# Patient Record
Sex: MALE | Race: WHITE | NOT HISPANIC OR LATINO | URBAN - METROPOLITAN AREA
[De-identification: names, ages, dates, MRNs, and addresses within clinical notes are randomized per-mention and may not be internally consistent; named-entity substitution may affect disease eponyms.]

---

## 2017-03-01 ENCOUNTER — INPATIENT (INPATIENT)
Facility: HOSPITAL | Age: 77
LOS: 2 days | Discharge: NURSING, REHAB OR LONG-TERM CARE | End: 2017-03-04
Attending: HOSPITALIST | Admitting: HOSPITALIST
Payer: MEDICARE

## 2017-03-01 VITALS
OXYGEN SATURATION: 95 % | RESPIRATION RATE: 16 BRPM | TEMPERATURE: 98 F | HEART RATE: 67 BPM | SYSTOLIC BLOOD PRESSURE: 151 MMHG | DIASTOLIC BLOOD PRESSURE: 83 MMHG

## 2017-03-01 DIAGNOSIS — Z29.9 ENCOUNTER FOR PROPHYLACTIC MEASURES, UNSPECIFIED: ICD-10-CM

## 2017-03-01 DIAGNOSIS — I63.9 CEREBRAL INFARCTION, UNSPECIFIED: ICD-10-CM

## 2017-03-01 DIAGNOSIS — I25.10 ATHEROSCLEROTIC HEART DISEASE OF NATIVE CORONARY ARTERY WITHOUT ANGINA PECTORIS: ICD-10-CM

## 2017-03-01 DIAGNOSIS — I15.9 SECONDARY HYPERTENSION, UNSPECIFIED: ICD-10-CM

## 2017-03-01 DIAGNOSIS — R26.81 UNSTEADINESS ON FEET: ICD-10-CM

## 2017-03-01 LAB
ALBUMIN SERPL ELPH-MCNC: 3.7 G/DL — SIGNIFICANT CHANGE UP (ref 3.3–5)
ALP SERPL-CCNC: 88 U/L — SIGNIFICANT CHANGE UP (ref 40–120)
ALT FLD-CCNC: 15 U/L — SIGNIFICANT CHANGE UP (ref 4–41)
APTT BLD: 27.8 SEC — SIGNIFICANT CHANGE UP (ref 27.5–37.4)
AST SERPL-CCNC: 19 U/L — SIGNIFICANT CHANGE UP (ref 4–40)
BASOPHILS # BLD AUTO: 0.03 K/UL — SIGNIFICANT CHANGE UP (ref 0–0.2)
BASOPHILS NFR BLD AUTO: 0.5 % — SIGNIFICANT CHANGE UP (ref 0–2)
BILIRUB SERPL-MCNC: 0.7 MG/DL — SIGNIFICANT CHANGE UP (ref 0.2–1.2)
BUN SERPL-MCNC: 18 MG/DL — SIGNIFICANT CHANGE UP (ref 7–23)
CALCIUM SERPL-MCNC: 9.1 MG/DL — SIGNIFICANT CHANGE UP (ref 8.4–10.5)
CHLORIDE SERPL-SCNC: 105 MMOL/L — SIGNIFICANT CHANGE UP (ref 98–107)
CO2 SERPL-SCNC: 23 MMOL/L — SIGNIFICANT CHANGE UP (ref 22–31)
CREAT SERPL-MCNC: 1.23 MG/DL — SIGNIFICANT CHANGE UP (ref 0.5–1.3)
EOSINOPHIL # BLD AUTO: 0.13 K/UL — SIGNIFICANT CHANGE UP (ref 0–0.5)
EOSINOPHIL NFR BLD AUTO: 2.1 % — SIGNIFICANT CHANGE UP (ref 0–6)
GLUCOSE SERPL-MCNC: 91 MG/DL — SIGNIFICANT CHANGE UP (ref 70–99)
HCT VFR BLD CALC: 40.5 % — SIGNIFICANT CHANGE UP (ref 39–50)
HGB BLD-MCNC: 13.2 G/DL — SIGNIFICANT CHANGE UP (ref 13–17)
IMM GRANULOCYTES NFR BLD AUTO: 0.2 % — SIGNIFICANT CHANGE UP (ref 0–1.5)
INR BLD: 1.06 — SIGNIFICANT CHANGE UP (ref 0.87–1.18)
LYMPHOCYTES # BLD AUTO: 1.68 K/UL — SIGNIFICANT CHANGE UP (ref 1–3.3)
LYMPHOCYTES # BLD AUTO: 27.4 % — SIGNIFICANT CHANGE UP (ref 13–44)
MCHC RBC-ENTMCNC: 30.8 PG — SIGNIFICANT CHANGE UP (ref 27–34)
MCHC RBC-ENTMCNC: 32.6 % — SIGNIFICANT CHANGE UP (ref 32–36)
MCV RBC AUTO: 94.6 FL — SIGNIFICANT CHANGE UP (ref 80–100)
MONOCYTES # BLD AUTO: 0.58 K/UL — SIGNIFICANT CHANGE UP (ref 0–0.9)
MONOCYTES NFR BLD AUTO: 9.4 % — SIGNIFICANT CHANGE UP (ref 2–14)
NEUTROPHILS # BLD AUTO: 3.71 K/UL — SIGNIFICANT CHANGE UP (ref 1.8–7.4)
NEUTROPHILS NFR BLD AUTO: 60.4 % — SIGNIFICANT CHANGE UP (ref 43–77)
PLATELET # BLD AUTO: 119 K/UL — LOW (ref 150–400)
PMV BLD: 11.3 FL — SIGNIFICANT CHANGE UP (ref 7–13)
POTASSIUM SERPL-MCNC: 4.4 MMOL/L — SIGNIFICANT CHANGE UP (ref 3.5–5.3)
POTASSIUM SERPL-SCNC: 4.4 MMOL/L — SIGNIFICANT CHANGE UP (ref 3.5–5.3)
PROT SERPL-MCNC: 7.4 G/DL — SIGNIFICANT CHANGE UP (ref 6–8.3)
PROTHROM AB SERPL-ACNC: 12.1 SEC — SIGNIFICANT CHANGE UP (ref 10–13.1)
RBC # BLD: 4.28 M/UL — SIGNIFICANT CHANGE UP (ref 4.2–5.8)
RBC # FLD: 16.4 % — HIGH (ref 10.3–14.5)
SODIUM SERPL-SCNC: 143 MMOL/L — SIGNIFICANT CHANGE UP (ref 135–145)
WBC # BLD: 6.14 K/UL — SIGNIFICANT CHANGE UP (ref 3.8–10.5)
WBC # FLD AUTO: 6.14 K/UL — SIGNIFICANT CHANGE UP (ref 3.8–10.5)

## 2017-03-01 PROCEDURE — 70450 CT HEAD/BRAIN W/O DYE: CPT | Mod: 26,59

## 2017-03-01 PROCEDURE — 99223 1ST HOSP IP/OBS HIGH 75: CPT | Mod: GC

## 2017-03-01 PROCEDURE — 70498 CT ANGIOGRAPHY NECK: CPT | Mod: 26,52

## 2017-03-01 PROCEDURE — 70496 CT ANGIOGRAPHY HEAD: CPT | Mod: 26,59

## 2017-03-01 PROCEDURE — 71020: CPT | Mod: 26

## 2017-03-01 RX ORDER — SIMVASTATIN 20 MG/1
20 TABLET, FILM COATED ORAL AT BEDTIME
Qty: 0 | Refills: 0 | Status: DISCONTINUED | OUTPATIENT
Start: 2017-03-01 | End: 2017-03-01

## 2017-03-01 RX ORDER — BUPROPION HYDROCHLORIDE 150 MG/1
150 TABLET, EXTENDED RELEASE ORAL
Qty: 0 | Refills: 0 | Status: DISCONTINUED | OUTPATIENT
Start: 2017-03-01 | End: 2017-03-04

## 2017-03-01 RX ORDER — HEPARIN SODIUM 5000 [USP'U]/ML
5000 INJECTION INTRAVENOUS; SUBCUTANEOUS EVERY 12 HOURS
Qty: 0 | Refills: 0 | Status: DISCONTINUED | OUTPATIENT
Start: 2017-03-01 | End: 2017-03-04

## 2017-03-01 RX ORDER — CLOPIDOGREL BISULFATE 75 MG/1
75 TABLET, FILM COATED ORAL DAILY
Qty: 0 | Refills: 0 | Status: DISCONTINUED | OUTPATIENT
Start: 2017-03-01 | End: 2017-03-04

## 2017-03-01 RX ORDER — ASPIRIN/CALCIUM CARB/MAGNESIUM 324 MG
81 TABLET ORAL DAILY
Qty: 0 | Refills: 0 | Status: DISCONTINUED | OUTPATIENT
Start: 2017-03-01 | End: 2017-03-04

## 2017-03-01 RX ORDER — NICOTINE POLACRILEX 2 MG
1 GUM BUCCAL DAILY
Qty: 0 | Refills: 0 | Status: DISCONTINUED | OUTPATIENT
Start: 2017-03-01 | End: 2017-03-04

## 2017-03-01 RX ORDER — SIMVASTATIN 20 MG/1
40 TABLET, FILM COATED ORAL AT BEDTIME
Qty: 0 | Refills: 0 | Status: DISCONTINUED | OUTPATIENT
Start: 2017-03-01 | End: 2017-03-04

## 2017-03-01 RX ADMIN — SIMVASTATIN 40 MILLIGRAM(S): 20 TABLET, FILM COATED ORAL at 22:44

## 2017-03-01 NOTE — H&P ADULT. - NEGATIVE OPHTHALMOLOGIC SYMPTOMS
no lacrimation L/no blurred vision L/no lacrimation R/no blurred vision R/no discharge R/no discharge L/no photophobia

## 2017-03-01 NOTE — H&P ADULT. - ASSESSMENT
76M admitted for unsteady gait due to b/l LE weakness r/o Stroke     Neuro consult ion the chart  NIHSS= 1

## 2017-03-01 NOTE — ED ADULT TRIAGE NOTE - CHIEF COMPLAINT QUOTE
Pt states that he has problems with his feet, that they are unsteady and he had multiple recent falls, last one yesterday. Denies injury from falls of head trauma. Pt on Plavix. Denies chest pain/palpitations/dizziness/SOB. Ambulatory in triage with cane Pt states that he has "problems" with his feet, that they are unsteady and he had multiple recent falls, last one yesterday. Denies injury from falls of head trauma. Pt on Plavix. Denies chest pain/palpitations/dizziness/SOB. Ambulatory in triage with cane. Denies foot pain.

## 2017-03-01 NOTE — ED PROVIDER NOTE - ATTENDING CONTRIBUTION TO CARE
73 yo male hx of stent also psychiatric hx gives hx of several episodes of isolated dizziness, not related to change of position, each episode happened while walking; denies focal numbness or weakness. Exam slow small steppage, mild increase tone of arms without gross cogwheeling no romberg and nl cerebellar testing. Imp: r/o movement disorder. Plan, given hx of Plavix, concerned about falling risk; CT of head, neuro consult

## 2017-03-01 NOTE — H&P ADULT. - PROBLEM SELECTOR PLAN 1
CM  F/U A1C, FLP, TSH, CBC, BMP in am   F/U TTE Bubble study,   F/U CTA H & N   F/U PT, OT, PMR  No need for permissive HTN  Okay to eat  Fall, Aspiration and seizure precautions

## 2017-03-01 NOTE — ED PROVIDER NOTE - PROGRESS NOTE DETAILS
Pt stable. Labs, ecg, imaging, neuro c/s appreciated. Pt has ppm, unable to get mri. Will admit to med on tele for further workup and management. Pt's pmd does not come here as per pt. Dr. Marie and MAR aware.  Vijay Fuller MD PGY-3

## 2017-03-01 NOTE — H&P ADULT. - NEGATIVE NEUROLOGICAL SYMPTOMS
no tremors/no generalized seizures/no syncope/no paresthesias/no vertigo/no focal seizures/no loss of sensation

## 2017-03-01 NOTE — ED PROVIDER NOTE - OBJECTIVE STATEMENT
77 y/o M with PMH of CAD (s/p stents), HTN p/w unsteadiness x 1 week. Pt reports multiple falls in the last  week, feels unsteady on his feet. Pt is on Plavix. Pt denies fevers, chills, nausea, vomiting, cough, dysuria, hematuria, numbness, weakness, vision changes, head trauma, LOC, chest pain, palpitations. Pt lives alone at home.

## 2017-03-01 NOTE — ED PROVIDER NOTE - MEDICAL DECISION MAKING DETAILS
unsteadiness, likely neurological, possibly early parkinson's, labs, ct, cxr, ecg, neuro c/s, reassess

## 2017-03-01 NOTE — H&P ADULT. - ATTENDING COMMENTS
-pt seen and examined by me  -75 yo m with 2-3 falls over last week that he attributes to leg giving out; denies periepisodic  cp, sob, lightheadedness, dizziness, palpitations. Denies recent changes to meds or dosages, denies decreases po intake, denies nausea, vomiting, diarrhea. Currently manifesting 5/5 strength throughout  -cont tele; CTA of head and neck ordered as pt with PPM  -would ensure PPM current with interrogation  -d/w tele pa -pt seen and examined by me  -75 yo m with 2-3 falls over last week that he attributes to leg giving out; denies periepisodic  cp, sob, lightheadedness, dizziness, palpitations. Denies recent changes to meds or dosages, denies decreases po intake, denies nausea, vomiting, diarrhea. Currently manifesting 5/5 strength throughout  -cont tele; CTA of head and neck ordered as pt with PPM  -would ensure PPM current with interrogation; pt unsure when last interrogated or by whom; brother may know; would try contact brother Johnson at [phone]  -d/w tele pa

## 2017-03-01 NOTE — H&P ADULT. - NEGATIVE ENMT SYMPTOMS
no abnormal taste sensation/no sinus symptoms/no recurrent cold sores/no nose bleeds/no nasal discharge/no vertigo

## 2017-03-02 DIAGNOSIS — Z95.0 PRESENCE OF CARDIAC PACEMAKER: ICD-10-CM

## 2017-03-02 LAB
BUN SERPL-MCNC: 14 MG/DL — SIGNIFICANT CHANGE UP (ref 7–23)
CALCIUM SERPL-MCNC: 9.3 MG/DL — SIGNIFICANT CHANGE UP (ref 8.4–10.5)
CHLORIDE SERPL-SCNC: 101 MMOL/L — SIGNIFICANT CHANGE UP (ref 98–107)
CHOLEST SERPL-MCNC: 115 MG/DL — LOW (ref 120–199)
CO2 SERPL-SCNC: 22 MMOL/L — SIGNIFICANT CHANGE UP (ref 22–31)
CREAT SERPL-MCNC: 1.1 MG/DL — SIGNIFICANT CHANGE UP (ref 0.5–1.3)
GLUCOSE SERPL-MCNC: 101 MG/DL — HIGH (ref 70–99)
HBA1C BLD-MCNC: 5.5 % — SIGNIFICANT CHANGE UP (ref 4–5.6)
HCT VFR BLD CALC: 42.2 % — SIGNIFICANT CHANGE UP (ref 39–50)
HDLC SERPL-MCNC: 43 MG/DL — SIGNIFICANT CHANGE UP (ref 35–55)
HGB BLD-MCNC: 13.8 G/DL — SIGNIFICANT CHANGE UP (ref 13–17)
LIPID PNL WITH DIRECT LDL SERPL: 57 MG/DL — SIGNIFICANT CHANGE UP
MCHC RBC-ENTMCNC: 30.5 PG — SIGNIFICANT CHANGE UP (ref 27–34)
MCHC RBC-ENTMCNC: 32.7 % — SIGNIFICANT CHANGE UP (ref 32–36)
MCV RBC AUTO: 93.4 FL — SIGNIFICANT CHANGE UP (ref 80–100)
PLATELET # BLD AUTO: 124 K/UL — LOW (ref 150–400)
PMV BLD: 11.3 FL — SIGNIFICANT CHANGE UP (ref 7–13)
POTASSIUM SERPL-MCNC: 4.4 MMOL/L — SIGNIFICANT CHANGE UP (ref 3.5–5.3)
POTASSIUM SERPL-SCNC: 4.4 MMOL/L — SIGNIFICANT CHANGE UP (ref 3.5–5.3)
RBC # BLD: 4.52 M/UL — SIGNIFICANT CHANGE UP (ref 4.2–5.8)
RBC # FLD: 16.3 % — HIGH (ref 10.3–14.5)
SODIUM SERPL-SCNC: 139 MMOL/L — SIGNIFICANT CHANGE UP (ref 135–145)
TRIGL SERPL-MCNC: 79 MG/DL — SIGNIFICANT CHANGE UP (ref 10–149)
TSH SERPL-MCNC: 2.89 UIU/ML — SIGNIFICANT CHANGE UP (ref 0.27–4.2)
WBC # BLD: 7.57 K/UL — SIGNIFICANT CHANGE UP (ref 3.8–10.5)
WBC # FLD AUTO: 7.57 K/UL — SIGNIFICANT CHANGE UP (ref 3.8–10.5)

## 2017-03-02 PROCEDURE — 72125 CT NECK SPINE W/O DYE: CPT | Mod: 26

## 2017-03-02 PROCEDURE — 99233 SBSQ HOSP IP/OBS HIGH 50: CPT

## 2017-03-02 PROCEDURE — 72128 CT CHEST SPINE W/O DYE: CPT | Mod: 26

## 2017-03-02 PROCEDURE — 99222 1ST HOSP IP/OBS MODERATE 55: CPT | Mod: GC

## 2017-03-02 PROCEDURE — 72131 CT LUMBAR SPINE W/O DYE: CPT | Mod: 26

## 2017-03-02 RX ORDER — AMLODIPINE BESYLATE 2.5 MG/1
5 TABLET ORAL DAILY
Qty: 0 | Refills: 0 | Status: DISCONTINUED | OUTPATIENT
Start: 2017-03-02 | End: 2017-03-03

## 2017-03-02 RX ORDER — INFLUENZA VIRUS VACCINE 15; 15; 15; 15 UG/.5ML; UG/.5ML; UG/.5ML; UG/.5ML
0.5 SUSPENSION INTRAMUSCULAR ONCE
Qty: 0 | Refills: 0 | Status: COMPLETED | OUTPATIENT
Start: 2017-03-02 | End: 2017-03-02

## 2017-03-02 RX ADMIN — HEPARIN SODIUM 5000 UNIT(S): 5000 INJECTION INTRAVENOUS; SUBCUTANEOUS at 19:32

## 2017-03-02 RX ADMIN — BUPROPION HYDROCHLORIDE 150 MILLIGRAM(S): 150 TABLET, EXTENDED RELEASE ORAL at 19:32

## 2017-03-02 RX ADMIN — Medication 81 MILLIGRAM(S): at 12:37

## 2017-03-02 RX ADMIN — HEPARIN SODIUM 5000 UNIT(S): 5000 INJECTION INTRAVENOUS; SUBCUTANEOUS at 05:22

## 2017-03-02 RX ADMIN — CLOPIDOGREL BISULFATE 75 MILLIGRAM(S): 75 TABLET, FILM COATED ORAL at 12:37

## 2017-03-02 RX ADMIN — BUPROPION HYDROCHLORIDE 150 MILLIGRAM(S): 150 TABLET, EXTENDED RELEASE ORAL at 05:22

## 2017-03-02 RX ADMIN — Medication 1 PATCH: at 12:37

## 2017-03-02 RX ADMIN — SIMVASTATIN 40 MILLIGRAM(S): 20 TABLET, FILM COATED ORAL at 22:33

## 2017-03-02 NOTE — OCCUPATIONAL THERAPY INITIAL EVALUATION ADULT - PERTINENT HX OF CURRENT PROBLEM, REHAB EVAL
76M that ambulates with a cane and has a history of HTN, CAD, St Denis PPM, daily cigarette smoker has experienced biateral LE weakness for the past week, with an atraumatic fall a few days ago. The pt says, he is able to ambulate. But feels unsteady on his feet due to the bilateral LE weakness.  Positive diaphoresis. CT Head (-).

## 2017-03-02 NOTE — PHYSICAL THERAPY INITIAL EVALUATION ADULT - GAIT DEVIATIONS NOTED, PT EVAL
increased right lateral lean ; decreased balance during turns./decreased step length/decreased ginger

## 2017-03-02 NOTE — OCCUPATIONAL THERAPY INITIAL EVALUATION ADULT - PLANNED THERAPY INTERVENTIONS, OT EVAL
transfer training/motor coordination training/balance training/ADL retraining/bed mobility training/fine motor coordination training/neuromuscular re-education/strengthening/ROM

## 2017-03-02 NOTE — PHYSICAL THERAPY INITIAL EVALUATION ADULT - ACTIVE RANGE OF MOTION EXAMINATION, REHAB EVAL
except left shoulder flexion 0-145 degrees/bilateral upper extremity Active ROM was WFL (within functional limits)

## 2017-03-02 NOTE — OCCUPATIONAL THERAPY INITIAL EVALUATION ADULT - DIAGNOSIS, OT EVAL
Left eyelid droop, Decreased standing balance, decreased functional mobility Left eyelid droop, decreased coordination, decreased standing balance, decreased functional mobility, decreased ADL independence.

## 2017-03-02 NOTE — PHYSICAL THERAPY INITIAL EVALUATION ADULT - PERTINENT HX OF CURRENT PROBLEM, REHAB EVAL
76M that ambulates with a cane and has a history of HTN, CAD, St Denis PPM, daily cigarette smoker has experienced b/l LE weakness for the past week, with an atraumatic fall a few days ago. The pt says, he is able to ambulate. But feels unsteady on his feet due to the b/l LE weakness.  CT head negative for acute findings.

## 2017-03-02 NOTE — OCCUPATIONAL THERAPY INITIAL EVALUATION ADULT - MD ORDER
Occupational Therapy to evaluate and treat. Occupational Therapy to evaluate and treat.  OOB with assistance.

## 2017-03-02 NOTE — PHYSICAL THERAPY INITIAL EVALUATION ADULT - GENERAL OBSERVATIONS, REHAB EVAL
Pt found supine in bed in NAD; +telemetry monitor; left eyelid droop noted; decreased bilateral visual tracking.

## 2017-03-02 NOTE — PHYSICAL THERAPY INITIAL EVALUATION ADULT - PLANNED THERAPY INTERVENTIONS, PT EVAL
balance training/Pt left sitting in chair in NAD; call bell in reach; NORA Bhardwaj made aware./gait training/bed mobility training/strengthening/transfer training

## 2017-03-02 NOTE — OCCUPATIONAL THERAPY INITIAL EVALUATION ADULT - LIVES WITH, PROFILE
alone Pt lives in apartment with elevator access. Pt reports there are 4 steps to enter building./alone

## 2017-03-03 ENCOUNTER — TRANSCRIPTION ENCOUNTER (OUTPATIENT)
Age: 77
End: 2017-03-03

## 2017-03-03 LAB
BUN SERPL-MCNC: 19 MG/DL — SIGNIFICANT CHANGE UP (ref 7–23)
CALCIUM SERPL-MCNC: 8.9 MG/DL — SIGNIFICANT CHANGE UP (ref 8.4–10.5)
CHLORIDE SERPL-SCNC: 106 MMOL/L — SIGNIFICANT CHANGE UP (ref 98–107)
CO2 SERPL-SCNC: 22 MMOL/L — SIGNIFICANT CHANGE UP (ref 22–31)
CREAT SERPL-MCNC: 1.01 MG/DL — SIGNIFICANT CHANGE UP (ref 0.5–1.3)
GLUCOSE SERPL-MCNC: 103 MG/DL — HIGH (ref 70–99)
HCT VFR BLD CALC: 41.8 % — SIGNIFICANT CHANGE UP (ref 39–50)
HGB BLD-MCNC: 13.8 G/DL — SIGNIFICANT CHANGE UP (ref 13–17)
HIV1 AG SER QL: SIGNIFICANT CHANGE UP
HIV1+2 AB SPEC QL: SIGNIFICANT CHANGE UP
MCHC RBC-ENTMCNC: 30.8 PG — SIGNIFICANT CHANGE UP (ref 27–34)
MCHC RBC-ENTMCNC: 33 % — SIGNIFICANT CHANGE UP (ref 32–36)
MCV RBC AUTO: 93.3 FL — SIGNIFICANT CHANGE UP (ref 80–100)
PLATELET # BLD AUTO: 114 K/UL — LOW (ref 150–400)
PMV BLD: 11.1 FL — SIGNIFICANT CHANGE UP (ref 7–13)
POTASSIUM SERPL-MCNC: 4.1 MMOL/L — SIGNIFICANT CHANGE UP (ref 3.5–5.3)
POTASSIUM SERPL-SCNC: 4.1 MMOL/L — SIGNIFICANT CHANGE UP (ref 3.5–5.3)
RBC # BLD: 4.48 M/UL — SIGNIFICANT CHANGE UP (ref 4.2–5.8)
RBC # FLD: 16.2 % — HIGH (ref 10.3–14.5)
RPR SER-TITR: SIGNIFICANT CHANGE UP
RPR SERPL-ACNC: REACTIVE — SIGNIFICANT CHANGE UP
SODIUM SERPL-SCNC: 141 MMOL/L — SIGNIFICANT CHANGE UP (ref 135–145)
T PALLIDUM AB TITR SER: POSITIVE — SIGNIFICANT CHANGE UP
WBC # BLD: 5.4 K/UL — SIGNIFICANT CHANGE UP (ref 3.8–10.5)
WBC # FLD AUTO: 5.4 K/UL — SIGNIFICANT CHANGE UP (ref 3.8–10.5)

## 2017-03-03 PROCEDURE — 99232 SBSQ HOSP IP/OBS MODERATE 35: CPT

## 2017-03-03 RX ORDER — SIMVASTATIN 20 MG/1
1 TABLET, FILM COATED ORAL
Qty: 0 | Refills: 0 | COMMUNITY
Start: 2017-03-03

## 2017-03-03 RX ORDER — CLOPIDOGREL BISULFATE 75 MG/1
1 TABLET, FILM COATED ORAL
Qty: 0 | Refills: 0 | COMMUNITY

## 2017-03-03 RX ORDER — SIMVASTATIN 20 MG/1
1 TABLET, FILM COATED ORAL
Qty: 0 | Refills: 0 | COMMUNITY

## 2017-03-03 RX ORDER — AMLODIPINE BESYLATE 2.5 MG/1
1 TABLET ORAL
Qty: 0 | Refills: 0 | COMMUNITY
Start: 2017-03-03

## 2017-03-03 RX ORDER — NICOTINE POLACRILEX 2 MG
1 GUM BUCCAL
Qty: 0 | Refills: 0 | COMMUNITY
Start: 2017-03-03

## 2017-03-03 RX ORDER — AMLODIPINE BESYLATE 2.5 MG/1
10 TABLET ORAL DAILY
Qty: 0 | Refills: 0 | Status: DISCONTINUED | OUTPATIENT
Start: 2017-03-03 | End: 2017-03-04

## 2017-03-03 RX ORDER — CLOPIDOGREL BISULFATE 75 MG/1
1 TABLET, FILM COATED ORAL
Qty: 0 | Refills: 0 | COMMUNITY
Start: 2017-03-03

## 2017-03-03 RX ORDER — ASPIRIN/CALCIUM CARB/MAGNESIUM 324 MG
1 TABLET ORAL
Qty: 0 | Refills: 0 | COMMUNITY
Start: 2017-03-03

## 2017-03-03 RX ADMIN — Medication 81 MILLIGRAM(S): at 12:33

## 2017-03-03 RX ADMIN — HEPARIN SODIUM 5000 UNIT(S): 5000 INJECTION INTRAVENOUS; SUBCUTANEOUS at 18:14

## 2017-03-03 RX ADMIN — BUPROPION HYDROCHLORIDE 150 MILLIGRAM(S): 150 TABLET, EXTENDED RELEASE ORAL at 18:14

## 2017-03-03 RX ADMIN — SIMVASTATIN 40 MILLIGRAM(S): 20 TABLET, FILM COATED ORAL at 21:18

## 2017-03-03 RX ADMIN — AMLODIPINE BESYLATE 5 MILLIGRAM(S): 2.5 TABLET ORAL at 05:37

## 2017-03-03 RX ADMIN — HEPARIN SODIUM 5000 UNIT(S): 5000 INJECTION INTRAVENOUS; SUBCUTANEOUS at 05:37

## 2017-03-03 RX ADMIN — BUPROPION HYDROCHLORIDE 150 MILLIGRAM(S): 150 TABLET, EXTENDED RELEASE ORAL at 05:37

## 2017-03-03 RX ADMIN — Medication 1 PATCH: at 12:30

## 2017-03-03 RX ADMIN — AMLODIPINE BESYLATE 10 MILLIGRAM(S): 2.5 TABLET ORAL at 12:33

## 2017-03-03 RX ADMIN — Medication 1 PATCH: at 12:33

## 2017-03-03 RX ADMIN — CLOPIDOGREL BISULFATE 75 MILLIGRAM(S): 75 TABLET, FILM COATED ORAL at 12:33

## 2017-03-03 NOTE — DISCHARGE NOTE ADULT - ADDITIONAL INSTRUCTIONS
Follow up with your primary care doctor. Follow up with your primary care doctor. within 1 week. Call for an appointment

## 2017-03-03 NOTE — DISCHARGE NOTE ADULT - PLAN OF CARE
Continue medications as prescribed. Rehab. Follow up with your primary care doctor. Monitor blood pressure. Continue medications as prescribed. Low sodium diet.

## 2017-03-03 NOTE — DISCHARGE NOTE ADULT - CARE PROVIDERS DIRECT ADDRESSES
,[email or web address],DirectAddress_Unknown [email or web address],DirectAddress_Unknown,DirectAddress_Unknown

## 2017-03-03 NOTE — DISCHARGE NOTE ADULT - CONDITIONS AT DISCHARGE
Alert and oriented X4. No distress noted. No complaint of pain. Pt will be transferred to [facility] via [facility] accompanied by 1 attendee. IV removed. Discharge paperwork placed in pt envelope and given to [facility] Attendee.

## 2017-03-03 NOTE — DISCHARGE NOTE ADULT - CARE PLAN
Principal Discharge DX:	Unsteady gait  Goal:	Continue medications as prescribed. Rehab.  Instructions for follow-up, activity and diet:	Follow up with your primary care doctor.  Secondary Diagnosis:	HTN (hypertension)  Goal:	Monitor blood pressure. Continue medications as prescribed.  Instructions for follow-up, activity and diet:	Low sodium diet.  Secondary Diagnosis:	Pacemaker  Goal:	Follow up with your primary care doctor.

## 2017-03-03 NOTE — DISCHARGE NOTE ADULT - PATIENT PORTAL LINK FT
“You can access the [facility] Patient Portal, offered by , by registering with the following website: [email or web address]”

## 2017-03-03 NOTE — DISCHARGE NOTE ADULT - MEDICATION SUMMARY - MEDICATIONS TO TAKE
I will START or STAY ON the medications listed below when I get home from the hospital:    aspirin 81 mg oral delayed release tablet  -- 1 tab(s) by mouth once a day  -- Indication: For Prophylactic measure    simvastatin 40 mg oral tablet  -- 1 tab(s) by mouth once a day (at bedtime)  -- Indication: For Hyperlipidemia    clopidogrel 75 mg oral tablet  -- 1 tab(s) by mouth once a day  -- Indication: For CAD (coronary artery disease)    amLODIPine 10 mg oral tablet  -- 1 tab(s) by mouth once a day  -- Indication: For Hypertension    nicotine 21 mg/24 hr transdermal film, extended release  -- 1 patch by transdermal patch once a day  -- Indication: For Smoking Cessation    buPROPion 300 mg/24 hours (XL) oral tablet, extended release  -- 1 tab(s) by mouth every 24 hours  -- Pharmacy records show:  2/3- Bupropion 150mg  2/11- Bupropion 100mg   2/22- Bupropion 300mg XL  -- Indication: For Smoking cessation

## 2017-03-03 NOTE — DISCHARGE NOTE ADULT - HOSPITAL COURSE
77 y/o Male, that ambulates with a cane and has a history of HTN, CAD, St Denis PPM, daily cigarette smoker, has experienced b/l LE weakness for the past week, with an atraumatic fall a few days ago. The pt says, he is able to ambulate. But feels unsteady on his feet due to the b/l LE weakness.  Positive diaphoresis. He denies dizziness, HA, blurred vision, tinnitus , SOB, chest pain, palpitations, nausea, vomit, chills.  He asked his brother to take him to the Ed due to the B/.L LE not improving. Pt was admitted to telemetry for r/o cva.    On admission, EKG done showed A-paced @ 60, RBBB, LVH, Tinv V6, AVL. CT Head done showed no gross evidence for calvarial fracture, acute intracranial hemorrhage, or acute infarct. If the patient remains symptomatic, consider short interval follow-up study. CXR done showed left sided dual-lead pacemaker. The lungs are free of any focal abnormalities. The heart is not enlarged and there are no pleural effusions. House Neuro c/s done and showed a NIHSS of 1 and rec CTA Head/Neck since patient has a PPM. CTA Head/Neck done showed atherosclerotic calcification of the cavernous and supraclinoid  internal carotid arteries bilaterally. No significant stenosis or occlusion of the carotid arteries, vertebral arteries, or cerebral arteries. CT Chest/Thoracic/C-Spine done showed a demineralization of the bones are seen. Degenerative changes as described above. Compression fracture seen involving the T7 vertebral body as described above. House ID c/s done.  Pt comfortable at this time. No evidence of an acute CVA. 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 Holding off on PCN for now. Pt is medically cleared for discharge to Rehab @ [facility]. Pt will need outpatient follow up. 75 y/o Male, that ambulates with a cane and has a history of HTN, CAD, St Denis PPM, daily cigarette smoker, has experienced b/l LE weakness for the past week, with an atraumatic fall a few days ago. The pt says, he is able to ambulate. But feels unsteady on his feet due to the b/l LE weakness.  Positive diaphoresis. He denies dizziness, HA, blurred vision, tinnitus , SOB, chest pain, palpitations, nausea, vomit, chills.  He asked his brother to take him to the Ed due to the B/.L LE not improving. Pt was admitted to telemetry for r/o cva.    On admission, EKG done showed A-paced @ 60, RBBB, LVH, Tinv V6, AVL. CT Head done showed no gross evidence for calvarial fracture, acute intracranial hemorrhage, or acute infarct. If the patient remains symptomatic, consider short interval follow-up study. CXR done showed left sided dual-lead pacemaker. The lungs are free of any focal abnormalities. The heart is not enlarged and there are no pleural effusions. [place] Neuro c/s done and showed a NIHSS of 1 and rec CTA Head/Neck since patient has a PPM. CTA Head/Neck done showed atherosclerotic calcification of the cavernous and supraclinoid  internal carotid arteries bilaterally. No significant stenosis or occlusion of the carotid arteries, vertebral arteries, or cerebral arteries. CT Chest/Thoracic/C-Spine done showed a demineralization of the bones are seen. Degenerative changes as described above. Compression fracture seen involving the T7 vertebral body as described above. House ID c/s done as patient had history of late latent syphilis and to rule out neurosyphilis as the cause of his ataxia: unlkely to be neurosyphillis and RPR titers have been stable for over a 1 year.. Holding off on PCN for now. Pt is medically cleared for discharge to Rehab @ [facility]. Pt will need outpatient follow up with ID (Dr. Rich), neuro (Dr. Moise) and Cardio (Dr. Martinez)

## 2017-03-03 NOTE — DISCHARGE NOTE ADULT - MEDICATION SUMMARY - MEDICATIONS TO CHANGE
I will SWITCH the dose or number of times a day I take the medications listed below when I get home from the hospital:    simvastatin 20 mg oral tablet  -- 1 tab(s) by mouth once a day (at bedtime)

## 2017-03-03 NOTE — DISCHARGE NOTE ADULT - CARE PROVIDER_API CALL
Lakhwinder Martinez), Cardiovascular Disease; Internal Medicine  [address] [place] [address]  Phone: [phone]  Fax: [phone] Lakhwinder Martinez), Cardiovascular Disease; Internal Medicine  [address] [place] [address]  Phone: [phone]  Fax: [phone]    Mariaa Rich (MD), Infectious Disease; Internal Medicine  [address] [place] [address]  Phone: [phone]  Fax: [phone]    Juan M Moise (DO), Neurology  [address] [place] [address]  Phone: [phone]  Fax: [phone]

## 2017-03-04 VITALS
SYSTOLIC BLOOD PRESSURE: 166 MMHG | TEMPERATURE: 98 F | OXYGEN SATURATION: 98 % | HEART RATE: 69 BPM | DIASTOLIC BLOOD PRESSURE: 70 MMHG | RESPIRATION RATE: 17 BRPM

## 2017-03-04 LAB
BUN SERPL-MCNC: 20 MG/DL — SIGNIFICANT CHANGE UP (ref 7–23)
CALCIUM SERPL-MCNC: 8.8 MG/DL — SIGNIFICANT CHANGE UP (ref 8.4–10.5)
CHLORIDE SERPL-SCNC: 105 MMOL/L — SIGNIFICANT CHANGE UP (ref 98–107)
CO2 SERPL-SCNC: 21 MMOL/L — LOW (ref 22–31)
CREAT SERPL-MCNC: 1.08 MG/DL — SIGNIFICANT CHANGE UP (ref 0.5–1.3)
GLUCOSE SERPL-MCNC: 106 MG/DL — HIGH (ref 70–99)
HCT VFR BLD CALC: 41.7 % — SIGNIFICANT CHANGE UP (ref 39–50)
HGB BLD-MCNC: 13.7 G/DL — SIGNIFICANT CHANGE UP (ref 13–17)
MCHC RBC-ENTMCNC: 30.9 PG — SIGNIFICANT CHANGE UP (ref 27–34)
MCHC RBC-ENTMCNC: 32.9 % — SIGNIFICANT CHANGE UP (ref 32–36)
MCV RBC AUTO: 94.1 FL — SIGNIFICANT CHANGE UP (ref 80–100)
PLATELET # BLD AUTO: 116 K/UL — LOW (ref 150–400)
PMV BLD: 11.6 FL — SIGNIFICANT CHANGE UP (ref 7–13)
POTASSIUM SERPL-MCNC: 4.3 MMOL/L — SIGNIFICANT CHANGE UP (ref 3.5–5.3)
POTASSIUM SERPL-SCNC: 4.3 MMOL/L — SIGNIFICANT CHANGE UP (ref 3.5–5.3)
RBC # BLD: 4.43 M/UL — SIGNIFICANT CHANGE UP (ref 4.2–5.8)
RBC # FLD: 16.1 % — HIGH (ref 10.3–14.5)
SODIUM SERPL-SCNC: 140 MMOL/L — SIGNIFICANT CHANGE UP (ref 135–145)
WBC # BLD: 5.6 K/UL — SIGNIFICANT CHANGE UP (ref 3.8–10.5)
WBC # FLD AUTO: 5.6 K/UL — SIGNIFICANT CHANGE UP (ref 3.8–10.5)

## 2017-03-04 PROCEDURE — 99239 HOSP IP/OBS DSCHRG MGMT >30: CPT

## 2017-03-04 RX ADMIN — AMLODIPINE BESYLATE 10 MILLIGRAM(S): 2.5 TABLET ORAL at 06:02

## 2017-03-04 RX ADMIN — HEPARIN SODIUM 5000 UNIT(S): 5000 INJECTION INTRAVENOUS; SUBCUTANEOUS at 06:02

## 2017-03-04 RX ADMIN — BUPROPION HYDROCHLORIDE 150 MILLIGRAM(S): 150 TABLET, EXTENDED RELEASE ORAL at 06:02

## 2017-03-04 NOTE — PROVIDER CONTACT NOTE (OTHER) - SITUATION
Patient refused vital signs assessment during the night. Continues to refuse vital signs, cardiac monitor placement, and blood draw.
Patient BP/Orthostatic results, drop from sitting to standing readings

## 2017-03-04 NOTE — PROVIDER CONTACT NOTE (OTHER) - BACKGROUND
Patient had negative orthos on previous evaluations
Alert and oriented x4, admitted to telemetry for chest pain

## 2017-03-09 LAB — ACHR BIND AB SER-SCNC: 0 — SIGNIFICANT CHANGE UP

## 2017-05-31 ENCOUNTER — INPATIENT (INPATIENT)
Facility: HOSPITAL | Age: 77
LOS: 1 days | Discharge: HOME | End: 2017-06-02
Attending: INTERNAL MEDICINE | Admitting: INTERNAL MEDICINE
Payer: MEDICARE

## 2017-05-31 VITALS
TEMPERATURE: 98 F | SYSTOLIC BLOOD PRESSURE: 170 MMHG | HEART RATE: 58 BPM | DIASTOLIC BLOOD PRESSURE: 79 MMHG | OXYGEN SATURATION: 98 % | RESPIRATION RATE: 20 BRPM
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
## 2017-05-31 DIAGNOSIS — F01.50 VASCULAR DEMENTIA WITHOUT BEHAVIORAL DISTURBANCE: ICD-10-CM

## 2017-05-31 DIAGNOSIS — L98.9 DISORDER OF THE SKIN AND SUBCUTANEOUS TISSUE, UNSPECIFIED: ICD-10-CM

## 2017-05-31 DIAGNOSIS — Z90.49 ACQUIRED ABSENCE OF OTHER SPECIFIED PARTS OF DIGESTIVE TRACT: Chronic | ICD-10-CM

## 2017-05-31 DIAGNOSIS — Z98.890 OTHER SPECIFIED POSTPROCEDURAL STATES: Chronic | ICD-10-CM

## 2017-05-31 DIAGNOSIS — Z29.9 ENCOUNTER FOR PROPHYLACTIC MEASURES, UNSPECIFIED: ICD-10-CM

## 2017-05-31 DIAGNOSIS — I25.10 ATHEROSCLEROTIC HEART DISEASE OF NATIVE CORONARY ARTERY WITHOUT ANGINA PECTORIS: ICD-10-CM

## 2017-05-31 DIAGNOSIS — I10 ESSENTIAL (PRIMARY) HYPERTENSION: ICD-10-CM

## 2017-05-31 LAB
ALBUMIN SERPL ELPH-MCNC: 3.5 G/DL — SIGNIFICANT CHANGE UP (ref 3.3–5)
ALP SERPL-CCNC: 81 U/L — SIGNIFICANT CHANGE UP (ref 40–120)
ALT FLD-CCNC: 14 U/L — SIGNIFICANT CHANGE UP (ref 4–41)
AST SERPL-CCNC: 13 U/L — SIGNIFICANT CHANGE UP (ref 4–40)
BASOPHILS # BLD AUTO: 0.03 K/UL — SIGNIFICANT CHANGE UP (ref 0–0.2)
BASOPHILS NFR BLD AUTO: 0.4 % — SIGNIFICANT CHANGE UP (ref 0–2)
BILIRUB SERPL-MCNC: 0.3 MG/DL — SIGNIFICANT CHANGE UP (ref 0.2–1.2)
BUN SERPL-MCNC: 13 MG/DL — SIGNIFICANT CHANGE UP (ref 7–23)
CALCIUM SERPL-MCNC: 8.7 MG/DL — SIGNIFICANT CHANGE UP (ref 8.4–10.5)
CHLORIDE SERPL-SCNC: 102 MMOL/L — SIGNIFICANT CHANGE UP (ref 98–107)
CK MB BLD-MCNC: 2.23 NG/ML — SIGNIFICANT CHANGE UP (ref 1–6.6)
CK MB BLD-MCNC: SIGNIFICANT CHANGE UP (ref 0–2.5)
CK SERPL-CCNC: 57 U/L — SIGNIFICANT CHANGE UP (ref 30–200)
CO2 SERPL-SCNC: 24 MMOL/L — SIGNIFICANT CHANGE UP (ref 22–31)
CREAT SERPL-MCNC: 0.98 MG/DL — SIGNIFICANT CHANGE UP (ref 0.5–1.3)
EOSINOPHIL # BLD AUTO: 0.28 K/UL — SIGNIFICANT CHANGE UP (ref 0–0.5)
EOSINOPHIL NFR BLD AUTO: 4 % — SIGNIFICANT CHANGE UP (ref 0–6)
GLUCOSE SERPL-MCNC: 88 MG/DL — SIGNIFICANT CHANGE UP (ref 70–99)
HCT VFR BLD CALC: 40 % — SIGNIFICANT CHANGE UP (ref 39–50)
HGB BLD-MCNC: 12.9 G/DL — LOW (ref 13–17)
IMM GRANULOCYTES NFR BLD AUTO: 0.1 % — SIGNIFICANT CHANGE UP (ref 0–1.5)
LYMPHOCYTES # BLD AUTO: 1.6 K/UL — SIGNIFICANT CHANGE UP (ref 1–3.3)
LYMPHOCYTES # BLD AUTO: 22.9 % — SIGNIFICANT CHANGE UP (ref 13–44)
MCHC RBC-ENTMCNC: 30.9 PG — SIGNIFICANT CHANGE UP (ref 27–34)
MCHC RBC-ENTMCNC: 32.3 % — SIGNIFICANT CHANGE UP (ref 32–36)
MCV RBC AUTO: 95.7 FL — SIGNIFICANT CHANGE UP (ref 80–100)
MONOCYTES # BLD AUTO: 0.79 K/UL — SIGNIFICANT CHANGE UP (ref 0–0.9)
MONOCYTES NFR BLD AUTO: 11.3 % — SIGNIFICANT CHANGE UP (ref 2–14)
NEUTROPHILS # BLD AUTO: 4.27 K/UL — SIGNIFICANT CHANGE UP (ref 1.8–7.4)
NEUTROPHILS NFR BLD AUTO: 61.3 % — SIGNIFICANT CHANGE UP (ref 43–77)
PLATELET # BLD AUTO: 130 K/UL — LOW (ref 150–400)
PMV BLD: 10.9 FL — SIGNIFICANT CHANGE UP (ref 7–13)
POTASSIUM SERPL-MCNC: 4.3 MMOL/L — SIGNIFICANT CHANGE UP (ref 3.5–5.3)
POTASSIUM SERPL-SCNC: 4.3 MMOL/L — SIGNIFICANT CHANGE UP (ref 3.5–5.3)
PROT SERPL-MCNC: 7 G/DL — SIGNIFICANT CHANGE UP (ref 6–8.3)
RBC # BLD: 4.18 M/UL — LOW (ref 4.2–5.8)
RBC # FLD: 15 % — HIGH (ref 10.3–14.5)
SODIUM SERPL-SCNC: 138 MMOL/L — SIGNIFICANT CHANGE UP (ref 135–145)
TROPONIN T SERPL-MCNC: < 0.06 NG/ML — SIGNIFICANT CHANGE UP (ref 0–0.06)
WBC # BLD: 6.98 K/UL — SIGNIFICANT CHANGE UP (ref 3.8–10.5)
WBC # FLD AUTO: 6.98 K/UL — SIGNIFICANT CHANGE UP (ref 3.8–10.5)

## 2017-05-31 PROCEDURE — 71020: CPT | Mod: 26

## 2017-05-31 PROCEDURE — 99223 1ST HOSP IP/OBS HIGH 75: CPT

## 2017-05-31 PROCEDURE — 70450 CT HEAD/BRAIN W/O DYE: CPT | Mod: 26

## 2017-05-31 RX ORDER — AMLODIPINE BESYLATE 2.5 MG/1
10 TABLET ORAL DAILY
Qty: 0 | Refills: 0 | Status: DISCONTINUED | OUTPATIENT
Start: 2017-05-31 | End: 2017-06-02

## 2017-05-31 RX ORDER — CLOPIDOGREL BISULFATE 75 MG/1
75 TABLET, FILM COATED ORAL DAILY
Qty: 0 | Refills: 0 | Status: DISCONTINUED | OUTPATIENT
Start: 2017-05-31 | End: 2017-06-02

## 2017-05-31 RX ORDER — HEPARIN SODIUM 5000 [USP'U]/ML
5000 INJECTION INTRAVENOUS; SUBCUTANEOUS EVERY 8 HOURS
Qty: 0 | Refills: 0 | Status: DISCONTINUED | OUTPATIENT
Start: 2017-05-31 | End: 2017-06-02

## 2017-05-31 RX ORDER — ASPIRIN/CALCIUM CARB/MAGNESIUM 324 MG
81 TABLET ORAL DAILY
Qty: 0 | Refills: 0 | Status: DISCONTINUED | OUTPATIENT
Start: 2017-05-31 | End: 2017-06-02

## 2017-05-31 RX ORDER — SIMVASTATIN 20 MG/1
40 TABLET, FILM COATED ORAL AT BEDTIME
Qty: 0 | Refills: 0 | Status: DISCONTINUED | OUTPATIENT
Start: 2017-05-31 | End: 2017-06-01

## 2017-05-31 RX ORDER — BUPROPION HYDROCHLORIDE 150 MG/1
300 TABLET, EXTENDED RELEASE ORAL DAILY
Qty: 0 | Refills: 0 | Status: DISCONTINUED | OUTPATIENT
Start: 2017-05-31 | End: 2017-06-02

## 2017-05-31 NOTE — H&P ADULT - NSHPREVIEWOFSYSTEMS_GEN_ALL_CORE
REVIEW OF SYSTEMS:    CONSTITUTIONAL: No weakness, fevers or chills  EYES/ENT: No visual changes;  No dysphagia  NECK: No pain or stiffness  RESPIRATORY: No cough, wheezing, hemoptysis; No shortness of breath  CARDIOVASCULAR: No chest pain or palpitations; No lower extremity edema  GASTROINTESTINAL: No abdominal or epigastric pain. No nausea, vomiting, or hematemesis; No diarrhea or constipation. No melena or hematochezia.  BACK: No back pain  GENITOURINARY: No dysuria, frequency or hematuria  NEUROLOGICAL: +Forgetfullness, No numbness or weakness, No Falls  SKIN: No itching, burning, rashes, or lesions   All other review of systems is negative unless indicated above.

## 2017-05-31 NOTE — H&P ADULT - HISTORY OF PRESENT ILLNESS
This is a 76M with history of HTN, CAD s/p PCI x1, and Vascular Dementia who presents to the hospital from his PCP's office due to elevated BP and worsening dementia. The patient said that he was recently discharged from a rehab facility to home and has been doing well. Said that he has had episodes where he forgets to take his medications but is otherwise well, denies any other complaints. As per the patient's brother, Johnson Young, the patient has been having worsening memory problems, especially with short term memory. Said that the patient forgets things he did the previous day, forgets what meals he has eaten and has forgotten to take his medications several times. Said that the patient's memory issued has worsened over the past 6 months. No other complaints.     In the ED, his vitals were T 98, P 50, /79, R 20, O2 sat 98% RA. His lab work did not show any significant abnormalities and his CT Head showed no acute findings. He was admitted to medicine.    Of note, patient is unsure on what medications he is taking and a note from his PCP states that he only filled out 1 medication, bupropion XL 300mg.

## 2017-05-31 NOTE — ED PROVIDER NOTE - PMH
CAD (coronary artery disease)    HTN (hypertension) CAD (coronary artery disease)    Dementia    HTN (hypertension)

## 2017-05-31 NOTE — ED ADULT NURSE NOTE - CHIEF COMPLAINT QUOTE
From Dr. Cee Christie office for F/U today sent for Adm. to Dr. David Martinez for HTN, pt denies CP/Dizziness/SOB on Plavix stated not taking his BP meds x few months.   pt cooperative AOX3,       Hx. Dementia, HTN, depression

## 2017-05-31 NOTE — H&P ADULT - NSHPSOCIALHISTORY_GEN_ALL_CORE
Social History:    Marital Status:  (   )    ( X ) Single    (   )    (  )   Occupation: Retired  Lives with: ( X ) alone  (  ) children   (  ) spouse   (  ) parents  (  ) other    Substance Use (street drugs): ( X ) never used  (  ) other:  Tobacco Usage:  Current smoker, 1ppd x 60+ years  Alcohol Usage: Denies

## 2017-05-31 NOTE — H&P ADULT - NSHPPHYSICALEXAM_GEN_ALL_CORE
Vital Signs Last 24 Hrs  T(C): 36.4, Max: 36.7 (05-31 @ 16:24)  T(F): 97.5, Max: 98.1 (05-31 @ 19:29)  HR: 59 (58 - 65)  BP: 159/91 (159/84 - 190/85)  BP(mean): --  RR: 17 (17 - 20)  SpO2: 98% (98% - 100%)    GENERAL: No acute distress, well-developed  HEAD:  Atraumatic, Normocephalic  ENT: EOMI, PERRLA, conjunctiva and sclera clear, Neck supple, No JVD, moist mucosa  CHEST/LUNG: Clear to auscultation bilaterally; No wheeze, equal breath sounds bilaterally   BACK: No spinal tenderness  HEART: Regular rate and rhythm; No murmurs, rubs, or gallops  ABDOMEN: Soft, Nontender, Nondistended; Bowel sounds present  EXTREMITIES:  No clubbing, cyanosis, or edema  PSYCH: Nl behavior, nl affect  NEUROLOGY: AAOx3, non-focal, cranial nerves intact, impaired short term recall (able to only recall 1/3 objects), good registration, attention and calculation  SKIN: Normal color, No rashes or lesions; Patient with stitches behind L ear, clear incision site

## 2017-05-31 NOTE — H&P ADULT - PROBLEM SELECTOR PLAN 1
- Patient with poorly controlled HTN in setting of medication non-compliance  - Unclear on medications he is on at home for BP control, was on norvasc during his last discharge, will restart on norvasc 10mg daily and observe his BP  - Will need to clarify home meds in AM

## 2017-05-31 NOTE — ED PROVIDER NOTE - MEDICAL DECISION MAKING DETAILS
pt with uncontrolled HTN, mildly hypertensive in ED, mildly increased confusion today. Will check basics, EKG, cxr, ua, admit.

## 2017-05-31 NOTE — H&P ADULT - ASSESSMENT
This is a 76M with history of HTN, CAD, and Dementia who presents to the hospital with uncontrolled HTN and worsening dementia.

## 2017-05-31 NOTE — ED ADULT NURSE NOTE - OBJECTIVE STATEMENT
Pt recd A+Ox2-3. Pt sent from PMD for HTN and increased dementia and multiple falls. Pt denies any CP, SOB, dizziness, N/V/D, or any other pain or discomfort at this time. Labs drawn and sent. Will continue to monitor.

## 2017-05-31 NOTE — H&P ADULT - PROBLEM SELECTOR PLAN 4
- Patient unsure why he had a skin biopsy, said that it occurred about 6-8 weeks ago  - Would recommend obtaining more information from patient's brother in AM as patient does not remember why he had the biopsy or the name of his dermatologist who preformed the biopsy.

## 2017-05-31 NOTE — ED PROVIDER NOTE - MUSCULOSKELETAL, MLM
Spine appears normal, range of motion is not limited, no muscle or joint tenderness. no midline spinal ttp

## 2017-05-31 NOTE — H&P ADULT - NSHPLABSRESULTS_GEN_ALL_CORE
Labs ( 31 May 2017 18:00 ):               12.9   6.98  )-----------( 130                   40.0     138  |  102  |  13  ----------------------------<  88  4.3   |  24  |  0.98    Ca    8.7      31 May 2017 18:00    TPro  7.0  /  Alb  3.5  /  TBili  0.3  /  DBili  x   /  AST  13  /  ALT  14  /  AlkPhos  81  05-31    CARDIAC MARKERS ( 31 May 2017 18:00 )  x     / < 0.06 ng/mL / 57 u/L / 2.23 ng/mL / x        LIVER FUNCTIONS - ( 31 May 2017 18:00 )  Alb: 3.5 g/dL / Pro: 7.0 g/dL / ALK PHOS: 81 u/L / ALT: 14 u/L / AST: 13 u/L / GGT: x

## 2017-05-31 NOTE — ED ADULT TRIAGE NOTE - CHIEF COMPLAINT QUOTE
From Dr. Cee Christie office for F/U today sent for Adm. to Dr. David Martinez for HTN, pt denies CP/Dizziness/SOB on Plavix stated not taking his BP meds x few months.   pt cooperative AOX3, From Dr. Cee Christie office for F/U today sent for Adm. to Dr. David Martinez for HTN, pt denies CP/Dizziness/SOB on Plavix stated not taking his BP meds x few months.   pt cooperative AOX3,       Hx. Dementia, HTN, depression

## 2017-05-31 NOTE — ED PROVIDER NOTE - OBJECTIVE STATEMENT
77 y/o m pmh HTN, CAD, St Denis PPM, presents from PMD for uncontrolled HTN and worsening confusion. Pt was recently discharged from sub acute rehab for frequent falls, per pmd Dr. Christie, pt not med compliant, BP in office today was 220/110, pt was also more confused/disoriented than usual. In ED patient had no complaints, denies chest pain, abdominal pain, urinary symptoms.

## 2017-05-31 NOTE — H&P ADULT - PROBLEM SELECTOR PLAN 2
- patient with worsening dementia  - Would continue with bupropion XL  - Consider neuro eval in AM for his worsening dementia  - Has a positive RPR titer from his last hospitalization but according to the discharge summary his titer was stable from last year and therefore is unlikely to have neurosyphilis

## 2017-05-31 NOTE — ED PROVIDER NOTE - ATTENDING CONTRIBUTION TO CARE
attg; 77 y/o m with h/o dementia, HTN, plavix, depression, presents for admission from PCP to Dr Martinez. Pt lives home alone. More confused, HTN with noncompliance, and frequent falls per note. Pt cannot give specifics of the falls. Pt is aaox 3 and denies pain. Normal exam. Plan for labs, EKG, CT, xr, ua and admission.   I have personally performed a face to face bedside history and physical examination of this patient. I have discussed the history, examination, review of systems, assessment and plan of management with the resident. I have reviewed the electronic medical record and amended it to reflect my history, review of systems, physical exam, assessment and plan.

## 2017-06-01 LAB
BUN SERPL-MCNC: 15 MG/DL — SIGNIFICANT CHANGE UP (ref 7–23)
CALCIUM SERPL-MCNC: 8.9 MG/DL — SIGNIFICANT CHANGE UP (ref 8.4–10.5)
CHLORIDE SERPL-SCNC: 105 MMOL/L — SIGNIFICANT CHANGE UP (ref 98–107)
CO2 SERPL-SCNC: 23 MMOL/L — SIGNIFICANT CHANGE UP (ref 22–31)
CREAT SERPL-MCNC: 0.97 MG/DL — SIGNIFICANT CHANGE UP (ref 0.5–1.3)
GLUCOSE SERPL-MCNC: 101 MG/DL — HIGH (ref 70–99)
HCT VFR BLD CALC: 40.2 % — SIGNIFICANT CHANGE UP (ref 39–50)
HGB BLD-MCNC: 13.2 G/DL — SIGNIFICANT CHANGE UP (ref 13–17)
MAGNESIUM SERPL-MCNC: 2.1 MG/DL — SIGNIFICANT CHANGE UP (ref 1.6–2.6)
MCHC RBC-ENTMCNC: 31.4 PG — SIGNIFICANT CHANGE UP (ref 27–34)
MCHC RBC-ENTMCNC: 32.8 % — SIGNIFICANT CHANGE UP (ref 32–36)
MCV RBC AUTO: 95.5 FL — SIGNIFICANT CHANGE UP (ref 80–100)
PLATELET # BLD AUTO: 128 K/UL — LOW (ref 150–400)
PMV BLD: 11.5 FL — SIGNIFICANT CHANGE UP (ref 7–13)
POTASSIUM SERPL-MCNC: 4.1 MMOL/L — SIGNIFICANT CHANGE UP (ref 3.5–5.3)
POTASSIUM SERPL-SCNC: 4.1 MMOL/L — SIGNIFICANT CHANGE UP (ref 3.5–5.3)
RBC # BLD: 4.21 M/UL — SIGNIFICANT CHANGE UP (ref 4.2–5.8)
RBC # FLD: 15.3 % — HIGH (ref 10.3–14.5)
SODIUM SERPL-SCNC: 142 MMOL/L — SIGNIFICANT CHANGE UP (ref 135–145)
WBC # BLD: 6.13 K/UL — SIGNIFICANT CHANGE UP (ref 3.8–10.5)
WBC # FLD AUTO: 6.13 K/UL — SIGNIFICANT CHANGE UP (ref 3.8–10.5)

## 2017-06-01 RX ORDER — ATORVASTATIN CALCIUM 80 MG/1
1 TABLET, FILM COATED ORAL
Qty: 30 | Refills: 0 | OUTPATIENT
Start: 2017-06-01 | End: 2017-07-01

## 2017-06-01 RX ORDER — ATORVASTATIN CALCIUM 80 MG/1
40 TABLET, FILM COATED ORAL AT BEDTIME
Qty: 0 | Refills: 0 | Status: DISCONTINUED | OUTPATIENT
Start: 2017-06-01 | End: 2017-06-02

## 2017-06-01 RX ORDER — CARVEDILOL PHOSPHATE 80 MG/1
6.25 CAPSULE, EXTENDED RELEASE ORAL EVERY 12 HOURS
Qty: 0 | Refills: 0 | Status: DISCONTINUED | OUTPATIENT
Start: 2017-06-01 | End: 2017-06-02

## 2017-06-01 RX ORDER — AMLODIPINE BESYLATE 2.5 MG/1
1 TABLET ORAL
Qty: 30 | Refills: 0 | OUTPATIENT
Start: 2017-06-01 | End: 2017-07-01

## 2017-06-01 RX ORDER — CARVEDILOL PHOSPHATE 80 MG/1
1 CAPSULE, EXTENDED RELEASE ORAL
Qty: 60 | Refills: 0 | OUTPATIENT
Start: 2017-06-01 | End: 2017-07-01

## 2017-06-01 RX ORDER — ASPIRIN/CALCIUM CARB/MAGNESIUM 324 MG
1 TABLET ORAL
Qty: 30 | Refills: 0 | OUTPATIENT
Start: 2017-06-01 | End: 2017-07-01

## 2017-06-01 RX ORDER — CLOPIDOGREL BISULFATE 75 MG/1
1 TABLET, FILM COATED ORAL
Qty: 30 | Refills: 0 | OUTPATIENT
Start: 2017-06-01 | End: 2017-07-01

## 2017-06-01 RX ORDER — NICOTINE POLACRILEX 2 MG
1 GUM BUCCAL DAILY
Qty: 0 | Refills: 0 | Status: DISCONTINUED | OUTPATIENT
Start: 2017-06-01 | End: 2017-06-02

## 2017-06-01 RX ORDER — NICOTINE POLACRILEX 2 MG
1 GUM BUCCAL
Qty: 30 | Refills: 0 | OUTPATIENT
Start: 2017-06-01 | End: 2017-07-01

## 2017-06-01 RX ADMIN — HEPARIN SODIUM 5000 UNIT(S): 5000 INJECTION INTRAVENOUS; SUBCUTANEOUS at 05:19

## 2017-06-01 RX ADMIN — AMLODIPINE BESYLATE 10 MILLIGRAM(S): 2.5 TABLET ORAL at 05:19

## 2017-06-01 RX ADMIN — HEPARIN SODIUM 5000 UNIT(S): 5000 INJECTION INTRAVENOUS; SUBCUTANEOUS at 13:05

## 2017-06-01 RX ADMIN — CLOPIDOGREL BISULFATE 75 MILLIGRAM(S): 75 TABLET, FILM COATED ORAL at 11:55

## 2017-06-01 RX ADMIN — BUPROPION HYDROCHLORIDE 300 MILLIGRAM(S): 150 TABLET, EXTENDED RELEASE ORAL at 11:55

## 2017-06-01 RX ADMIN — ATORVASTATIN CALCIUM 40 MILLIGRAM(S): 80 TABLET, FILM COATED ORAL at 22:00

## 2017-06-01 RX ADMIN — Medication 1 PATCH: at 11:55

## 2017-06-01 RX ADMIN — HEPARIN SODIUM 5000 UNIT(S): 5000 INJECTION INTRAVENOUS; SUBCUTANEOUS at 22:00

## 2017-06-01 RX ADMIN — Medication 81 MILLIGRAM(S): at 11:55

## 2017-06-01 RX ADMIN — CARVEDILOL PHOSPHATE 6.25 MILLIGRAM(S): 80 CAPSULE, EXTENDED RELEASE ORAL at 17:07

## 2017-06-01 NOTE — DISCHARGE NOTE ADULT - HOSPITAL COURSE
This is a 76M with history of HTN, CAD s/p PCI x1, and Vascular Dementia who presents to the hospital from his PCP's office due to elevated BP and worsening dementia. The patient said that he was recently discharged from a rehab facility to home and has been doing well. Said that he has had episodes where he forgets to take his medications but is otherwise well, denies any other complaints. As per the patient's brother, Johnson Young, the patient has been having worsening memory problems, especially with short term memory that has worsened over the past 6 months. No other complaints. In the ED, his vitals were T 98, P 50, /79, R 20, O2 sat 98% RA. His lab work did not show any significant abnormalities and his CT Head showed no acute findings. He was admitted to medicine.    Hospital Course    Essential hypertension - Patient with poorly controlled HTN in setting of medication non-compliance, restarted on Norvasc 10mg daily, added Coreg by Dr Martinez. CXR -Clear lungs    Vascular dementia without behavioral disturbance - patient with worsening dementia, continue with Bupropion XL. Following Psych as outpatient. Has a positive RPR titer from his last hospitalization but according to the discharge summary his titer was stable from last year and therefore is unlikely to have neurosyphilis. CT Head -No CT evidence of acute intracranial hemorrhage, brain edema, mass effect or acute territorial infarct. At present, patient's mental status returned to normal.    Coronary artery disease - started Lipitor, added Coreg. Active Smoker- Nicotine patch 21 mcg daily. Skin lesion- Patient unsure why he had a skin biopsy, said that it occurred about 6-8 weeks ago. Out pt Derm [phone]. Need for prophylactic measure- HSQ for DVT ppx.

## 2017-06-01 NOTE — PROGRESS NOTE ADULT - ASSESSMENT
A/P  HTN Urgency  Mild Dementia  CAD hx PCI stent LCx 2012  s.p PPM  smoker, BPH    echo from 5/2015 EF 60% DD  NST negative 5/2015      Rec:   asa, clopidogrel, amlodipine as ordered  add carvedilol 6.25mg BID for BP  discontinue simvastatin; add atorvastatin 40mg qhs    discharge planning in AM if stable    HAIR Martinez MD, FACC  [phone]

## 2017-06-01 NOTE — DISCHARGE NOTE ADULT - CARE PLAN
Principal Discharge DX:	Essential hypertension  Goal:	Optimal blood pressure control  Instructions for follow-up, activity and diet:	Continue taking medications Coreg, Norvasc as prescribed. Monitor blood pressure at home prior to taking medications. Follow low sodium diet. Follow up with Dr Martinez PCP / Card as outpatient for further management and treatment.  Secondary Diagnosis:	Coronary artery disease involving native coronary artery of native heart without angina pectoris  Instructions for follow-up, activity and diet:	Continue taking ASA, Plavix, Lipitor as prescribed daily. Follow up with Dr Martinez PCP / Card as outpatient for further management and treatment.  Secondary Diagnosis:	Vascular dementia without behavioral disturbance  Instructions for follow-up, activity and diet:	Follow up with your Psych as outpatient. Continue taking Wellbutrin as prescribed.  Secondary Diagnosis:	Smoker  Instructions for follow-up, activity and diet:	Smoking cessations reinforced with patient. Pt agreed to stop smoking and to use Nicotine patch.

## 2017-06-01 NOTE — DISCHARGE NOTE ADULT - SECONDARY DIAGNOSIS.
Coronary artery disease involving native coronary artery of native heart without angina pectoris Vascular dementia without behavioral disturbance Smoker

## 2017-06-01 NOTE — DISCHARGE NOTE ADULT - PATIENT PORTAL LINK FT
“You can access the Follow[facility] Patient Portal, offered by [facility], by registering with the following website: [email or web address]”

## 2017-06-01 NOTE — DISCHARGE NOTE ADULT - PLAN OF CARE
Optimal blood pressure control Continue taking medications Coreg, Norvasc as prescribed. Monitor blood pressure at home prior to taking medications. Follow low sodium diet. Follow up with Dr Martinez PCP / Card as outpatient for further management and treatment. Continue taking ASA, Plavix, Lipitor as prescribed daily. Follow up with Dr Martinez PCP / Card as outpatient for further management and treatment. Follow up with your Psych as outpatient. Continue taking Wellbutrin as prescribed. Smoking cessations reinforced with patient. Pt agreed to stop smoking and to use Nicotine patch.

## 2017-06-01 NOTE — DISCHARGE NOTE ADULT - MEDICATION SUMMARY - MEDICATIONS TO TAKE
I will START or STAY ON the medications listed below when I get home from the hospital:    aspirin 81 mg oral delayed release tablet  -- 1 tab(s) by mouth once a day  -- Indication: For Coronary artery disease involving native coronary artery of native heart without angina pectoris    atorvastatin 40 mg oral tablet  -- 1 tab(s) by mouth once a day (at bedtime)  -- Indication: For Coronary artery disease involving native coronary artery of native heart without angina pectoris    clopidogrel 75 mg oral tablet  -- 1 tab(s) by mouth once a day  -- Indication: For Coronary artery disease involving native coronary artery of native heart without angina pectoris    carvedilol 6.25 mg oral tablet  -- 1 tab(s) by mouth every 12 hours  -- Indication: For Essential hypertension    amLODIPine 10 mg oral tablet  -- 1 tab(s) by mouth once a day  -- Indication: For Essential hypertension    nicotine 21 mg/24 hr transdermal film, extended release  -- 1 patch by transdermal patch once a day  -- Indication: For Smoker    buPROPion 300 mg/24 hours (XL) oral tablet, extended release  -- 1 tab(s) by mouth every 24 hours  -- Pharmacy records show:  2/3- Bupropion 150mg  2/11- Bupropion 100mg   2/22- Bupropion 300mg XL  -- Indication: For Vascular dementia without behavioral disturbance

## 2017-06-01 NOTE — DISCHARGE NOTE ADULT - CARE PROVIDER_API CALL
Lakhwinder Martinez), Cardiovascular Disease; Internal Medicine  [address] [place] [address]  Phone: [phone]  Fax: [phone]

## 2017-06-01 NOTE — PROGRESS NOTE ADULT - SUBJECTIVE AND OBJECTIVE BOX
Cardiology Attending  Pt awake and alert  No cp SOB    v/s 163/88 59  99% RA; afebrile  Neck: no JVP  Lungs: clear; no rales or wheezing  CVS: s1s2 NRRR no murmur  Abd: soft NT obese  Ext: no edema  Skin: no rashes  CNS: aao x 3 non focal    Hgb 13  cr 0.9  Trop < 0.06    CXR clear    A/P  HTN Urgency  Mild Dementia  CAD hx PCI stent LCx 2012  s.p PPM  smoker, BPH    echo from 5/2015 EF 60% DD  NST negative 5/2015      Rec:   asa, clopidogrel, amlodipine as ordered  add carvedilol 6.25mg BID for BP  discontinue simvastatin; add atorvastatin 40mg qhs    discharge planning in AM if stable    HAIR Martinez MD, FACC  [phone]

## 2017-06-02 VITALS
HEART RATE: 84 BPM | OXYGEN SATURATION: 98 % | DIASTOLIC BLOOD PRESSURE: 91 MMHG | TEMPERATURE: 98 F | RESPIRATION RATE: 18 BRPM | SYSTOLIC BLOOD PRESSURE: 150 MMHG

## 2017-06-02 RX ADMIN — BUPROPION HYDROCHLORIDE 300 MILLIGRAM(S): 150 TABLET, EXTENDED RELEASE ORAL at 13:11

## 2017-06-02 RX ADMIN — CLOPIDOGREL BISULFATE 75 MILLIGRAM(S): 75 TABLET, FILM COATED ORAL at 13:11

## 2017-06-02 RX ADMIN — Medication 1 PATCH: at 13:00

## 2017-06-02 RX ADMIN — HEPARIN SODIUM 5000 UNIT(S): 5000 INJECTION INTRAVENOUS; SUBCUTANEOUS at 05:50

## 2017-06-02 RX ADMIN — CARVEDILOL PHOSPHATE 6.25 MILLIGRAM(S): 80 CAPSULE, EXTENDED RELEASE ORAL at 05:50

## 2017-06-02 RX ADMIN — HEPARIN SODIUM 5000 UNIT(S): 5000 INJECTION INTRAVENOUS; SUBCUTANEOUS at 13:12

## 2017-06-02 RX ADMIN — AMLODIPINE BESYLATE 10 MILLIGRAM(S): 2.5 TABLET ORAL at 05:50

## 2017-06-02 RX ADMIN — Medication 81 MILLIGRAM(S): at 13:11

## 2017-06-30 ENCOUNTER — INPATIENT (INPATIENT)
Facility: HOSPITAL | Age: 77
LOS: 1 days | Discharge: HOME | DRG: 312 | End: 2017-07-02
Attending: INTERNAL MEDICINE | Admitting: INTERNAL MEDICINE
Payer: MEDICARE

## 2017-06-30 VITALS
SYSTOLIC BLOOD PRESSURE: 169 MMHG | HEART RATE: 56 BPM | WEIGHT: 235.89 LBS | RESPIRATION RATE: 17 BRPM | HEIGHT: 71 IN | TEMPERATURE: 98 F | OXYGEN SATURATION: 95 % | DIASTOLIC BLOOD PRESSURE: 79 MMHG

## 2017-06-30 DIAGNOSIS — Z90.49 ACQUIRED ABSENCE OF OTHER SPECIFIED PARTS OF DIGESTIVE TRACT: Chronic | ICD-10-CM

## 2017-06-30 DIAGNOSIS — Z98.890 OTHER SPECIFIED POSTPROCEDURAL STATES: Chronic | ICD-10-CM

## 2017-06-30 LAB
ALBUMIN SERPL ELPH-MCNC: 3.4 G/DL — LOW (ref 3.5–5)
ALP SERPL-CCNC: 82 U/L — SIGNIFICANT CHANGE UP (ref 40–120)
ALT FLD-CCNC: 20 U/L DA — SIGNIFICANT CHANGE UP (ref 10–60)
ANION GAP SERPL CALC-SCNC: 5 MMOL/L — SIGNIFICANT CHANGE UP (ref 5–17)
APTT BLD: 26.4 SEC — LOW (ref 27.5–37.4)
AST SERPL-CCNC: 15 U/L — SIGNIFICANT CHANGE UP (ref 10–40)
BASOPHILS # BLD AUTO: 0.1 K/UL — SIGNIFICANT CHANGE UP (ref 0–0.2)
BASOPHILS NFR BLD AUTO: 1 % — SIGNIFICANT CHANGE UP (ref 0–2)
BILIRUB SERPL-MCNC: 0.5 MG/DL — SIGNIFICANT CHANGE UP (ref 0.2–1.2)
BUN SERPL-MCNC: 20 MG/DL — HIGH (ref 7–18)
CALCIUM SERPL-MCNC: 8.5 MG/DL — SIGNIFICANT CHANGE UP (ref 8.4–10.5)
CHLORIDE SERPL-SCNC: 106 MMOL/L — SIGNIFICANT CHANGE UP (ref 96–108)
CK MB CFR SERPL CALC: <1 NG/ML — SIGNIFICANT CHANGE UP (ref 0–3.6)
CO2 SERPL-SCNC: 25 MMOL/L — SIGNIFICANT CHANGE UP (ref 22–31)
CREAT SERPL-MCNC: 1.14 MG/DL — SIGNIFICANT CHANGE UP (ref 0.5–1.3)
EOSINOPHIL # BLD AUTO: 0.1 K/UL — SIGNIFICANT CHANGE UP (ref 0–0.5)
EOSINOPHIL NFR BLD AUTO: 1.8 % — SIGNIFICANT CHANGE UP (ref 0–6)
GLUCOSE SERPL-MCNC: 98 MG/DL — SIGNIFICANT CHANGE UP (ref 70–99)
HCT VFR BLD CALC: 37.7 % — LOW (ref 39–50)
HGB BLD-MCNC: 12.4 G/DL — LOW (ref 13–17)
INR BLD: 1.05 RATIO — SIGNIFICANT CHANGE UP (ref 0.88–1.16)
LYMPHOCYTES # BLD AUTO: 1.7 K/UL — SIGNIFICANT CHANGE UP (ref 1–3.3)
LYMPHOCYTES # BLD AUTO: 23.4 % — SIGNIFICANT CHANGE UP (ref 13–44)
MAGNESIUM SERPL-MCNC: 2.3 MG/DL — SIGNIFICANT CHANGE UP (ref 1.6–2.6)
MCHC RBC-ENTMCNC: 32.2 PG — SIGNIFICANT CHANGE UP (ref 27–34)
MCHC RBC-ENTMCNC: 32.8 GM/DL — SIGNIFICANT CHANGE UP (ref 32–36)
MCV RBC AUTO: 98.1 FL — SIGNIFICANT CHANGE UP (ref 80–100)
MONOCYTES # BLD AUTO: 0.8 K/UL — SIGNIFICANT CHANGE UP (ref 0–0.9)
MONOCYTES NFR BLD AUTO: 10.2 % — SIGNIFICANT CHANGE UP (ref 2–14)
NEUTROPHILS # BLD AUTO: 4.8 K/UL — SIGNIFICANT CHANGE UP (ref 1.8–7.4)
NEUTROPHILS NFR BLD AUTO: 63.7 % — SIGNIFICANT CHANGE UP (ref 43–77)
PLATELET # BLD AUTO: 120 K/UL — LOW (ref 150–400)
POTASSIUM SERPL-MCNC: 4.1 MMOL/L — SIGNIFICANT CHANGE UP (ref 3.5–5.3)
POTASSIUM SERPL-SCNC: 4.1 MMOL/L — SIGNIFICANT CHANGE UP (ref 3.5–5.3)
PROT SERPL-MCNC: 7 G/DL — SIGNIFICANT CHANGE UP (ref 6–8.3)
PROTHROM AB SERPL-ACNC: 11.5 SEC — SIGNIFICANT CHANGE UP (ref 9.8–12.7)
RBC # BLD: 3.84 M/UL — LOW (ref 4.2–5.8)
RBC # FLD: 13.4 % — SIGNIFICANT CHANGE UP (ref 10.3–14.5)
SODIUM SERPL-SCNC: 136 MMOL/L — SIGNIFICANT CHANGE UP (ref 135–145)
TROPONIN I SERPL-MCNC: <0.015 NG/ML — SIGNIFICANT CHANGE UP (ref 0–0.04)
WBC # BLD: 7.5 K/UL — SIGNIFICANT CHANGE UP (ref 3.8–10.5)
WBC # FLD AUTO: 7.5 K/UL — SIGNIFICANT CHANGE UP (ref 3.8–10.5)

## 2017-06-30 PROCEDURE — 71010: CPT | Mod: 26

## 2017-06-30 PROCEDURE — 70450 CT HEAD/BRAIN W/O DYE: CPT | Mod: 26

## 2017-06-30 PROCEDURE — 72125 CT NECK SPINE W/O DYE: CPT | Mod: 26

## 2017-06-30 PROCEDURE — 73080 X-RAY EXAM OF ELBOW: CPT | Mod: 26,LT

## 2017-06-30 RX ORDER — TETANUS TOXOID, REDUCED DIPHTHERIA TOXOID AND ACELLULAR PERTUSSIS VACCINE, ADSORBED 5; 2.5; 8; 8; 2.5 [IU]/.5ML; [IU]/.5ML; UG/.5ML; UG/.5ML; UG/.5ML
0.5 SUSPENSION INTRAMUSCULAR ONCE
Qty: 0 | Refills: 0 | Status: COMPLETED | OUTPATIENT
Start: 2017-06-30 | End: 2017-06-30

## 2017-06-30 RX ADMIN — TETANUS TOXOID, REDUCED DIPHTHERIA TOXOID AND ACELLULAR PERTUSSIS VACCINE, ADSORBED 0.5 MILLILITER(S): 5; 2.5; 8; 8; 2.5 SUSPENSION INTRAMUSCULAR at 18:22

## 2017-06-30 NOTE — ED ADULT NURSE REASSESSMENT NOTE - NS ED NURSE REASSESS COMMENT FT1
2303 - pt is ambulatory with assistance, denies any chest pain and denies any light headedness pt placed on telebox J  for continous monitoring.

## 2017-06-30 NOTE — ED PROVIDER NOTE - MEDICAL DECISION MAKING DETAILS
patient pw mechanical fall. head ct reassuring. elbow reassuring. safe for dc. patient pw mechanical fall. head ct reassuring. elbow reassuring. safe for dc with local wound care. patient pw mechanical fall. head ct reassuring. elbow reassuring. safe for dc with local wound care.  upon preparing to dc, patient tried to ambulate out of the er, became diaphoretic and had a syncopal episode. will admit for obs. patient pw mechanical fall. head ct reassuring. elbow reassuring. safe for dc with local wound care.  upon preparing to dc, patient tried to ambulate out of the er, became diaphoretic and had a syncopal episode. will admit for tele obs.

## 2017-06-30 NOTE — ED ADULT NURSE REASSESSMENT NOTE - NS ED NURSE REASSESS COMMENT FT1
1911- no henrietta in LOC feeling better 1845- no henrietta in LOC feeling better, offered food to eat before he goes home

## 2017-06-30 NOTE — ED PROVIDER NOTE - PHYSICAL EXAMINATION
Gen: Alert, NAD  Head: NC, AT   Eyes: PERRL, EOMI, normal lids/conjunctiva  ENT: normal hearing, patent oropharynx without erythema/exudate, uvula midline  Neck: supple, no tenderness, Trachea midline  Pulm: Bilateral BS, normal resp effort, no wheeze/stridor/retractions  CV: RRR, no M/R/G, 2+ radial and dp pulses bl, no edema  Abd: soft, NT/ND, +BS, no hepatosplenomegaly  Mskel: extremities x4 with normal ROM and no joint effusions. the left elbow, however, has a large hematoma just distal to it. no ctl spine ttp.   Skin: abrasion left elbow without laceration  Neuro: AAOx3, no sensory/motor deficits, CN 2-12 intact

## 2017-06-30 NOTE — ED ADULT NURSE NOTE - OBJECTIVE STATEMENT
pt came in via ambulance with c/o fall , pt fell backwards and states that  he sustained cuts on his forearm left but denies any LOC

## 2017-06-30 NOTE — ED PROVIDER NOTE - OBJECTIVE STATEMENT
Pertinent PMH/PSH/FHx/SHx and Review of Systems contained within:  76m hx of cad on plavix and bl le paresthesias with gait instability pw mechanical fall down 1 stair just pta. unknown loc, unclear if hit head. denies ha, vision change, nausea, or any other pain. abrasion of the left elbow but full rom  Fh and Sh not otherwise contributory  ROS otherwise negative

## 2017-06-30 NOTE — ED ADULT NURSE REASSESSMENT NOTE - NS ED NURSE REASSESS COMMENT FT1
1900 - pt left the building prior to signing discharge paper. and pt found  outside  by Ms Heller pale and diaphoretic.

## 2017-07-01 DIAGNOSIS — Z95.0 PRESENCE OF CARDIAC PACEMAKER: ICD-10-CM

## 2017-07-01 DIAGNOSIS — R55 SYNCOPE AND COLLAPSE: ICD-10-CM
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
## 2017-07-01 DIAGNOSIS — I25.10 ATHEROSCLEROTIC HEART DISEASE OF NATIVE CORONARY ARTERY WITHOUT ANGINA PECTORIS: ICD-10-CM

## 2017-07-01 DIAGNOSIS — Z29.9 ENCOUNTER FOR PROPHYLACTIC MEASURES, UNSPECIFIED: ICD-10-CM

## 2017-07-01 DIAGNOSIS — Z90.49 ACQUIRED ABSENCE OF OTHER SPECIFIED PARTS OF DIGESTIVE TRACT: Chronic | ICD-10-CM
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
## 2017-07-01 DIAGNOSIS — R20.2 PARESTHESIA OF SKIN: ICD-10-CM

## 2017-07-01 DIAGNOSIS — Z87.438 PERSONAL HISTORY OF OTHER DISEASES OF MALE GENITAL ORGANS: Chronic | ICD-10-CM

## 2017-07-01 DIAGNOSIS — I10 ESSENTIAL (PRIMARY) HYPERTENSION: ICD-10-CM

## 2017-07-01 LAB
24R-OH-CALCIDIOL SERPL-MCNC: 26.6 NG/ML — LOW (ref 30–100)
ANION GAP SERPL CALC-SCNC: 8 MMOL/L — SIGNIFICANT CHANGE UP (ref 5–17)
APPEARANCE UR: CLEAR — SIGNIFICANT CHANGE UP
BILIRUB UR-MCNC: NEGATIVE — SIGNIFICANT CHANGE UP
BUN SERPL-MCNC: 18 MG/DL — SIGNIFICANT CHANGE UP (ref 7–18)
CALCIUM SERPL-MCNC: 8.2 MG/DL — LOW (ref 8.4–10.5)
CHLORIDE SERPL-SCNC: 109 MMOL/L — HIGH (ref 96–108)
CHOLEST SERPL-MCNC: 84 MG/DL — SIGNIFICANT CHANGE UP (ref 10–199)
CK MB BLD-MCNC: 0.8 % — SIGNIFICANT CHANGE UP (ref 0–3.5)
CK MB BLD-MCNC: 0.8 % — SIGNIFICANT CHANGE UP (ref 0–3.5)
CK MB CFR SERPL CALC: 1.1 NG/ML — SIGNIFICANT CHANGE UP (ref 0–3.6)
CK MB CFR SERPL CALC: 1.5 NG/ML — SIGNIFICANT CHANGE UP (ref 0–3.6)
CK SERPL-CCNC: 141 U/L — SIGNIFICANT CHANGE UP (ref 35–232)
CK SERPL-CCNC: 191 U/L — SIGNIFICANT CHANGE UP (ref 35–232)
CO2 SERPL-SCNC: 23 MMOL/L — SIGNIFICANT CHANGE UP (ref 22–31)
COLOR SPEC: YELLOW — SIGNIFICANT CHANGE UP
CREAT SERPL-MCNC: 1 MG/DL — SIGNIFICANT CHANGE UP (ref 0.5–1.3)
DIFF PNL FLD: NEGATIVE — SIGNIFICANT CHANGE UP
GLUCOSE SERPL-MCNC: 79 MG/DL — SIGNIFICANT CHANGE UP (ref 70–99)
GLUCOSE UR QL: NEGATIVE — SIGNIFICANT CHANGE UP
HBA1C BLD-MCNC: 5.9 % — HIGH (ref 4–5.6)
HCT VFR BLD CALC: 37.6 % — LOW (ref 39–50)
HDLC SERPL-MCNC: 41 MG/DL — SIGNIFICANT CHANGE UP (ref 40–125)
HGB BLD-MCNC: 12.9 G/DL — LOW (ref 13–17)
KETONES UR-MCNC: ABNORMAL
LEUKOCYTE ESTERASE UR-ACNC: NEGATIVE — SIGNIFICANT CHANGE UP
LIPID PNL WITH DIRECT LDL SERPL: 28 MG/DL — SIGNIFICANT CHANGE UP
MAGNESIUM SERPL-MCNC: 2.3 MG/DL — SIGNIFICANT CHANGE UP (ref 1.6–2.6)
MCHC RBC-ENTMCNC: 32.9 PG — SIGNIFICANT CHANGE UP (ref 27–34)
MCHC RBC-ENTMCNC: 34.1 GM/DL — SIGNIFICANT CHANGE UP (ref 32–36)
MCV RBC AUTO: 96.4 FL — SIGNIFICANT CHANGE UP (ref 80–100)
NITRITE UR-MCNC: NEGATIVE — SIGNIFICANT CHANGE UP
PH UR: 5 — SIGNIFICANT CHANGE UP (ref 5–8)
PHOSPHATE SERPL-MCNC: 2.6 MG/DL — SIGNIFICANT CHANGE UP (ref 2.5–4.5)
PLATELET # BLD AUTO: 128 K/UL — LOW (ref 150–400)
POTASSIUM SERPL-MCNC: 4 MMOL/L — SIGNIFICANT CHANGE UP (ref 3.5–5.3)
POTASSIUM SERPL-SCNC: 4 MMOL/L — SIGNIFICANT CHANGE UP (ref 3.5–5.3)
PROT UR-MCNC: 15
RBC # BLD: 3.91 M/UL — LOW (ref 4.2–5.8)
RBC # FLD: 13.7 % — SIGNIFICANT CHANGE UP (ref 10.3–14.5)
SODIUM SERPL-SCNC: 140 MMOL/L — SIGNIFICANT CHANGE UP (ref 135–145)
SP GR SPEC: 1.02 — SIGNIFICANT CHANGE UP (ref 1.01–1.02)
TOTAL CHOLESTEROL/HDL RATIO MEASUREMENT: 2 RATIO — LOW (ref 3.4–9.6)
TRIGL SERPL-MCNC: 76 MG/DL — SIGNIFICANT CHANGE UP (ref 10–149)
TROPONIN I SERPL-MCNC: <0.015 NG/ML — SIGNIFICANT CHANGE UP (ref 0–0.04)
TROPONIN I SERPL-MCNC: <0.015 NG/ML — SIGNIFICANT CHANGE UP (ref 0–0.04)
TSH SERPL-MCNC: 1.81 UU/ML — SIGNIFICANT CHANGE UP (ref 0.34–4.82)
UROBILINOGEN FLD QL: 1
VIT B12 SERPL-MCNC: 416 PG/ML — SIGNIFICANT CHANGE UP (ref 243–894)
WBC # BLD: 8.3 K/UL — SIGNIFICANT CHANGE UP (ref 3.8–10.5)
WBC # FLD AUTO: 8.3 K/UL — SIGNIFICANT CHANGE UP (ref 3.8–10.5)

## 2017-07-01 PROCEDURE — 99285 EMERGENCY DEPT VISIT HI MDM: CPT

## 2017-07-01 RX ORDER — ASPIRIN/CALCIUM CARB/MAGNESIUM 324 MG
81 TABLET ORAL DAILY
Qty: 0 | Refills: 0 | Status: DISCONTINUED | OUTPATIENT
Start: 2017-07-01 | End: 2017-07-02

## 2017-07-01 RX ORDER — BUPROPION HYDROCHLORIDE 150 MG/1
300 TABLET, EXTENDED RELEASE ORAL DAILY
Qty: 0 | Refills: 0 | Status: DISCONTINUED | OUTPATIENT
Start: 2017-07-01 | End: 2017-07-02

## 2017-07-01 RX ORDER — SODIUM CHLORIDE 9 MG/ML
1000 INJECTION INTRAMUSCULAR; INTRAVENOUS; SUBCUTANEOUS
Qty: 0 | Refills: 0 | Status: DISCONTINUED | OUTPATIENT
Start: 2017-07-01 | End: 2017-07-01

## 2017-07-01 RX ORDER — AMLODIPINE BESYLATE 2.5 MG/1
10 TABLET ORAL DAILY
Qty: 0 | Refills: 0 | Status: DISCONTINUED | OUTPATIENT
Start: 2017-07-01 | End: 2017-07-02

## 2017-07-01 RX ORDER — CLOPIDOGREL BISULFATE 75 MG/1
75 TABLET, FILM COATED ORAL DAILY
Qty: 0 | Refills: 0 | Status: DISCONTINUED | OUTPATIENT
Start: 2017-07-01 | End: 2017-07-02

## 2017-07-01 RX ORDER — CLOPIDOGREL BISULFATE 75 MG/1
75 TABLET, FILM COATED ORAL DAILY
Qty: 0 | Refills: 0 | Status: DISCONTINUED | OUTPATIENT
Start: 2017-07-01 | End: 2017-07-01

## 2017-07-01 RX ORDER — SODIUM CHLORIDE 9 MG/ML
1000 INJECTION, SOLUTION INTRAVENOUS
Qty: 0 | Refills: 0 | Status: DISCONTINUED | OUTPATIENT
Start: 2017-07-01 | End: 2017-07-02

## 2017-07-01 RX ORDER — GABAPENTIN 400 MG/1
100 CAPSULE ORAL
Qty: 0 | Refills: 0 | Status: DISCONTINUED | OUTPATIENT
Start: 2017-07-01 | End: 2017-07-02

## 2017-07-01 RX ORDER — HEPARIN SODIUM 5000 [USP'U]/ML
5000 INJECTION INTRAVENOUS; SUBCUTANEOUS EVERY 8 HOURS
Qty: 0 | Refills: 0 | Status: DISCONTINUED | OUTPATIENT
Start: 2017-07-01 | End: 2017-07-02

## 2017-07-01 RX ORDER — ATORVASTATIN CALCIUM 80 MG/1
20 TABLET, FILM COATED ORAL AT BEDTIME
Qty: 0 | Refills: 0 | Status: DISCONTINUED | OUTPATIENT
Start: 2017-07-01 | End: 2017-07-02

## 2017-07-01 RX ORDER — NICOTINE POLACRILEX 2 MG
1 GUM BUCCAL DAILY
Qty: 0 | Refills: 0 | Status: DISCONTINUED | OUTPATIENT
Start: 2017-07-01 | End: 2017-07-02

## 2017-07-01 RX ADMIN — Medication 1 PATCH: at 22:53

## 2017-07-01 RX ADMIN — GABAPENTIN 100 MILLIGRAM(S): 400 CAPSULE ORAL at 06:44

## 2017-07-01 RX ADMIN — HEPARIN SODIUM 5000 UNIT(S): 5000 INJECTION INTRAVENOUS; SUBCUTANEOUS at 14:29

## 2017-07-01 RX ADMIN — ATORVASTATIN CALCIUM 20 MILLIGRAM(S): 80 TABLET, FILM COATED ORAL at 21:46

## 2017-07-01 RX ADMIN — BUPROPION HYDROCHLORIDE 300 MILLIGRAM(S): 150 TABLET, EXTENDED RELEASE ORAL at 12:49

## 2017-07-01 RX ADMIN — CLOPIDOGREL BISULFATE 75 MILLIGRAM(S): 75 TABLET, FILM COATED ORAL at 12:12

## 2017-07-01 RX ADMIN — GABAPENTIN 100 MILLIGRAM(S): 400 CAPSULE ORAL at 17:41

## 2017-07-01 RX ADMIN — HEPARIN SODIUM 5000 UNIT(S): 5000 INJECTION INTRAVENOUS; SUBCUTANEOUS at 21:47

## 2017-07-01 RX ADMIN — SODIUM CHLORIDE 50 MILLILITER(S): 9 INJECTION, SOLUTION INTRAVENOUS at 06:47

## 2017-07-01 RX ADMIN — HEPARIN SODIUM 5000 UNIT(S): 5000 INJECTION INTRAVENOUS; SUBCUTANEOUS at 06:44

## 2017-07-01 RX ADMIN — AMLODIPINE BESYLATE 10 MILLIGRAM(S): 2.5 TABLET ORAL at 12:49

## 2017-07-01 RX ADMIN — Medication 81 MILLIGRAM(S): at 12:12

## 2017-07-01 RX ADMIN — SODIUM CHLORIDE 75 MILLILITER(S): 9 INJECTION INTRAMUSCULAR; INTRAVENOUS; SUBCUTANEOUS at 03:11

## 2017-07-01 NOTE — H&P ADULT - RS GEN PE MLT RESP DETAILS PC
good air movement/clear to auscultation bilaterally/respirations non-labored/airway patent/breath sounds equal

## 2017-07-01 NOTE — H&P ADULT - MUSCULOSKELETAL
detailed exam no calf tenderness/ROM intact/no joint warmth/no joint erythema/no joint swelling/normal strength

## 2017-07-01 NOTE — PROGRESS NOTE ADULT - PROBLEM SELECTOR PLAN 1
s/p fall preceded by dizziness and lightheadedness;   monitor on telemetry to rule out arrythmia.  patient had a recent pacemaker interrogation- normal function  Seen by ED attending ; recommendations noted

## 2017-07-01 NOTE — PROGRESS NOTE ADULT - ASSESSMENT
HPI:  76m from home lives alone ambulates with the help of cane, PMH of CAD s/p stent not on any medications, s/p PPM, HTN, bilateral LE paresthesias with gait instability, chronic active smoker, BIBA after patient had a fall while he was walking on the street. Per patient he was in usual state of health and was walking up the street, he felt tired and there was stoop on the way, he felt as if his legs gave way and lost balance tried to avoid fall by holding some support, but ultimately ended up with a fall backwards, hitting his left elbow. patient denies any LOC, headache, nausea or vomiting. He denies any chest pain, palpitations, weakness of extremities, dizziness, visual disturbances, tremors, involuntary loss of  urine or feces. A passer by noticed and called 911 who brought him here. patient reports that he had similar episodes of fall X3 in the last 6 months relates to poor balance and paresthesias of both feet. patient follows up with cardiologist, echo done 2 years ago, PPM was interrogated 3 weeks ago.     In ED, patient VS were stable on admission, CT head no acute pathology, CT c spine degenerative spondylosis and spondylolisthesis. patient had X ray of left forearm no acute fracture. He was dischaged from ED, when he was found to be diaphoretic, and weak unable to ambulate again and was almost about to pass out again. so he was brought back to ED and ekg was done which showed paced rythm, LAD, Rt BBB, TWI in v1, v2. cardiac enzymes X1 negative.    ADDENDUM: UPDATED OUTPATIENT MEDRICS AFTER CONFIRMING WITH PHARMACY (01 Jul 2017 01:15)

## 2017-07-01 NOTE — CONSULT NOTE ADULT - SUBJECTIVE AND OBJECTIVE BOX
Cardiology Attending    History: He is an extremely pleasant 76-year-old male with a past medical history of symptomatic sick sinus syndrome who originally had a [employer] dual-chamber pacemaker implanted at [facility] in October 2013. Device interrogation in the office 2 weeks ago demonstrated excellent right atrial and right ventricular lead function with over 8 years remaining of pacemaker longevity. He A paces 80% of the time but never requires ventricular pacing. He denies high risk features such as palpitations or angina. He is now admitted with a fall rule out syncope. EKG/Tele show AP-VS, no arrhythmias    PMHX: As above. Hypertension, symptomatic sick sinus syndrome, hyperlipidemia, anxiety, and CAD  PSHX: [employer] dual chamber pacemaker in October 2013, appendectomy  Home Medications: Imdur 30 mg daily, Benicar 20 mg daily, Zocor 20 mg daily, Plavix 75 daily, Klonopin 0.5 mg daily.  Allergies: He has no known drug allergies.  Social HX: No tobacco, alcohol, or drugs.  Review of systems: No nausea. No vomiting. No diarrhea. No fever. No abdominal pain. No rashes. No weakness. No angina.  + fall r/o syncope    PHYSICAL EXAM:  T(C): 37.1 (07-01-17 @ 12:05), Max: 37.1 (07-01-17 @ 12:05)  HR: 60 (07-01-17 @ 09:06) (56 - 60)  BP: 165/73 (07-01-17 @ 09:06) (132/77 - 169/79)  RR: 14 (07-01-17 @ 12:05) (12 - 20)  SpO2: 95% (07-01-17 @ 12:05) (95% - 100%)  Wt(kg): --    no JVD  RRR, no murmurs  CTAB  soft nt/nd  no c/c/e    TELEMETRY: AP-VS  ECG: atrial pacing, RBBB    Echo: office normal  NST: n/a  Cath: n/a  	  	  LABS:	 	                          12.9   8.3   )-----------( 128      ( 01 Jul 2017 05:14 )             37.6     07-01    140  |  109<H>  |  18  ----------------------------<  79  4.0   |  23  |  1.00    Ca    8.2<L>      01 Jul 2017 05:14  Phos  2.6     07-01  Mg     2.3     07-01    TPro  7.0  /  Alb  3.4<L>  /  TBili  0.5  /  DBili  x   /  AST  15  /  ALT  20  /  AlkPhos  82  06-30    proBNP:   Lipid Profile:   HgA1c: Hemoglobin A1C, Whole Blood: 5.9 % (07-01 @ 09:23)    TSH: Thyroid Stimulating Hormone, Serum: 1.81 uU/mL (07-01 @ 05:14)      ASSESSMENT/PLAN: He is an extremely pleasant 76-year-old male with a past medical history of symptomatic sick sinus syndrome who originally had a [employer] dual-chamber pacemaker implanted at [facility] in October 2013. Device interrogation in the office 2 weeks ago demonstrated excellent right atrial and right ventricular lead function with over 8 years remaining of pacemaker longevity. He A paces 80% of the time but never requires ventricular pacing. He denies high risk features such as palpitations or angina. He is now admitted with a fall rule out syncope. EKG/Tele show AP-VS, no arrhythmias    -call [employer] for PPM interrogation. If interrogation normal then d/c home today  -f/u with me and cardiologist Dr. Lakhwinder Martinez as scheduled    Misti Amaya M.D., [facility]  [phone]

## 2017-07-01 NOTE — H&P ADULT - PROBLEM SELECTOR PLAN 4
patient c/o paresthesia of both feet, likely due to neuropathy vs age related autonomic dysfunction.  will start on gabapentin 100mg BID.

## 2017-07-01 NOTE — H&P ADULT - PROBLEM SELECTOR PLAN 2
patient almost synopsized after initial evaluation in ED.  EKG-paced rythm, LAD, Rt BBB, TWI in v1, v2. cardiac enzymes X1 negative.  will trend cardiac enzymes   admit to telemetry. patient almost synopsized after initial evaluation in ED.  admit to telemetry.  EKG-paced rythm, LAD, Rt BBB, TWI in v1, v2. cardiac enzymes X1 negative.  will trend cardiac enzymes   fall precautions  f/u echo, TSH, lipid profile, a1c levels.  PT evaluation. patient almost synopsized after initial evaluation in ED.  admit to telemetry.  EKG-paced rythm, LAD, Rt BBB, TWI in v1, v2. cardiac enzymes X1 negative.  will trend cardiac enzymes   fall precautions  f/u echo, TSH, lipid profile, a1c levels.  PT evaluation.  cardiology- DR Amin

## 2017-07-01 NOTE — ED ADULT NURSE REASSESSMENT NOTE - NS ED NURSE REASSESS COMMENT FT1
Patient alert and oriented x3, breathing spontaneously on room air. Patient remains being nursed on tele box, reported no complaints of pain or discomfort. Ambulated to the bathroom with assist. Vitals charted. IV infusion continues at prescribed rate. Patient is being admitted, awaiting bed availability.

## 2017-07-01 NOTE — H&P ADULT - GAIT/BALANCE
able to walk without difficulty, but was swaying side ways. cerebellar signs negative, heel knee test and finger nose coordination intact.

## 2017-07-01 NOTE — H&P ADULT - HISTORY OF PRESENT ILLNESS
76m from home lives alone ambulates with the help of cane, PMH of CAD s/p stent not on any medications, HTN, bilateral LE paresthesias with gait instability BIBA after patient had a fall while he was walking on the street. Per patient he was in usual state of health and was walking up the street, he felt tired and there was stoop on the way, he felt as if his legs gave way and lost balance tried to avoid fall by holding some support, but ultimately ended up with a fall backwards, hitting his left elbow. patient denies any LOC, headache, nausea or vomiting. He denies any chest pain, palpitations, weakness of extremities, dizziness, visual disturbances, tremors, involuntary loss of  urine or feces. A passer by noticed and called 911 who brought him here. patient reports that he had similar episodes of fall X3 in the last 6 months relates to poor balance and paresthesias of both feet.     In ED, patient VS were stable on admission, CT head no acute pathology, CT c spine degenerative spondylosis and spondylolisthesis. patient had X ray of left forearm no acute fracture. He was dischaged from ED, when he was found to be diaphoretic, and weak unable to ambulate again and was almost about to pass out again. so he was brought back to ED and ekg was done which showed paced rythm, LAD, Rt BBB, TWI in v1, v2. cardiac enzymes X1 negative. 76m from home lives alone ambulates with the help of cane, PMH of CAD s/p stent not on any medications, s/p PPM, HTN, bilateral LE paresthesias with gait instability, chronic active smoker, BIBA after patient had a fall while he was walking on the street. Per patient he was in usual state of health and was walking up the street, he felt tired and there was stoop on the way, he felt as if his legs gave way and lost balance tried to avoid fall by holding some support, but ultimately ended up with a fall backwards, hitting his left elbow. patient denies any LOC, headache, nausea or vomiting. He denies any chest pain, palpitations, weakness of extremities, dizziness, visual disturbances, tremors, involuntary loss of  urine or feces. A passer by noticed and called 911 who brought him here. patient reports that he had similar episodes of fall X3 in the last 6 months relates to poor balance and paresthesias of both feet. patient follows up with cardiologist, echo done 2 years ago, PPM was interrogated 3 weeks ago.     In ED, patient VS were stable on admission, CT head no acute pathology, CT c spine degenerative spondylosis and spondylolisthesis. patient had X ray of left forearm no acute fracture. He was dischaged from ED, when he was found to be diaphoretic, and weak unable to ambulate again and was almost about to pass out again. so he was brought back to ED and ekg was done which showed paced rythm, LAD, Rt BBB, TWI in v1, v2. cardiac enzymes X1 negative. 76m from home lives alone ambulates with the help of cane, PMH of CAD s/p stent not on any medications, s/p PPM, HTN, bilateral LE paresthesias with gait instability, chronic active smoker, BIBA after patient had a fall while he was walking on the street. Per patient he was in usual state of health and was walking up the street, he felt tired and there was stoop on the way, he felt as if his legs gave way and lost balance tried to avoid fall by holding some support, but ultimately ended up with a fall backwards, hitting his left elbow. patient denies any LOC, headache, nausea or vomiting. He denies any chest pain, palpitations, weakness of extremities, dizziness, visual disturbances, tremors, involuntary loss of  urine or feces. A passer by noticed and called 911 who brought him here. patient reports that he had similar episodes of fall X3 in the last 6 months relates to poor balance and paresthesias of both feet. patient follows up with cardiologist, echo done 2 years ago, PPM was interrogated 3 weeks ago.     In ED, patient VS were stable on admission, CT head no acute pathology, CT c spine degenerative spondylosis and spondylolisthesis. patient had X ray of left forearm no acute fracture. He was dischaged from ED, when he was found to be diaphoretic, and weak unable to ambulate again and was almost about to pass out again. so he was brought back to ED and ekg was done which showed paced rythm, LAD, Rt BBB, TWI in v1, v2. cardiac enzymes X1 negative.    ADDENDUM: UPDATED OUTPATIENT MEDRICS AFTER CONFIRMING WITH PHARMACY

## 2017-07-01 NOTE — H&P ADULT - PROBLEM SELECTOR PLAN 1
patient had hx of recurrent falls likely due to age related neuropathy vs orthostatic hypotension vs arrythmia.   CT head no acute pathology, CT c spine degenerative spondylosis and spondylolisthesis.  X ray of left forearm no fracture noted.  orthostatics positive , will start with IVF NS at 75 cc/hr  PT evaluation   Fall precautions

## 2017-07-01 NOTE — PROGRESS NOTE ADULT - SUBJECTIVE AND OBJECTIVE BOX
Patient is a 76y old  Male who presents with a chief complaint of fall (2017 01:15)  ATTENDING NOTE    INTERVAL HPI/OVERNIGHT EVENTS: no new complaints    MEDICATIONS  (STANDING):  atorvastatin 20 milliGRAM(s) Oral at bedtime  aspirin enteric coated 81 milliGRAM(s) Oral daily  gabapentin 100 milliGRAM(s) Oral two times a day  heparin  Injectable 5000 Unit(s) SubCutaneous every 8 hours  sodium chloride 0.45%. 1000 milliLiter(s) (50 mL/Hr) IV Continuous <Continuous>  clopidogrel Tablet 75 milliGRAM(s) Oral daily  amLODIPine   Tablet 10 milliGRAM(s) Oral daily  buPROPion XL . 300 milliGRAM(s) Oral daily    MEDICATIONS  (PRN):      __________________________________________________  REVIEW OF SYSTEMS:    CONSTITUTIONAL: No fever,   EYES: no acute visual disturbances  NECK: No pain or stiffness  RESPIRATORY: No cough; No shortness of breath  CARDIOVASCULAR: No chest pain, no palpitations  GASTROINTESTINAL: No pain. No nausea or vomiting; No diarrhea   NEUROLOGICAL: No headache or numbness, no tremors  MUSCULOSKELETAL: No joint pain, no muscle pain  GENITOURINARY: no dysuria, no frequency, no hesitancy  PSYCHIATRY: no depression , no anxiety  ALL OTHER  ROS negative        Vital Signs Last 24 Hrs  T(C): 36.8 (2017 09:06), Max: 36.9 (2017 15:11)  T(F): 98.3 (2017 09:06), Max: 98.5 (2017 15:11)  HR: 60 (2017 09:06) (56 - 60)  BP: 165/73 (2017 09:06) (132/77 - 169/79)  BP(mean): --  RR: 16 (2017 09:06) (12 - 20)  SpO2: 98% (2017 09:06) (95% - 100%)    ________________________________________________  PHYSICAL EXAM:  GENERAL: NAD  HEENT: Normocephalic;  conjunctivae and sclerae clear; moist mucous membranes;   NECK : supple  CHEST/LUNG: Clear to auscultation bilaterally with good air entry   HEART: S1 S2  regular; no murmurs, gallops or rubs  ABDOMEN: Soft, Nontender, Nondistended; Bowel sounds present  EXTREMITIES: no cyanosis; no edema; no calf tenderness  SKIN: warm and dry; no rash  NERVOUS SYSTEM:  Awake and alert; Oriented  to place, person and time ; no new deficits    _________________________________________________  LABS:                        12.9   8.3   )-----------( 128      ( 2017 05:14 )             37.6     07-01    140  |  109<H>  |  18  ----------------------------<  79  4.0   |  23  |  1.00    Ca    8.2<L>      2017 05:14  Phos  2.6     07-  Mg     2.3     07-01    TPro  7.0  /  Alb  3.4<L>  /  TBili  0.5  /  DBili  x   /  AST  15  /  ALT  20  /  AlkPhos  82  06-30    PT/INR - ( 2017 16:12 )   PT: 11.5 sec;   INR: 1.05 ratio         PTT - ( 2017 16:12 )  PTT:26.4 sec  Urinalysis Basic - ( 2017 03:32 )    Color: Yellow / Appearance: Clear / S.020 / pH: x  Gluc: x / Ketone: Trace  / Bili: Negative / Urobili: 1   Blood: x / Protein: 15 / Nitrite: Negative   Leuk Esterase: Negative / RBC: 0-2 /HPF / WBC 0-2 /HPF   Sq Epi: x / Non Sq Epi: Few / Bacteria: Few /HPF      CAPILLARY BLOOD GLUCOSE  137 (2017 19:11)            RADIOLOGY & ADDITIONAL TESTS:    Imaging Personally Reviewed:  YES/NO    Consultant(s) Notes Reviewed:   YES/ No    Care Discussed with Consultants :     Plan of care was discussed with patient and /or primary care giver; all questions and concerns were addressed and care was aligned with patient's wishes. ATTENDING NOTE- COVERAGE FOR DR MONTELONGO    Patient is a 76y old  Male who presents with a chief complaint of fall (2017 01:15)      INTERVAL HPI/ OVERNIGHT EVENTS: no new complaints    MEDICATIONS  (STANDING):  atorvastatin 20 milliGRAM(s) Oral at bedtime  aspirin enteric coated 81 milliGRAM(s) Oral daily  gabapentin 100 milliGRAM(s) Oral two times a day  heparin  Injectable 5000 Unit(s) SubCutaneous every 8 hours  sodium chloride 0.45%. 1000 milliLiter(s) (50 mL/Hr) IV Continuous <Continuous>  clopidogrel Tablet 75 milliGRAM(s) Oral daily  amLODIPine   Tablet 10 milliGRAM(s) Oral daily  buPROPion XL . 300 milliGRAM(s) Oral daily    MEDICATIONS  (PRN):      __________________________________________________  REVIEW OF SYSTEMS:    CONSTITUTIONAL: No fever,   EYES: no acute visual disturbances  NECK: No pain or stiffness  RESPIRATORY: No cough; No shortness of breath  CARDIOVASCULAR: No chest pain, no palpitations  GASTROINTESTINAL: No pain. No nausea or vomiting; No diarrhea   NEUROLOGICAL: No headache or numbness, no tremors  MUSCULOSKELETAL: No joint pain, no muscle pain  GENITOURINARY: no dysuria, no frequency, no hesitancy  PSYCHIATRY: no depression , no anxiety  ALL OTHER  ROS negative        Vital Signs Last 24 Hrs  T(C): 36.8 (2017 09:06), Max: 36.9 (2017 15:11)  T(F): 98.3 (2017 09:06), Max: 98.5 (2017 15:11)  HR: 60 (2017 09:06) (56 - 60)  BP: 165/73 (2017 09:06) (132/77 - 169/79)  BP(mean): --  RR: 16 (2017 09:06) (12 - 20)  SpO2: 98% (2017 09:06) (95% - 100%)    ________________________________________________  PHYSICAL EXAM:  GENERAL: NAD  HEENT: Normocephalic;  conjunctivae and sclerae clear; moist mucous membranes;   NECK : supple  CHEST/LUNG: Clear to auscultation bilaterally with good air entry   HEART: S1 S2  regular; no murmurs, gallops or rubs  ABDOMEN: Soft, Nontender, Nondistended; Bowel sounds present  EXTREMITIES: no cyanosis; no edema; no calf tenderness  SKIN: warm and dry; no rash  NERVOUS SYSTEM:  Awake and alert; Oriented  to place, person and time ; no new deficits    _________________________________________________  LABS:                        12.9   8.3   )-----------( 128      ( 2017 05:14 )             37.6     07-01    140  |  109<H>  |  18  ----------------------------<  79  4.0   |  23  |  1.00    Ca    8.2<L>      2017 05:14  Phos  2.6     07-01  Mg     2.3     07-01    TPro  7.0  /  Alb  3.4<L>  /  TBili  0.5  /  DBili  x   /  AST  15  /  ALT  20  /  AlkPhos  82  06-30    PT/INR - ( 2017 16:12 )   PT: 11.5 sec;   INR: 1.05 ratio         PTT - ( 2017 16:12 )  PTT:26.4 sec  Urinalysis Basic - ( 2017 03:32 )    Color: Yellow / Appearance: Clear / S.020 / pH: x  Gluc: x / Ketone: Trace  / Bili: Negative / Urobili: 1   Blood: x / Protein: 15 / Nitrite: Negative   Leuk Esterase: Negative / RBC: 0-2 /HPF / WBC 0-2 /HPF   Sq Epi: x / Non Sq Epi: Few / Bacteria: Few /HPF      CAPILLARY BLOOD GLUCOSE  137 (2017 19:11)            RADIOLOGY & ADDITIONAL TESTS:    Imaging Personally Reviewed:  YES- CXR- no infiltrate and no effusion    Consultant(s) Notes Reviewed:   YES- EP attending        Plan of care was discussed with patient and /or primary care giver; all questions and concerns were addressed and care was aligned with patient's wishes.

## 2017-07-01 NOTE — H&P ADULT - PROBLEM SELECTOR PLAN 5
patient takes anti hypertensive at home, but do not remember the name, will hold antihypertensives for now given orthostatic hypotension.  primary team to call pharmacy [facility] at [phone] regarding list of medications. patient takes anti hypertensive at home, but do not remember the name, will hold antihypertensives for now given orthostatic hypotension except for home dose of norvasc.

## 2017-07-01 NOTE — H&P ADULT - ASSESSMENT
76m from home lives alone ambulates with the help of cane, PMH of CAD s/p stent not on any medications, HTN, bilateral LE paresthesias with gait instability BIBA after patient had a fall while he was walking on the street.    In ED, patient VS were stable on admission, CT head no acute pathology, CT c spine degenerative spondylosis and spondylolisthesis. patient had X ray of left forearm no acute fracture. He was dischaged from ED, when he was found to be diaphoretic, and weak unable to ambulate again and was almost about to pass out again. so he was brought back to ED and ekg was done which showed paced rythm, LAD, Rt BBB, TWI in v1, v2. cardiac enzymes X1 negative.

## 2017-07-01 NOTE — ED ADULT NURSE REASSESSMENT NOTE - NS ED NURSE REASSESS COMMENT FT1
Report received from LASHELL Washington RN. Patient resting in bed. AA&Ox3. Breathing on room air. IVHL intact on right hand. 0.5NS ongoing. Denies pain or discomfort. Transferred to ED holding. In no distress.

## 2017-07-01 NOTE — H&P ADULT - PROBLEM SELECTOR PLAN 3
pateint has Hx of CAD s/p stent but denies any use of aspirin or statin.  will start on aspirin and statin for now   hold beta blocker due to orthostatic hypotension.  f/u echo  s/p PPM (patient do not know the reason) was interrogated 3 weeks ago.  primary team to call pharmacy [facility] at [phone] pateint has Hx of CAD s/p stent but denies any use of aspirin or statin.  will start on aspirin and statin for now   hold beta blocker due to orthostatic hypotension.  f/u echo  s/p PPM (patient do not know the reason) was interrogated 3 weeks ago.([facility])  primary team to call pharmacy [facility] at [phone] pateint has Hx of CAD s/p stent but denies any use of aspirin or statin.  will start on aspirin and statin for now along with plavix confirmed with pharmacy patient recently refilled prescriptions.  hold beta blocker due to orthostatic hypotension.  f/u echo  s/p PPM (patient do not know the reason) was interrogated 3 weeks ago.([facility])  rep informed of interrogation.

## 2017-07-02 ENCOUNTER — TRANSCRIPTION ENCOUNTER (OUTPATIENT)
Age: 77
End: 2017-07-02

## 2017-07-02 VITALS
RESPIRATION RATE: 18 BRPM | OXYGEN SATURATION: 98 % | DIASTOLIC BLOOD PRESSURE: 89 MMHG | TEMPERATURE: 98 F | SYSTOLIC BLOOD PRESSURE: 131 MMHG | HEART RATE: 58 BPM

## 2017-07-02 LAB
ANION GAP SERPL CALC-SCNC: 11 MMOL/L — SIGNIFICANT CHANGE UP (ref 5–17)
BUN SERPL-MCNC: 12 MG/DL — SIGNIFICANT CHANGE UP (ref 7–18)
CALCIUM SERPL-MCNC: 8.5 MG/DL — SIGNIFICANT CHANGE UP (ref 8.4–10.5)
CHLORIDE SERPL-SCNC: 109 MMOL/L — HIGH (ref 96–108)
CO2 SERPL-SCNC: 23 MMOL/L — SIGNIFICANT CHANGE UP (ref 22–31)
CREAT SERPL-MCNC: 0.93 MG/DL — SIGNIFICANT CHANGE UP (ref 0.5–1.3)
GLUCOSE SERPL-MCNC: 87 MG/DL — SIGNIFICANT CHANGE UP (ref 70–99)
HCT VFR BLD CALC: 36.6 % — LOW (ref 39–50)
HGB BLD-MCNC: 12.4 G/DL — LOW (ref 13–17)
MCHC RBC-ENTMCNC: 32.9 PG — SIGNIFICANT CHANGE UP (ref 27–34)
MCHC RBC-ENTMCNC: 33.8 GM/DL — SIGNIFICANT CHANGE UP (ref 32–36)
MCV RBC AUTO: 97.1 FL — SIGNIFICANT CHANGE UP (ref 80–100)
PLATELET # BLD AUTO: 123 K/UL — LOW (ref 150–400)
POTASSIUM SERPL-MCNC: 4 MMOL/L — SIGNIFICANT CHANGE UP (ref 3.5–5.3)
POTASSIUM SERPL-SCNC: 4 MMOL/L — SIGNIFICANT CHANGE UP (ref 3.5–5.3)
RBC # BLD: 3.77 M/UL — LOW (ref 4.2–5.8)
RBC # FLD: 13.5 % — SIGNIFICANT CHANGE UP (ref 10.3–14.5)
SODIUM SERPL-SCNC: 143 MMOL/L — SIGNIFICANT CHANGE UP (ref 135–145)
WBC # BLD: 7.4 K/UL — SIGNIFICANT CHANGE UP (ref 3.8–10.5)
WBC # FLD AUTO: 7.4 K/UL — SIGNIFICANT CHANGE UP (ref 3.8–10.5)

## 2017-07-02 PROCEDURE — 70450 CT HEAD/BRAIN W/O DYE: CPT

## 2017-07-02 PROCEDURE — 85730 THROMBOPLASTIN TIME PARTIAL: CPT

## 2017-07-02 PROCEDURE — 82553 CREATINE MB FRACTION: CPT

## 2017-07-02 PROCEDURE — 84100 ASSAY OF PHOSPHORUS: CPT

## 2017-07-02 PROCEDURE — 83036 HEMOGLOBIN GLYCOSYLATED A1C: CPT

## 2017-07-02 PROCEDURE — 80053 COMPREHEN METABOLIC PANEL: CPT

## 2017-07-02 PROCEDURE — 90715 TDAP VACCINE 7 YRS/> IM: CPT

## 2017-07-02 PROCEDURE — 85610 PROTHROMBIN TIME: CPT

## 2017-07-02 PROCEDURE — 82550 ASSAY OF CK (CPK): CPT

## 2017-07-02 PROCEDURE — 82607 VITAMIN B-12: CPT

## 2017-07-02 PROCEDURE — 82306 VITAMIN D 25 HYDROXY: CPT

## 2017-07-02 PROCEDURE — 84443 ASSAY THYROID STIM HORMONE: CPT

## 2017-07-02 PROCEDURE — 80061 LIPID PANEL: CPT

## 2017-07-02 PROCEDURE — 97161 PT EVAL LOW COMPLEX 20 MIN: CPT

## 2017-07-02 PROCEDURE — 80048 BASIC METABOLIC PNL TOTAL CA: CPT

## 2017-07-02 PROCEDURE — 36415 COLL VENOUS BLD VENIPUNCTURE: CPT

## 2017-07-02 PROCEDURE — 93306 TTE W/DOPPLER COMPLETE: CPT

## 2017-07-02 PROCEDURE — 81001 URINALYSIS AUTO W/SCOPE: CPT

## 2017-07-02 PROCEDURE — 72125 CT NECK SPINE W/O DYE: CPT

## 2017-07-02 PROCEDURE — 93005 ELECTROCARDIOGRAM TRACING: CPT

## 2017-07-02 PROCEDURE — 99285 EMERGENCY DEPT VISIT HI MDM: CPT | Mod: 25

## 2017-07-02 PROCEDURE — 73080 X-RAY EXAM OF ELBOW: CPT

## 2017-07-02 PROCEDURE — 71045 X-RAY EXAM CHEST 1 VIEW: CPT

## 2017-07-02 PROCEDURE — 85027 COMPLETE CBC AUTOMATED: CPT

## 2017-07-02 PROCEDURE — 83735 ASSAY OF MAGNESIUM: CPT

## 2017-07-02 PROCEDURE — 84484 ASSAY OF TROPONIN QUANT: CPT

## 2017-07-02 RX ADMIN — HEPARIN SODIUM 5000 UNIT(S): 5000 INJECTION INTRAVENOUS; SUBCUTANEOUS at 05:48

## 2017-07-02 RX ADMIN — BUPROPION HYDROCHLORIDE 300 MILLIGRAM(S): 150 TABLET, EXTENDED RELEASE ORAL at 11:08

## 2017-07-02 RX ADMIN — CLOPIDOGREL BISULFATE 75 MILLIGRAM(S): 75 TABLET, FILM COATED ORAL at 11:08

## 2017-07-02 RX ADMIN — AMLODIPINE BESYLATE 10 MILLIGRAM(S): 2.5 TABLET ORAL at 05:48

## 2017-07-02 RX ADMIN — Medication 1 PATCH: at 11:08

## 2017-07-02 RX ADMIN — GABAPENTIN 100 MILLIGRAM(S): 400 CAPSULE ORAL at 05:48

## 2017-07-02 RX ADMIN — Medication 81 MILLIGRAM(S): at 11:08

## 2017-07-02 NOTE — DISCHARGE NOTE ADULT - HOSPITAL COURSE
HPI:  76m from home lives alone ambulates with the help of cane, PMH of CAD s/p stent not on any medications, s/p PPM, HTN, bilateral LE paresthesias with gait instability, chronic active smoker, BIBA after patient had a fall while he was walking on the street. Per patient he was in usual state of health and was walking up the street, he felt tired and there was stoop on the way, he felt as if his legs gave way and lost balance tried to avoid fall by holding some support, but ultimately ended up with a fall backwards, hitting his left elbow. patient denies any LOC, headache, nausea or vomiting. He denies any chest pain, palpitations, weakness of extremities, dizziness, visual disturbances, tremors, involuntary loss of  urine or feces. A passer by noticed and called 911 who brought him here. patient reports that he had similar episodes of fall X3 in the last 6 months relates to poor balance and paresthesias of both feet. patient follows up with cardiologist, echo done 2 years ago, PPM was interrogated 3 weeks ago.     In ED, patient VS were stable on admission, CT head no acute pathology, CT c spine degenerative spondylosis and spondylolisthesis. patient had X ray of left forearm no acute fracture. He was dischaged from ED, when he was found to be diaphoretic, and weak unable to ambulate again and was almost about to pass out again. so he was brought back to ED and ekg was done which showed paced rythm, LAD, Rt BBB, TWI in v1, v2. cardiac enzymes X1 negative.    ADDENDUM: UPDATED OUTPATIENT MEDRICS AFTER CONFIRMING WITH PHARMACY (01 Jul 2017 01:15)      COURSE:    Patient remained clinically stable.  He was monitored on telemetry without any acute events. He was noted with a-paced rhythm without signs of arrhythmias.  Recent pacemaker interrogation prior to hospitalization no need to interrogate in house. He will follow up with PMD, primary cardiologist and the EP cardiologist upon discharge for continued care and monitoring.  The hospital course of this patient was uncomplicated and the patient was discharged in stable medical condition.

## 2017-07-02 NOTE — DISCHARGE NOTE ADULT - PATIENT PORTAL LINK FT
“You can access the Follow[employer] Patient Portal, offered by [facility], by registering with the following website: [email or web address]”

## 2017-07-02 NOTE — PROGRESS NOTE ADULT - SUBJECTIVE AND OBJECTIVE BOX
Subjective: pt seen and examined , ROS negative.    atorvastatin 20 milliGRAM(s) Oral at bedtime  aspirin enteric coated 81 milliGRAM(s) Oral daily  gabapentin 100 milliGRAM(s) Oral two times a day  heparin  Injectable 5000 Unit(s) SubCutaneous every 8 hours  sodium chloride 0.45%. 1000 milliLiter(s) IV Continuous <Continuous>  clopidogrel Tablet 75 milliGRAM(s) Oral daily  amLODIPine   Tablet 10 milliGRAM(s) Oral daily  buPROPion XL . 300 milliGRAM(s) Oral daily  nicotine -   7 mG/24Hr(s) Patch 1 patch Transdermal daily                            12.9   8.3   )-----------( 128      ( 2017 05:14 )             37.6       Hemoglobin: 12.9 g/dL ( @ 05:14)  Hemoglobin: 12.4 g/dL ( @ 16:12)          140  |  109<H>  |  18  ----------------------------<  79  4.0   |  23  |  1.00    Ca    8.2<L>      2017 05:14  Phos  2.6       Mg     2.3         TPro  7.0  /  Alb  3.4<L>  /  TBili  0.5  /  DBili  x   /  AST  15  /  ALT  20  /  AlkPhos  82      Creatinine Trend: 1.00<--, 1.14<--    COAGS:     CARDIAC MARKERS ( 2017 10:57 )  <0.015 ng/mL / x     / 191 U/L / x     / 1.5 ng/mL  CARDIAC MARKERS ( 2017 05:14 )  <0.015 ng/mL / x     / 141 U/L / x     / 1.1 ng/mL  CARDIAC MARKERS ( 2017 23:04 )  <0.015 ng/mL / x     / x     / x     / <1.0 ng/mL    T(C): 36.3 (17 @ 05:16), Max: 37.1 (17 @ 12:05)  HR: 60 (17 @ 05:16) (58 - 63)  BP: 162/65 (17 @ 05:16) (138/84 - 165/73)  RR: 18 (17 @ 05:16) (14 - 18)  SpO2: 99% (17 @ 05:16) (95% - 99%)  Wt(kg): --    I&O's Summary    2017 07:01  -  2017 06:59  --------------------------------------------------------  IN: 300 mL / OUT: 0 mL / NET: 300 mL        Daily     Daily Weight in k.4 (2017 05:16)	    Appearance: Normal	  HEENT:   Normal oral mucosa, PERRL, EOMI	  Lymphatic: No lymphadenopathy , no edema  Cardiovascular: Normal S1 S2, No JVD, No murmurs , Peripheral pulses palpable 2+ bilaterally  Respiratory: Lungs clear to auscultation, normal effort 	  Gastrointestinal:  Soft, Non-tender, + BS	  Skin: No rashes, No ecchymoses, No cyanosis, warm to touch  Musculoskeletal: Normal range of motion, normal strength  Psychiatry:  Mood & affect appropriate    TELEMETRY: 	  paced   ECG:  	  RADIOLOGY:   DIAGNOSTIC TESTING:  [ ] Echocardiogram: nl LV/RV fx  [ ]  Catheterization:  [ ] Stress Test:    OTHER: 	        ASSESSMENT/PLAN: 	76y Male hx of Sick sinus syndrome, s/p ppm, now fall and near syncope    Asa, statin , plavix   GI / DVT prophylaxis.   keep K>4, mag >2.0   cont bp control with norvasc,   smoking cessation  no s/s of chf   PPM evaluation pending .  d/c planning   D/W Dr Amaya

## 2017-07-02 NOTE — DISCHARGE NOTE ADULT - MEDICATION SUMMARY - MEDICATIONS TO TAKE
I will START or STAY ON the medications listed below when I get home from the hospital:    aspirin 81 mg oral tablet  -- 1 tab(s) by mouth once a day  -- Indication: For CAD (coronary artery disease)    Lipitor 40 mg oral tablet  -- 1 tab(s) by mouth once a day  -- Indication: For Hyperlipidemia    Plavix 75 mg oral tablet  -- 1 tab(s) by mouth once a day  -- Indication: For CAD (coronary artery disease)    Coreg 6.25 mg oral tablet  -- 1 tab(s) by mouth 2 times a day  -- Indication: For CHF    amLODIPine 10 mg oral tablet  -- 1 tab(s) by mouth once a day  -- Indication: For HTN (hypertension)    buPROPion 24 hour extended release  -- 300 milligram(s) by mouth once a day  -- Indication: For Mood

## 2017-07-02 NOTE — DISCHARGE NOTE ADULT - NSTOBACCOHOTLINE_GEN_A_CS
[facility] Smokers Quitline ([id number]) [facility] Smokers Quitline ([email or web address])

## 2017-07-02 NOTE — PHYSICAL THERAPY INITIAL EVALUATION ADULT - GENERAL OBSERVATIONS, REHAB EVAL
Consult received, chart reviewed. Patient received standing in room, NAD. Patient agreed to EVALUATION from Physical Therapist.

## 2017-07-02 NOTE — PHYSICAL THERAPY INITIAL EVALUATION ADULT - CRITERIA FOR SKILLED THERAPEUTIC INTERVENTIONS
Pt. presents without gross impairment and independent with functional mobility. Skilled, therapeutic PT intervention not indicated at this time. Pt. will be d/c from PT program at this time.

## 2017-07-02 NOTE — PROGRESS NOTE ADULT - PROBLEM SELECTOR PLAN 2
stable; continue maintenance cardiac meds stable; no acute cardiac symptoms  continue maintenance cardiac meds

## 2017-07-02 NOTE — DISCHARGE NOTE ADULT - CARE PLAN
Principal Discharge DX:	Near syncope  Goal:	Avoid falls  Instructions for follow-up, activity and diet:	Follow up with your PMD and primary cardiologist within one week  Secondary Diagnosis:	CAD (coronary artery disease)  Instructions for follow-up, activity and diet:	Continue your prescribed medications  Secondary Diagnosis:	Pacemaker  Secondary Diagnosis:	HTN (hypertension)  Instructions for follow-up, activity and diet:	continue your medications as prescribed

## 2017-07-02 NOTE — PROGRESS NOTE ADULT - SUBJECTIVE AND OBJECTIVE BOX
MEDICAL ATTENDING NOTE- COVERAGE FOR DR MONTELONGO    Patient is a 76y old  Male who presents with a chief complaint of fall       INTERVAL HPI/ OVERNIGHT EVENTS: no new complaints    MEDICATIONS  (STANDING):  atorvastatin 20 milliGRAM(s) Oral at bedtime  aspirin enteric coated 81 milliGRAM(s) Oral daily  gabapentin 100 milliGRAM(s) Oral two times a day  heparin  Injectable 5000 Unit(s) SubCutaneous every 8 hours  sodium chloride 0.45%. 1000 milliLiter(s) (50 mL/Hr) IV Continuous <Continuous>  clopidogrel Tablet 75 milliGRAM(s) Oral daily  amLODIPine   Tablet 10 milliGRAM(s) Oral daily  buPROPion XL . 300 milliGRAM(s) Oral daily  nicotine -   7 mG/24Hr(s) Patch 1 patch Transdermal daily    MEDICATIONS  (PRN):      __________________________________________________  REVIEW OF SYSTEMS:    CONSTITUTIONAL: No fever,   EYES: no acute visual disturbances  NECK: No pain or stiffness  RESPIRATORY: No cough; No shortness of breath  CARDIOVASCULAR: No chest pain, no palpitations  GASTROINTESTINAL: No pain. No nausea or vomiting; No diarrhea   NEUROLOGICAL: No headache or numbness, no tremors  MUSCULOSKELETAL: No joint pain, no muscle pain  GENITOURINARY: no dysuria, no frequency, no hesitancy  PSYCHIATRY: no depression , no anxiety  ALL OTHER  ROS negative        Vital Signs Last 24 Hrs  T(C): 36.5 (02 Jul 2017 11:51), Max: 36.9 (01 Jul 2017 21:59)  T(F): 97.7 (02 Jul 2017 11:51), Max: 98.5 (01 Jul 2017 21:59)  HR: 58 (02 Jul 2017 11:51) (58 - 63)  BP: 135/55 (02 Jul 2017 11:51) (135/55 - 162/65)  BP(mean): --  RR: 16 (02 Jul 2017 11:51) (14 - 18)  SpO2: 96% (02 Jul 2017 11:51) (95% - 99%)    ________________________________________________  PHYSICAL EXAM:  GENERAL: NAD  HEENT: Normocephalic;  conjunctivae and sclerae clear; moist mucous membranes;   NECK : supple  CHEST/LUNG: Clear to auscultation bilaterally with good air entry   HEART: S1 S2  regular; no murmurs, gallops or rubs  ABDOMEN: Soft, Nontender, Nondistended; Bowel sounds present  EXTREMITIES: no cyanosis; no edema; no calf tenderness  SKIN: warm and dry; no rash  NERVOUS SYSTEM:  Awake and alert; Oriented  to place, person and time ; no new deficits    _________________________________________________  LABS:                        12.4   7.4   )-----------( 123      ( 02 Jul 2017 06:24 )             36.6     07-02    143  |  109<H>  |  12  ----------------------------<  87  4.0   |  23  |  0.93    Ca    8.5      02 Jul 2017 06:24  Phos  2.6     07-01  Mg     2.3     07-01    TPro  7.0  /  Alb  3.4<L>  /  TBili  0.5  /  DBili  x   /  AST  15  /  ALT  20  /  AlkPhos  82  06-30    PT/INR - ( 30 Jun 2017 16:12 )   PT: 11.5 sec;   INR: 1.05 ratio         PTT - ( 30 Jun 2017 16:12 )  PTT:26.4 sec  Urinalysis Basic - ( 01 Jul 2017 03:32 )                Plan of care was discussed with patient; all questions and concerns were addressed and care was aligned with patient's wishes. MEDICAL ATTENDING NOTE- COVERAGE FOR DR MONTELONGO    Patient is a 76y old  Male who presents with a chief complaint of s/p fall       INTERVAL HPI/ OVERNIGHT EVENTS: no new complaints    MEDICATIONS  (STANDING):  atorvastatin 20 milliGRAM(s) Oral at bedtime  aspirin enteric coated 81 milliGRAM(s) Oral daily  gabapentin 100 milliGRAM(s) Oral two times a day  heparin  Injectable 5000 Unit(s) SubCutaneous every 8 hours  sodium chloride 0.45%. 1000 milliLiter(s) (50 mL/Hr) IV Continuous <Continuous>  clopidogrel Tablet 75 milliGRAM(s) Oral daily  amLODIPine   Tablet 10 milliGRAM(s) Oral daily  buPROPion XL . 300 milliGRAM(s) Oral daily  nicotine -   7 mG/24Hr(s) Patch 1 patch Transdermal daily    MEDICATIONS  (PRN):      __________________________________________________  REVIEW OF SYSTEMS:    CONSTITUTIONAL: No fever,   EYES: no acute visual disturbances  NECK: No pain or stiffness  RESPIRATORY: No cough; No shortness of breath  CARDIOVASCULAR: No chest pain, no palpitations  GASTROINTESTINAL: No pain. No nausea or vomiting; No diarrhea   NEUROLOGICAL: No headache or numbness, no tremors  MUSCULOSKELETAL: No joint pain, no muscle pain  GENITOURINARY: no dysuria, no frequency, no hesitancy  PSYCHIATRY: no depression , no anxiety  ALL OTHER  ROS negative        Vital Signs Last 24 Hrs  T(C): 36.5 (02 Jul 2017 11:51), Max: 36.9 (01 Jul 2017 21:59)  T(F): 97.7 (02 Jul 2017 11:51), Max: 98.5 (01 Jul 2017 21:59)  HR: 58 (02 Jul 2017 11:51) (58 - 63)  BP: 135/55 (02 Jul 2017 11:51) (135/55 - 162/65)  BP(mean): --  RR: 16 (02 Jul 2017 11:51) (14 - 18)  SpO2: 96% (02 Jul 2017 11:51) (95% - 99%)    ________________________________________________  PHYSICAL EXAM:  GENERAL: NAD  HEENT: Normocephalic;  conjunctivae and sclerae clear; moist mucous membranes;   NECK : supple  CHEST/LUNG: Clear to auscultation bilaterally with good air entry   HEART: S1 S2  regular; no murmurs, gallops or rubs  ABDOMEN: Soft, Nontender, Nondistended; Bowel sounds present  EXTREMITIES: no cyanosis; no edema; no calf tenderness  SKIN: warm and dry; no rash  NERVOUS SYSTEM:  Awake and alert; Oriented  to place, person and time ; no new deficits    _________________________________________________  LABS:                        12.4   7.4   )-----------( 123      ( 02 Jul 2017 06:24 )             36.6     07-02    143  |  109<H>  |  12  ----------------------------<  87  4.0   |  23  |  0.93    Ca    8.5      02 Jul 2017 06:24  Phos  2.6     07-01  Mg     2.3     07-01    TPro  7.0  /  Alb  3.4<L>  /  TBili  0.5  /  DBili  x   /  AST  15  /  ALT  20  /  AlkPhos  82  06-30    PT/INR - ( 30 Jun 2017 16:12 )   PT: 11.5 sec;   INR: 1.05 ratio         PTT - ( 30 Jun 2017 16:12 )  PTT:26.4 sec  Urinalysis Basic - ( 01 Jul 2017 03:32 )                Plan of care was discussed with patient; all questions and concerns were addressed and care was aligned with patient's wishes.

## 2017-07-02 NOTE — DISCHARGE NOTE ADULT - PLAN OF CARE
Avoid falls Follow up with your PMD and primary cardiologist within one week Continue your prescribed medications continue your medications as prescribed

## 2017-07-02 NOTE — DISCHARGE NOTE ADULT - NSTOBACCOSMKCESSPRO_GEN_A_CS
Referred to [facility] for Tobacco Control... Referred to [facility] for Tobacco Control...

## 2017-07-03 PROBLEM — F03.90 UNSPECIFIED DEMENTIA, UNSPECIFIED SEVERITY, WITHOUT BEHAVIORAL DISTURBANCE, PSYCHOTIC DISTURBANCE, MOOD DISTURBANCE, AND ANXIETY: Chronic | Status: ACTIVE | Noted: 2017-05-31

## 2017-07-10 DIAGNOSIS — I10 ESSENTIAL (PRIMARY) HYPERTENSION: ICD-10-CM

## 2017-07-10 DIAGNOSIS — G62.9 POLYNEUROPATHY, UNSPECIFIED: ICD-10-CM
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
## 2017-07-10 DIAGNOSIS — Z79.02 LONG TERM (CURRENT) USE OF ANTITHROMBOTICS/ANTIPLATELETS: ICD-10-CM
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
## 2017-07-10 DIAGNOSIS — Z98.890 OTHER SPECIFIED POSTPROCEDURAL STATES: ICD-10-CM

## 2017-07-10 DIAGNOSIS — R55 SYNCOPE AND COLLAPSE: ICD-10-CM

## 2017-07-10 DIAGNOSIS — M43.10 SPONDYLOLISTHESIS, SITE UNSPECIFIED: ICD-10-CM

## 2017-07-10 DIAGNOSIS — R26.89 OTHER ABNORMALITIES OF GAIT AND MOBILITY: ICD-10-CM

## 2017-07-10 DIAGNOSIS — R20.9 UNSPECIFIED DISTURBANCES OF SKIN SENSATION: ICD-10-CM
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
## 2017-07-10 DIAGNOSIS — M47.9 SPONDYLOSIS, UNSPECIFIED: ICD-10-CM

## 2017-07-10 DIAGNOSIS — Z95.0 PRESENCE OF CARDIAC PACEMAKER: ICD-10-CM

## 2017-07-10 DIAGNOSIS — Z90.49 ACQUIRED ABSENCE OF OTHER SPECIFIED PARTS OF DIGESTIVE TRACT: ICD-10-CM

## 2017-07-10 DIAGNOSIS — F17.210 NICOTINE DEPENDENCE, CIGARETTES, UNCOMPLICATED: ICD-10-CM
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
## 2017-07-10 DIAGNOSIS — I25.10 ATHEROSCLEROTIC HEART DISEASE OF NATIVE CORONARY ARTERY WITHOUT ANGINA PECTORIS: ICD-10-CM

## 2017-07-11 ENCOUNTER — INPATIENT (INPATIENT)
Facility: HOSPITAL | Age: 77
LOS: 3 days | Discharge: NURSING, REHAB OR LONG-TERM CARE | DRG: 312 | End: 2017-07-15
Attending: INTERNAL MEDICINE | Admitting: INTERNAL MEDICINE
Payer: MEDICARE

## 2017-07-11 VITALS — WEIGHT: 190.04 LBS

## 2017-07-11 DIAGNOSIS — Z87.438 PERSONAL HISTORY OF OTHER DISEASES OF MALE GENITAL ORGANS: Chronic | ICD-10-CM
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
## 2017-07-11 DIAGNOSIS — Z98.890 OTHER SPECIFIED POSTPROCEDURAL STATES: Chronic | ICD-10-CM

## 2017-07-11 DIAGNOSIS — Z90.49 ACQUIRED ABSENCE OF OTHER SPECIFIED PARTS OF DIGESTIVE TRACT: Chronic | ICD-10-CM

## 2017-07-11 LAB
ALBUMIN SERPL ELPH-MCNC: 3.4 G/DL — LOW (ref 3.5–5)
ALP SERPL-CCNC: 96 U/L — SIGNIFICANT CHANGE UP (ref 40–120)
ALT FLD-CCNC: 25 U/L DA — SIGNIFICANT CHANGE UP (ref 10–60)
ANION GAP SERPL CALC-SCNC: 9 MMOL/L — SIGNIFICANT CHANGE UP (ref 5–17)
APTT BLD: 24.9 SEC — LOW (ref 27.5–37.4)
AST SERPL-CCNC: 18 U/L — SIGNIFICANT CHANGE UP (ref 10–40)
BASOPHILS # BLD AUTO: 0.1 K/UL — SIGNIFICANT CHANGE UP (ref 0–0.2)
BASOPHILS NFR BLD AUTO: 0.9 % — SIGNIFICANT CHANGE UP (ref 0–2)
BILIRUB SERPL-MCNC: 0.6 MG/DL — SIGNIFICANT CHANGE UP (ref 0.2–1.2)
BUN SERPL-MCNC: 19 MG/DL — HIGH (ref 7–18)
CALCIUM SERPL-MCNC: 8.7 MG/DL — SIGNIFICANT CHANGE UP (ref 8.4–10.5)
CHLORIDE SERPL-SCNC: 110 MMOL/L — HIGH (ref 96–108)
CO2 SERPL-SCNC: 23 MMOL/L — SIGNIFICANT CHANGE UP (ref 22–31)
CREAT SERPL-MCNC: 1.05 MG/DL — SIGNIFICANT CHANGE UP (ref 0.5–1.3)
EOSINOPHIL # BLD AUTO: 0.2 K/UL — SIGNIFICANT CHANGE UP (ref 0–0.5)
EOSINOPHIL NFR BLD AUTO: 2.3 % — SIGNIFICANT CHANGE UP (ref 0–6)
ETHANOL SERPL-MCNC: 4 MG/DL — SIGNIFICANT CHANGE UP (ref 0–10)
GLUCOSE SERPL-MCNC: 104 MG/DL — HIGH (ref 70–99)
HCT VFR BLD CALC: 38.7 % — LOW (ref 39–50)
HGB BLD-MCNC: 13 G/DL — SIGNIFICANT CHANGE UP (ref 13–17)
INR BLD: 1.06 RATIO — SIGNIFICANT CHANGE UP (ref 0.88–1.16)
LIDOCAIN IGE QN: 76 U/L — SIGNIFICANT CHANGE UP (ref 73–393)
LYMPHOCYTES # BLD AUTO: 1.8 K/UL — SIGNIFICANT CHANGE UP (ref 1–3.3)
LYMPHOCYTES # BLD AUTO: 20 % — SIGNIFICANT CHANGE UP (ref 13–44)
MCHC RBC-ENTMCNC: 33 PG — SIGNIFICANT CHANGE UP (ref 27–34)
MCHC RBC-ENTMCNC: 33.7 GM/DL — SIGNIFICANT CHANGE UP (ref 32–36)
MCV RBC AUTO: 97.9 FL — SIGNIFICANT CHANGE UP (ref 80–100)
MONOCYTES # BLD AUTO: 0.6 K/UL — SIGNIFICANT CHANGE UP (ref 0–0.9)
MONOCYTES NFR BLD AUTO: 7 % — SIGNIFICANT CHANGE UP (ref 2–14)
NEUTROPHILS # BLD AUTO: 6.4 K/UL — SIGNIFICANT CHANGE UP (ref 1.8–7.4)
NEUTROPHILS NFR BLD AUTO: 69.8 % — SIGNIFICANT CHANGE UP (ref 43–77)
NT-PROBNP SERPL-SCNC: 819 PG/ML — HIGH (ref 0–450)
PLATELET # BLD AUTO: 130 K/UL — LOW (ref 150–400)
POTASSIUM SERPL-MCNC: 4.1 MMOL/L — SIGNIFICANT CHANGE UP (ref 3.5–5.3)
POTASSIUM SERPL-SCNC: 4.1 MMOL/L — SIGNIFICANT CHANGE UP (ref 3.5–5.3)
PROT SERPL-MCNC: 7.4 G/DL — SIGNIFICANT CHANGE UP (ref 6–8.3)
PROTHROM AB SERPL-ACNC: 11.6 SEC — SIGNIFICANT CHANGE UP (ref 9.8–12.7)
RBC # BLD: 3.95 M/UL — LOW (ref 4.2–5.8)
RBC # FLD: 14.1 % — SIGNIFICANT CHANGE UP (ref 10.3–14.5)
SODIUM SERPL-SCNC: 142 MMOL/L — SIGNIFICANT CHANGE UP (ref 135–145)
TROPONIN I SERPL-MCNC: <0.015 NG/ML — SIGNIFICANT CHANGE UP (ref 0–0.04)
WBC # BLD: 9.1 K/UL — SIGNIFICANT CHANGE UP (ref 3.8–10.5)
WBC # FLD AUTO: 9.1 K/UL — SIGNIFICANT CHANGE UP (ref 3.8–10.5)

## 2017-07-11 PROCEDURE — 72125 CT NECK SPINE W/O DYE: CPT | Mod: 26

## 2017-07-11 PROCEDURE — 70450 CT HEAD/BRAIN W/O DYE: CPT | Mod: 26

## 2017-07-11 PROCEDURE — 99285 EMERGENCY DEPT VISIT HI MDM: CPT | Mod: 25

## 2017-07-11 PROCEDURE — 73060 X-RAY EXAM OF HUMERUS: CPT | Mod: 26,RT

## 2017-07-11 PROCEDURE — 73030 X-RAY EXAM OF SHOULDER: CPT | Mod: 26,RT

## 2017-07-11 PROCEDURE — 71020: CPT | Mod: 26

## 2017-07-11 RX ORDER — ACETAMINOPHEN 500 MG
650 TABLET ORAL ONCE
Qty: 0 | Refills: 0 | Status: COMPLETED | OUTPATIENT
Start: 2017-07-11 | End: 2017-07-11

## 2017-07-11 RX ORDER — SODIUM CHLORIDE 9 MG/ML
3 INJECTION INTRAMUSCULAR; INTRAVENOUS; SUBCUTANEOUS ONCE
Qty: 0 | Refills: 0 | Status: COMPLETED | OUTPATIENT
Start: 2017-07-11 | End: 2017-07-11

## 2017-07-11 RX ADMIN — SODIUM CHLORIDE 3 MILLILITER(S): 9 INJECTION INTRAMUSCULAR; INTRAVENOUS; SUBCUTANEOUS at 20:40

## 2017-07-11 RX ADMIN — Medication 650 MILLIGRAM(S): at 20:40

## 2017-07-11 NOTE — ED PROVIDER NOTE - OBJECTIVE STATEMENT
75 y/o M pt (ambulatory with cane) with PMHx of HTN (on Benicar), Dementia, Pacemaker, and CAD and no significant PSHx presents to ED c/o R shoulder pain s/p mechanical trip and fall with head trauma today (1 hour PTA in ED; 18:00pm). Pt reports he was walking to go get food when he tripped and fell; pt states he has been having difficulty walking recently, which may have lead to his fall.  Pt is uncertain of LOC; pt is able to recall the fall but does not remember head trauma. Pt denies neck pain, numbness, tingling, or any other complaints. Pt notes living alone with no health aides and only occasional assistance from family. Pt takes Klonopin, Plavix, Imdur, and Benicar daily. NKDA.

## 2017-07-11 NOTE — ED PROVIDER NOTE - MEDICAL DECISION MAKING DETAILS
77 y/o M pt presents with likely mechanical fall, however unclear functional status. Will check labs, X-Rays, CT scan, and reassess.

## 2017-07-11 NOTE — ED ADULT TRIAGE NOTE - CHIEF COMPLAINT QUOTE
syncope without complaints.  Arrived awake and alert with facial symmetry and equal strength of all extremities

## 2017-07-12 DIAGNOSIS — I25.10 ATHEROSCLEROTIC HEART DISEASE OF NATIVE CORONARY ARTERY WITHOUT ANGINA PECTORIS: ICD-10-CM

## 2017-07-12 DIAGNOSIS — I10 ESSENTIAL (PRIMARY) HYPERTENSION: ICD-10-CM
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
## 2017-07-12 DIAGNOSIS — F03.90 UNSPECIFIED DEMENTIA, UNSPECIFIED SEVERITY, WITHOUT BEHAVIORAL DISTURBANCE, PSYCHOTIC DISTURBANCE, MOOD DISTURBANCE, AND ANXIETY: ICD-10-CM

## 2017-07-12 DIAGNOSIS — Z29.9 ENCOUNTER FOR PROPHYLACTIC MEASURES, UNSPECIFIED: ICD-10-CM

## 2017-07-12 LAB
24R-OH-CALCIDIOL SERPL-MCNC: 23.8 NG/ML — LOW (ref 30–100)
ALBUMIN SERPL ELPH-MCNC: 3.6 G/DL — SIGNIFICANT CHANGE UP (ref 3.5–5)
ALP SERPL-CCNC: 94 U/L — SIGNIFICANT CHANGE UP (ref 40–120)
ALT FLD-CCNC: 24 U/L DA — SIGNIFICANT CHANGE UP (ref 10–60)
ANION GAP SERPL CALC-SCNC: 6 MMOL/L — SIGNIFICANT CHANGE UP (ref 5–17)
AST SERPL-CCNC: 17 U/L — SIGNIFICANT CHANGE UP (ref 10–40)
BASOPHILS # BLD AUTO: 0.1 K/UL — SIGNIFICANT CHANGE UP (ref 0–0.2)
BASOPHILS NFR BLD AUTO: 0.7 % — SIGNIFICANT CHANGE UP (ref 0–2)
BILIRUB SERPL-MCNC: 0.6 MG/DL — SIGNIFICANT CHANGE UP (ref 0.2–1.2)
BUN SERPL-MCNC: 15 MG/DL — SIGNIFICANT CHANGE UP (ref 7–18)
CALCIUM SERPL-MCNC: 8.7 MG/DL — SIGNIFICANT CHANGE UP (ref 8.4–10.5)
CHLORIDE SERPL-SCNC: 109 MMOL/L — HIGH (ref 96–108)
CHOLEST SERPL-MCNC: 101 MG/DL — SIGNIFICANT CHANGE UP (ref 10–199)
CK SERPL-CCNC: 80 U/L — SIGNIFICANT CHANGE UP (ref 35–232)
CO2 SERPL-SCNC: 24 MMOL/L — SIGNIFICANT CHANGE UP (ref 22–31)
CREAT SERPL-MCNC: 0.96 MG/DL — SIGNIFICANT CHANGE UP (ref 0.5–1.3)
EOSINOPHIL # BLD AUTO: 0 K/UL — SIGNIFICANT CHANGE UP (ref 0–0.5)
EOSINOPHIL NFR BLD AUTO: 0.5 % — SIGNIFICANT CHANGE UP (ref 0–6)
GLUCOSE SERPL-MCNC: 115 MG/DL — HIGH (ref 70–99)
HBA1C BLD-MCNC: 5.8 % — HIGH (ref 4–5.6)
HCT VFR BLD CALC: 39.4 % — SIGNIFICANT CHANGE UP (ref 39–50)
HDLC SERPL-MCNC: 45 MG/DL — SIGNIFICANT CHANGE UP (ref 40–125)
HGB BLD-MCNC: 13.3 G/DL — SIGNIFICANT CHANGE UP (ref 13–17)
LIPID PNL WITH DIRECT LDL SERPL: 41 MG/DL — SIGNIFICANT CHANGE UP
LYMPHOCYTES # BLD AUTO: 1.4 K/UL — SIGNIFICANT CHANGE UP (ref 1–3.3)
LYMPHOCYTES # BLD AUTO: 17.3 % — SIGNIFICANT CHANGE UP (ref 13–44)
MAGNESIUM SERPL-MCNC: 2.3 MG/DL — SIGNIFICANT CHANGE UP (ref 1.6–2.6)
MCHC RBC-ENTMCNC: 33 PG — SIGNIFICANT CHANGE UP (ref 27–34)
MCHC RBC-ENTMCNC: 33.7 GM/DL — SIGNIFICANT CHANGE UP (ref 32–36)
MCV RBC AUTO: 97.8 FL — SIGNIFICANT CHANGE UP (ref 80–100)
MONOCYTES # BLD AUTO: 0.6 K/UL — SIGNIFICANT CHANGE UP (ref 0–0.9)
MONOCYTES NFR BLD AUTO: 7.5 % — SIGNIFICANT CHANGE UP (ref 2–14)
NEUTROPHILS # BLD AUTO: 5.8 K/UL — SIGNIFICANT CHANGE UP (ref 1.8–7.4)
NEUTROPHILS NFR BLD AUTO: 74 % — SIGNIFICANT CHANGE UP (ref 43–77)
PHOSPHATE SERPL-MCNC: 1.9 MG/DL — LOW (ref 2.5–4.5)
PLATELET # BLD AUTO: 106 K/UL — LOW (ref 150–400)
POTASSIUM SERPL-MCNC: 3.9 MMOL/L — SIGNIFICANT CHANGE UP (ref 3.5–5.3)
POTASSIUM SERPL-SCNC: 3.9 MMOL/L — SIGNIFICANT CHANGE UP (ref 3.5–5.3)
PROT SERPL-MCNC: 7.7 G/DL — SIGNIFICANT CHANGE UP (ref 6–8.3)
RBC # BLD: 4.03 M/UL — LOW (ref 4.2–5.8)
RBC # FLD: 14.6 % — HIGH (ref 10.3–14.5)
SODIUM SERPL-SCNC: 139 MMOL/L — SIGNIFICANT CHANGE UP (ref 135–145)
TOTAL CHOLESTEROL/HDL RATIO MEASUREMENT: 2.2 RATIO — LOW (ref 3.4–9.6)
TRIGL SERPL-MCNC: 77 MG/DL — SIGNIFICANT CHANGE UP (ref 10–149)
TSH SERPL-MCNC: 1.14 UU/ML — SIGNIFICANT CHANGE UP (ref 0.34–4.82)
VIT B12 SERPL-MCNC: 419 PG/ML — SIGNIFICANT CHANGE UP (ref 243–894)
WBC # BLD: 7.8 K/UL — SIGNIFICANT CHANGE UP (ref 3.8–10.5)
WBC # FLD AUTO: 7.8 K/UL — SIGNIFICANT CHANGE UP (ref 3.8–10.5)

## 2017-07-12 PROCEDURE — 72110 X-RAY EXAM L-2 SPINE 4/>VWS: CPT | Mod: 26

## 2017-07-12 PROCEDURE — 74000: CPT | Mod: 26

## 2017-07-12 RX ORDER — MORPHINE SULFATE 50 MG/1
2 CAPSULE, EXTENDED RELEASE ORAL ONCE
Qty: 0 | Refills: 0 | Status: DISCONTINUED | OUTPATIENT
Start: 2017-07-12 | End: 2017-07-12

## 2017-07-12 RX ORDER — BUPROPION HYDROCHLORIDE 150 MG/1
300 TABLET, EXTENDED RELEASE ORAL DAILY
Qty: 0 | Refills: 0 | Status: DISCONTINUED | OUTPATIENT
Start: 2017-07-12 | End: 2017-07-13

## 2017-07-12 RX ORDER — AMLODIPINE BESYLATE 2.5 MG/1
10 TABLET ORAL DAILY
Qty: 0 | Refills: 0 | Status: DISCONTINUED | OUTPATIENT
Start: 2017-07-12 | End: 2017-07-15

## 2017-07-12 RX ORDER — NICOTINE POLACRILEX 2 MG
1 GUM BUCCAL DAILY
Qty: 0 | Refills: 0 | Status: DISCONTINUED | OUTPATIENT
Start: 2017-07-12 | End: 2017-07-15

## 2017-07-12 RX ORDER — CARVEDILOL PHOSPHATE 80 MG/1
6.25 CAPSULE, EXTENDED RELEASE ORAL EVERY 12 HOURS
Qty: 0 | Refills: 0 | Status: DISCONTINUED | OUTPATIENT
Start: 2017-07-12 | End: 2017-07-15

## 2017-07-12 RX ORDER — POTASSIUM PHOSPHATE, MONOBASIC POTASSIUM PHOSPHATE, DIBASIC 236; 224 MG/ML; MG/ML
15 INJECTION, SOLUTION INTRAVENOUS ONCE
Qty: 0 | Refills: 0 | Status: DISCONTINUED | OUTPATIENT
Start: 2017-07-12 | End: 2017-07-12

## 2017-07-12 RX ORDER — AMLODIPINE BESYLATE 2.5 MG/1
10 TABLET ORAL ONCE
Qty: 0 | Refills: 0 | Status: COMPLETED | OUTPATIENT
Start: 2017-07-12 | End: 2017-07-12

## 2017-07-12 RX ORDER — ACETAMINOPHEN 500 MG
650 TABLET ORAL EVERY 6 HOURS
Qty: 0 | Refills: 0 | Status: DISCONTINUED | OUTPATIENT
Start: 2017-07-12 | End: 2017-07-15

## 2017-07-12 RX ORDER — MORPHINE SULFATE 50 MG/1
2 CAPSULE, EXTENDED RELEASE ORAL EVERY 6 HOURS
Qty: 0 | Refills: 0 | Status: DISCONTINUED | OUTPATIENT
Start: 2017-07-12 | End: 2017-07-15

## 2017-07-12 RX ORDER — METOPROLOL TARTRATE 50 MG
5 TABLET ORAL ONCE
Qty: 0 | Refills: 0 | Status: COMPLETED | OUTPATIENT
Start: 2017-07-12 | End: 2017-07-12

## 2017-07-12 RX ORDER — HYDRALAZINE HCL 50 MG
10 TABLET ORAL ONCE
Qty: 0 | Refills: 0 | Status: COMPLETED | OUTPATIENT
Start: 2017-07-12 | End: 2017-07-12

## 2017-07-12 RX ORDER — POTASSIUM PHOSPHATE, MONOBASIC POTASSIUM PHOSPHATE, DIBASIC 236; 224 MG/ML; MG/ML
15 INJECTION, SOLUTION INTRAVENOUS ONCE
Qty: 0 | Refills: 0 | Status: COMPLETED | OUTPATIENT
Start: 2017-07-12 | End: 2017-07-12

## 2017-07-12 RX ORDER — KETOROLAC TROMETHAMINE 30 MG/ML
15 SYRINGE (ML) INJECTION ONCE
Qty: 0 | Refills: 0 | Status: DISCONTINUED | OUTPATIENT
Start: 2017-07-12 | End: 2017-07-12

## 2017-07-12 RX ORDER — ASPIRIN/CALCIUM CARB/MAGNESIUM 324 MG
81 TABLET ORAL DAILY
Qty: 0 | Refills: 0 | Status: DISCONTINUED | OUTPATIENT
Start: 2017-07-12 | End: 2017-07-15

## 2017-07-12 RX ORDER — ATORVASTATIN CALCIUM 80 MG/1
40 TABLET, FILM COATED ORAL AT BEDTIME
Qty: 0 | Refills: 0 | Status: DISCONTINUED | OUTPATIENT
Start: 2017-07-12 | End: 2017-07-15

## 2017-07-12 RX ORDER — KETOROLAC TROMETHAMINE 30 MG/ML
10 SYRINGE (ML) INJECTION ONCE
Qty: 0 | Refills: 0 | Status: DISCONTINUED | OUTPATIENT
Start: 2017-07-12 | End: 2017-07-12

## 2017-07-12 RX ORDER — CLOPIDOGREL BISULFATE 75 MG/1
75 TABLET, FILM COATED ORAL DAILY
Qty: 0 | Refills: 0 | Status: DISCONTINUED | OUTPATIENT
Start: 2017-07-12 | End: 2017-07-15

## 2017-07-12 RX ORDER — ENOXAPARIN SODIUM 100 MG/ML
40 INJECTION SUBCUTANEOUS DAILY
Qty: 0 | Refills: 0 | Status: DISCONTINUED | OUTPATIENT
Start: 2017-07-12 | End: 2017-07-15

## 2017-07-12 RX ORDER — HYDRALAZINE HCL 50 MG
5 TABLET ORAL ONCE
Qty: 0 | Refills: 0 | Status: COMPLETED | OUTPATIENT
Start: 2017-07-12 | End: 2017-07-12

## 2017-07-12 RX ADMIN — Medication 10 MILLIGRAM(S): at 04:54

## 2017-07-12 RX ADMIN — ATORVASTATIN CALCIUM 40 MILLIGRAM(S): 80 TABLET, FILM COATED ORAL at 21:39

## 2017-07-12 RX ADMIN — Medication 5 MILLIGRAM(S): at 00:33

## 2017-07-12 RX ADMIN — Medication 1 PATCH: at 05:56

## 2017-07-12 RX ADMIN — Medication 5 MILLIGRAM(S): at 05:56

## 2017-07-12 RX ADMIN — Medication 81 MILLIGRAM(S): at 11:25

## 2017-07-12 RX ADMIN — BUPROPION HYDROCHLORIDE 300 MILLIGRAM(S): 150 TABLET, EXTENDED RELEASE ORAL at 11:25

## 2017-07-12 RX ADMIN — MORPHINE SULFATE 2 MILLIGRAM(S): 50 CAPSULE, EXTENDED RELEASE ORAL at 03:34

## 2017-07-12 RX ADMIN — Medication 650 MILLIGRAM(S): at 00:33

## 2017-07-12 RX ADMIN — CLOPIDOGREL BISULFATE 75 MILLIGRAM(S): 75 TABLET, FILM COATED ORAL at 11:25

## 2017-07-12 RX ADMIN — MORPHINE SULFATE 2 MILLIGRAM(S): 50 CAPSULE, EXTENDED RELEASE ORAL at 11:25

## 2017-07-12 RX ADMIN — AMLODIPINE BESYLATE 10 MILLIGRAM(S): 2.5 TABLET ORAL at 16:50

## 2017-07-12 RX ADMIN — MORPHINE SULFATE 2 MILLIGRAM(S): 50 CAPSULE, EXTENDED RELEASE ORAL at 01:12

## 2017-07-12 RX ADMIN — MORPHINE SULFATE 2 MILLIGRAM(S): 50 CAPSULE, EXTENDED RELEASE ORAL at 11:55

## 2017-07-12 RX ADMIN — MORPHINE SULFATE 2 MILLIGRAM(S): 50 CAPSULE, EXTENDED RELEASE ORAL at 04:00

## 2017-07-12 RX ADMIN — ENOXAPARIN SODIUM 40 MILLIGRAM(S): 100 INJECTION SUBCUTANEOUS at 11:25

## 2017-07-12 RX ADMIN — Medication 10 MILLIGRAM(S): at 03:34

## 2017-07-12 RX ADMIN — AMLODIPINE BESYLATE 10 MILLIGRAM(S): 2.5 TABLET ORAL at 01:12

## 2017-07-12 RX ADMIN — CARVEDILOL PHOSPHATE 6.25 MILLIGRAM(S): 80 CAPSULE, EXTENDED RELEASE ORAL at 17:35

## 2017-07-12 RX ADMIN — Medication 650 MILLIGRAM(S): at 00:42

## 2017-07-12 RX ADMIN — POTASSIUM PHOSPHATE, MONOBASIC POTASSIUM PHOSPHATE, DIBASIC 62.5 MILLIMOLE(S): 236; 224 INJECTION, SOLUTION INTRAVENOUS at 09:46

## 2017-07-12 RX ADMIN — Medication 10 MILLIGRAM(S): at 06:02

## 2017-07-12 NOTE — H&P ADULT - ASSESSMENT
75yo/M ambulates with cane living on his own PMH of HTN, paresthesia of B/L LE, dementia, CAD s/p stent placement and pacemaker comes to ED complaining of left shoulder pain following fall. He does not remember any events before or during fall. He complaints of episodes of dizziness frequently, specially following change in posture.  He had similar fall 2 weeks prior and got admitted to [facility].   He denies fever, nausea, vomiting, vision - hearing problem, SOB, CP, headache, abdominal pain, palpitation, involuntary loss of urine, tongue bite.  Patients BP lying down: 193/64, HR: 67                    sittin/71, HR: 67

## 2017-07-12 NOTE — PHYSICAL THERAPY INITIAL EVALUATION ADULT - PERTINENT HX OF CURRENT PROBLEM, REHAB EVAL
Pt. admitted from home due to a fall; Xrays are neg. for fracture to (L) sh. Pt. states had fallen once last week also. Pt. w/ h/o HTN, RLS., Paresthesias on both LE. Pt. lives alone in apart. bldg. w/ elevator; has SAC to use for ambulation.

## 2017-07-12 NOTE — PHYSICAL THERAPY INITIAL EVALUATION ADULT - 30 SECOND CHAIR STAND TEST, REHAB EVAL
According to 30 second chair stand test, patient's score is at 3  which reveals significant functional weakness and puts the patient at risk for falls. Based on the score mentioned above, patient is unsafe to be discharged to home and will greatly benefit from sub-acute rehab placement.

## 2017-07-12 NOTE — H&P ADULT - NSHPLABSRESULTS_GEN_ALL_CORE
13.0   9.1   )-----------( 130      ( 11 Jul 2017 19:23 )             38.7       07-11    142  |  110<H>  |  19<H>  ----------------------------<  104<H>  4.1   |  23  |  1.05    Ca    8.7      11 Jul 2017 19:23    TPro  7.4  /  Alb  3.4<L>  /  TBili  0.6  /  DBili  x   /  AST  18  /  ALT  25  /  AlkPhos  96  07-11          PT/INR - ( 11 Jul 2017 19:23 )   PT: 11.6 sec;   INR: 1.06 ratio         PTT - ( 11 Jul 2017 19:23 )  PTT:24.9 sec    Lactate Trend      CARDIAC MARKERS ( 11 Jul 2017 23:54 )  x     / x     / 80 U/L / x     / x      CARDIAC MARKERS ( 11 Jul 2017 19:23 )  <0.015 ng/mL / x     / x     / x     / x 13.0   9.1   )-----------( 130      ( 11 Jul 2017 19:23 )             38.7       07-11    142  |  110<H>  |  19<H>  ----------------------------<  104<H>  4.1   |  23  |  1.05    Ca    8.7      11 Jul 2017 19:23    TPro  7.4  /  Alb  3.4<L>  /  TBili  0.6  /  DBili  x   /  AST  18  /  ALT  25  /  AlkPhos  96  07-11          PT/INR - ( 11 Jul 2017 19:23 )   PT: 11.6 sec;   INR: 1.06 ratio         PTT - ( 11 Jul 2017 19:23 )  PTT:24.9 sec    Lactate Trend      CARDIAC MARKERS ( 11 Jul 2017 23:54 )  x     / x     / 80 U/L / x     / x      CARDIAC MARKERS ( 11 Jul 2017 19:23 )  <0.015 ng/mL / x     / x     / x     / x    CT Cervical spine and head: No acute fracture or dislocation of the cervical spine.   Cervical spondylosis.

## 2017-07-12 NOTE — H&P ADULT - EXTREMITIES COMMENTS
Bruises over inner aspect of Right arm. (probably from fall 2 weeks back) Bruises over inner aspect of Right arm. (probably from fall 2 weeks back), paresthesia of B/L LE.

## 2017-07-12 NOTE — H&P ADULT - HISTORY OF PRESENT ILLNESS
77yo/M ambulates with cane living on his own(without home aid and with some assistance from brother.) PMH of HTN, dementia, CAD, s/p pacemaker and stent placement comes to ED complaining of left shoulder pain following fall. He does not remember any events before or during fall. He only remembers surrounded by peoples after regaining consciousness someone of whom called 911. He complaints of episodes of dizziness frequently, specially following change in posture. He states that he is being cautious about standing up to prevent fall.   He had similar fall 2 weeks prior and got admitted to [facility].   He denies fever, nausea, vomiting, vision - hearing problem, SOB, CP, headache, abdominal pain. 77yo/M ambulates with cane living on his own(without home aid and with some assistance from brother.) PMH of HTN, dementia, CAD, s/p pacemaker and stent placement comes to ED complaining of left shoulder pain following fall. He does not remember any events before or during fall. He only remembers surrounded by peoples after regaining consciousness someone of whom called 911. He complaints of episodes of dizziness frequently, specially following change in posture. He states that he is being cautious about standing up to prevent fall.   He had similar fall 2 weeks prior and got admitted to [facility].   He denies fever, nausea, vomiting, vision - hearing problem, SOB, CP, headache, abdominal pain.  he takes his all medication regularly with help of pill organizer box. 77yo/M ambulates with cane living on his own(without home aid and with some assistance from brother.) PMH of HTN, paresthesia of B/L LE, dementia, CAD s/p stent placement and pacemaker comes to ED complaining of left shoulder pain following fall. He does not remember any events before or during fall. He only remembers surrounded by peoples after regaining consciousness someone of whom called 911. He complaints of episodes of dizziness frequently, specially following change in posture. He states that he is being cautious about standing up to prevent fall.   He had similar fall 2 weeks prior and got admitted to [facility].   He denies fever, nausea, vomiting, vision - hearing problem, SOB, CP, headache, abdominal pain, palpitation, involuntary loss of urine. tongue bite.  he takes his all medication regularly with help of pill organizer box. 77yo/M ambulates with cane living on his own(without home aid and with some assistance from brother.) PMH of HTN, paresthesia of B/L LE, dementia, CAD s/p stent placement and pacemaker comes to ED complaining of left shoulder pain following fall. He does not remember any events before or during fall. He only remembers surrounded by peoples after regaining consciousness someone of whom called 911. He complaints of episodes of dizziness frequently, specially following change in posture. He states that he is being cautious about standing up to prevent fall.   He had similar fall 2 weeks prior and got admitted to [facility]. He denies fever, nausea, vomiting, vision - hearing problem, SOB, CP, headache, abdominal pain, palpitation, involuntary loss of urine. tongue bite.he takes his all medication regularly with help of pill organizer box. 77yo/M ambulates with cane living on his own(without home aid and with some assistance from brother.) PMH of HTN, paresthesia of B/L LE, dementia, CAD s/p stent placement and pacemaker comes to ED complaining of left shoulder pain following fall. He does not remember any events before or during fall. He only remembers surrounded by peoples after regaining consciousness someone of whom called 911. He complaints of episodes of dizziness frequently, specially following change in posture. He states that he is being cautious about standing up to prevent fall.   He had similar fall 2 weeks prior and got admitted to [facility]. He denies fever, nausea, vomiting, vision - hearing problem, SOB, CP, headache, abdominal pain, palpitation, involuntary loss of urine. tongue bite.he takes his all medication regularly with help of pill organizer box. patient had good meal before walk.

## 2017-07-13 DIAGNOSIS — Z95.5 PRESENCE OF CORONARY ANGIOPLASTY IMPLANT AND GRAFT: ICD-10-CM

## 2017-07-13 DIAGNOSIS — Z91.81 HISTORY OF FALLING: ICD-10-CM

## 2017-07-13 DIAGNOSIS — R55 SYNCOPE AND COLLAPSE: ICD-10-CM

## 2017-07-13 LAB
ANION GAP SERPL CALC-SCNC: 10 MMOL/L — SIGNIFICANT CHANGE UP (ref 5–17)
BUN SERPL-MCNC: 11 MG/DL — SIGNIFICANT CHANGE UP (ref 7–18)
CALCIUM SERPL-MCNC: 8.8 MG/DL — SIGNIFICANT CHANGE UP (ref 8.4–10.5)
CHLORIDE SERPL-SCNC: 105 MMOL/L — SIGNIFICANT CHANGE UP (ref 96–108)
CK MB BLD-MCNC: 1.4 % — SIGNIFICANT CHANGE UP (ref 0–3.5)
CK MB CFR SERPL CALC: 2 NG/ML — SIGNIFICANT CHANGE UP (ref 0–3.6)
CK SERPL-CCNC: 147 U/L — SIGNIFICANT CHANGE UP (ref 35–232)
CO2 SERPL-SCNC: 22 MMOL/L — SIGNIFICANT CHANGE UP (ref 22–31)
CREAT SERPL-MCNC: 0.83 MG/DL — SIGNIFICANT CHANGE UP (ref 0.5–1.3)
GLUCOSE SERPL-MCNC: 109 MG/DL — HIGH (ref 70–99)
HCT VFR BLD CALC: 40.5 % — SIGNIFICANT CHANGE UP (ref 39–50)
HGB BLD-MCNC: 13.7 G/DL — SIGNIFICANT CHANGE UP (ref 13–17)
MAGNESIUM SERPL-MCNC: 2.2 MG/DL — SIGNIFICANT CHANGE UP (ref 1.6–2.6)
MCHC RBC-ENTMCNC: 33 PG — SIGNIFICANT CHANGE UP (ref 27–34)
MCHC RBC-ENTMCNC: 33.8 GM/DL — SIGNIFICANT CHANGE UP (ref 32–36)
MCV RBC AUTO: 97.5 FL — SIGNIFICANT CHANGE UP (ref 80–100)
PHOSPHATE SERPL-MCNC: 2.2 MG/DL — LOW (ref 2.5–4.5)
PLATELET # BLD AUTO: 107 K/UL — LOW (ref 150–400)
POTASSIUM SERPL-MCNC: 4.3 MMOL/L — SIGNIFICANT CHANGE UP (ref 3.5–5.3)
POTASSIUM SERPL-SCNC: 4.3 MMOL/L — SIGNIFICANT CHANGE UP (ref 3.5–5.3)
RBC # BLD: 4.16 M/UL — LOW (ref 4.2–5.8)
RBC # FLD: 14.4 % — SIGNIFICANT CHANGE UP (ref 10.3–14.5)
SODIUM SERPL-SCNC: 137 MMOL/L — SIGNIFICANT CHANGE UP (ref 135–145)
TROPONIN I SERPL-MCNC: <0.015 NG/ML — SIGNIFICANT CHANGE UP (ref 0–0.04)
WBC # BLD: 8.1 K/UL — SIGNIFICANT CHANGE UP (ref 3.8–10.5)
WBC # FLD AUTO: 8.1 K/UL — SIGNIFICANT CHANGE UP (ref 3.8–10.5)

## 2017-07-13 RX ADMIN — Medication 81 MILLIGRAM(S): at 12:42

## 2017-07-13 RX ADMIN — Medication 1 PATCH: at 12:42

## 2017-07-13 RX ADMIN — AMLODIPINE BESYLATE 10 MILLIGRAM(S): 2.5 TABLET ORAL at 06:03

## 2017-07-13 RX ADMIN — BUPROPION HYDROCHLORIDE 300 MILLIGRAM(S): 150 TABLET, EXTENDED RELEASE ORAL at 12:42

## 2017-07-13 RX ADMIN — CARVEDILOL PHOSPHATE 6.25 MILLIGRAM(S): 80 CAPSULE, EXTENDED RELEASE ORAL at 17:46

## 2017-07-13 RX ADMIN — Medication 1 PATCH: at 06:04

## 2017-07-13 RX ADMIN — ATORVASTATIN CALCIUM 40 MILLIGRAM(S): 80 TABLET, FILM COATED ORAL at 21:36

## 2017-07-13 RX ADMIN — CARVEDILOL PHOSPHATE 6.25 MILLIGRAM(S): 80 CAPSULE, EXTENDED RELEASE ORAL at 06:03

## 2017-07-13 RX ADMIN — ENOXAPARIN SODIUM 40 MILLIGRAM(S): 100 INJECTION SUBCUTANEOUS at 12:42

## 2017-07-13 RX ADMIN — CLOPIDOGREL BISULFATE 75 MILLIGRAM(S): 75 TABLET, FILM COATED ORAL at 12:42

## 2017-07-13 NOTE — PROGRESS NOTE ADULT - ASSESSMENT
75yo/M ambulates with cane living on his own PMH of HTN, dementia, CAD s/p stent placement, and pacemaker presents after syncopal episode and fall. Pt does not remember any events before, or during fall. Pt also reports multiple prior falls over the past months.  Pt's orthostatic vital signs were positive. CT head and neck negative for acute fracture, or stroke.  Pt was saturating well on room air.  Pt was placed on the telemetry floor to monitor for arrythmia.

## 2017-07-13 NOTE — PROGRESS NOTE ADULT - SUBJECTIVE AND OBJECTIVE BOX
Patient is a 76y old  Male who presents with a chief complaint of Pain in left shoulder following fall. (2017 02:32)    PATIENT IS SEEN AND EXAMINED IN MEDICAL FLOOR.      ALLERGIES:  No Known Allergies  Daily Weight in k (2017 05:16)    VITALS:    Vital Signs Last 24 Hrs  T(C): 36.7 (2017 20:03), Max: 37 (2017 08:09)  T(F): 98.1 (2017 20:03), Max: 98.6 (2017 08:09)  HR: 63 (2017 20:03) (59 - 64)  BP: 175/71 (2017 20:03) (138/74 - 179/71)  BP(mean): --  RR: 18 (2017 20:03) (15 - 18)  SpO2: 97% (2017 20:03) (96% - 98%)    LABS:  CBC Full  -  ( 2017 07:43 )  WBC Count : 8.1 K/uL  Hemoglobin : 13.7 g/dL  Hematocrit : 40.5 %  Platelet Count - Automated : 107 K/uL  Mean Cell Volume : 97.5 fl  Mean Cell Hemoglobin : 33.0 pg  Mean Cell Hemoglobin Concentration : 33.8 gm/dL  Auto Neutrophil # : x  Auto Lymphocyte # : x  Auto Monocyte # : x  Auto Eosinophil # : x  Auto Basophil # : x  Auto Neutrophil % : x  Auto Lymphocyte % : x  Auto Monocyte % : x  Auto Eosinophil % : x  Auto Basophil % : x      07-13    137  |  105  |  11  ----------------------------<  109<H>  4.3   |  22  |  0.83    Ca    8.8      2017 07:43  Phos  2.2     07-13  Mg     2.2     07-13    TPro  7.7  /  Alb  3.6  /  TBili  0.6  /  DBili  x   /  AST  17  /  ALT  24  /  AlkPhos  94  07-12        CARDIAC MARKERS ( 2017 07:43 )  <0.015 ng/mL / x     / 147 U/L / x     / 2.0 ng/mL  CARDIAC MARKERS ( 2017 23:54 )  x     / x     / 80 U/L / x     / x          LIVER FUNCTIONS - ( 2017 06:33 )  Alb: 3.6 g/dL / Pro: 7.7 g/dL / ALK PHOS: 94 U/L / ALT: 24 U/L DA / AST: 17 U/L / GGT: x             MEDICATIONS:    MEDICATIONS  (STANDING):  nicotine - 21 mG/24Hr(s) Patch 1 patch Transdermal daily  enoxaparin Injectable 40 milliGRAM(s) SubCutaneous daily  aspirin enteric coated 81 milliGRAM(s) Oral daily  atorvastatin 40 milliGRAM(s) Oral at bedtime  clopidogrel Tablet 75 milliGRAM(s) Oral daily  carvedilol 6.25 milliGRAM(s) Oral every 12 hours  amLODIPine   Tablet 10 milliGRAM(s) Oral daily      MEDICATIONS  (PRN):  acetaminophen   Tablet. 650 milliGRAM(s) Oral every 6 hours PRN Mild Pain (1 - 3)  morphine  - Injectable 2 milliGRAM(s) IV Push every 6 hours PRN Moderate Pain (4 - 6)      REVIEW OF SYSTEMS:                           ALL ROS DONE [  X  ]    CONSTITUTIONAL:  LETHARGIC [   ], FEVER [   ], UNRESPONSIVE [   ]  CVS:  CP  [   ], SOB, [   ], PALPITATIONS [   ], DIZZYNESS [   ]  RS: COUGH [   ], SPUTUM [   ]  GI: ABDOMINAL PAIN [   ], NAUSEA [   ], VOMITINGS [   ], DIARRHEA [   ], CONSTIPATION [   ]  :  DYSURIA [   ], NOCTURIA [   ], INCREASED FREQUENCY [   ], DRIBLING [   ],  SKELETAL: PAINFUL JOINTS [   ], SWOLLEN JOINTS [   ], NECK ACHE [   ], LOW BACK ACHE [   ],  SKIN : ULCERS [   ], RASH [   ], ITCHING [   ]  CNS: HEAD ACHE [   ], DOUBLE VISION [   ], BLURRED VISION [   ], AMS / CONFUSION [   ], SEIZURES [   ], SEIZURES [   ], WEAKNESS [   ],TINGLING / NUMBNESS [   ],    PHYSICAL EXAMINATION:  GENERAL APPEARANCE: NO DISTRESS  HEENT: no  PALLOR, no  JVD,   no  NODES, NECK SUPPLE  CVS: S1 +, S2 +,   RS: AEEB, no  RALES, B/L   RONCHI  +  ABD: SOFT, NT, NO, BS +  EXT:  PE +  SKIN: WARM,   SKELETAL:  ROM ACCEPTABLE  CNS:  AAO X 2   ,   NO DEFICITS    RADIOLOGY :      ASSESSMENT :     Fall  HTN (hypertension)  Pacemaker  CAD (coronary artery disease)  Dementia  HTN (hypertension)  CAD (coronary artery disease)  H/O hydrocele  History of appendectomy  S/P skin biopsy  History of appendectomy  No significant past surgical history      PLAN:  77yo/M ambulates with cane living on his own(without home aid and with some assistance from brother.) PMH of HTN, paresthesia of B/L LE, dementia, CAD s/p stent placement and pacemaker comes to ED complaining of left shoulder pain following fall. He does not remember any events before or during fall. He only remembers surrounded by peoples after regaining consciousness someone of whom called 911. He complaints of episodes of dizziness frequently, specially following change in posture. He states that he is being cautious about standing up to prevent fall.     - SYNCOPE, CHRONIC VERTIGO , DIZZINESS WITH RECURRENT FALLS - DISCONTINUED WELLBUTRIN. ACS AND ARRYTHMIA IS BEING RULED OUT  - SMOKER'S BRONCHITIS - ADD DUONEB ( SMOKING CESSATION D/W PATIENT )  - T 11, T12, L4 COMPRESSION FRACTURES ( OLD ), LS SPINAL SPONDYLOSIS, SUSPECT PERIPHERAL NEUROPATHY  - DC PLAN TO [facility] IN AM  - GI AND DVT PROPHYLAXIS  - DR. MONTELONGO Patient is a 76y old  Male who presents with a chief complaint of Pain in left shoulder following fall. (2017 02:32)    PATIENT IS SEEN AND EXAMINED IN MEDICAL FLOOR.      ALLERGIES:  No Known Allergies  Daily Weight in k (2017 05:16)    VITALS:    Vital Signs Last 24 Hrs  T(C): 36.7 (2017 20:03), Max: 37 (2017 08:09)  T(F): 98.1 (2017 20:03), Max: 98.6 (2017 08:09)  HR: 63 (2017 20:03) (59 - 64)  BP: 175/71 (2017 20:03) (138/74 - 179/71)  BP(mean): --  RR: 18 (2017 20:03) (15 - 18)  SpO2: 97% (2017 20:03) (96% - 98%)    LABS:  CBC Full  -  ( 2017 07:43 )  WBC Count : 8.1 K/uL  Hemoglobin : 13.7 g/dL  Hematocrit : 40.5 %  Platelet Count - Automated : 107 K/uL  Mean Cell Volume : 97.5 fl  Mean Cell Hemoglobin : 33.0 pg  Mean Cell Hemoglobin Concentration : 33.8 gm/dL  Auto Neutrophil # : x  Auto Lymphocyte # : x  Auto Monocyte # : x  Auto Eosinophil # : x  Auto Basophil # : x  Auto Neutrophil % : x  Auto Lymphocyte % : x  Auto Monocyte % : x  Auto Eosinophil % : x  Auto Basophil % : x      07-13    137  |  105  |  11  ----------------------------<  109<H>  4.3   |  22  |  0.83    Ca    8.8      2017 07:43  Phos  2.2     07-13  Mg     2.2     07-13    TPro  7.7  /  Alb  3.6  /  TBili  0.6  /  DBili  x   /  AST  17  /  ALT  24  /  AlkPhos  94  07-12        CARDIAC MARKERS ( 2017 07:43 )  <0.015 ng/mL / x     / 147 U/L / x     / 2.0 ng/mL  CARDIAC MARKERS ( 2017 23:54 )  x     / x     / 80 U/L / x     / x          LIVER FUNCTIONS - ( 2017 06:33 )  Alb: 3.6 g/dL / Pro: 7.7 g/dL / ALK PHOS: 94 U/L / ALT: 24 U/L DA / AST: 17 U/L / GGT: x             MEDICATIONS:    MEDICATIONS  (STANDING):  nicotine - 21 mG/24Hr(s) Patch 1 patch Transdermal daily  enoxaparin Injectable 40 milliGRAM(s) SubCutaneous daily  aspirin enteric coated 81 milliGRAM(s) Oral daily  atorvastatin 40 milliGRAM(s) Oral at bedtime  clopidogrel Tablet 75 milliGRAM(s) Oral daily  carvedilol 6.25 milliGRAM(s) Oral every 12 hours  amLODIPine   Tablet 10 milliGRAM(s) Oral daily      MEDICATIONS  (PRN):  acetaminophen   Tablet. 650 milliGRAM(s) Oral every 6 hours PRN Mild Pain (1 - 3)  morphine  - Injectable 2 milliGRAM(s) IV Push every 6 hours PRN Moderate Pain (4 - 6)      REVIEW OF SYSTEMS:                           ALL ROS DONE [  X  ]    CONSTITUTIONAL:  LETHARGIC [   ], FEVER [   ], UNRESPONSIVE [   ]  CVS:  CP  [   ], SOB, [   ], PALPITATIONS [   ], DIZZYNESS [   ]  RS: COUGH [   ], SPUTUM [   ]  GI: ABDOMINAL PAIN [   ], NAUSEA [   ], VOMITINGS [   ], DIARRHEA [   ], CONSTIPATION [   ]  :  DYSURIA [   ], NOCTURIA [   ], INCREASED FREQUENCY [   ], DRIBLING [   ],  SKELETAL: PAINFUL JOINTS [   ], SWOLLEN JOINTS [   ], NECK ACHE [   ], LOW BACK ACHE [   ],  SKIN : ULCERS [   ], RASH [   ], ITCHING [   ]  CNS: HEAD ACHE [   ], DOUBLE VISION [   ], BLURRED VISION [   ], AMS / CONFUSION [   ], SEIZURES [   ], SEIZURES [   ], WEAKNESS [   ],TINGLING / NUMBNESS [   ],    PHYSICAL EXAMINATION:  GENERAL APPEARANCE: NO DISTRESS  HEENT: no  PALLOR, no  JVD,   no  NODES, NECK SUPPLE  CVS: S1 +, S2 +,   RS: AEEB, no  RALES, B/L   RONCHI  +  ABD: SOFT, NT, NO, BS +  EXT:  PE +  SKIN: WARM,   SKELETAL:  ROM ACCEPTABLE  CNS:  AAO X 2   ,   NO DEFICITS    RADIOLOGY :      ASSESSMENT :     Fall  HTN (hypertension)  Pacemaker  CAD (coronary artery disease)  Dementia  HTN (hypertension)  CAD (coronary artery disease)  H/O hydrocele  History of appendectomy  S/P skin biopsy  History of appendectomy  No significant past surgical history      PLAN:  77yo/M ambulates with cane living on his own(without home aid and with some assistance from brother.) PMH of HTN, paresthesia of B/L LE, dementia, CAD s/p stent placement and pacemaker comes to ED complaining of left shoulder pain following fall. He does not remember any events before or during fall. He only remembers surrounded by peoples after regaining consciousness someone of whom called 911. He complaints of episodes of dizziness frequently, specially following change in posture. He states that he is being cautious about standing up to prevent fall.     - SYNCOPE, CHRONIC VERTIGO , DIZZINESS WITH RECURRENT FALLS - DISCONTINUED WELLBUTRIN. ACS AND ARRYTHMIA IS BEING RULED OUT  - SMOKER'S BRONCHITIS - ADD DUONEB ( SMOKING CESSATION D/W PATIENT )  - T 11, T12, L4 COMPRESSION FRACTURES ( OLD ), LS SPINAL SPONDYLOSIS, SUSPECT PERIPHERAL NEUROPATHY  - fecal impaction - add lactulose 30 ml bid and mom 30 ml q daily   - DC PLAN TO [facility] IN AM  - GI AND DVT PROPHYLAXIS  - DR. MONTELONGO

## 2017-07-13 NOTE — PROGRESS NOTE ADULT - SUBJECTIVE AND OBJECTIVE BOX
PGY 1 Note discussed with supervising resident and primary attending    Patient is a 76y old  Male who presents after syncopal episode and fall. (12 Jul 2017 02:32)  Overnight no acute events reported.  Today pt presents in no acute distress.  Found resting in bed comfortably.  Pt states that he feels better today and has no complaints.  Pt reports active appetite, normal BM, ans is urinating appropriately.       INTERVAL HPI/OVERNIGHT EVENTS: no new complaints    MEDICATIONS  (STANDING):  nicotine - 21 mG/24Hr(s) Patch 1 patch Transdermal daily  enoxaparin Injectable 40 milliGRAM(s) SubCutaneous daily  aspirin enteric coated 81 milliGRAM(s) Oral daily  atorvastatin 40 milliGRAM(s) Oral at bedtime  clopidogrel Tablet 75 milliGRAM(s) Oral daily  buPROPion XL . 300 milliGRAM(s) Oral daily  carvedilol 6.25 milliGRAM(s) Oral every 12 hours  amLODIPine   Tablet 10 milliGRAM(s) Oral daily    MEDICATIONS  (PRN):  acetaminophen   Tablet. 650 milliGRAM(s) Oral every 6 hours PRN Mild Pain (1 - 3)  morphine  - Injectable 2 milliGRAM(s) IV Push every 6 hours PRN Moderate Pain (4 - 6)        __________________________________________________  REVIEW OF SYSTEMS:    CONSTITUTIONAL: No fever,   EYES: no acute visual disturbances  NECK: No pain or stiffness  RESPIRATORY: No cough; No shortness of breath  CARDIOVASCULAR: No chest pain, no palpitations  GASTROINTESTINAL: No pain. No nausea or vomiting; No diarrhea   NEUROLOGICAL: No headache or numbness, no tremors  MUSCULOSKELETAL: No joint pain, no muscle pain  GENITOURINARY: no dysuria, no frequency, no hesitancy  PSYCHIATRY: no depression , no anxiety  ALL OTHER  ROS negative      Vital Signs Last 24 Hrs  T(C): 36.8 (13 Jul 2017 15:52), Max: 37 (13 Jul 2017 08:09)  T(F): 98.2 (13 Jul 2017 15:52), Max: 98.6 (13 Jul 2017 08:09)  HR: 63 (13 Jul 2017 15:52) (59 - 64)  BP: 163/81 (13 Jul 2017 15:52) (138/74 - 179/71)  BP(mean): --  RR: 18 (13 Jul 2017 15:52) (15 - 18)  SpO2: 98% (13 Jul 2017 15:52) (96% - 98%)    ________________________________________________  PHYSICAL EXAM:  GENERAL: NAD  HEENT:Normocephalic;  conjunctivae and sclerae clear; moist mucous membranes;   NECK : supple  CHEST/LUNG: Clear to auscuitation bilaterally with good air entry   HEART: S1 S2  regular; no murmurs, gallops or rubs  ABDOMEN: Soft, Nontender, Nondistended; Bowel sounds present  EXTREMITIES: no cyanosis; no edema; no calf tenderness  NERVOUS SYSTEM:  Awake and alert; Oriented  to place, person and time ; no new deficits    _________________________________________________  LABS:                                   13.7   8.1   )-----------( 107      ( 13 Jul 2017 07:43 )             40.5       07-13    137  |  105  |  11  ----------------------------<  109<H>  4.3   |  22  |  0.83    Ca    8.8      13 Jul 2017 07:43  Phos  2.2     07-13  Mg     2.2     07-13    TPro  7.7  /  Alb  3.6  /  TBili  0.6  /  DBili  x   /  AST  17  /  ALT  24  /  AlkPhos  94  07-12      PT/INR - ( 11 Jul 2017 19:23 )   PT: 11.6 sec;   INR: 1.06 ratio         PTT - ( 11 Jul 2017 19:23 )  PTT:24.9 sec    CAPILLARY BLOOD GLUCOSE        RADIOLOGY & ADDITIONAL TESTS:    CT Cervical Spine No Cont (07.11.17 @ 20:11)   IMPRESSION: No acute fracture or dislocation of the cervical spine.   Cervical spondylosis.    CT Head No Cont (07.11.17 @ 20:10) >    IMPRESSION: No intracranial hemorrhage or depressed calvarial fracture.   Chronic infarcts as above.        Imaging Personally Reviewed:  YES    Consultant(s) Notes Reviewed:   YES    Care Discussed with Consultants :     Plan of care was discussed with patient and /or primary care giver; all questions and concerns were addressed and care was aligned with patient's wishes.

## 2017-07-14 ENCOUNTER — TRANSCRIPTION ENCOUNTER (OUTPATIENT)
Age: 77
End: 2017-07-14

## 2017-07-14 LAB
ANION GAP SERPL CALC-SCNC: 10 MMOL/L — SIGNIFICANT CHANGE UP (ref 5–17)
BUN SERPL-MCNC: 16 MG/DL — SIGNIFICANT CHANGE UP (ref 7–18)
CALCIUM SERPL-MCNC: 9 MG/DL — SIGNIFICANT CHANGE UP (ref 8.4–10.5)
CHLORIDE SERPL-SCNC: 105 MMOL/L — SIGNIFICANT CHANGE UP (ref 96–108)
CO2 SERPL-SCNC: 23 MMOL/L — SIGNIFICANT CHANGE UP (ref 22–31)
CREAT SERPL-MCNC: 0.99 MG/DL — SIGNIFICANT CHANGE UP (ref 0.5–1.3)
GLUCOSE SERPL-MCNC: 120 MG/DL — HIGH (ref 70–99)
HCT VFR BLD CALC: 41.7 % — SIGNIFICANT CHANGE UP (ref 39–50)
HGB BLD-MCNC: 14 G/DL — SIGNIFICANT CHANGE UP (ref 13–17)
MAGNESIUM SERPL-MCNC: 2.2 MG/DL — SIGNIFICANT CHANGE UP (ref 1.6–2.6)
MCHC RBC-ENTMCNC: 32.7 PG — SIGNIFICANT CHANGE UP (ref 27–34)
MCHC RBC-ENTMCNC: 33.6 GM/DL — SIGNIFICANT CHANGE UP (ref 32–36)
MCV RBC AUTO: 97.3 FL — SIGNIFICANT CHANGE UP (ref 80–100)
PHOSPHATE SERPL-MCNC: 2.6 MG/DL — SIGNIFICANT CHANGE UP (ref 2.5–4.5)
PLATELET # BLD AUTO: 148 K/UL — LOW (ref 150–400)
POTASSIUM SERPL-MCNC: 4 MMOL/L — SIGNIFICANT CHANGE UP (ref 3.5–5.3)
POTASSIUM SERPL-SCNC: 4 MMOL/L — SIGNIFICANT CHANGE UP (ref 3.5–5.3)
RBC # BLD: 4.28 M/UL — SIGNIFICANT CHANGE UP (ref 4.2–5.8)
RBC # FLD: 13.8 % — SIGNIFICANT CHANGE UP (ref 10.3–14.5)
SODIUM SERPL-SCNC: 138 MMOL/L — SIGNIFICANT CHANGE UP (ref 135–145)
WBC # BLD: 7.1 K/UL — SIGNIFICANT CHANGE UP (ref 3.8–10.5)
WBC # FLD AUTO: 7.1 K/UL — SIGNIFICANT CHANGE UP (ref 3.8–10.5)

## 2017-07-14 PROCEDURE — 70450 CT HEAD/BRAIN W/O DYE: CPT | Mod: 26

## 2017-07-14 RX ORDER — SODIUM CHLORIDE 9 MG/ML
1000 INJECTION INTRAMUSCULAR; INTRAVENOUS; SUBCUTANEOUS
Qty: 0 | Refills: 0 | Status: DISCONTINUED | OUTPATIENT
Start: 2017-07-14 | End: 2017-07-15

## 2017-07-14 RX ORDER — CARVEDILOL PHOSPHATE 80 MG/1
1 CAPSULE, EXTENDED RELEASE ORAL
Qty: 0 | Refills: 0 | COMMUNITY

## 2017-07-14 RX ORDER — BUPROPION HYDROCHLORIDE 150 MG/1
300 TABLET, EXTENDED RELEASE ORAL
Qty: 0 | Refills: 0 | COMMUNITY

## 2017-07-14 RX ORDER — CLOPIDOGREL BISULFATE 75 MG/1
1 TABLET, FILM COATED ORAL
Qty: 0 | Refills: 0 | COMMUNITY

## 2017-07-14 RX ORDER — BUPROPION HYDROCHLORIDE 150 MG/1
1 TABLET, EXTENDED RELEASE ORAL
Qty: 0 | Refills: 0 | COMMUNITY

## 2017-07-14 RX ADMIN — CARVEDILOL PHOSPHATE 6.25 MILLIGRAM(S): 80 CAPSULE, EXTENDED RELEASE ORAL at 18:24

## 2017-07-14 RX ADMIN — CARVEDILOL PHOSPHATE 6.25 MILLIGRAM(S): 80 CAPSULE, EXTENDED RELEASE ORAL at 05:15

## 2017-07-14 RX ADMIN — ATORVASTATIN CALCIUM 40 MILLIGRAM(S): 80 TABLET, FILM COATED ORAL at 22:35

## 2017-07-14 RX ADMIN — CLOPIDOGREL BISULFATE 75 MILLIGRAM(S): 75 TABLET, FILM COATED ORAL at 11:36

## 2017-07-14 RX ADMIN — AMLODIPINE BESYLATE 10 MILLIGRAM(S): 2.5 TABLET ORAL at 05:15

## 2017-07-14 RX ADMIN — SODIUM CHLORIDE 80 MILLILITER(S): 9 INJECTION INTRAMUSCULAR; INTRAVENOUS; SUBCUTANEOUS at 11:19

## 2017-07-14 RX ADMIN — Medication 1 PATCH: at 11:36

## 2017-07-14 RX ADMIN — Medication 1 PATCH: at 12:00

## 2017-07-14 RX ADMIN — ENOXAPARIN SODIUM 40 MILLIGRAM(S): 100 INJECTION SUBCUTANEOUS at 11:36

## 2017-07-14 RX ADMIN — Medication 81 MILLIGRAM(S): at 11:36

## 2017-07-14 NOTE — DISCHARGE NOTE ADULT - NSTOBACCOHOTLINE_GEN_A_CS
[facility] Smokers Quitline ([id number]) [facility] Smokers Quitline ([phone])

## 2017-07-14 NOTE — PROGRESS NOTE ADULT - PROBLEM SELECTOR PROBLEM 3
Coronary artery disease involving native heart, angina presence unspecified, unspecified vessel or lesion type
Coronary artery disease involving native heart, angina presence unspecified, unspecified vessel or lesion type

## 2017-07-14 NOTE — DISCHARGE NOTE ADULT - PLAN OF CARE
Please follow up with PCP and stay hydrated. Please hold bupoprion for now due to hypotension, and follow up with PCP.  Please follow up with Psychiatrist. Please continue current cardiac re Please schedule close follow up with cardiologist and PCP. Please hold bupoprion for now due to hypotension, and follow up with PCP.  Please follow up with Psychiatrist.  Please sit up and stand up slowly.  Please stay hydrated. Please continue current cardiac regimen

## 2017-07-14 NOTE — PROGRESS NOTE ADULT - SUBJECTIVE AND OBJECTIVE BOX
PGY 1 Note discussed with supervising resident and primary attending    Patient is a 76y old  Male who presents after syncopal episode and fall. (12 Jul 2017 02:32)  Overnight no acute events reported.  Today pt presents in no acute distress.  Found resting in bed comfortably.  Pt states that he feels better today and has no complaints.  Pt reports active appetite, normal BM, ans is urinating appropriately.       INTERVAL HPI/OVERNIGHT EVENTS: no new complaints    MEDICATIONS  (STANDING):  nicotine - 21 mG/24Hr(s) Patch 1 patch Transdermal daily  enoxaparin Injectable 40 milliGRAM(s) SubCutaneous daily  aspirin enteric coated 81 milliGRAM(s) Oral daily  atorvastatin 40 milliGRAM(s) Oral at bedtime  clopidogrel Tablet 75 milliGRAM(s) Oral daily  carvedilol 6.25 milliGRAM(s) Oral every 12 hours  amLODIPine   Tablet 10 milliGRAM(s) Oral daily  sodium chloride 0.9%. 1000 milliLiter(s) (80 mL/Hr) IV Continuous <Continuous>    MEDICATIONS  (PRN):  acetaminophen   Tablet. 650 milliGRAM(s) Oral every 6 hours PRN Mild Pain (1 - 3)  morphine  - Injectable 2 milliGRAM(s) IV Push every 6 hours PRN Moderate Pain (4 - 6)          __________________________________________________  REVIEW OF SYSTEMS:    CONSTITUTIONAL: No fever,   EYES: no acute visual disturbances  NECK: No pain or stiffness  RESPIRATORY: No cough; No shortness of breath  CARDIOVASCULAR: No chest pain, no palpitations  GASTROINTESTINAL: No pain. No nausea or vomiting; No diarrhea   NEUROLOGICAL: No headache or numbness, no tremors  MUSCULOSKELETAL: No joint pain, no muscle pain  GENITOURINARY: no dysuria, no frequency, no hesitancy  PSYCHIATRY: no depression , no anxiety  ALL OTHER  ROS negative      Vital Signs Last 24 Hrs  T(C): 36.7 (14 Jul 2017 12:08), Max: 36.8 (14 Jul 2017 00:20)  T(F): 98 (14 Jul 2017 12:08), Max: 98.2 (14 Jul 2017 00:20)  HR: 61 (14 Jul 2017 12:08) (58 - 64)  BP: 132/70 (14 Jul 2017 12:08) (132/70 - 175/71)  BP(mean): --  RR: 18 (14 Jul 2017 12:08) (18 - 18)  SpO2: 98% (14 Jul 2017 12:08) (97% - 99%)    ________________________________________________  PHYSICAL EXAM:  GENERAL: NAD  HEENT:Normocephalic;  conjunctivae and sclerae clear; moist mucous membranes;   NECK : supple  CHEST/LUNG: Clear to auscuitation bilaterally with good air entry   HEART: S1 S2  regular; no murmurs, gallops or rubs  ABDOMEN: Soft, Nontender, Nondistended; Bowel sounds present  EXTREMITIES: no cyanosis; no edema; no calf tenderness  NERVOUS SYSTEM:  Awake and alert; Oriented  to place, person and time ; no new deficits    _________________________________________________  LABS:                                   14.0   7.1   )-----------( 148      ( 14 Jul 2017 07:46 )             41.7       07-14    138  |  105  |  16  ----------------------------<  120<H>  4.0   |  23  |  0.99    Ca    9.0      14 Jul 2017 07:46  Phos  2.6     07-14  Mg     2.2     07-14          PT/INR - ( 11 Jul 2017 19:23 )   PT: 11.6 sec;   INR: 1.06 ratio         PTT - ( 11 Jul 2017 19:23 )  PTT:24.9 sec    CAPILLARY BLOOD GLUCOSE        RADIOLOGY & ADDITIONAL TESTS:    CT Cervical Spine No Cont (07.11.17 @ 20:11)   IMPRESSION: No acute fracture or dislocation of the cervical spine.   Cervical spondylosis.    CT Head No Cont (07.11.17 @ 20:10) >    IMPRESSION: No intracranial hemorrhage or depressed calvarial fracture.   Chronic infarcts as above.        Imaging Personally Reviewed:  YES    Consultant(s) Notes Reviewed:   YES    Care Discussed with Consultants :     Plan of care was discussed with patient and /or primary care giver; all questions and concerns were addressed and care was aligned with patient's wishes. PGY 1 Note discussed with supervising resident and primary attending    Patient is a 76y old Male who presents after syncopal episode and fall. (12 Jul 2017 02:32)  Overnight no acute events reported.  Today pt presents in no acute distress.  Found resting in bed comfortably.  Pt states that he feels better today and has no complaints.  Pt reports active appetite, normal BM, ans is urinating appropriately.  Pt is pending discharge to rehab facility.   recently spoke to niece stating that pt's healthcare proxy is returning from [place] today, and will approve rehab facility prior to discharge.      INTERVAL HPI/OVERNIGHT EVENTS: no new complaints    MEDICATIONS  (STANDING):  nicotine - 21 mG/24Hr(s) Patch 1 patch Transdermal daily  enoxaparin Injectable 40 milliGRAM(s) SubCutaneous daily  aspirin enteric coated 81 milliGRAM(s) Oral daily  atorvastatin 40 milliGRAM(s) Oral at bedtime  clopidogrel Tablet 75 milliGRAM(s) Oral daily  carvedilol 6.25 milliGRAM(s) Oral every 12 hours  amLODIPine   Tablet 10 milliGRAM(s) Oral daily  sodium chloride 0.9%. 1000 milliLiter(s) (80 mL/Hr) IV Continuous <Continuous>    MEDICATIONS  (PRN):  acetaminophen   Tablet. 650 milliGRAM(s) Oral every 6 hours PRN Mild Pain (1 - 3)  morphine  - Injectable 2 milliGRAM(s) IV Push every 6 hours PRN Moderate Pain (4 - 6)          __________________________________________________  REVIEW OF SYSTEMS:    CONSTITUTIONAL: No fever,   EYES: no acute visual disturbances  NECK: No pain or stiffness  RESPIRATORY: No cough; No shortness of breath  CARDIOVASCULAR: No chest pain, no palpitations  GASTROINTESTINAL: No pain. No nausea or vomiting; No diarrhea   NEUROLOGICAL: No headache or numbness, no tremors  MUSCULOSKELETAL: No joint pain, no muscle pain  GENITOURINARY: no dysuria, no frequency, no hesitancy  PSYCHIATRY: no depression , no anxiety  ALL OTHER  ROS negative      Vital Signs Last 24 Hrs  T(C): 36.6 (15 Jul 2017 11:23), Max: 37.3 (14 Jul 2017 20:02)  T(F): 97.9 (15 Jul 2017 11:23), Max: 99.2 (14 Jul 2017 20:02)  HR: 59 (15 Jul 2017 11:23) (58 - 67)  BP: 147/62 (15 Jul 2017 11:23) (137/72 - 168/64)  BP(mean): --  RR: 16 (15 Jul 2017 11:23) (16 - 18)  SpO2: 94% (15 Jul 2017 11:23) (94% - 99%)    ________________________________________________  PHYSICAL EXAM:  GENERAL: NAD  HEENT:Normocephalic;  conjunctivae and sclerae clear; moist mucous membranes;   NECK : supple  CHEST/LUNG: Clear to auscuitation bilaterally with good air entry   HEART: S1 S2  regular; no murmurs, gallops or rubs  ABDOMEN: Soft, Nontender, Nondistended; Bowel sounds present  EXTREMITIES: no cyanosis; no edema; no calf tenderness  NERVOUS SYSTEM:  Awake and alert; Oriented  to place, person and time ; no new deficits    _________________________________________________  LABS:                                              14.0   7.1   )-----------( 148      ( 14 Jul 2017 07:46 )             41.7     07-15    137  |  105  |  17  ----------------------------<  98  4.0   |  23  |  0.93    Ca    8.5      15 Jul 2017 06:44  Phos  2.6     07-14  Mg     2.2     07-14        RADIOLOGY & ADDITIONAL TESTS:    CT Cervical Spine No Cont (07.11.17 @ 20:11)   IMPRESSION: No acute fracture or dislocation of the cervical spine.   Cervical spondylosis.    CT Head No Cont (07.11.17 @ 20:10) >    IMPRESSION: No intracranial hemorrhage or depressed calvarial fracture.   Chronic infarcts as above.        Imaging Personally Reviewed:  YES    Consultant(s) Notes Reviewed:   YES    Care Discussed with Consultants :     Plan of care was discussed with patient and /or primary care giver; all questions and concerns were addressed and care was aligned with patient's wishes.

## 2017-07-14 NOTE — DISCHARGE NOTE ADULT - CARE PLAN
Principal Discharge DX:	Orthostatic hypotension  Goal:	Please follow up with PCP and stay hydrated.  Instructions for follow-up, activity and diet:	Please hold bupoprion for now due to hypotension, and follow up with PCP.  Please follow up with Psychiatrist.  Secondary Diagnosis:	Coronary artery disease involving native heart without angina pectoris, unspecified vessel or lesion type  Goal:	Please continue current cardiac re Principal Discharge DX:	Orthostatic hypotension  Goal:	Please follow up with PCP and stay hydrated.  Instructions for follow-up, activity and diet:	Please hold bupoprion for now due to hypotension, and follow up with PCP.  Please follow up with Psychiatrist.  Please sit up and stand up slowly.  Please stay hydrated.  Secondary Diagnosis:	Coronary artery disease involving native heart without angina pectoris, unspecified vessel or lesion type  Goal:	Please continue current cardiac regimen  Instructions for follow-up, activity and diet:	Please schedule close follow up with cardiologist and PCP.

## 2017-07-14 NOTE — PROGRESS NOTE ADULT - SUBJECTIVE AND OBJECTIVE BOX
Patient is a 76y old  Male who presents with a chief complaint of Pain in left shoulder following fall. (14 Jul 2017 12:13)    PATIENT IS SEEN AND EXAMINED IN MEDICAL FLOOR. NO SIGNIFICANT EVENTS OVERNIGHT OBSERVED.     ALLERGIES:  No Known Allergies      VITALS:    Vital Signs Last 24 Hrs  T(C): 36.7 (14 Jul 2017 12:08), Max: 36.8 (13 Jul 2017 15:52)  T(F): 98 (14 Jul 2017 12:08), Max: 98.2 (13 Jul 2017 15:52)  HR: 61 (14 Jul 2017 12:08) (58 - 64)  BP: 132/70 (14 Jul 2017 12:08) (132/70 - 175/71)  BP(mean): --  RR: 18 (14 Jul 2017 12:08) (18 - 18)  SpO2: 98% (14 Jul 2017 12:08) (97% - 99%)    LABS:  CBC Full  -  ( 14 Jul 2017 07:46 )  WBC Count : 7.1 K/uL  Hemoglobin : 14.0 g/dL  Hematocrit : 41.7 %  Platelet Count - Automated : 148 K/uL  Mean Cell Volume : 97.3 fl  Mean Cell Hemoglobin : 32.7 pg  Mean Cell Hemoglobin Concentration : 33.6 gm/dL  Auto Neutrophil # : x  Auto Lymphocyte # : x  Auto Monocyte # : x  Auto Eosinophil # : x  Auto Basophil # : x  Auto Neutrophil % : x  Auto Lymphocyte % : x  Auto Monocyte % : x  Auto Eosinophil % : x  Auto Basophil % : x      07-14    138  |  105  |  16  ----------------------------<  120<H>  4.0   |  23  |  0.99    Ca    9.0      14 Jul 2017 07:46  Phos  2.6     07-14  Mg     2.2     07-14        CARDIAC MARKERS ( 13 Jul 2017 07:43 )  <0.015 ng/mL / x     / 147 U/L / x     / 2.0 ng/mL    MEDICATIONS:    MEDICATIONS  (STANDING):  nicotine - 21 mG/24Hr(s) Patch 1 patch Transdermal daily  enoxaparin Injectable 40 milliGRAM(s) SubCutaneous daily  aspirin enteric coated 81 milliGRAM(s) Oral daily  atorvastatin 40 milliGRAM(s) Oral at bedtime  clopidogrel Tablet 75 milliGRAM(s) Oral daily  carvedilol 6.25 milliGRAM(s) Oral every 12 hours  amLODIPine   Tablet 10 milliGRAM(s) Oral daily  sodium chloride 0.9%. 1000 milliLiter(s) (80 mL/Hr) IV Continuous <Continuous>      MEDICATIONS  (PRN):  acetaminophen   Tablet. 650 milliGRAM(s) Oral every 6 hours PRN Mild Pain (1 - 3)  morphine  - Injectable 2 milliGRAM(s) IV Push every 6 hours PRN Moderate Pain (4 - 6)      REVIEW OF SYSTEMS:                           ALL ROS DONE [  X  ]    CONSTITUTIONAL:  LETHARGIC [   ], FEVER [   ], UNRESPONSIVE [   ]  CVS:  CP  [   ], SOB, [   ], PALPITATIONS [   ], DIZZYNESS [   ]  RS: COUGH [   ], SPUTUM [   ]  GI: ABDOMINAL PAIN [   ], NAUSEA [   ], VOMITINGS [   ], DIARRHEA [   ], CONSTIPATION [   ]  :  DYSURIA [   ], NOCTURIA [   ], INCREASED FREQUENCY [   ], DRIBLING [   ],  SKELETAL: PAINFUL JOINTS [   ], SWOLLEN JOINTS [   ], NECK ACHE [   ], LOW BACK ACHE [   ],  SKIN : ULCERS [   ], RASH [   ], ITCHING [   ]  CNS: HEAD ACHE [   ], DOUBLE VISION [   ], BLURRED VISION [   ], AMS / CONFUSION [   ], SEIZURES [   ], SEIZURES [   ], WEAKNESS [   ],TINGLING / NUMBNESS [   ],      PHYSICAL EXAMINATION:  GENERAL APPEARANCE: NO DISTRESS  HEENT: no  PALLOR, no  JVD,   no  NODES, NECK SUPPLE  CVS: S1 +, S2 +,   RS: AEEB, no  RALES, B/L   RONCHI  +  ABD: SOFT, NT, NO, BS +  EXT:  PE +  SKIN: WARM,   SKELETAL:  ROM ACCEPTABLE  CNS:  AAO X 2   ,   NO DEFICITS    RADIOLOGY :      ASSESSMENT :     Fall  HTN (hypertension)  Pacemaker  CAD (coronary artery disease)  Dementia  HTN (hypertension)  CAD (coronary artery disease)  H/O hydrocele  History of appendectomy  S/P skin biopsy  History of appendectomy  No significant past surgical history      PLAN:  75yo/M ambulates with cane living on his own(without home aid and with some assistance from brother.) PMH of HTN, paresthesia of B/L LE, dementia, CAD s/p stent placement and pacemaker comes to ED complaining of left shoulder pain following fall. He does not remember any events before or during fall. He only remembers surrounded by peoples after regaining consciousness someone of whom called 911. He complaints of episodes of dizziness frequently, specially following change in posture. He states that he is being cautious about standing up to prevent fall.       - DC TELE AND DC PATIENT TO [facility]  - SYNCOPE, CHRONIC VERTIGO , DIZZINESS WITH RECURRENT FALLS - DISCONTINUED WELLBUTRIN. ACS AND ARRYTHMIA IS BEING RULED OUT  - SMOKER'S BRONCHITIS - ADD DUONEB ( SMOKING CESSATION D/W PATIENT )  - T 11, T12, L4 COMPRESSION FRACTURES ( OLD ), LS SPINAL SPONDYLOSIS, SUSPECT PERIPHERAL NEUROPATHY  - fecal impaction - add lactulose 30 ml bid and mom 30 ml q daily   - DC PLAN TO [facility] IN AM  - GI AND DVT PROPHYLAXIS  - DR. MONTELONGO

## 2017-07-14 NOTE — DISCHARGE NOTE ADULT - PATIENT PORTAL LINK FT
“You can access the [facility] Patient Portal, offered by [facility], by registering with the following website: [email or web address]”

## 2017-07-14 NOTE — PROGRESS NOTE ADULT - PROBLEM SELECTOR PLAN 5
improve VTE score of 2. will start on chemical anti coagulation
improve VTE score of 2. will start on chemical anti coagulation

## 2017-07-14 NOTE — PROGRESS NOTE ADULT - ASSESSMENT
77yo/M ambulates with cane living on his own PMH of HTN, dementia, CAD s/p stent placement, and pacemaker presents after syncopal episode and fall. Pt does not remember any events before, or during fall. Pt also reports multiple prior falls over the past months.  Pt's orthostatic vital signs were positive. CT head and neck negative for acute fracture, or stroke.  Pt was saturating well on room air.  Pt was placed on the telemetry floor to monitor for arrythmia. Pt pending discharge.  Attempting to get in touch with family for transfer to rehab facility. 77yo/M ambulates with cane living on his own PMH of HTN, dementia, CAD s/p stent placement, and pacemaker presents after syncopal episode and fall. Pt does not remember any events before, or during fall. Pt also reports multiple prior falls over the past months.  Pt's orthostatic vital signs were positive. CT head and neck negative for acute fracture, or stroke.  Pt was saturating well on room air.  Pt was placed on the telemetry floor to monitor for arrythmia. Pt pending discharge.  Pt's healthcare proxy returned from [place] today, and will come to hospital later today to approve rehab facility.

## 2017-07-14 NOTE — DISCHARGE NOTE ADULT - MEDICATION SUMMARY - MEDICATIONS TO STOP TAKING
I will STOP taking the medications listed below when I get home from the hospital:    buPROPion 300 mg/24 hours (XL) oral tablet, extended release  -- 1 tab(s) by mouth every 24 hours  -- Pharmacy records show:  2/3- Bupropion 150mg  2/11- Bupropion 100mg   2/22- Bupropion 300mg XL

## 2017-07-14 NOTE — PROGRESS NOTE ADULT - PROBLEM SELECTOR PLAN 1
mechanical fall vs syncopal episode  admitted with similar episode  2 months prior  -CT head and neck neg- results above   -Pacemaker interrogated yesterday by st, [facility], and found to be functioning properly  -orthostatic vital signs positive- will repeat in AM  -will attain repeat CT head in AM per neuro  -Dr. Pena on board for neuro  -fall precautions.
mechanical fall vs syncopal episode  admitted with similar episode  2 months prior  -CT head and neck neg- results above   -Pacemaker interrogated yesterday by st, [facility], and found to be functioning properly  -orthostatic vital signs positive  -CT head negative  -Dr. Pena on board for neuro  -fall precautions.  -pt ready for D/C

## 2017-07-14 NOTE — DISCHARGE NOTE ADULT - HOSPITAL COURSE
75yo/M living on his own( with some assistance from brother.) with PMH of HTN, paresthesia of B/L LE, dementia, CAD s/p stent placement, and pacemaker who presents after syncopal episode and fall. Pt was admitted 2 weeks prior for a fall without syncope.  Pt does not remember events of fall. On arrival orthostatic vital signs were positive for orthostatic hypotension.  Heat CT was significant for stable chronic small vessel disease.  Chest x-ray was also negative. Troponin negative x 1.  Echo showed EF >60% and EKG significant for RBBB.  Pt was admitted to the telemetry floor for possible arrythmia, but no events occurred  Pt labs remained within normal limits during stay.  Pt did have some hypertensive episodes controlled with his blood pressure medication regimen.  Pt currently feels well, and will be discharged to subacute rehab today.  Pt advised to have close follow up with PCP, and Cardiologist.

## 2017-07-14 NOTE — DISCHARGE NOTE ADULT - MEDICATION SUMMARY - MEDICATIONS TO TAKE
I will START or STAY ON the medications listed below when I get home from the hospital:    aspirin 81 mg oral tablet  -- 1 tab(s) by mouth once a day  -- Indication: For Coronary artery disease involving native heart, angina presence unspecified, unspecified vessel or lesion type    Lipitor 40 mg oral tablet  -- 1 tab(s) by mouth once a day  -- Indication: For hld    clopidogrel 75 mg oral tablet  -- 1 tab(s) by mouth once a day  -- Indication: For Coronary artery disease involving native heart, angina presence unspecified, unspecified vessel or lesion type    carvedilol 6.25 mg oral tablet  -- 1 tab(s) by mouth every 12 hours  -- Indication: For Essential hypertension    amLODIPine 10 mg oral tablet  -- 1 tab(s) by mouth once a day  -- Indication: For Essential hypertension    nicotine 21 mg/24 hr transdermal film, extended release  -- 1 patch by transdermal patch once a day  -- Indication: For smoking

## 2017-07-14 NOTE — PROGRESS NOTE ADULT - PROBLEM SELECTOR PLAN 3
s/p stent.  taking isosorbide mononitrate, olmesartan, simvastatin, clopidogrel regularly
s/p stent.  taking isosorbide mononitrate, olmesartan, simvastatin, clopidogrel regularly

## 2017-07-15 VITALS
DIASTOLIC BLOOD PRESSURE: 65 MMHG | SYSTOLIC BLOOD PRESSURE: 164 MMHG | OXYGEN SATURATION: 100 % | RESPIRATION RATE: 16 BRPM | TEMPERATURE: 98 F | HEART RATE: 63 BPM

## 2017-07-15 LAB
ANION GAP SERPL CALC-SCNC: 9 MMOL/L — SIGNIFICANT CHANGE UP (ref 5–17)
BUN SERPL-MCNC: 17 MG/DL — SIGNIFICANT CHANGE UP (ref 7–18)
CALCIUM SERPL-MCNC: 8.5 MG/DL — SIGNIFICANT CHANGE UP (ref 8.4–10.5)
CHLORIDE SERPL-SCNC: 105 MMOL/L — SIGNIFICANT CHANGE UP (ref 96–108)
CO2 SERPL-SCNC: 23 MMOL/L — SIGNIFICANT CHANGE UP (ref 22–31)
CREAT SERPL-MCNC: 0.93 MG/DL — SIGNIFICANT CHANGE UP (ref 0.5–1.3)
GLUCOSE SERPL-MCNC: 98 MG/DL — SIGNIFICANT CHANGE UP (ref 70–99)
POTASSIUM SERPL-MCNC: 4 MMOL/L — SIGNIFICANT CHANGE UP (ref 3.5–5.3)
POTASSIUM SERPL-SCNC: 4 MMOL/L — SIGNIFICANT CHANGE UP (ref 3.5–5.3)
SODIUM SERPL-SCNC: 137 MMOL/L — SIGNIFICANT CHANGE UP (ref 135–145)

## 2017-07-15 PROCEDURE — 84484 ASSAY OF TROPONIN QUANT: CPT

## 2017-07-15 PROCEDURE — 73030 X-RAY EXAM OF SHOULDER: CPT

## 2017-07-15 PROCEDURE — 84100 ASSAY OF PHOSPHORUS: CPT

## 2017-07-15 PROCEDURE — 80061 LIPID PANEL: CPT

## 2017-07-15 PROCEDURE — 82607 VITAMIN B-12: CPT

## 2017-07-15 PROCEDURE — 74018 RADEX ABDOMEN 1 VIEW: CPT

## 2017-07-15 PROCEDURE — 72125 CT NECK SPINE W/O DYE: CPT

## 2017-07-15 PROCEDURE — 97162 PT EVAL MOD COMPLEX 30 MIN: CPT

## 2017-07-15 PROCEDURE — 72110 X-RAY EXAM L-2 SPINE 4/>VWS: CPT

## 2017-07-15 PROCEDURE — 73060 X-RAY EXAM OF HUMERUS: CPT

## 2017-07-15 PROCEDURE — 85027 COMPLETE CBC AUTOMATED: CPT

## 2017-07-15 PROCEDURE — 82553 CREATINE MB FRACTION: CPT

## 2017-07-15 PROCEDURE — 83880 ASSAY OF NATRIURETIC PEPTIDE: CPT

## 2017-07-15 PROCEDURE — 82306 VITAMIN D 25 HYDROXY: CPT

## 2017-07-15 PROCEDURE — 83690 ASSAY OF LIPASE: CPT

## 2017-07-15 PROCEDURE — 82550 ASSAY OF CK (CPK): CPT

## 2017-07-15 PROCEDURE — 84443 ASSAY THYROID STIM HORMONE: CPT

## 2017-07-15 PROCEDURE — 80053 COMPREHEN METABOLIC PANEL: CPT

## 2017-07-15 PROCEDURE — 85730 THROMBOPLASTIN TIME PARTIAL: CPT

## 2017-07-15 PROCEDURE — 83735 ASSAY OF MAGNESIUM: CPT

## 2017-07-15 PROCEDURE — 36415 COLL VENOUS BLD VENIPUNCTURE: CPT

## 2017-07-15 PROCEDURE — 71046 X-RAY EXAM CHEST 2 VIEWS: CPT

## 2017-07-15 PROCEDURE — 80307 DRUG TEST PRSMV CHEM ANLYZR: CPT

## 2017-07-15 PROCEDURE — 83036 HEMOGLOBIN GLYCOSYLATED A1C: CPT

## 2017-07-15 PROCEDURE — 99285 EMERGENCY DEPT VISIT HI MDM: CPT | Mod: 25

## 2017-07-15 PROCEDURE — 80048 BASIC METABOLIC PNL TOTAL CA: CPT

## 2017-07-15 PROCEDURE — 85610 PROTHROMBIN TIME: CPT

## 2017-07-15 PROCEDURE — 70450 CT HEAD/BRAIN W/O DYE: CPT

## 2017-07-15 PROCEDURE — 93005 ELECTROCARDIOGRAM TRACING: CPT

## 2017-07-15 RX ADMIN — Medication 1 PATCH: at 11:15

## 2017-07-15 RX ADMIN — AMLODIPINE BESYLATE 10 MILLIGRAM(S): 2.5 TABLET ORAL at 05:26

## 2017-07-15 RX ADMIN — CARVEDILOL PHOSPHATE 6.25 MILLIGRAM(S): 80 CAPSULE, EXTENDED RELEASE ORAL at 17:30

## 2017-07-15 RX ADMIN — Medication 1 PATCH: at 11:14

## 2017-07-15 RX ADMIN — Medication 81 MILLIGRAM(S): at 11:14

## 2017-07-15 RX ADMIN — CARVEDILOL PHOSPHATE 6.25 MILLIGRAM(S): 80 CAPSULE, EXTENDED RELEASE ORAL at 05:26

## 2017-07-15 RX ADMIN — ENOXAPARIN SODIUM 40 MILLIGRAM(S): 100 INJECTION SUBCUTANEOUS at 11:14

## 2017-07-15 RX ADMIN — CLOPIDOGREL BISULFATE 75 MILLIGRAM(S): 75 TABLET, FILM COATED ORAL at 11:14

## 2017-07-15 NOTE — CHART NOTE - NSCHARTNOTEFT_GEN_A_CORE
Spoke with Johnson Young (Health care proxy) at bedside today received his approval to discharge pt to [facility] for Rehab.

## 2017-07-18 DIAGNOSIS — K56.41 FECAL IMPACTION: ICD-10-CM

## 2017-07-18 DIAGNOSIS — I25.10 ATHEROSCLEROTIC HEART DISEASE OF NATIVE CORONARY ARTERY WITHOUT ANGINA PECTORIS: ICD-10-CM

## 2017-07-18 DIAGNOSIS — M25.512 PAIN IN LEFT SHOULDER: ICD-10-CM

## 2017-07-18 DIAGNOSIS — I95.1 ORTHOSTATIC HYPOTENSION: ICD-10-CM

## 2017-07-18 DIAGNOSIS — F03.90 UNSPECIFIED DEMENTIA, UNSPECIFIED SEVERITY, WITHOUT BEHAVIORAL DISTURBANCE, PSYCHOTIC DISTURBANCE, MOOD DISTURBANCE, AND ANXIETY: ICD-10-CM

## 2017-07-18 DIAGNOSIS — F17.210 NICOTINE DEPENDENCE, CIGARETTES, UNCOMPLICATED: ICD-10-CM

## 2017-07-18 DIAGNOSIS — R55 SYNCOPE AND COLLAPSE: ICD-10-CM

## 2017-07-18 DIAGNOSIS — Z71.6 TOBACCO ABUSE COUNSELING: ICD-10-CM

## 2017-07-18 DIAGNOSIS — E78.5 HYPERLIPIDEMIA, UNSPECIFIED: ICD-10-CM

## 2017-07-18 DIAGNOSIS — G62.9 POLYNEUROPATHY, UNSPECIFIED: ICD-10-CM

## 2017-07-18 DIAGNOSIS — I10 ESSENTIAL (PRIMARY) HYPERTENSION: ICD-10-CM

## 2017-07-18 DIAGNOSIS — Z95.5 PRESENCE OF CORONARY ANGIOPLASTY IMPLANT AND GRAFT: ICD-10-CM

## 2017-07-18 DIAGNOSIS — M47.897 OTHER SPONDYLOSIS, LUMBOSACRAL REGION: ICD-10-CM

## 2017-07-18 DIAGNOSIS — J41.0 SIMPLE CHRONIC BRONCHITIS: ICD-10-CM

## 2017-07-18 DIAGNOSIS — Z23 ENCOUNTER FOR IMMUNIZATION: ICD-10-CM

## 2017-10-07 ENCOUNTER — INPATIENT (INPATIENT)
Facility: HOSPITAL | Age: 77
LOS: 3 days | Discharge: HOME HEALTH | End: 2017-10-11
Attending: INTERNAL MEDICINE | Admitting: INTERNAL MEDICINE
Payer: MEDICARE

## 2017-10-07 VITALS
RESPIRATION RATE: 20 BRPM | OXYGEN SATURATION: 99 % | HEART RATE: 60 BPM | SYSTOLIC BLOOD PRESSURE: 149 MMHG | DIASTOLIC BLOOD PRESSURE: 66 MMHG

## 2017-10-07 DIAGNOSIS — Z87.438 PERSONAL HISTORY OF OTHER DISEASES OF MALE GENITAL ORGANS: Chronic | ICD-10-CM

## 2017-10-07 DIAGNOSIS — Z90.49 ACQUIRED ABSENCE OF OTHER SPECIFIED PARTS OF DIGESTIVE TRACT: Chronic | ICD-10-CM

## 2017-10-07 DIAGNOSIS — Z98.890 OTHER SPECIFIED POSTPROCEDURAL STATES: Chronic | ICD-10-CM

## 2017-10-07 LAB
ALBUMIN SERPL ELPH-MCNC: 3.8 G/DL — SIGNIFICANT CHANGE UP (ref 3.3–5)
ALP SERPL-CCNC: 102 U/L — SIGNIFICANT CHANGE UP (ref 40–120)
ALT FLD-CCNC: 18 U/L — SIGNIFICANT CHANGE UP (ref 4–41)
APPEARANCE UR: CLEAR — SIGNIFICANT CHANGE UP
APTT BLD: 26.6 SEC — LOW (ref 27.5–37.4)
AST SERPL-CCNC: 22 U/L — SIGNIFICANT CHANGE UP (ref 4–40)
BASE EXCESS BLDV CALC-SCNC: -0.2 MMOL/L — SIGNIFICANT CHANGE UP
BASOPHILS # BLD AUTO: 0.04 K/UL — SIGNIFICANT CHANGE UP (ref 0–0.2)
BASOPHILS NFR BLD AUTO: 0.5 % — SIGNIFICANT CHANGE UP (ref 0–2)
BILIRUB SERPL-MCNC: 0.3 MG/DL — SIGNIFICANT CHANGE UP (ref 0.2–1.2)
BILIRUB UR-MCNC: NEGATIVE — SIGNIFICANT CHANGE UP
BLOOD GAS VENOUS - CREATININE: 0.85 MG/DL — SIGNIFICANT CHANGE UP (ref 0.5–1.3)
BLOOD UR QL VISUAL: NEGATIVE — SIGNIFICANT CHANGE UP
BUN SERPL-MCNC: 13 MG/DL — SIGNIFICANT CHANGE UP (ref 7–23)
CALCIUM SERPL-MCNC: 8.5 MG/DL — SIGNIFICANT CHANGE UP (ref 8.4–10.5)
CHLORIDE BLDV-SCNC: 104 MMOL/L — SIGNIFICANT CHANGE UP (ref 96–108)
CHLORIDE SERPL-SCNC: 100 MMOL/L — SIGNIFICANT CHANGE UP (ref 98–107)
CK MB BLD-MCNC: 4.4 NG/ML — SIGNIFICANT CHANGE UP (ref 1–6.6)
CK SERPL-CCNC: 147 U/L — SIGNIFICANT CHANGE UP (ref 30–200)
CO2 SERPL-SCNC: 22 MMOL/L — SIGNIFICANT CHANGE UP (ref 22–31)
COLOR SPEC: SIGNIFICANT CHANGE UP
CREAT SERPL-MCNC: 0.95 MG/DL — SIGNIFICANT CHANGE UP (ref 0.5–1.3)
EOSINOPHIL # BLD AUTO: 0.17 K/UL — SIGNIFICANT CHANGE UP (ref 0–0.5)
EOSINOPHIL NFR BLD AUTO: 2 % — SIGNIFICANT CHANGE UP (ref 0–6)
GAS PNL BLDV: 133 MMOL/L — LOW (ref 136–146)
GLUCOSE BLDV-MCNC: 108 — HIGH (ref 70–99)
GLUCOSE SERPL-MCNC: 103 MG/DL — HIGH (ref 70–99)
GLUCOSE UR-MCNC: NEGATIVE — SIGNIFICANT CHANGE UP
HCO3 BLDV-SCNC: 24 MMOL/L — SIGNIFICANT CHANGE UP (ref 20–27)
HCT VFR BLD CALC: 37 % — LOW (ref 39–50)
HCT VFR BLDV CALC: 39.7 % — SIGNIFICANT CHANGE UP (ref 39–51)
HGB BLD-MCNC: 12.5 G/DL — LOW (ref 13–17)
HGB BLDV-MCNC: 12.9 G/DL — LOW (ref 13–17)
IMM GRANULOCYTES # BLD AUTO: 0.03 # — SIGNIFICANT CHANGE UP
IMM GRANULOCYTES NFR BLD AUTO: 0.4 % — SIGNIFICANT CHANGE UP (ref 0–1.5)
INR BLD: 1.06 — SIGNIFICANT CHANGE UP (ref 0.88–1.17)
KETONES UR-MCNC: NEGATIVE — SIGNIFICANT CHANGE UP
LACTATE BLDV-MCNC: 1.2 MMOL/L — SIGNIFICANT CHANGE UP (ref 0.5–2)
LEUKOCYTE ESTERASE UR-ACNC: NEGATIVE — SIGNIFICANT CHANGE UP
LYMPHOCYTES # BLD AUTO: 1.43 K/UL — SIGNIFICANT CHANGE UP (ref 1–3.3)
LYMPHOCYTES # BLD AUTO: 17.2 % — SIGNIFICANT CHANGE UP (ref 13–44)
MCHC RBC-ENTMCNC: 31.5 PG — SIGNIFICANT CHANGE UP (ref 27–34)
MCHC RBC-ENTMCNC: 33.8 % — SIGNIFICANT CHANGE UP (ref 32–36)
MCV RBC AUTO: 93.2 FL — SIGNIFICANT CHANGE UP (ref 80–100)
MONOCYTES # BLD AUTO: 0.89 K/UL — SIGNIFICANT CHANGE UP (ref 0–0.9)
MONOCYTES NFR BLD AUTO: 10.7 % — SIGNIFICANT CHANGE UP (ref 2–14)
MUCOUS THREADS # UR AUTO: SIGNIFICANT CHANGE UP
NEUTROPHILS # BLD AUTO: 5.75 K/UL — SIGNIFICANT CHANGE UP (ref 1.8–7.4)
NEUTROPHILS NFR BLD AUTO: 69.2 % — SIGNIFICANT CHANGE UP (ref 43–77)
NITRITE UR-MCNC: NEGATIVE — SIGNIFICANT CHANGE UP
NRBC # FLD: 0 — SIGNIFICANT CHANGE UP
PCO2 BLDV: 41 MMHG — SIGNIFICANT CHANGE UP (ref 41–51)
PH BLDV: 7.39 PH — SIGNIFICANT CHANGE UP (ref 7.32–7.43)
PH UR: 6.5 — SIGNIFICANT CHANGE UP (ref 4.6–8)
PLATELET # BLD AUTO: 123 K/UL — LOW (ref 150–400)
PMV BLD: 10.5 FL — SIGNIFICANT CHANGE UP (ref 7–13)
PO2 BLDV: 42 MMHG — HIGH (ref 35–40)
POTASSIUM BLDV-SCNC: 3.6 MMOL/L — SIGNIFICANT CHANGE UP (ref 3.4–4.5)
POTASSIUM SERPL-MCNC: 3.9 MMOL/L — SIGNIFICANT CHANGE UP (ref 3.5–5.3)
POTASSIUM SERPL-SCNC: 3.9 MMOL/L — SIGNIFICANT CHANGE UP (ref 3.5–5.3)
PROT SERPL-MCNC: 6.8 G/DL — SIGNIFICANT CHANGE UP (ref 6–8.3)
PROT UR-MCNC: NEGATIVE — SIGNIFICANT CHANGE UP
PROTHROM AB SERPL-ACNC: 11.9 SEC — SIGNIFICANT CHANGE UP (ref 9.8–13.1)
RBC # BLD: 3.97 M/UL — LOW (ref 4.2–5.8)
RBC # FLD: 13.9 % — SIGNIFICANT CHANGE UP (ref 10.3–14.5)
RBC CASTS # UR COMP ASSIST: SIGNIFICANT CHANGE UP (ref 0–?)
SAO2 % BLDV: 79 % — SIGNIFICANT CHANGE UP (ref 60–85)
SODIUM SERPL-SCNC: 136 MMOL/L — SIGNIFICANT CHANGE UP (ref 135–145)
SP GR SPEC: 1.01 — SIGNIFICANT CHANGE UP (ref 1–1.03)
TROPONIN T SERPL-MCNC: < 0.06 NG/ML — SIGNIFICANT CHANGE UP (ref 0–0.06)
TSH SERPL-MCNC: 2.65 UIU/ML — SIGNIFICANT CHANGE UP (ref 0.27–4.2)
UROBILINOGEN FLD QL: NORMAL E.U. — SIGNIFICANT CHANGE UP (ref 0.1–0.2)
WBC # BLD: 8.31 K/UL — SIGNIFICANT CHANGE UP (ref 3.8–10.5)
WBC # FLD AUTO: 8.31 K/UL — SIGNIFICANT CHANGE UP (ref 3.8–10.5)
WBC UR QL: SIGNIFICANT CHANGE UP (ref 0–?)

## 2017-10-07 PROCEDURE — 71010: CPT | Mod: 26

## 2017-10-07 PROCEDURE — 70450 CT HEAD/BRAIN W/O DYE: CPT | Mod: 26

## 2017-10-07 RX ORDER — SODIUM CHLORIDE 9 MG/ML
1000 INJECTION INTRAMUSCULAR; INTRAVENOUS; SUBCUTANEOUS ONCE
Qty: 0 | Refills: 0 | Status: COMPLETED | OUTPATIENT
Start: 2017-10-07 | End: 2017-10-07

## 2017-10-07 RX ADMIN — SODIUM CHLORIDE 1000 MILLILITER(S): 9 INJECTION INTRAMUSCULAR; INTRAVENOUS; SUBCUTANEOUS at 23:28

## 2017-10-07 NOTE — ED PROVIDER NOTE - OBJECTIVE STATEMENT
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  Pt was discharged from [facility] to family's home 2 weeks ago.  Per niece, patient has been more fatigued and eating less this past week.  Today patient was standing in room, felt dizzy and found himself on the floor.  Pt denies any pain.  Unclear if he hit head, unclear LOC.  Pt denies feeling chest pain, shortness of breath, nausea/vomiting/diarrhea, fever/chills, dysuria, head/neck/back pain.  family reports cough.    - Radha Lamb,

## 2017-10-07 NOTE — ED ADULT NURSE NOTE - OBJECTIVE STATEMENT
pt received to rm 19 A&Ox3, fall risk, c/o dizziness and fall at home. pt states he got up out of bed and began to feel dizzy. The next thing he knew he woke up with his head in the closet. Pt was found by niece who is an EMT and called 911. family states pt was sweating when she  found him. Pt has hx of pacemaker and cva 6 months ago with some residual balance issues and cognitive deficits. Pt denies head trauma, cp, sob, nvd, fever/chills. Awaiting further eval

## 2017-10-07 NOTE — ED PROVIDER NOTE - MEDICAL DECISION MAKING DETAILS
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  Plan: IV hydration, labs, Cardiac enzymes, CT head, U/A.

## 2017-10-07 NOTE — ED PROVIDER NOTE - ATTENDING CONTRIBUTION TO CARE
MILAN Attending Note - Dr. 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  PE: pt is alert and oriented to person and place, perrl, ent normal, membranes are dry, neck supple. no lymphadenopathy or thyroid enlargement, No JVD.  Chest clear to P&A, Heart- reg rhythm without murmur, rubs or gallops, radial pulses equal bilaterally.  Abd is soft, non-tender, Bowel sounds are active. no mass or organomegaly. : No CVA tenderness. Neuro:  Pt weak and tired Perrl    Distal neurosensory is intact. Motor function is 4/5 strength bilaterally.  No focal deficits. Extremities:  No edema.  Skin: warm and dry with poor skin turgor.  Impression: Altered mental status and weakness with dehydration   Plan: IV hydration, labs, Cardiac enzymes, CT head, U/A.

## 2017-10-07 NOTE — ED ADULT TRIAGE NOTE - CHIEF COMPLAINT QUOTE
From home with weakness, s/p fall by his bed no obvious hematoma, pt stated, became weak and slow went down denies cp/SOB + dizziness.

## 2017-10-08 DIAGNOSIS — Z95.0 PRESENCE OF CARDIAC PACEMAKER: ICD-10-CM

## 2017-10-08 DIAGNOSIS — R55 SYNCOPE AND COLLAPSE: ICD-10-CM

## 2017-10-08 DIAGNOSIS — Z29.9 ENCOUNTER FOR PROPHYLACTIC MEASURES, UNSPECIFIED: ICD-10-CM

## 2017-10-08 DIAGNOSIS — F03.90 UNSPECIFIED DEMENTIA, UNSPECIFIED SEVERITY, WITHOUT BEHAVIORAL DISTURBANCE, PSYCHOTIC DISTURBANCE, MOOD DISTURBANCE, AND ANXIETY: ICD-10-CM

## 2017-10-08 DIAGNOSIS — F17.200 NICOTINE DEPENDENCE, UNSPECIFIED, UNCOMPLICATED: ICD-10-CM

## 2017-10-08 DIAGNOSIS — I25.10 ATHEROSCLEROTIC HEART DISEASE OF NATIVE CORONARY ARTERY WITHOUT ANGINA PECTORIS: ICD-10-CM

## 2017-10-08 DIAGNOSIS — I10 ESSENTIAL (PRIMARY) HYPERTENSION: ICD-10-CM

## 2017-10-08 LAB
BUN SERPL-MCNC: 10 MG/DL — SIGNIFICANT CHANGE UP (ref 7–23)
CALCIUM SERPL-MCNC: 8.5 MG/DL — SIGNIFICANT CHANGE UP (ref 8.4–10.5)
CHLORIDE SERPL-SCNC: 104 MMOL/L — SIGNIFICANT CHANGE UP (ref 98–107)
CHOLEST SERPL-MCNC: 113 MG/DL — LOW (ref 120–199)
CK SERPL-CCNC: 174 U/L — SIGNIFICANT CHANGE UP (ref 30–200)
CO2 SERPL-SCNC: 21 MMOL/L — LOW (ref 22–31)
CREAT SERPL-MCNC: 0.77 MG/DL — SIGNIFICANT CHANGE UP (ref 0.5–1.3)
GLUCOSE SERPL-MCNC: 79 MG/DL — SIGNIFICANT CHANGE UP (ref 70–99)
HBA1C BLD-MCNC: 6.2 % — HIGH (ref 4–5.6)
HCT VFR BLD CALC: 36.8 % — LOW (ref 39–50)
HDLC SERPL-MCNC: 37 MG/DL — SIGNIFICANT CHANGE UP (ref 35–55)
HGB BLD-MCNC: 12 G/DL — LOW (ref 13–17)
LIPID PNL WITH DIRECT LDL SERPL: 66 MG/DL — SIGNIFICANT CHANGE UP
MAGNESIUM SERPL-MCNC: 2.1 MG/DL — SIGNIFICANT CHANGE UP (ref 1.6–2.6)
MCHC RBC-ENTMCNC: 30.9 PG — SIGNIFICANT CHANGE UP (ref 27–34)
MCHC RBC-ENTMCNC: 32.6 % — SIGNIFICANT CHANGE UP (ref 32–36)
MCV RBC AUTO: 94.8 FL — SIGNIFICANT CHANGE UP (ref 80–100)
NRBC # FLD: 0 — SIGNIFICANT CHANGE UP
PHOSPHATE SERPL-MCNC: 2.4 MG/DL — LOW (ref 2.5–4.5)
PLATELET # BLD AUTO: 123 K/UL — LOW (ref 150–400)
PMV BLD: 11.2 FL — SIGNIFICANT CHANGE UP (ref 7–13)
POTASSIUM SERPL-MCNC: 4.3 MMOL/L — SIGNIFICANT CHANGE UP (ref 3.5–5.3)
POTASSIUM SERPL-SCNC: 4.3 MMOL/L — SIGNIFICANT CHANGE UP (ref 3.5–5.3)
RBC # BLD: 3.88 M/UL — LOW (ref 4.2–5.8)
RBC # FLD: 14 % — SIGNIFICANT CHANGE UP (ref 10.3–14.5)
SODIUM SERPL-SCNC: 140 MMOL/L — SIGNIFICANT CHANGE UP (ref 135–145)
TRIGL SERPL-MCNC: 66 MG/DL — SIGNIFICANT CHANGE UP (ref 10–149)
TROPONIN T SERPL-MCNC: < 0.06 NG/ML — SIGNIFICANT CHANGE UP (ref 0–0.06)
TSH SERPL-MCNC: 2.02 UIU/ML — SIGNIFICANT CHANGE UP (ref 0.27–4.2)
WBC # BLD: 5.83 K/UL — SIGNIFICANT CHANGE UP (ref 3.8–10.5)
WBC # FLD AUTO: 5.83 K/UL — SIGNIFICANT CHANGE UP (ref 3.8–10.5)

## 2017-10-08 RX ORDER — AMLODIPINE BESYLATE 2.5 MG/1
10 TABLET ORAL DAILY
Qty: 0 | Refills: 0 | Status: DISCONTINUED | OUTPATIENT
Start: 2017-10-08 | End: 2017-10-11

## 2017-10-08 RX ORDER — NICOTINE POLACRILEX 2 MG
4 GUM BUCCAL
Qty: 0 | Refills: 0 | Status: DISCONTINUED | OUTPATIENT
Start: 2017-10-08 | End: 2017-10-11

## 2017-10-08 RX ORDER — INFLUENZA VIRUS VACCINE 15; 15; 15; 15 UG/.5ML; UG/.5ML; UG/.5ML; UG/.5ML
0.5 SUSPENSION INTRAMUSCULAR ONCE
Qty: 0 | Refills: 0 | Status: DISCONTINUED | OUTPATIENT
Start: 2017-10-08 | End: 2017-10-11

## 2017-10-08 RX ORDER — SODIUM CHLORIDE 9 MG/ML
500 INJECTION INTRAMUSCULAR; INTRAVENOUS; SUBCUTANEOUS ONCE
Qty: 0 | Refills: 0 | Status: COMPLETED | OUTPATIENT
Start: 2017-10-08 | End: 2017-10-08

## 2017-10-08 RX ORDER — ATORVASTATIN CALCIUM 80 MG/1
40 TABLET, FILM COATED ORAL AT BEDTIME
Qty: 0 | Refills: 0 | Status: DISCONTINUED | OUTPATIENT
Start: 2017-10-08 | End: 2017-10-11

## 2017-10-08 RX ORDER — SODIUM CHLORIDE 9 MG/ML
1000 INJECTION INTRAMUSCULAR; INTRAVENOUS; SUBCUTANEOUS
Qty: 0 | Refills: 0 | Status: DISCONTINUED | OUTPATIENT
Start: 2017-10-08 | End: 2017-10-11

## 2017-10-08 RX ORDER — CARVEDILOL PHOSPHATE 80 MG/1
6.25 CAPSULE, EXTENDED RELEASE ORAL EVERY 12 HOURS
Qty: 0 | Refills: 0 | Status: DISCONTINUED | OUTPATIENT
Start: 2017-10-08 | End: 2017-10-11

## 2017-10-08 RX ORDER — ASPIRIN/CALCIUM CARB/MAGNESIUM 324 MG
81 TABLET ORAL DAILY
Qty: 0 | Refills: 0 | Status: DISCONTINUED | OUTPATIENT
Start: 2017-10-08 | End: 2017-10-11

## 2017-10-08 RX ORDER — CLOPIDOGREL BISULFATE 75 MG/1
75 TABLET, FILM COATED ORAL DAILY
Qty: 0 | Refills: 0 | Status: DISCONTINUED | OUTPATIENT
Start: 2017-10-08 | End: 2017-10-11

## 2017-10-08 RX ADMIN — SODIUM CHLORIDE 1000 MILLILITER(S): 9 INJECTION INTRAMUSCULAR; INTRAVENOUS; SUBCUTANEOUS at 12:59

## 2017-10-08 RX ADMIN — SODIUM CHLORIDE 75 MILLILITER(S): 9 INJECTION INTRAMUSCULAR; INTRAVENOUS; SUBCUTANEOUS at 21:19

## 2017-10-08 RX ADMIN — CLOPIDOGREL BISULFATE 75 MILLIGRAM(S): 75 TABLET, FILM COATED ORAL at 11:02

## 2017-10-08 RX ADMIN — AMLODIPINE BESYLATE 10 MILLIGRAM(S): 2.5 TABLET ORAL at 04:58

## 2017-10-08 RX ADMIN — ATORVASTATIN CALCIUM 40 MILLIGRAM(S): 80 TABLET, FILM COATED ORAL at 21:19

## 2017-10-08 RX ADMIN — Medication 81 MILLIGRAM(S): at 11:02

## 2017-10-08 RX ADMIN — Medication 4 MILLIGRAM(S): at 11:02

## 2017-10-08 RX ADMIN — SODIUM CHLORIDE 75 MILLILITER(S): 9 INJECTION INTRAMUSCULAR; INTRAVENOUS; SUBCUTANEOUS at 14:00

## 2017-10-08 RX ADMIN — CARVEDILOL PHOSPHATE 6.25 MILLIGRAM(S): 80 CAPSULE, EXTENDED RELEASE ORAL at 04:59

## 2017-10-08 RX ADMIN — CARVEDILOL PHOSPHATE 6.25 MILLIGRAM(S): 80 CAPSULE, EXTENDED RELEASE ORAL at 16:53

## 2017-10-08 NOTE — H&P ADULT - RS GEN PE MLT RESP DETAILS PC
no rales/no wheezes/breath sounds equal/clear to auscultation bilaterally/no chest wall tenderness/no intercostal retractions/airway patent/no subcutaneous emphysema/no rhonchi/respirations non-labored/good air movement

## 2017-10-08 NOTE — CONSULT NOTE ADULT - SUBJECTIVE AND OBJECTIVE BOX
HISTORY OF PRESENT ILLNESS: 77M with  a  history of HTN, PPM unsure of when placed  CAD s/p stents on Plavix, CVA 6 with residual memory and balance issues resulting in approximatively 5 falls in 1 year.  The pt was at his brother's house, standing and the next thing he recalls is coming too on the floor laying on the R side of his body. His niece came, called EMS and the pt was taken to the ED.  The pt denies, dizziness, HA, tinnitus, blurry vision, nausea, vomit, bowel/ bladder incontinence, chest pain, palpitations, SOB, chills, diaphoresis. The pt is unsure of LOC.        PAST MEDICAL & SURGICAL HISTORY:  Pacemaker  Dementia  HTN (hypertension)  CAD (coronary artery disease)  H/O hydrocele  S/P skin biopsy: Behind L ear  History of appendectomy      PREVIOUS DIAGNOSTIC TESTING:    [ ] Echocardiogram: 7/1/17      EF 60%      < from: Transthoracic Echocardiogram (07.01.17 @ 08:11) >  1. Mitral annular calcification. Mild mitral regurgitation.    2. Normal trileaflet aortic valve.  3. Normal aortic root.  4. Normal left atrium.  5. Normal left ventricular internal dimensions and wall  thicknesses.  6. Normal Left Ventricular Systolic Function,  (EF = 55 to  60%)  7. Grade I diastolic dysfunction  8. Normal right atrium.  9. Normal right ventricular size and function. A device  lead is visualized in the right heart.  10. RA Pressure is 10 mm Hg.  11. There is mild tricuspid regurgitation.  12. Normal pericardium with no pericardial effusion.    < end of copied text >    [ ]  Catheterization:  2012       < from: Cardiac Cath Lab (06.18.12 @ 11:11) >  Ventricles: No left ventriculogram was performed.  Coronary vessels: The coronary circulation is left dominant.  LM:      LM: Angiography showed minor luminal irregularities with no flow  limiting lesions.  LAD:      Proximal LAD:There was a 95 % stenosis. The lesion was hazy and  associated with a small filling defect consistent with thrombus. There was  MASOOD grade 3 flow through the vessel (brisk flow). This lesion is a likely  culprit for the patient's recent myocardial infarction.  CX:      Circumflex: Angiography showed minor luminal irregularities with  no flow limiting lesions.  OM1: There was no significant restenosis.  RCA:      RCA: Angiography showed mild atherosclerosis with no flow  limiting lesions.    A stent was performed on the 90 % lesion in the proximal LAD. Following  intervention there was a 1 % residual stenosis. According to the ACC/AHA      [ ] Stress Test:  	    MEDICATIONS:  amLODIPine   Tablet 10 milliGRAM(s) Oral daily  aspirin enteric coated 81 milliGRAM(s) Oral daily  carvedilol 6.25 milliGRAM(s) Oral every 12 hours  clopidogrel Tablet 75 milliGRAM(s) Oral daily    atorvastatin 40 milliGRAM(s) Oral at bedtime    influenza   Vaccine 0.5 milliLiter(s) IntraMuscular once  sodium chloride 0.9%. 1000 milliLiter(s) IV Continuous <Continuous>      Allergies    No Known Allergies    Intolerances        FAMILY HISTORY:  No pertinent family history in first degree relatives      SOCIAL HISTORY:    [ ] Non-smoker  [ ] Former Smoker  [x ] Current Smoker  [ ] Alcohol      REVIEW OF SYSTEMS:  [ ]chest pain  [  ]shortness of breath  [  ]palpitations  [ x ]syncope PTA  [ ]near syncope [  ]diplopia  [  ]altered mental status   [  ]fevers  [ ]chills [ ]nausea  [ vomiting  [ ]abdominal pain  [ ]melena  [ ]BRBPR  [  ]epistaxis  [  ]rash  [  ]lower extremity edema          [x ] All others negative; currently with no complaints 	  [ ] Unable to obtain    PHYSICAL EXAM:  T(C): 36.7 (10-08-17 @ 12:50), Max: 37 (10-08-17 @ 04:23)  HR: 60 (10-08-17 @ 12:50) (60 - 66)  BP: 148/80 (10-08-17 @ 12:50) (148/80 - 168/70)  RR: 20 (10-08-17 @ 12:50) (16 - 21)  SpO2: 98% (10-08-17 @ 12:50) (96% - 100%)  Wt(kg): --  I&O's Summary    07 Oct 2017 07:01  -  08 Oct 2017 07:00  --------------------------------------------------------  IN: 0 mL / OUT: 350 mL / NET: -350 mL    08 Oct 2017 07:01  -  08 Oct 2017 16:08  --------------------------------------------------------  IN: 0 mL / OUT: 250 mL / NET: -250 mL        Appearance: No Signs of acute distress	  HEENT:   Normal oral mucosa, PERRL, EOMI	  Lymphatic: No lymphadenopathy  Cardiovascular: Normal S1 S2, No JVD, No murmurs, No edema  Respiratory: Lungs clear to auscultation	  Gastrointestinal:  Soft, Non-tender, + BS	Obese  Extremities:, No clubbing, cyanosis or edema  Vascular: Peripheral pulses palpable 2+ bilaterally    TELEMETRY: 	    ECG:  	  RADIOLOGY:  CXR   Clear Lungs   CT Brain   < from: CT Head No Cont (10.07.17 @ 23:54) >  No acute intracranial hemorrhage or calvarial fracture.     < end of copied text >      OTHER: 	  	  LABS:	 	    CARDIAC MARKERS:      CKMB: 4.40 ng/mL (10-07 @ 21:17)                              12.0   5.83  )-----------( 123      ( 08 Oct 2017 06:12 )             36.8     10-08    140  |  104  |  10  ----------------------------<  79  4.3   |  21<L>  |  0.77    Ca    8.5      08 Oct 2017 06:12  Phos  2.4     10-08  Mg     2.1     10-08    TPro  6.8  /  Alb  3.8  /  TBili  0.3  /  DBili  x   /  AST  22  /  ALT  18  /  AlkPhos  102  10-07    proBNP:   Lipid Profile:   HgA1c: Hemoglobin A1C, Whole Blood: 6.2 % (10-08 @ 06:12)    TSH: Thyroid Stimulating Hormone, Serum: 2.02 uIU/mL (10-08 @ 06:12)  Thyroid Stimulating Hormone, Serum: 2.65 uIU/mL (10-07 @ 21:17)      ASSESSMENT/PLAN: 77M with  a  history of HTN, PPM unsure of when placed  CAD s/p stents on Plavix, CVA 6 with residual memory and balance issues resulting in approximatively 5 falls in 1 year.  The pt was at his brother's house, standing and the next thing he recalls is coming too on the floor laying on the R side of his body.     admitted s/p syncopal episode w/ report of ~ 5 falls in 1 year   CE neg x 2  CT Brain negative  Last orthostatics negative  now dons MALIA Stocking   C/W tele monitoring   Interrogate PPM   further recommendations dependent on above   current smoker on nicotine gum

## 2017-10-08 NOTE — PHYSICAL THERAPY INITIAL EVALUATION ADULT - IMPAIRMENTS CONTRIBUTING TO GAIT DEVIATIONS, PT EVAL
cognition/impaired balance/decreased strength impaired balance/cognition/* IMPULSIVE @ times/decreased strength

## 2017-10-08 NOTE — PHYSICAL THERAPY INITIAL EVALUATION ADULT - PATIENT PROFILE REVIEW, REHAB EVAL
ACTIVITY: Ambulate as Tolerated; spoke with NORA George prior to PT evaluation--> Pt OK for ambulation/yes

## 2017-10-08 NOTE — PHYSICAL THERAPY INITIAL EVALUATION ADULT - ADDITIONAL COMMENTS
Pt reports that he lives off an on between his place and his brothers house. Pt lives alone in an apartment with ~3STE; elevator is inside. When pt lives at brother house with are also a few steps to negotiate to enter; bedroom/bathroom on the first floor. Prior to hospital admission pt ambulated independently using a single axis cane/rolling walker. Pt reports multiple recent falls.    Pt left comfortable in bed, NAD, all lines intact, all precautions maintained, with call bell in reach, bed alarm on, and RN aware of PT session.

## 2017-10-08 NOTE — H&P ADULT - HISTORY OF PRESENT ILLNESS
77M that ambulates with a cane and walker, has a history of HTN, CAD s/p stents on Plavix, CVA 6 with residual memory and balance issues resulting in approximatively 5 falls in 1 year.  The pt was at his brother's house, standing and the next thing he recalls is coming too on the floor laying on the R side of his body. His niece came, called EMS and the pt was taken to the ED.  The pt denies, dizziness, HA, tinnitus, blurry vision, nausea, vomit, bowel/ bladder incontinence, chest pain, palpitations, SOB, chills, diaphoresis. The pt is unsure of LOC.  In the Ed, the pt had a CT head the preliminary is negative, CE negative x 1. The pt currently has no complaints.

## 2017-10-08 NOTE — PHYSICAL THERAPY INITIAL EVALUATION ADULT - PERTINENT HX OF CURRENT PROBLEM, REHAB EVAL
77M that ambulates with a cane and walker, has a history of HTN, CAD s/p stents on Plavix, CVA 6 with residual memory and balance issues resulting in approximatively 5 falls in 1 year.

## 2017-10-08 NOTE — CONSULT NOTE ADULT - SUBJECTIVE AND OBJECTIVE BOX
77 year old  history of HTN, s/p PPM  CAD s/p stents on Plavix, CVA 6 with residual memory and balance issues resulting in approximatively 5 falls in 1 year.  The pt was at his brother's house, standing and the next thing he recalls is coming too on the floor laying on the R side of his body. His niece came, called EMS and the pt was taken to the ED.  The pt denies, dizziness, HA, tinnitus, blurry vision, nausea, vomit, bowel/ bladder incontinence, chest pain, palpitations, SOB, chills, diaphoresis. The pt is unsure of LOC.  In the Ed, the pt had a CT head the preliminary is negative, CE negative x 1.   Patient admitted to telemetry       Review of Systems:  · Negative General Symptoms	no chills; no sweating  · Skin/Breast	negative  · Negative Ophthalmologic Symptoms	no photophobia; no lacrimation L; no lacrimation R; no blurred vision L  · Negative ENMT Symptoms	no ear pain; no tinnitus; no vertigo; no sinus symptoms; no nasal congestion; no nasal discharge; no nasal obstruction; no post-nasal discharge; no recurrent cold sores; no abnormal taste sensation; no dry mouth  · Negative Respiratory and Thorax Symptoms	no wheezing; no dyspnea; no cough; no hemoptysis; no pleuritic chest pain  · Negative Cardiovascular Symptoms	no chest pain; no palpitations; no dyspnea on exertion; no orthopnea  · Negative Gastrointestinal Symptoms	no nausea; no vomiting; no diarrhea; no constipation  · Genitourinary	negative  · Musculoskeletal Comments	Fall  · Neurological Comments	? Syncope ?  · Psychiatric	not applicable  · Hematology/Lymphatics	not applicable  · Endocrine	not applicable  · Allergic/Immunologic	not applicable      Allergies and Intolerances:        Allergies:  	No Known Allergies:     Home Medications:   * No Current Medications as of 2017 12:15 documented in Structured Notes  · 	clopidogrel 75 mg oral tablet: 1 tab(s) orally once a day  · 	carvedilol 6.25 mg oral tablet: 1 tab(s) orally every 12 hours  · 	aspirin 81 mg oral tablet: 1 tab(s) orally once a day  · 	amLODIPine 10 mg oral tablet: 1 tab(s) orally once a day  · 	Lipitor 40 mg oral tablet: 1 tab(s) orally once a day    .    Patient History:    Past Medical History:  CAD (coronary artery disease)    Dementia    HTN (hypertension)    Pacemaker.     Past Surgical History:  H/O hydrocele    History of appendectomy    S/P skin biopsy  Behind L ear.     Family History:  No pertinent family history in first degree relatives.     Social History:  Social History (marital status, living situation, occupation, tobacco use, alcohol and drug use, and sexual history): Single and lives alone.  	Denies ETOH.  Nicotine= Smokes 1 pack od cigarettes/ day     Tobacco Screening:  · Core Measure Site	No  · Has the patient used tobacco in the past 30 days?	Yes  · Tobacco Cessation Education/Counseling	Offered and patient declined  · Tobacco Cessation Medication	Offered and patient accepted    Risk Assessment:    Present on Admission:  Deep Venous Thrombosis	no  Pulmonary Embolus	no  Urinary Catheter	no  Central Venous Catheter/PICC Line	no  Surgical Site Incision	no  Pressure Ulcer(s)	no     Heart Failure:  Does this patient have a history of or has been diagnosed with heart failure? no.       Height/Weight/BSA/BMI:  · Height (FEET)	5 Feet  · Height (INCHES)	11 Inch(s)  · Height (CENTIMETERS)	180.34 Centimeter(s)  · 	 used  · Dosing Weight (KILOGRAMS)	104.8 kg  · Dosing Weight  (POUNDS)	231 Pound(s)  · BSA (m2)	2.24 Meter Squared  · BMI (kG/m2)	32.2     Physical Exam:  · Constitutional	no distress; obese  · Eyes Details	EOMI; conjunctiva clear  · Eyes Comments	wears glasses  · Nose	no discharge  · Mouth	moist  · Neck	detailed exam  · Neck Details	supple  · Breasts Details	no tenderness  · Back	detailed exam  · Back Details	normal shape; strength intact  · Respiratory Details	airway patent; breath sounds equal; good air movement; respirations non-labored; clear to auscultation bilaterally; no chest wall tenderness; no intercostal retractions; no rales; no rhonchi; no subcutaneous emphysema; no wheezes  · Cardiovascular Details	regular rate and rhythm  no rub  · GI Normal	soft; nontender; no masses palpable; bowel sounds normal  · Gastrointestinal Comments	distended due to habitus  · Genitourinary	not examined  · Rectal	not examined  · Extremities	detailed exam  · Extremities Details	no clubbing; no cyanosis; no pedal edema  · Vascular	detailed exam  · Radial Pulse	right normal; left normal  · DP Pulse	right normal; left normal  · PT Pulse	right normal; left normal  · Neurological	detailed exam  · Neurological Details	alert and oriented x 3; responds to verbal commands; sensation intact  · Skin	not examined  · Lymph Nodes	not examined  · Musculoskeletal	detailed exam  · Musculoskeletal Details	normal strength  · Psychiatric	not examined       Labs and Results:  Labs, Radiology, Cardiology, and Other Results: CXR preliminary = clear, L U chest ICD.  	CT Head preliminary = No acute intracranial hemorrhage or calvarial fracture.  	H &H = 12.5/ 37  	BUN/ Creatinie = 13/ 0.95  	Glucose= 103  	CE negative x 1  	UA clean   	EKG     	17 TTE -- EF= 60%.  	1. Mitral annular calcification. Mild mitral regurgitation.  	2. Normal trileaflet aortic valve.  	3. Normal aortic root.  	4. Normal left atrium.  	5. Normal left ventricular internal dimensions and wall  	thicknesses.  	6. Normal Left Ventricular Systolic Function,  (EF = 55 to  	60%)  	7. Grade I diastolic dysfunction  	8. Normal right atrium.  	9. Normal right ventricular size and function. A device  	lead is visualized in the right heart.  	10. RA Pressure is 10 mm Hg.  	11. There is mild tricuspid regurgitation.  12. Normal pericardium with no pericardial effusion.        SUBJECTIVE / OVERNIGHT EVENTS:  clinically stable  patient in good spirit  Denies CP/SOB/Palpitation/HA.  cardiac monitor : NSR     MEDICATIONS  (STANDING):  amLODIPine   Tablet 10 milliGRAM(s) Oral daily  aspirin enteric coated 81 milliGRAM(s) Oral daily  atorvastatin 40 milliGRAM(s) Oral at bedtime  carvedilol 6.25 milliGRAM(s) Oral every 12 hours  clopidogrel Tablet 75 milliGRAM(s) Oral daily  influenza   Vaccine 0.5 milliLiter(s) IntraMuscular once  sodium chloride 0.9% Bolus 500 milliLiter(s) IV Bolus once  sodium chloride 0.9%. 1000 milliLiter(s) (75 mL/Hr) IV Continuous <Continuous>    MEDICATIONS  (PRN):  nicotine  Polacrilex Gum 4 milliGRAM(s) Oral every 3 hours PRN nicotine craving      I&O's Summary    07 Oct 2017 07:  -  08 Oct 2017 07:00  --------------------------------------------------------  IN: 0 mL / OUT: 350 mL / NET: -350 mL    08 Oct 2017 07:  -  08 Oct 2017 12:44  --------------------------------------------------------  IN: 0 mL / OUT: 250 mL / NET: -250 mL      LABS:                        12.0   5.83  )-----------( 123      ( 08 Oct 2017 06:12 )             36.8     10-08    140  |  104  |  10  ----------------------------<  79  4.3   |  21<L>  |  0.77    Ca    8.5      08 Oct 2017 06:12  Phos  2.4     10-08  Mg     2.1     10    TPro  6.8  /  Alb  3.8  /  TBili  0.3  /  DBili  x   /  AST  22  /  ALT  18  /  AlkPhos  102  10-07    PT/INR - ( 07 Oct 2017 21:17 )   PT: 11.9 SEC;   INR: 1.06          PTT - ( 07 Oct 2017 21:17 )  PTT:26.6 SEC  CARDIAC MARKERS ( 08 Oct 2017 06:12 )  x     / < 0.06 ng/mL / 174 u/L / x     / x      CARDIAC MARKERS ( 07 Oct 2017 21:17 )  x     / < 0.06 ng/mL / 147 u/L / 4.40 ng/mL / x          Urinalysis Basic - ( 07 Oct 2017 21:55 )    Color: PLYEL / Appearance: CLEAR / S.008 / pH: 6.5  Gluc: NEGATIVE / Ketone: NEGATIVE  / Bili: NEGATIVE / Urobili: NORMAL E.U.   Blood: NEGATIVE / Protein: NEGATIVE / Nitrite: NEGATIVE   Leuk Esterase: NEGATIVE / RBC: 0-2 / WBC 0-2   Sq Epi: x / Non Sq Epi: x / Bacteria: x

## 2017-10-08 NOTE — H&P ADULT - NSHPLABSRESULTS_GEN_ALL_CORE
CXR preliminary = clear, L U chest ICD.  CT Head preliminary = No acute intracranial hemorrhage or calvarial fracture.  H &H = 12.5/ 37  BUN/ Creatinie = 13/ 0.95  Glucose= 103  CE negative x 1  UA clean   EKG     7/1/17 TTE -- EF= 60%.  1. Mitral annular calcification. Mild mitral regurgitation.  2. Normal trileaflet aortic valve.  3. Normal aortic root.  4. Normal left atrium.  5. Normal left ventricular internal dimensions and wall  thicknesses.  6. Normal Left Ventricular Systolic Function,  (EF = 55 to  60%)  7. Grade I diastolic dysfunction  8. Normal right atrium.  9. Normal right ventricular size and function. A device  lead is visualized in the right heart.  10. RA Pressure is 10 mm Hg.  11. There is mild tricuspid regurgitation.  12. Normal pericardium with no pericardial effusion.

## 2017-10-08 NOTE — CONSULT NOTE ADULT - ATTENDING COMMENTS
EP ATTENDING    Agree with above. Mr. Terence Young is a pleasant 76-year-old male with a past medical history of symptomatic sick sinus syndrome, hypertension, and hyperlipidemia who originally had a [employer] dual-chamber pacemaker implanted in October 2013 at [facility]. His pacemaker interrogations in the past demonstrate underlying sinus bradycardia with normal AV conduction as well as excellent right atrial  and right ventricular lead function with greater than 8 years remaining of pacemaker battery longevity. He is now admitted with a fall r/o syncope. I will arrange PPM interrogation.
patient clinically stable  continue current plan of care  Cardiology on the case  rehab PT  Dementia work up  assistance when needed  Social work evaluation  GI DVT prophylaxis

## 2017-10-08 NOTE — H&P ADULT - NEGATIVE ENMT SYMPTOMS
no vertigo/no sinus symptoms/no recurrent cold sores/no dry mouth/no tinnitus/no nasal congestion/no nasal obstruction/no post-nasal discharge/no abnormal taste sensation/no ear pain/no nasal discharge

## 2017-10-08 NOTE — PHYSICAL THERAPY INITIAL EVALUATION ADULT - DIAGNOSIS, PT EVAL
Pt s/p fall; pt presents with decreased balance, decreased visual tracking, and decreased safety awareness

## 2017-10-08 NOTE — CONSULT NOTE ADULT - ASSESSMENT
· Assessment	  77 year old male admitted for "Syncope"     Problem/Plan - 1:  ·  Problem: Syncope, unspecified syncope type.    continue cardiac monitoring   Check orthostatics and Neuro checks  Pacemaker check   further plan as per Cardiology - main team  Fall precautions  Rehab PT     Problem/Plan - 2:  ·  Problem: S/P Pacemaker.  Plan: Consider PPM interrogation.   Plan as per cardiology     Problem/Plan - 3:  ·  Problem: CAD (coronary artery disease).  Hypertension  Plan: Continue ASA, Plavix, BB, Statin.   continue all previous blood pressure medications  monitor blood pressure and hold parameter       Problem/Plan - 4  ·  Problem: Dementia.  Plan: AA O x3  dementia work up  assistance when needed  rehab PT       Problem/Plan - 5:  Problem: Nicotine dependence. Plan: Nicorette gum PRN.     Problem/Plan - 6:  ·  Problem: Need for prophylactic measure.  Plan: B/L LE Klaus and Venodyne.

## 2017-10-09 LAB
BACTERIA UR CULT: SIGNIFICANT CHANGE UP
BUN SERPL-MCNC: 12 MG/DL — SIGNIFICANT CHANGE UP (ref 7–23)
CALCIUM SERPL-MCNC: 8.4 MG/DL — SIGNIFICANT CHANGE UP (ref 8.4–10.5)
CHLORIDE SERPL-SCNC: 105 MMOL/L — SIGNIFICANT CHANGE UP (ref 98–107)
CO2 SERPL-SCNC: 21 MMOL/L — LOW (ref 22–31)
CREAT SERPL-MCNC: 0.79 MG/DL — SIGNIFICANT CHANGE UP (ref 0.5–1.3)
GLUCOSE SERPL-MCNC: 82 MG/DL — SIGNIFICANT CHANGE UP (ref 70–99)
HCT VFR BLD CALC: 37.8 % — LOW (ref 39–50)
HGB BLD-MCNC: 12.7 G/DL — LOW (ref 13–17)
MCHC RBC-ENTMCNC: 32.2 PG — SIGNIFICANT CHANGE UP (ref 27–34)
MCHC RBC-ENTMCNC: 33.6 % — SIGNIFICANT CHANGE UP (ref 32–36)
MCV RBC AUTO: 95.7 FL — SIGNIFICANT CHANGE UP (ref 80–100)
NRBC # FLD: 0 — SIGNIFICANT CHANGE UP
PLATELET # BLD AUTO: 128 K/UL — LOW (ref 150–400)
PMV BLD: 10.8 FL — SIGNIFICANT CHANGE UP (ref 7–13)
POTASSIUM SERPL-MCNC: 4.2 MMOL/L — SIGNIFICANT CHANGE UP (ref 3.5–5.3)
POTASSIUM SERPL-SCNC: 4.2 MMOL/L — SIGNIFICANT CHANGE UP (ref 3.5–5.3)
RBC # BLD: 3.95 M/UL — LOW (ref 4.2–5.8)
RBC # FLD: 14.2 % — SIGNIFICANT CHANGE UP (ref 10.3–14.5)
SODIUM SERPL-SCNC: 139 MMOL/L — SIGNIFICANT CHANGE UP (ref 135–145)
SPECIMEN SOURCE: SIGNIFICANT CHANGE UP
WBC # BLD: 5.46 K/UL — SIGNIFICANT CHANGE UP (ref 3.8–10.5)
WBC # FLD AUTO: 5.46 K/UL — SIGNIFICANT CHANGE UP (ref 3.8–10.5)

## 2017-10-09 PROCEDURE — 93280 PM DEVICE PROGR EVAL DUAL: CPT | Mod: 26

## 2017-10-09 RX ADMIN — AMLODIPINE BESYLATE 10 MILLIGRAM(S): 2.5 TABLET ORAL at 05:20

## 2017-10-09 RX ADMIN — ATORVASTATIN CALCIUM 40 MILLIGRAM(S): 80 TABLET, FILM COATED ORAL at 22:17

## 2017-10-09 RX ADMIN — SODIUM CHLORIDE 75 MILLILITER(S): 9 INJECTION INTRAMUSCULAR; INTRAVENOUS; SUBCUTANEOUS at 22:18

## 2017-10-09 RX ADMIN — CARVEDILOL PHOSPHATE 6.25 MILLIGRAM(S): 80 CAPSULE, EXTENDED RELEASE ORAL at 05:20

## 2017-10-09 RX ADMIN — CARVEDILOL PHOSPHATE 6.25 MILLIGRAM(S): 80 CAPSULE, EXTENDED RELEASE ORAL at 17:04

## 2017-10-09 RX ADMIN — Medication 81 MILLIGRAM(S): at 11:18

## 2017-10-09 RX ADMIN — CLOPIDOGREL BISULFATE 75 MILLIGRAM(S): 75 TABLET, FILM COATED ORAL at 11:18

## 2017-10-09 NOTE — PROGRESS NOTE ADULT - ASSESSMENT
77 year old male admitted for "Syncope"     Problem/Plan - 1:  ·  Problem: Syncope, unspecified syncope type - near syncope  patient clinically better  stable cardiac monitoring  Check orthostatics and Neuro checks  Pacemaker check as per EPS  further plan as per Cardiology - main team  Fall precautions  Rehab PT started     Problem/Plan - 2:  ·  Problem: S/P Pacemaker.  Plan: Consider PPM interrogation.   patient seen by EPS   Plan as per cardiology     Problem/Plan - 3:  ·  Problem: CAD (coronary artery disease).  Hypertension  Plan: Continue ASA, Plavix Statin.   continue all previous blood pressure medications  monitor blood pressure and hold parameter       Problem/Plan - 4  ·  Problem: Dementia.  Plan: AA O x3  dementia work up  assistance when needed  rehab PT       Problem/Plan - 5:  Problem: Nicotine dependence. Plan: Nicorette gum PRN.  smoking cessation advise     Problem/Plan - 6:  ·  Problem: Need for prophylactic measure.  Plan: B/L ANITA Enrique and Catalina.

## 2017-10-09 NOTE — CHART NOTE - NSCHARTNOTEFT_GEN_A_CORE
ELECTROPHYSIOLOGY  Device Interrogation Performed                                  Date/Time:  10/9/2017 15:10  :                  [employer]          Model:                       Accent DR 2210             Mode:             DDDR                Rate:     60    Atrial Lead:  P wave amplitude:           3.2               mv          Impedence:            410       Ohms      Threshold:          0.75      V@    0.4         ms      Ventricular Lead(s):  RV Lead: R wave amplitude:       7.7                   mv          Impedence:      330             Ohms      Threshold:             1.0   V@          0.4   ms       Battery Status:                     Good    {X}               Underlying Rhythm:    Sinus verna                           Events/Observation: One 8 beats high ventricular rate events (NSVT) on 9/14;  No high atrial rate events     Impression/Plan: s/p fall one week ago; no correlated event reocrded  Normal sensing and pacing via iterative testing.  Excellent threshold capture.  No reprogramming.

## 2017-10-09 NOTE — PROGRESS NOTE ADULT - SUBJECTIVE AND OBJECTIVE BOX
SUBJECTIVE: no CP, SOB, Palpitations, Syncope    MEDICATIONS  (STANDING):  amLODIPine   Tablet 10 milliGRAM(s) Oral daily  aspirin enteric coated 81 milliGRAM(s) Oral daily  atorvastatin 40 milliGRAM(s) Oral at bedtime  carvedilol 6.25 milliGRAM(s) Oral every 12 hours  clopidogrel Tablet 75 milliGRAM(s) Oral daily  influenza   Vaccine 0.5 milliLiter(s) IntraMuscular once  sodium chloride 0.9%. 1000 milliLiter(s) (75 mL/Hr) IV Continuous <Continuous>    LABS:                        12.7   5.46  )-----------( 128      ( 09 Oct 2017 06:10 )             37.8     139  |  105  |  12  ----------------------------<  82  4.2   |  21<L>  |  0.79    Ca    8.4      09 Oct 2017 06:10  Phos  2.4     10-08  Mg     2.1     10-08    TPro  6.8  /  Alb  3.8  /  TBili  0.3  /  DBili  x   /  AST  22  /  ALT  18  /  AlkPhos  102  10-07    CARDIAC MARKERS ( 08 Oct 2017 06:12 )  x     / < 0.06 ng/mL / 174 u/L / x     / x      CARDIAC MARKERS ( 07 Oct 2017 21:17 )  x     / < 0.06 ng/mL / 147 u/L / 4.40 ng/mL / x        PHYSICAL EXAM:  Vital Signs Last 24 Hrs  T(C): 36.3 (09 Oct 2017 05:07), Max: 36.6 (08 Oct 2017 21:12)  T(F): 97.4 (09 Oct 2017 05:07), Max: 97.8 (08 Oct 2017 21:12)  HR: 61 (09 Oct 2017 05:07) (60 - 69)  BP: 160/78 (09 Oct 2017 05:07) (140/79 - 160/78)  RR: 18 (09 Oct 2017 05:07) (18 - 18)  SpO2: 99% (09 Oct 2017 05:07) (97% - 99%)    Lymphatic: No obvious lymphadenopathy, No edema  Cardiovascular: Normal S1S2, No JVD, 1/6 DEVANG, Peripheral pulses palpable 2+ B/L  Respiratory: Lungs clear to auscultation, normal effort  Gastrointestinal: Abdomen soft, ND, NT, +BS  Skin: Warm, dry, intact. No cyanosis, No rash.      DIAGNOSTIC DATA  TELEMETRY: Apaced 60s    < from: Transthoracic Echocardiogram (07.01.17 @ 08:11) >  CONCLUSIONS:  1. Mitral annular calcification. Mild mitral regurgitation.    2. Normal trileaflet aortic valve.  3. Normal aortic root.  4. Normal left atrium.  5. Normal left ventricular internal dimensions and wall  thicknesses.  6. Normal Left Ventricular Systolic Function,  (EF = 55 to  60%)  7. Grade I diastolic dysfunction  8. Normal right atrium.  9. Normal right ventricular size and function. A device  lead is visualized in the right heart.  10. RA Pressure is 10 mm Hg.  11. There is mild tricuspid regurgitation.  12. Normal pericardium with no pericardial effusion.    ------------------------------------------------------------------------  Confirmed on  7/2/2017 - 07:11:51 by Giorgi Gallagher M.D.    < end of copied text >    < from: Cardiac Cath Lab (06.18.12 @ 11:11) >  Ventricles: No left ventriculogram was performed.  Coronary vessels: The coronary circulation is left dominant.  LM:      LM: Angiography showed minor luminal irregularities with no flow  limiting lesions.  LAD:      Proximal LAD:There was a 95 % stenosis. The lesion was hazy and  associated with a small filling defect consistent with thrombus. There was  MASOOD grade 3 flow through the vessel (brisk flow). This lesion is a likely  culprit for the patient's recent myocardial infarction.  CX:      Circumflex: Angiography showed minor luminal irregularities with  no flow limiting lesions.  OM1: There was no significant restenosis.  RCA:      RCA: Angiography showed mild atherosclerosis with no flow  limiting lesions.  Complications: There were no complications.  Recommendations:  Secondary prevention measures.  Patient management should include aggressive medical therapy, smoking  cessation, and weight reduction. The patient should follow a low fat low  calorie diet.  Impressions: Acute NSTEMI; Severe disease of the proximal LAD as described.  Successful PCI of the proximal LAD (ALLYSON x 1).  Prepared and signed by  Lakhwinder Martinez M.D.  Signed 06/18/2012 12:56:12    < end of copied text >        ASSESSMENT AND PLAN:  77M with a PMH of HTN, PPM, CAD s/p NSTEMI and ALLYSON to pLAD in June 2012, CVA,  with residual memory and balance issues resulting in approximatively 5 falls in 1 year.  The pt was at his brother's house, standing and the next thing he recalls is coming too on the floor laying on the R side of his body.     --CE neg x 2  --CT Brain negative  --On admit, orthostatics were positive, so MALIA Stockings placed. Repeat orthostatics have been negative.   --Recent TTE with normal LV function  --Interrogate PPM   --further EP recs pending above    Phyllis Capone PA-C  [facility]  [address], [address]   [place] [address]  office [phone]  pager [phone] SUBJECTIVE: no CP, SOB, Palpitations, Syncope    MEDICATIONS  (STANDING):  amLODIPine   Tablet 10 milliGRAM(s) Oral daily  aspirin enteric coated 81 milliGRAM(s) Oral daily  atorvastatin 40 milliGRAM(s) Oral at bedtime  carvedilol 6.25 milliGRAM(s) Oral every 12 hours  clopidogrel Tablet 75 milliGRAM(s) Oral daily  influenza   Vaccine 0.5 milliLiter(s) IntraMuscular once  sodium chloride 0.9%. 1000 milliLiter(s) (75 mL/Hr) IV Continuous <Continuous>    LABS:                        12.7   5.46  )-----------( 128      ( 09 Oct 2017 06:10 )             37.8     139  |  105  |  12  ----------------------------<  82  4.2   |  21<L>  |  0.79    Ca    8.4      09 Oct 2017 06:10  Phos  2.4     10-08  Mg     2.1     10-08    TPro  6.8  /  Alb  3.8  /  TBili  0.3  /  DBili  x   /  AST  22  /  ALT  18  /  AlkPhos  102  10-07    CARDIAC MARKERS ( 08 Oct 2017 06:12 )  x     / < 0.06 ng/mL / 174 u/L / x     / x      CARDIAC MARKERS ( 07 Oct 2017 21:17 )  x     / < 0.06 ng/mL / 147 u/L / 4.40 ng/mL / x        PHYSICAL EXAM:  Vital Signs Last 24 Hrs  T(C): 36.3 (09 Oct 2017 05:07), Max: 36.6 (08 Oct 2017 21:12)  T(F): 97.4 (09 Oct 2017 05:07), Max: 97.8 (08 Oct 2017 21:12)  HR: 61 (09 Oct 2017 05:07) (60 - 69)  BP: 160/78 (09 Oct 2017 05:07) (140/79 - 160/78)  RR: 18 (09 Oct 2017 05:07) (18 - 18)  SpO2: 99% (09 Oct 2017 05:07) (97% - 99%)    Lymphatic: No obvious lymphadenopathy, No edema  Cardiovascular: Normal S1S2, No JVD, 1/6 DEVANG, Peripheral pulses palpable 2+ B/L  Respiratory: Lungs clear to auscultation, normal effort  Gastrointestinal: Abdomen soft, ND, NT, +BS  Skin: Warm, dry, intact. No cyanosis, No rash.      DIAGNOSTIC DATA  TELEMETRY: Apaced 60s    < from: Transthoracic Echocardiogram (07.01.17 @ 08:11) >  CONCLUSIONS:  1. Mitral annular calcification. Mild mitral regurgitation.    2. Normal trileaflet aortic valve.  3. Normal aortic root.  4. Normal left atrium.  5. Normal left ventricular internal dimensions and wall  thicknesses.  6. Normal Left Ventricular Systolic Function,  (EF = 55 to  60%)  7. Grade I diastolic dysfunction  8. Normal right atrium.  9. Normal right ventricular size and function. A device  lead is visualized in the right heart.  10. RA Pressure is 10 mm Hg.  11. There is mild tricuspid regurgitation.  12. Normal pericardium with no pericardial effusion.    ------------------------------------------------------------------------  Confirmed on  7/2/2017 - 07:11:51 by Giorgi Gallagher M.D.    < end of copied text >    < from: Cardiac Cath Lab (06.18.12 @ 11:11) >  Ventricles: No left ventriculogram was performed.  Coronary vessels: The coronary circulation is left dominant.  LM:      LM: Angiography showed minor luminal irregularities with no flow  limiting lesions.  LAD:      Proximal LAD:There was a 95 % stenosis. The lesion was hazy and  associated with a small filling defect consistent with thrombus. There was  MASOOD grade 3 flow through the vessel (brisk flow). This lesion is a likely  culprit for the patient's recent myocardial infarction.  CX:      Circumflex: Angiography showed minor luminal irregularities with  no flow limiting lesions.  OM1: There was no significant restenosis.  RCA:      RCA: Angiography showed mild atherosclerosis with no flow  limiting lesions.  Complications: There were no complications.  Recommendations:  Secondary prevention measures.  Patient management should include aggressive medical therapy, smoking  cessation, and weight reduction. The patient should follow a low fat low  calorie diet.  Impressions: Acute NSTEMI; Severe disease of the proximal LAD as described.  Successful PCI of the proximal LAD (ALLYSON x 1).  Prepared and signed by  Lakhwinder Martinez M.D.  Signed 06/18/2012 12:56:12    < end of copied text >        ASSESSMENT AND PLAN: 77M with a PMH of HTN, PPM, CAD s/p NSTEMI and ALLYSON to pLAD in June 2012, CVA,  with residual memory and balance issues resulting in approximatively 5 falls in 1 year.  The pt was at his brother's house, standing and the next thing he recalls is coming too on the floor laying on the R side of his body.     --CE neg x 2  --CT Brain negative  --On admit, orthostatics were positive, so MALIA Stockings placed. Repeat orthostatics have been negative.   --Recent TTE with normal LV function  --PPM interrogation unremarkable      Phyllis Capone PA-C  [facility]  [address], [address]   [place] [address]  office [phone]  pager [phone]

## 2017-10-09 NOTE — PROGRESS NOTE ADULT - ATTENDING COMMENTS
EP ATTENDING    Patient seen and examined. Agree with above. Mr. Terence Young is a pleasant 76-year-old male with a past medical history of symptomatic sick sinus syndrome, hypertension, and hyperlipidemia who originally had a [employer] dual-chamber pacemaker implanted in October 2013 at [facility]. His pacemaker interrogation today demonstrates underlying sinus bradycardia with normal AV conduction as well as excellent right atrial and right ventricular lead function with greater than 8 years remaining of pacemaker battery longevity. There were no arrythmias corresponding to his fall/syncope. No further EP workup needed. F/U with me as scheduled at Dr. Martinez's office on Tuesday 12/19 at 1:40 PM for routine PPM interrogations.

## 2017-10-09 NOTE — PROGRESS NOTE ADULT - SUBJECTIVE AND OBJECTIVE BOX
Patient is a 77y old  Male who presents with a chief complaint of Fall X 1 day (08 Oct 2017 03:55)  admitted under Cardiology called for medical/Geriatric consult      SUBJECTIVE / OVERNIGHT EVENTS:  feels better no event over night  Denies CP/SOB/Palpitation/HA.  Seen by EPS - for interrogation  Orthostasis negative    MEDICATIONS  (STANDING):  amLODIPine   Tablet 10 milliGRAM(s) Oral daily  aspirin enteric coated 81 milliGRAM(s) Oral daily  atorvastatin 40 milliGRAM(s) Oral at bedtime  carvedilol 6.25 milliGRAM(s) Oral every 12 hours  clopidogrel Tablet 75 milliGRAM(s) Oral daily  influenza   Vaccine 0.5 milliLiter(s) IntraMuscular once  sodium chloride 0.9%. 1000 milliLiter(s) (75 mL/Hr) IV Continuous <Continuous>    MEDICATIONS  (PRN):  nicotine  Polacrilex Gum 4 milliGRAM(s) Oral every 3 hours PRN nicotine craving      I&O's Summary    08 Oct 2017 07:01  -  09 Oct 2017 07:00  --------------------------------------------------------  IN: 0 mL / OUT: 250 mL / NET: -250 mL        PHYSICAL EXAM:  GENERAL: NAD, well-developed obese in good spirit  HEAD:  Atraumatic, Normocephalic  EYES: EOMI, PERRLA, conjunctiva and sclera clear  NECK: Supple, No JVD large neck  CHEST/LUNG: Clear to auscultation bilaterally; No wheeze left side pacemaker   HEART: Regular rate and rhythm; No murmurs, rubs, or gallops  ABDOMEN: Soft, Nontender, Nondistended; Bowel sounds present globular   EXTREMITIES:  2+ Peripheral Pulses, No clubbing, cyanosis, or edema  PSYCH: AAOx3  NEUROLOGY: non-focal no gross deficits  SKIN: No rashes or lesions    LABS:                        12.7   5.46  )-----------( 128      ( 09 Oct 2017 06:10 )             37.8     10-09    139  |  105  |  12  ----------------------------<  82  4.2   |  21<L>  |  0.79    Ca    8.4      09 Oct 2017 06:10  Phos  2.4     10-  Mg     2.1     10-    TPro  6.8  /  Alb  3.8  /  TBili  0.3  /  DBili  x   /  AST  22  /  ALT  18  /  AlkPhos  102  10-07    PT/INR - ( 07 Oct 2017 21:17 )   PT: 11.9 SEC;   INR: 1.06          PTT - ( 07 Oct 2017 21:17 )  PTT:26.6 SEC  CARDIAC MARKERS ( 08 Oct 2017 06:12 )  x     / < 0.06 ng/mL / 174 u/L / x     / x      CARDIAC MARKERS ( 07 Oct 2017 21:17 )  x     / < 0.06 ng/mL / 147 u/L / 4.40 ng/mL / x          Urinalysis Basic - ( 07 Oct 2017 21:55 )    Color: PLYEL / Appearance: CLEAR / S.008 / pH: 6.5  Gluc: NEGATIVE / Ketone: NEGATIVE  / Bili: NEGATIVE / Urobili: NORMAL E.U.   Blood: NEGATIVE / Protein: NEGATIVE / Nitrite: NEGATIVE   Leuk Esterase: NEGATIVE / RBC: 0-2 / WBC 0-2   Sq Epi: x / Non Sq Epi: x / Bacteria: x      CAPILLARY BLOOD GLUCOSE          RADIOLOGY & ADDITIONAL TESTS:    Imaging Personally Reviewed:    Consultant(s) Notes Reviewed:      Care Discussed with Consultants/Other Providers:

## 2017-10-09 NOTE — PROGRESS NOTE ADULT - ATTENDING COMMENTS
patient clinically stable  continue rehab PT  stable cardiac monitoring  Seen by EPS - possible interrogation  continue rest of medications  assistance when needed  GI DVT prophylaxuis

## 2017-10-10 LAB
BUN SERPL-MCNC: 13 MG/DL — SIGNIFICANT CHANGE UP (ref 7–23)
CALCIUM SERPL-MCNC: 8.2 MG/DL — LOW (ref 8.4–10.5)
CHLORIDE SERPL-SCNC: 104 MMOL/L — SIGNIFICANT CHANGE UP (ref 98–107)
CO2 SERPL-SCNC: 20 MMOL/L — LOW (ref 22–31)
CREAT SERPL-MCNC: 0.83 MG/DL — SIGNIFICANT CHANGE UP (ref 0.5–1.3)
GLUCOSE SERPL-MCNC: 96 MG/DL — SIGNIFICANT CHANGE UP (ref 70–99)
HCT VFR BLD CALC: 38.2 % — LOW (ref 39–50)
HGB BLD-MCNC: 12.5 G/DL — LOW (ref 13–17)
MAGNESIUM SERPL-MCNC: 2 MG/DL — SIGNIFICANT CHANGE UP (ref 1.6–2.6)
MCHC RBC-ENTMCNC: 31.3 PG — SIGNIFICANT CHANGE UP (ref 27–34)
MCHC RBC-ENTMCNC: 32.7 % — SIGNIFICANT CHANGE UP (ref 32–36)
MCV RBC AUTO: 95.5 FL — SIGNIFICANT CHANGE UP (ref 80–100)
NRBC # FLD: 0 — SIGNIFICANT CHANGE UP
PLATELET # BLD AUTO: 130 K/UL — LOW (ref 150–400)
PMV BLD: 10.8 FL — SIGNIFICANT CHANGE UP (ref 7–13)
POTASSIUM SERPL-MCNC: 4.2 MMOL/L — SIGNIFICANT CHANGE UP (ref 3.5–5.3)
POTASSIUM SERPL-SCNC: 4.2 MMOL/L — SIGNIFICANT CHANGE UP (ref 3.5–5.3)
RBC # BLD: 4 M/UL — LOW (ref 4.2–5.8)
RBC # FLD: 13.8 % — SIGNIFICANT CHANGE UP (ref 10.3–14.5)
SODIUM SERPL-SCNC: 138 MMOL/L — SIGNIFICANT CHANGE UP (ref 135–145)
WBC # BLD: 5.46 K/UL — SIGNIFICANT CHANGE UP (ref 3.8–10.5)
WBC # FLD AUTO: 5.46 K/UL — SIGNIFICANT CHANGE UP (ref 3.8–10.5)

## 2017-10-10 RX ADMIN — Medication 81 MILLIGRAM(S): at 11:50

## 2017-10-10 RX ADMIN — CARVEDILOL PHOSPHATE 6.25 MILLIGRAM(S): 80 CAPSULE, EXTENDED RELEASE ORAL at 17:22

## 2017-10-10 RX ADMIN — CLOPIDOGREL BISULFATE 75 MILLIGRAM(S): 75 TABLET, FILM COATED ORAL at 11:50

## 2017-10-10 RX ADMIN — CARVEDILOL PHOSPHATE 6.25 MILLIGRAM(S): 80 CAPSULE, EXTENDED RELEASE ORAL at 05:05

## 2017-10-10 RX ADMIN — ATORVASTATIN CALCIUM 40 MILLIGRAM(S): 80 TABLET, FILM COATED ORAL at 21:29

## 2017-10-10 RX ADMIN — SODIUM CHLORIDE 75 MILLILITER(S): 9 INJECTION INTRAMUSCULAR; INTRAVENOUS; SUBCUTANEOUS at 21:30

## 2017-10-10 RX ADMIN — SODIUM CHLORIDE 75 MILLILITER(S): 9 INJECTION INTRAMUSCULAR; INTRAVENOUS; SUBCUTANEOUS at 05:06

## 2017-10-10 RX ADMIN — AMLODIPINE BESYLATE 10 MILLIGRAM(S): 2.5 TABLET ORAL at 05:04

## 2017-10-10 NOTE — PROGRESS NOTE ADULT - ASSESSMENT
Assessment and Plan:   · Assessment		  Syncope likely due to vasovagal vs orthostatic hypotension  Hx CAD s/p PCI stent, PPM, COPD, HTN, CVA, HLD    Check orthostatics and Neuro checks  Pacemaker check as per EP --appreciated---no arrhythmic events  Fall precautions  Rehab PT started  Continue ASA, Plavix Statin.   continue all previous blood pressure medications  monitor blood pressure and hold parameter  Pt stable from cardiac pt of view for discharge planning and Rehab PT  Medical / Geriatric and EP evaluation are appreciated    HAIR Martinez MD, FACC  [phone]

## 2017-10-10 NOTE — PROGRESS NOTE ADULT - ASSESSMENT
7 year old male admitted for "Syncope"     Problem/Plan - 1:  Problem: Syncope, unspecified syncope type - near syncope  patient clinically better in good spirit  stable cardiac monitoring  Check orthostatics and Neuro checks  Pacemaker check as per EPS  further plan as per Cardiology - main team  Fall precautions  Rehab PT started     Problem/Plan - 2:  Problem: S/P Pacemaker.  s/p  PPM interrogation.   patient seen by EPS ; interrogation okay  no further work up  Plan as per cardiology     Problem/Plan - 3:  ·  Problem: CAD (coronary artery disease).  Hypertension  stable on current medical management   Plan: Continue ASA, Plavix Statin.   continue all previous blood pressure medications  monitor blood pressure and hold parameter       Problem/Plan - 4  ·  Problem: Dementia.  Plan: AA O x3  clinically stable  assistance when needed  rehab PT       Problem/Plan - 5:  Problem: Nicotine dependence. Plan: Nicorette gum PRN.  smoking cessation advise     Problem/Plan - 6:  ·  Problem: Need for prophylactic measure.  Plan: B/L LE Klaus and Venodyne.   out of bed to chair

## 2017-10-10 NOTE — PROGRESS NOTE ADULT - SUBJECTIVE AND OBJECTIVE BOX
Patient is a 77y old  Male who presents with a chief complaint of Fall X 1 day (08 Oct 2017 03:55)  patient admitted to Telemetry called for Medical/Geriatric consult      SUBJECTIVE / OVERNIGHT EVENTS:  Patient in good spirit   Seen by EPS, interrogation okay  no further work up as per recommendation  cardiac monitor stable  Denies CP/SOB/Palpitation/HA.    MEDICATIONS  (STANDING):  amLODIPine   Tablet 10 milliGRAM(s) Oral daily  aspirin enteric coated 81 milliGRAM(s) Oral daily  atorvastatin 40 milliGRAM(s) Oral at bedtime  carvedilol 6.25 milliGRAM(s) Oral every 12 hours  clopidogrel Tablet 75 milliGRAM(s) Oral daily  influenza   Vaccine 0.5 milliLiter(s) IntraMuscular once  sodium chloride 0.9%. 1000 milliLiter(s) (75 mL/Hr) IV Continuous <Continuous>    MEDICATIONS  (PRN):  nicotine  Polacrilex Gum 4 milliGRAM(s) Oral every 3 hours PRN nicotine craving        CAPILLARY BLOOD GLUCOSE        I&O's Summary      PHYSICAL EXAM:  GENERAL: NAD, well-developed obese  HEAD:  Atraumatic, Normocephalic  EYES: EOMI, PERRLA, conjunctiva and sclera clear  NECK: Supple, No JVD  CHEST/LUNG: Clear to auscultation bilaterally; No wheeze left side pacemaker  HEART: Regular rate and rhythm; No murmurs, rubs, or gallops  ABDOMEN: Soft, Nontender, Nondistended; Bowel sounds present  EXTREMITIES:  2+ Peripheral Pulses, No clubbing, cyanosis, or edema  PSYCH: AAOx3  NEUROLOGY: non-focal  SKIN: No rashes or lesions    LABS:                        12.5   5.46  )-----------( 130      ( 10 Oct 2017 06:20 )             38.2     10-10    138  |  104  |  13  ----------------------------<  96  4.2   |  20<L>  |  0.83    Ca    8.2<L>      10 Oct 2017 06:20  Mg     2.0     10-10              CAPILLARY BLOOD GLUCOSE          RADIOLOGY & ADDITIONAL TESTS:    Imaging Personally Reviewed: yes    Consultant(s) Notes Reviewed:  yes    Care Discussed with Consultants/Other Providers: yes

## 2017-10-10 NOTE — PROGRESS NOTE ADULT - ATTENDING COMMENTS
Patient clinically stable  Pacemaker check - ok as per EPS  rehab PT  discharge planning as per Cardiology  GI DVT prophylaxis

## 2017-10-10 NOTE — PROGRESS NOTE ADULT - SUBJECTIVE AND OBJECTIVE BOX
SUBJECTIVE / OVERNIGHT EVENTS:  Pt denies CP/SOB/Palpitation/HA.  PPM interrogation completed    MEDICATIONS  (STANDING):  amLODIPine   Tablet 10 milliGRAM(s) Oral daily  aspirin enteric coated 81 milliGRAM(s) Oral daily  atorvastatin 40 milliGRAM(s) Oral at bedtime  carvedilol 6.25 milliGRAM(s) Oral every 12 hours  clopidogrel Tablet 75 milliGRAM(s) Oral daily  influenza   Vaccine 0.5 milliLiter(s) IntraMuscular once  sodium chloride 0.9%. 1000 milliLiter(s) (75 mL/Hr) IV Continuous <Continuous>    MEDICATIONS  (PRN):  nicotine  Polacrilex Gum 4 milliGRAM(s) Oral every 3 hours PRN nicotine craving        CAPILLARY BLOOD GLUCOSE  I&O's Summary      PHYSICAL EXAM:  GENERAL: NAD, well-developed obese  HEAD:  Atraumatic, Normocephalic  EYES: EOMI, PERRLA, conjunctiva and sclera clear  NECK: Supple, No JVD  CHEST/LUNG: Clear to auscultation bilaterally; No wheeze left side pacemaker  HEART: Regular rate and rhythm; No murmurs, rubs, or gallops  ABDOMEN: Soft, Nontender, Nondistended; Bowel sounds present  EXTREMITIES:  2+ Peripheral Pulses, No clubbing, cyanosis, or edema  PSYCH: AAOx3  NEUROLOGY: non-focal  SKIN: No rashes or lesions    LABS:                        12.5   5.46  )-----------( 130      ( 10 Oct 2017 06:20 )             38.2     10-10    138  |  104  |  13  ----------------------------<  96  4.2   |  20<L>  |  0.83    Ca    8.2<L>      10 Oct 2017 06:20  Mg     2.0     10-10              CAPILLARY BLOOD GLUCOSE          RADIOLOGY & ADDITIONAL TESTS:    Imaging Personally Reviewed: yes    Consultant(s) Notes Reviewed:  yes    Care Discussed with Consultants/Other Providers: yes

## 2017-10-10 NOTE — PROGRESS NOTE ADULT - SUBJECTIVE AND OBJECTIVE BOX
EP ATTENDING    tele: AP-VS, no events    no palpitations, no syncope, no angina    amLODIPine   Tablet 10 milliGRAM(s) Oral daily  aspirin enteric coated 81 milliGRAM(s) Oral daily  atorvastatin 40 milliGRAM(s) Oral at bedtime  carvedilol 6.25 milliGRAM(s) Oral every 12 hours  clopidogrel Tablet 75 milliGRAM(s) Oral daily  influenza   Vaccine 0.5 milliLiter(s) IntraMuscular once  nicotine  Polacrilex Gum 4 milliGRAM(s) Oral every 3 hours PRN  sodium chloride 0.9%. 1000 milliLiter(s) IV Continuous <Continuous>                            12.5   5.46  )-----------( 130      ( 10 Oct 2017 06:20 )             38.2       10-10    138  |  104  |  13  ----------------------------<  96  4.2   |  20<L>  |  0.83    Ca    8.2<L>      10 Oct 2017 06:20  Mg     2.0     10-10        CARDIAC MARKERS ( 08 Oct 2017 06:12 )  x     / < 0.06 ng/mL / 174 u/L / x     / x      CARDIAC MARKERS ( 07 Oct 2017 21:17 )  x     / < 0.06 ng/mL / 147 u/L / 4.40 ng/mL / x            T(C): 36.9 (10-10-17 @ 05:03), Max: 36.9 (10-10-17 @ 05:03)  HR: 61 (10-10-17 @ 05:03) (59 - 61)  BP: 156/85 (10-10-17 @ 05:03) (145/82 - 162/87)  RR: 18 (10-10-17 @ 05:03) (18 - 18)  SpO2: 98% (10-10-17 @ 05:03) (94% - 98%)  Wt(kg): --    no JVD  RRR, no murmurs  CTAB  soft nt/nd  no c/c/e    A/P) Mr. Terence Young is a pleasant 77-year-old male with a past medical history of symptomatic sick sinus syndrome, hypertension, and hyperlipidemia who originally had a [employer] dual-chamber pacemaker implanted in October 2013 at [facility]. His pacemaker interrogation demonstrates underlying sinus bradycardia with normal AV conduction as well as excellent right atrial and right ventricular lead function with greater than 8 years remaining of pacemaker battery longevity. There were no arrythmias corresponding to his fall/syncope.     -No further EP workup needed.   -F/U with me as scheduled at Dr. Martinez's office on Tuesday 12/19 at 1:40 PM for routine PPM interrogations.   -d/c planning as per primary team      Misti Amaya MD, RS  [phone]

## 2017-10-11 ENCOUNTER — TRANSCRIPTION ENCOUNTER (OUTPATIENT)
Age: 77
End: 2017-10-11

## 2017-10-11 VITALS
HEART RATE: 60 BPM | SYSTOLIC BLOOD PRESSURE: 145 MMHG | TEMPERATURE: 99 F | DIASTOLIC BLOOD PRESSURE: 74 MMHG | OXYGEN SATURATION: 97 % | RESPIRATION RATE: 18 BRPM

## 2017-10-11 LAB
BUN SERPL-MCNC: 10 MG/DL — SIGNIFICANT CHANGE UP (ref 7–23)
CALCIUM SERPL-MCNC: 8.6 MG/DL — SIGNIFICANT CHANGE UP (ref 8.4–10.5)
CHLORIDE SERPL-SCNC: 103 MMOL/L — SIGNIFICANT CHANGE UP (ref 98–107)
CO2 SERPL-SCNC: 21 MMOL/L — LOW (ref 22–31)
CREAT SERPL-MCNC: 0.79 MG/DL — SIGNIFICANT CHANGE UP (ref 0.5–1.3)
GLUCOSE SERPL-MCNC: 87 MG/DL — SIGNIFICANT CHANGE UP (ref 70–99)
HCT VFR BLD CALC: 38 % — LOW (ref 39–50)
HGB BLD-MCNC: 12.7 G/DL — LOW (ref 13–17)
MAGNESIUM SERPL-MCNC: 2 MG/DL — SIGNIFICANT CHANGE UP (ref 1.6–2.6)
MCHC RBC-ENTMCNC: 31.5 PG — SIGNIFICANT CHANGE UP (ref 27–34)
MCHC RBC-ENTMCNC: 33.4 % — SIGNIFICANT CHANGE UP (ref 32–36)
MCV RBC AUTO: 94.3 FL — SIGNIFICANT CHANGE UP (ref 80–100)
NRBC # FLD: 0 — SIGNIFICANT CHANGE UP
PHOSPHATE SERPL-MCNC: 2.5 MG/DL — SIGNIFICANT CHANGE UP (ref 2.5–4.5)
PLATELET # BLD AUTO: 123 K/UL — LOW (ref 150–400)
PMV BLD: 10.7 FL — SIGNIFICANT CHANGE UP (ref 7–13)
POTASSIUM SERPL-MCNC: 4 MMOL/L — SIGNIFICANT CHANGE UP (ref 3.5–5.3)
POTASSIUM SERPL-SCNC: 4 MMOL/L — SIGNIFICANT CHANGE UP (ref 3.5–5.3)
RBC # BLD: 4.03 M/UL — LOW (ref 4.2–5.8)
RBC # FLD: 13.9 % — SIGNIFICANT CHANGE UP (ref 10.3–14.5)
SODIUM SERPL-SCNC: 137 MMOL/L — SIGNIFICANT CHANGE UP (ref 135–145)
WBC # BLD: 5.26 K/UL — SIGNIFICANT CHANGE UP (ref 3.8–10.5)
WBC # FLD AUTO: 5.26 K/UL — SIGNIFICANT CHANGE UP (ref 3.8–10.5)

## 2017-10-11 RX ORDER — AMLODIPINE BESYLATE 2.5 MG/1
1 TABLET ORAL
Qty: 0 | Refills: 0 | COMMUNITY

## 2017-10-11 RX ORDER — CARVEDILOL PHOSPHATE 80 MG/1
1 CAPSULE, EXTENDED RELEASE ORAL
Qty: 0 | Refills: 0 | COMMUNITY
Start: 2017-10-11

## 2017-10-11 RX ORDER — NICOTINE POLACRILEX 2 MG
21 GUM BUCCAL
Qty: 0 | Refills: 0 | COMMUNITY
Start: 2017-10-11

## 2017-10-11 RX ORDER — AMLODIPINE BESYLATE 2.5 MG/1
1 TABLET ORAL
Qty: 0 | Refills: 0 | DISCHARGE
Start: 2017-10-11

## 2017-10-11 RX ORDER — ASPIRIN/CALCIUM CARB/MAGNESIUM 324 MG
1 TABLET ORAL
Qty: 0 | Refills: 0 | COMMUNITY
Start: 2017-10-11

## 2017-10-11 RX ORDER — ASPIRIN/CALCIUM CARB/MAGNESIUM 324 MG
1 TABLET ORAL
Qty: 0 | Refills: 0 | COMMUNITY

## 2017-10-11 RX ORDER — CLOPIDOGREL BISULFATE 75 MG/1
1 TABLET, FILM COATED ORAL
Qty: 0 | Refills: 0 | COMMUNITY
Start: 2017-10-11

## 2017-10-11 RX ADMIN — CARVEDILOL PHOSPHATE 6.25 MILLIGRAM(S): 80 CAPSULE, EXTENDED RELEASE ORAL at 05:40

## 2017-10-11 RX ADMIN — Medication 81 MILLIGRAM(S): at 11:41

## 2017-10-11 RX ADMIN — AMLODIPINE BESYLATE 10 MILLIGRAM(S): 2.5 TABLET ORAL at 05:40

## 2017-10-11 RX ADMIN — SODIUM CHLORIDE 75 MILLILITER(S): 9 INJECTION INTRAMUSCULAR; INTRAVENOUS; SUBCUTANEOUS at 05:40

## 2017-10-11 RX ADMIN — CLOPIDOGREL BISULFATE 75 MILLIGRAM(S): 75 TABLET, FILM COATED ORAL at 11:42

## 2017-10-11 NOTE — DISCHARGE NOTE ADULT - PLAN OF CARE
resolved Remain hydrated. Follow up with your medical doctor in 1 week Follow up with cardiologist within one week of discharge. Call for appointment. Return to ED for any concerning symptoms. Continue medications as prescribed. Low salt, low fat, low cholesterol diet. Follow up with PCP and/or cardiologist for ongoing medical management of your hypertension. Continue medications as prescribed. Low salt diet. Follow with Dr. Amaya on 12/19 at 1:40pm in Dr. Rojas office.

## 2017-10-11 NOTE — DISCHARGE NOTE ADULT - PATIENT PORTAL LINK FT
“You can access the [facility] Patient Portal, offered by [facility], by registering with the following website: [email or web address]”

## 2017-10-11 NOTE — CHART NOTE - NSCHARTNOTEFT_GEN_A_CORE
Pt seen. Feels well. No complaints.  VSS. Labs /case reviewed with Dr. Martinez. OK to discharge home.

## 2017-10-11 NOTE — DISCHARGE NOTE ADULT - CARE PROVIDER_API CALL
Lakhwinder Martinez), Cardiovascular Disease; Internal Medicine  [address] [place] [address]  Phone: [phone]  Fax: [phone]    Misti Amaya), Cardiac Electrophysiology; Cardiovascular Disease; Internal Medicine; Nuclear Cardiology  [address] [facility]  [address]  [place] [address]  Phone: [phone]  Fax: [phone]

## 2017-10-11 NOTE — DISCHARGE NOTE ADULT - HOME CARE AGENCY
[facility] for RN, PT & Home Health Aid services.  Please call [phone] with questions/concerns

## 2017-10-11 NOTE — PROGRESS NOTE ADULT - SUBJECTIVE AND OBJECTIVE BOX
EP ATTENDING    tele: [facility], no events    no palpitations, no syncope, no angina    amLODIPine   Tablet 10 milliGRAM(s) Oral daily  aspirin enteric coated 81 milliGRAM(s) Oral daily  atorvastatin 40 milliGRAM(s) Oral at bedtime  carvedilol 6.25 milliGRAM(s) Oral every 12 hours  clopidogrel Tablet 75 milliGRAM(s) Oral daily  influenza   Vaccine 0.5 milliLiter(s) IntraMuscular once  nicotine  Polacrilex Gum 4 milliGRAM(s) Oral every 3 hours PRN  sodium chloride 0.9%. 1000 milliLiter(s) IV Continuous <Continuous>                            12.7   5.26  )-----------( 123      ( 11 Oct 2017 06:05 )             38.0       10-11    137  |  103  |  10  ----------------------------<  87  4.0   |  21<L>  |  0.79    Ca    8.6      11 Oct 2017 06:05  Phos  2.5     10-11  Mg     2.0     10-11        T(C): 36.6 (10-11-17 @ 05:35), Max: 36.8 (10-10-17 @ 21:29)  HR: 64 (10-11-17 @ 05:35) (60 - 64)  BP: 163/86 (10-11-17 @ 05:35) (141/79 - 163/86)  RR: 18 (10-11-17 @ 05:35) (17 - 18)  SpO2: 98% (10-11-17 @ 05:35) (92% - 98%)  Wt(kg): --    no JVD  RRR, no murmurs  CTAB  soft nt/nd  no c/c/e    A/P) Mr. Teernce Young is a pleasant 77-year-old male with a past medical history of symptomatic sick sinus syndrome, hypertension, and hyperlipidemia who originally had a [employer] Denis dual-chamber pacemaker implanted in October 2013 at [facility]. His pacemaker interrogation demonstrates underlying sinus bradycardia with normal AV conduction as well as excellent right atrial and right ventricular lead function with greater than 8 years remaining of pacemaker battery longevity. There were no arrhythmias corresponding to his fall/syncope.     -No further EP workup needed.   -F/U with me as scheduled at Dr. Martinez's office on Tuesday 12/19 at 1:40 PM for routine PPM interrogations.   -d/c planning as per primary team      Misti Amaya MD, RS  [phone]

## 2017-10-11 NOTE — DISCHARGE NOTE ADULT - CARE PLAN
Principal Discharge DX:	Orthostatic hypotension  Goal:	resolved  Instructions for follow-up, activity and diet:	Remain hydrated. Follow up with your medical doctor in 1 week  Secondary Diagnosis:	CAD (coronary artery disease)  Instructions for follow-up, activity and diet:	Follow up with cardiologist within one week of discharge. Call for appointment. Return to ED for any concerning symptoms. Continue medications as prescribed. Low salt, low fat, low cholesterol diet.  Secondary Diagnosis:	HTN (hypertension)  Instructions for follow-up, activity and diet:	Follow up with PCP and/or cardiologist for ongoing medical management of your hypertension. Continue medications as prescribed. Low salt diet.  Secondary Diagnosis:	Pacemaker  Instructions for follow-up, activity and diet:	Follow with Dr. Amaya on 12/19 at 1:40pm in Dr. Rojas office.

## 2017-10-11 NOTE — DISCHARGE NOTE ADULT - MEDICATION SUMMARY - MEDICATIONS TO TAKE
I will START or STAY ON the medications listed below when I get home from the hospital:    aspirin 81 mg oral delayed release tablet  -- 1 tab(s) by mouth once a day  -- Indication: For heart    Lipitor 40 mg oral tablet  -- 1 tab(s) by mouth once a day  -- Indication: For hyperlipidemia    clopidogrel 75 mg oral tablet  -- 1 tab(s) by mouth once a day  -- Indication: For CAD (coronary artery disease)    carvedilol 6.25 mg oral tablet  -- 1 tab(s) by mouth every 12 hours  -- Indication: For CAD (coronary artery disease)    amLODIPine 10 mg oral tablet  -- 1 tab(s) by mouth once a day  -- Indication: For htn    nicotine  -- 21 milligram(s) by transdermal patch once a day  -- Indication: For Smoking cessation I will START or STAY ON the medications listed below when I get home from the hospital:    aspirin 81 mg oral delayed release tablet  -- 1 tab(s) by mouth once a day  -- Indication: For CAD (coronary artery disease)    Lipitor 40 mg oral tablet  -- 1 tab(s) by mouth once a day  -- Indication: For HLD    clopidogrel 75 mg oral tablet  -- 1 tab(s) by mouth once a day  -- Indication: For CAD (coronary artery disease)    carvedilol 6.25 mg oral tablet  -- 1 tab(s) by mouth every 12 hours  -- Indication: For HTN (hypertension)    amLODIPine 10 mg oral tablet  -- 1 tab(s) by mouth once a day  -- Indication: For HTN (hypertension)    nicotine  -- 21 milligram(s) by transdermal patch once a day  -- Indication: For Smoking cessation

## 2017-10-11 NOTE — DISCHARGE NOTE ADULT - HOSPITAL COURSE
77M admitted for syncope .pt was admitted to [facility] ruled out for MI.     CXR preliminary = clear, L U chest ICD.  CT Head preliminary = No acute intracranial hemorrhage or calvarial fracture.  H &H = 12.5/ 37  BUN/ Creatinie = 13/ 0.95  Glucose= 103  CE negative x 2  UA clean   EKG     7/1/17 TTE -- EF= 60%. 1. Mitral annular calcification. Mild mitral regurgitation. 2. Normal trileaflet aortic valve. 3. Normal aortic root. 4. Normal left atrium. 5. Normal left ventricular internal dimensions and wall thicknesses. 6. Normal Left Ventricular Systolic Function,  (EF = 55 to 60%) 7. Grade I diastolic dysfunction 8. Normal right atrium. 9. Normal right ventricular size and function. A device lead is visualized in the right heart. 10. RA Pressure is 10 mm Hg. 11. There is mild tricuspid regurgitation. 12. Normal pericardium with no pericardial effusion.    Pt found to be orthostatic--> placed on IVF and resolved.    PPM interrogation without events. Pt to follow up as outpatient with Dr. Martinez and Monique.     Pt medicall cleared for dc.

## 2018-07-28 ENCOUNTER — INPATIENT (INPATIENT)
Facility: HOSPITAL | Age: 78
LOS: 2 days | Discharge: NURSING, REHAB OR LONG-TERM CARE | End: 2018-07-31
Attending: INTERNAL MEDICINE | Admitting: INTERNAL MEDICINE
Payer: MEDICARE

## 2018-07-28 VITALS
SYSTOLIC BLOOD PRESSURE: 195 MMHG | DIASTOLIC BLOOD PRESSURE: 106 MMHG | OXYGEN SATURATION: 97 % | RESPIRATION RATE: 16 BRPM | HEART RATE: 74 BPM | TEMPERATURE: 97 F

## 2018-07-28 DIAGNOSIS — Z90.49 ACQUIRED ABSENCE OF OTHER SPECIFIED PARTS OF DIGESTIVE TRACT: Chronic | ICD-10-CM
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
## 2018-07-28 DIAGNOSIS — Z87.438 PERSONAL HISTORY OF OTHER DISEASES OF MALE GENITAL ORGANS: Chronic | ICD-10-CM

## 2018-07-28 DIAGNOSIS — Z98.890 OTHER SPECIFIED POSTPROCEDURAL STATES: Chronic | ICD-10-CM

## 2018-07-28 PROBLEM — Z95.0 PRESENCE OF CARDIAC PACEMAKER: Chronic | Status: ACTIVE | Noted: 2017-07-01

## 2018-07-28 LAB
ALBUMIN SERPL ELPH-MCNC: 3.8 G/DL — SIGNIFICANT CHANGE UP (ref 3.3–5)
ALP SERPL-CCNC: 90 U/L — SIGNIFICANT CHANGE UP (ref 40–120)
ALT FLD-CCNC: 20 U/L — SIGNIFICANT CHANGE UP (ref 4–41)
ANISOCYTOSIS BLD QL: SLIGHT — SIGNIFICANT CHANGE UP
AST SERPL-CCNC: 28 U/L — SIGNIFICANT CHANGE UP (ref 4–40)
BASOPHILS NFR SPEC: 0 % — SIGNIFICANT CHANGE UP (ref 0–2)
BILIRUB SERPL-MCNC: 0.8 MG/DL — SIGNIFICANT CHANGE UP (ref 0.2–1.2)
BLASTS # FLD: 0 % — SIGNIFICANT CHANGE UP (ref 0–0)
BUN SERPL-MCNC: 15 MG/DL — SIGNIFICANT CHANGE UP (ref 7–23)
CALCIUM SERPL-MCNC: 9.1 MG/DL — SIGNIFICANT CHANGE UP (ref 8.4–10.5)
CHLORIDE SERPL-SCNC: 98 MMOL/L — SIGNIFICANT CHANGE UP (ref 98–107)
CK SERPL-CCNC: 284 U/L — HIGH (ref 30–200)
CO2 SERPL-SCNC: 23 MMOL/L — SIGNIFICANT CHANGE UP (ref 22–31)
CREAT SERPL-MCNC: 0.84 MG/DL — SIGNIFICANT CHANGE UP (ref 0.5–1.3)
EOSINOPHIL NFR FLD: 0 % — SIGNIFICANT CHANGE UP (ref 0–6)
GIANT PLATELETS BLD QL SMEAR: PRESENT — SIGNIFICANT CHANGE UP
GLUCOSE SERPL-MCNC: 111 MG/DL — HIGH (ref 70–99)
HCT VFR BLD CALC: 40.6 % — SIGNIFICANT CHANGE UP (ref 39–50)
HGB BLD-MCNC: 13.6 G/DL — SIGNIFICANT CHANGE UP (ref 13–17)
LYMPHOCYTES NFR SPEC AUTO: 7 % — LOW (ref 13–44)
MCHC RBC-ENTMCNC: 31.5 PG — SIGNIFICANT CHANGE UP (ref 27–34)
MCHC RBC-ENTMCNC: 33.5 % — SIGNIFICANT CHANGE UP (ref 32–36)
MCV RBC AUTO: 94 FL — SIGNIFICANT CHANGE UP (ref 80–100)
METAMYELOCYTES # FLD: 0 % — SIGNIFICANT CHANGE UP (ref 0–1)
MONOCYTES NFR BLD: 7.9 % — SIGNIFICANT CHANGE UP (ref 2–9)
MYELOCYTES NFR BLD: 0 % — SIGNIFICANT CHANGE UP (ref 0–0)
NEUTROPHIL AB SER-ACNC: 79.8 % — HIGH (ref 43–77)
NEUTS BAND # BLD: 3.5 % — SIGNIFICANT CHANGE UP (ref 0–6)
NRBC # FLD: 0 — SIGNIFICANT CHANGE UP
OTHER - HEMATOLOGY %: 0 — SIGNIFICANT CHANGE UP
PLATELET # BLD AUTO: 97 K/UL — LOW (ref 150–400)
PLATELET COUNT - ESTIMATE: SIGNIFICANT CHANGE UP
PMV BLD: 11.6 FL — SIGNIFICANT CHANGE UP (ref 7–13)
POTASSIUM SERPL-MCNC: 4.2 MMOL/L — SIGNIFICANT CHANGE UP (ref 3.5–5.3)
POTASSIUM SERPL-SCNC: 4.2 MMOL/L — SIGNIFICANT CHANGE UP (ref 3.5–5.3)
PROMYELOCYTES # FLD: 0 % — SIGNIFICANT CHANGE UP (ref 0–0)
PROT SERPL-MCNC: 7.8 G/DL — SIGNIFICANT CHANGE UP (ref 6–8.3)
RBC # BLD: 4.32 M/UL — SIGNIFICANT CHANGE UP (ref 4.2–5.8)
RBC # FLD: 14.3 % — SIGNIFICANT CHANGE UP (ref 10.3–14.5)
SODIUM SERPL-SCNC: 135 MMOL/L — SIGNIFICANT CHANGE UP (ref 135–145)
VARIANT LYMPHS # BLD: 1.8 % — SIGNIFICANT CHANGE UP
WBC # BLD: 11 K/UL — HIGH (ref 3.8–10.5)
WBC # FLD AUTO: 11 K/UL — HIGH (ref 3.8–10.5)

## 2018-07-28 PROCEDURE — 70450 CT HEAD/BRAIN W/O DYE: CPT | Mod: 26

## 2018-07-28 PROCEDURE — 71045 X-RAY EXAM CHEST 1 VIEW: CPT | Mod: 26

## 2018-07-28 PROCEDURE — 72125 CT NECK SPINE W/O DYE: CPT | Mod: 26

## 2018-07-28 PROCEDURE — 73010 X-RAY EXAM OF SHOULDER BLADE: CPT | Mod: 26,RT

## 2018-07-28 RX ORDER — HYDRALAZINE HCL 50 MG
10 TABLET ORAL ONCE
Qty: 0 | Refills: 0 | Status: COMPLETED | OUTPATIENT
Start: 2018-07-28 | End: 2018-07-28

## 2018-07-28 RX ORDER — CLOPIDOGREL BISULFATE 75 MG/1
75 TABLET, FILM COATED ORAL DAILY
Qty: 0 | Refills: 0 | Status: DISCONTINUED | OUTPATIENT
Start: 2018-07-28 | End: 2018-07-31

## 2018-07-28 RX ORDER — ATORVASTATIN CALCIUM 80 MG/1
40 TABLET, FILM COATED ORAL AT BEDTIME
Qty: 0 | Refills: 0 | Status: DISCONTINUED | OUTPATIENT
Start: 2018-07-28 | End: 2018-07-31

## 2018-07-28 RX ORDER — CARVEDILOL PHOSPHATE 80 MG/1
6.25 CAPSULE, EXTENDED RELEASE ORAL EVERY 12 HOURS
Qty: 0 | Refills: 0 | Status: DISCONTINUED | OUTPATIENT
Start: 2018-07-28 | End: 2018-07-29

## 2018-07-28 RX ORDER — HEPARIN SODIUM 5000 [USP'U]/ML
5000 INJECTION INTRAVENOUS; SUBCUTANEOUS EVERY 12 HOURS
Qty: 0 | Refills: 0 | Status: DISCONTINUED | OUTPATIENT
Start: 2018-07-28 | End: 2018-07-31

## 2018-07-28 RX ORDER — AMLODIPINE BESYLATE 2.5 MG/1
10 TABLET ORAL DAILY
Qty: 0 | Refills: 0 | Status: DISCONTINUED | OUTPATIENT
Start: 2018-07-28 | End: 2018-07-31

## 2018-07-28 RX ORDER — PRAMIPEXOLE DIHYDROCHLORIDE 0.12 MG/1
0.25 TABLET ORAL AT BEDTIME
Qty: 0 | Refills: 0 | Status: DISCONTINUED | OUTPATIENT
Start: 2018-07-28 | End: 2018-07-31

## 2018-07-28 RX ORDER — ACETAMINOPHEN 500 MG
650 TABLET ORAL EVERY 6 HOURS
Qty: 0 | Refills: 0 | Status: DISCONTINUED | OUTPATIENT
Start: 2018-07-28 | End: 2018-07-31

## 2018-07-28 RX ORDER — ACETAMINOPHEN 500 MG
650 TABLET ORAL ONCE
Qty: 0 | Refills: 0 | Status: COMPLETED | OUTPATIENT
Start: 2018-07-28 | End: 2018-07-28

## 2018-07-28 RX ORDER — AMLODIPINE BESYLATE 2.5 MG/1
10 TABLET ORAL ONCE
Qty: 0 | Refills: 0 | Status: COMPLETED | OUTPATIENT
Start: 2018-07-28 | End: 2018-07-28

## 2018-07-28 RX ORDER — ASPIRIN/CALCIUM CARB/MAGNESIUM 324 MG
81 TABLET ORAL DAILY
Qty: 0 | Refills: 0 | Status: DISCONTINUED | OUTPATIENT
Start: 2018-07-28 | End: 2018-07-31

## 2018-07-28 RX ADMIN — Medication 10 MILLIGRAM(S): at 21:29

## 2018-07-28 RX ADMIN — Medication 650 MILLIGRAM(S): at 21:49

## 2018-07-28 RX ADMIN — AMLODIPINE BESYLATE 10 MILLIGRAM(S): 2.5 TABLET ORAL at 16:00

## 2018-07-28 RX ADMIN — Medication 650 MILLIGRAM(S): at 15:40

## 2018-07-28 RX ADMIN — Medication 650 MILLIGRAM(S): at 22:20

## 2018-07-28 NOTE — H&P ADULT - NEGATIVE OPHTHALMOLOGIC SYMPTOMS
no lacrimation L/no photophobia/no discharge L/no lacrimation R/no blurred vision L/no blurred vision R/no discharge R/no diplopia

## 2018-07-28 NOTE — ED PROVIDER NOTE - OBJECTIVE STATEMENT
76 yo M w/ pmhx of Dementia, lives alone, coming in s/p fall while in the shower today. Denies any LOC. Pt has fallen about 2 times this week alone.  reports pain to the right shoulder. Denies any other symptoms.

## 2018-07-28 NOTE — H&P ADULT - NSHPSOCIALHISTORY_GEN_ALL_CORE
Lives alone, retired IT worker  Smokes every day and last smoked today and smoked 10 cigarette and denied any drinking or any illicit drugs use

## 2018-07-28 NOTE — H&P ADULT - NEGATIVE GASTROINTESTINAL SYMPTOMS
no nausea/no hematochezia/no vomiting/no constipation/no diarrhea/no change in bowel habits/no melena/no abdominal pain

## 2018-07-28 NOTE — H&P ADULT - HISTORY OF PRESENT ILLNESS
78 y/o M who walks with a cane at baseline with PMH of symptomatic SSS(s/p St Denis PPM), Dementia, HTN, CAD, Parkinson's disease presented with fall while in shower few days ago. As per the patient he had an unwitnessed fall few days ago. Patient stated that he was standing at the sink and went to turn around and then fell onto the tub on his side. Patient denied any pre-fall triggers such as lightheadedness/dizziness or palpitations. Patient stated that he did not hit his head or have any LOC. His brother was home and he helped him up when he had fallen. Patient stated that he had a fall couple of months ago at home. Patient denied any seizure like activity, bowel or bladder incontinence. As per ED note, patient had fallen about 2 times this week alone and reports pain to the right shoulder. Patient denied any CP, SOB, fevers, chills, N/V/D/C, abdominal pain, cough, dysuria, melena, hematochezia, recent travel, sick contact, pleuritic or positional chest pain.     On ED admission EKG revealed WBC: 11.0, Plt: 97, Gluc: 111. CT Head: No CT evidence of acute intracranial hemorrhage, extra-axial collection, mass effect or calvarial fracture. CT Cervical Spine: No CT evidence of cervical spine fracture or traumatic malalignment. Advanced degenerative changes.  At C2-C3 there is 8 mm hyperattenuating focus in the dorsal sac that probably reflects ligamentum flavum hypertrophy although blood products or tumor could have a similar appearance. This contributes to moderate to severe spinal canal stenosis and dorsal cord impingement at C2-C3.  MRI follow-up can be performed as clinically warranted. Prelim CXR: No emergent findings. Prelim Right scapula Xray: No emergent findings. When examined patient is resting in the stretcher and denied any current complaints. 78 y/o M who walks with a cane at baseline with PMH of symptomatic SSS(s/p St Denis PPM), Dementia, HTN, CAD, Parkinson's disease presented with fall while in shower few days ago. As per the patient he had an unwitnessed fall few days ago. Patient stated that he was standing at the sink and went to turn around and then fell onto the tub on his side. Patient denied any pre-fall triggers such as lightheadedness/dizziness or palpitations. Patient stated that he did not hit his head or have any LOC. His brother was home and he helped him up when he had fallen. Patient stated that he had a fall couple of months ago at home. Patient denied any seizure like activity, bowel or bladder incontinence. As per ED note, patient had fallen about 2 times this week alone and reports pain to the right shoulder. Patient denied any CP, SOB, fevers, chills, N/V/D/C, abdominal pain, cough, dysuria, melena, hematochezia, recent travel, sick contact, pleuritic or positional chest pain.     On ED admission EKG revealed NSR at a rate of 76 with RBBB and QTc of 533, WBC: 11.0, Plt: 97, Gluc: 111. CT Head: No CT evidence of acute intracranial hemorrhage, extra-axial collection, mass effect or calvarial fracture. CT Cervical Spine: No CT evidence of cervical spine fracture or traumatic malalignment. Advanced degenerative changes.  At C2-C3 there is 8 mm hyperattenuating focus in the dorsal sac that probably reflects ligamentum flavum hypertrophy although blood products or tumor could have a similar appearance. This contributes to moderate to severe spinal canal stenosis and dorsal cord impingement at C2-C3.  MRI follow-up can be performed as clinically warranted. Prelim CXR: No emergent findings. Prelim Right scapula Xray: No emergent findings. When examined patient is resting in the stretcher and denied any current complaints. 78 y/o M with PMH of symptomatic SSS(s/p St Denis PPM), Dementia, HTN, CAD, RLS presented with fall while in shower few days ago. As per the patient he had an unwitnessed fall few days ago. Patient stated that he was standing at the sink and went to turn around and then fell onto the tub on his side. Patient denied any pre-fall triggers such as lightheadedness/dizziness or palpitations. Patient stated that he did not hit his head or have any LOC. His brother was home and he helped him up when he had fallen. Patient stated that he had a fall couple of months ago at home. Patient denied any seizure like activity, bowel or bladder incontinence. As per ED note, patient had fallen about 2 times this week alone and reports pain to the right shoulder. Patient denied any CP, SOB, fevers, chills, N/V/D/C, abdominal pain, cough, dysuria, melena, hematochezia, recent travel, sick contact, pleuritic or positional chest pain.     On ED admission EKG revealed NSR at a rate of 76 with RBBB and QTc of 533, WBC: 11.0, Plt: 97, Gluc: 111. CT Head: No CT evidence of acute intracranial hemorrhage, extra-axial collection, mass effect or calvarial fracture. CT Cervical Spine: No CT evidence of cervical spine fracture or traumatic malalignment. Advanced degenerative changes.  At C2-C3 there is 8 mm hyperattenuating focus in the dorsal sac that probably reflects ligamentum flavum hypertrophy although blood products or tumor could have a similar appearance. This contributes to moderate to severe spinal canal stenosis and dorsal cord impingement at C2-C3.  MRI follow-up can be performed as clinically warranted. Prelim CXR: No emergent findings. Prelim Right scapula Xray: No emergent findings. When examined patient is resting in the stretcher and denied any current complaints.

## 2018-07-28 NOTE — H&P ADULT - PROBLEM SELECTOR PLAN 3
Continue with Pramipexole daily Continue with Lipitor daily   Lipid profile in am, low cholesterol diet recommended

## 2018-07-28 NOTE — H&P ADULT - PROBLEM SELECTOR PLAN 4
Continue with Lipitor daily   Lipid profile in am, low cholesterol diet recommended Smoking cessation education ordered  Nicotine gum ordered

## 2018-07-28 NOTE — ED ADULT TRIAGE NOTE - CHIEF COMPLAINT QUOTE
pt brought from brother's house by EMS s/p fall today witnessed, denies head trauma, as per EMS family stated increased recent falls, Hx Dementia, poor historian, Plavis 75mg daily, no active bleeding noted

## 2018-07-28 NOTE — H&P ADULT - ASSESSMENT
78 y/o M with PMH of symptomatic SSS(s/p St Denis PPM), Dementia, HTN, CAD, Parkinson's disease presented with fall while in shower few days ago. 78 y/o M with PMH of symptomatic SSS(s/p St Denis PPM), Dementia, HTN, CAD, RLS presented with fall while in shower few days ago.

## 2018-07-28 NOTE — H&P ADULT - PROBLEM SELECTOR PLAN 1
Differential include neuro-cardiogenic vs dehydration vs cardiac arrhythmia  Admit to telemetry, serial CE's, serial EKG  HgbA1C, TSH, lipid profile, CBC, CMP in am   TTE ordered to evaluate LVEF   Orthostatics ordered  Fall precautions ordered   Will need to call EP in am for PPM interrogation(St. Denis)  Consider neurology consult in am for CT C-spine results  PT consult ordered   UA/Urine cultures

## 2018-07-28 NOTE — H&P ADULT - PSH
Artificial pacemaker  St. Denis  H/O hydrocele    History of appendectomy    S/P skin biopsy  Behind L ear

## 2018-07-28 NOTE — ED ADULT NURSE NOTE - OBJECTIVE STATEMENT
pt received to room 2. A+Ox2 to person and place. unsure of baseline. pt is poor historian, states he fell into the bathtub today and was down for a few minutes. pt states walks with a walker. denies any head injury or LOC. states only has pain in right upper back. small wounds to upper back, appear old, no bruising noted. no bleeding. pt appears unkept. labs sent. iv access established. awaiting family arrival for more history. MD aware pt is hypertensive.

## 2018-07-28 NOTE — H&P ADULT - NEGATIVE NEUROLOGICAL SYMPTOMS
no focal seizures/no hemiparesis/no syncope/no loss of sensation/no loss of consciousness/no vertigo/no headache/no confusion/no tremors/no difficulty walking/no weakness/no generalized seizures/no transient paralysis/no paresthesias

## 2018-07-28 NOTE — H&P ADULT - NEGATIVE MUSCULOSKELETAL SYMPTOMS
no muscle cramps/no arthralgia/no muscle weakness/no stiffness/no neck pain/no arthritis/no joint swelling/no myalgia

## 2018-07-28 NOTE — H&P ADULT - NEGATIVE ENMT SYMPTOMS
no hearing difficulty/no ear pain/no tinnitus/no vertigo/no nasal congestion/no nasal discharge/no sinus symptoms

## 2018-07-28 NOTE — H&P ADULT - NSHPLABSRESULTS_GEN_ALL_CORE
13.6   11.00 )-----------( 97       ( 28 Jul 2018 15:40 )             40.6     07-28    135  |  98  |  15  ----------------------------<  111<H>  4.2   |  23  |  0.84    Ca    9.1      28 Jul 2018 15:40    TPro  7.8  /  Alb  3.8  /  TBili  0.8  /  DBili  x   /  AST  28  /  ALT  20  /  AlkPhos  90  07-28    CT Head: No CT evidence of acute intracranial hemorrhage, extra-axial collection, mass effect or calvarial fracture.    CT Cervical Spine: No CT evidence of cervical spine fracture or traumatic malalignment.  Advanced degenerative changes.  At C2-C3 there is 8 mm hyperattenuating focus in the dorsal sac that probably reflects ligamentum flavum hypertrophy although blood products or tumor could have a similar appearance. This contributes to moderate to severe spinal canal stenosis and dorsal cord impingement at C2-C3.  MRI follow-up can be performed as clinically warranted.     Prelim CXR: No emergent findings    Prelim Right scapula Xray: No emergent findings 13.6   11.00 )-----------( 97       ( 28 Jul 2018 15:40 )             40.6     07-28    135  |  98  |  15  ----------------------------<  111<H>  4.2   |  23  |  0.84    Ca    9.1      28 Jul 2018 15:40    TPro  7.8  /  Alb  3.8  /  TBili  0.8  /  DBili  x   /  AST  28  /  ALT  20  /  AlkPhos  90  07-28    CT Head: No CT evidence of acute intracranial hemorrhage, extra-axial collection, mass effect or calvarial fracture.    CT Cervical Spine: No CT evidence of cervical spine fracture or traumatic malalignment.  Advanced degenerative changes.  At C2-C3 there is 8 mm hyperattenuating focus in the dorsal sac that probably reflects ligamentum flavum hypertrophy although blood products or tumor could have a similar appearance. This contributes to moderate to severe spinal canal stenosis and dorsal cord impingement at C2-C3.  MRI follow-up can be performed as clinically warranted.     Prelim CXR: No emergent findings    Prelim Right scapula Xray: No emergent findings    EKG: NSR at a rate of 76 with RBBB and QTc of 533

## 2018-07-28 NOTE — H&P ADULT - GASTROINTESTINAL DETAILS
bowel sounds normal/no rigidity/normal/nontender/soft/no distention/no guarding/no rebound tenderness

## 2018-07-28 NOTE — ED PROVIDER NOTE - ATTENDING CONTRIBUTION TO CARE
DR. BLOCH, ATTENDING MD-  I performed a face to face bedside interview with patient regarding history of present illness, review of symptoms and past medical history. I completed an independent physical exam.  I have discussed patient's plan of care with the resident.  Patient with HTN, CAD, ppm, memory loss, with increasing falls, lives alone, has brother that doesn't know whats going on, nor whether he's taking his meds, fell today after shower while bending over to put onhis shorts falling into bathroom. meds include carvedilol, amlodipine, plavix. PE alert oriented x2 HEENT nml heart sounds nml lungs clear, axilla with candida, abd soft nontender, tender right scapula, motor 5/5 bilaterally, FROM both shoulders, pulses 2+ and equal. Concern for safety at home.

## 2018-07-28 NOTE — H&P ADULT - RS GEN PE MLT RESP DETAILS PC
no chest wall tenderness/no intercostal retractions/no rales/normal/wheezes/respirations non-labored/no rhonchi/airway patent

## 2018-07-28 NOTE — H&P ADULT - PMH
CAD (coronary artery disease)    Dementia    HTN (hypertension)    Pacemaker    Parkinsons disease CAD (coronary artery disease)    Dementia    HTN (hypertension)    Pacemaker    RLS (restless legs syndrome)

## 2018-07-29 DIAGNOSIS — Z95.0 PRESENCE OF CARDIAC PACEMAKER: Chronic | ICD-10-CM

## 2018-07-29 DIAGNOSIS — G20 PARKINSON'S DISEASE: ICD-10-CM

## 2018-07-29 DIAGNOSIS — I10 ESSENTIAL (PRIMARY) HYPERTENSION: ICD-10-CM

## 2018-07-29 DIAGNOSIS — Z72.0 TOBACCO USE: ICD-10-CM

## 2018-07-29 DIAGNOSIS — R55 SYNCOPE AND COLLAPSE: ICD-10-CM

## 2018-07-29 DIAGNOSIS — Z29.9 ENCOUNTER FOR PROPHYLACTIC MEASURES, UNSPECIFIED: ICD-10-CM

## 2018-07-29 DIAGNOSIS — E78.5 HYPERLIPIDEMIA, UNSPECIFIED: ICD-10-CM

## 2018-07-29 LAB
APPEARANCE UR: CLEAR — SIGNIFICANT CHANGE UP
BACTERIA # UR AUTO: SIGNIFICANT CHANGE UP
BILIRUB UR-MCNC: NEGATIVE — SIGNIFICANT CHANGE UP
BLOOD UR QL VISUAL: HIGH
BUN SERPL-MCNC: 15 MG/DL — SIGNIFICANT CHANGE UP (ref 7–23)
CALCIUM SERPL-MCNC: 8.9 MG/DL — SIGNIFICANT CHANGE UP (ref 8.4–10.5)
CHLORIDE SERPL-SCNC: 100 MMOL/L — SIGNIFICANT CHANGE UP (ref 98–107)
CHOLEST SERPL-MCNC: 139 MG/DL — SIGNIFICANT CHANGE UP (ref 120–199)
CK MB BLD-MCNC: 3.61 NG/ML — SIGNIFICANT CHANGE UP (ref 1–6.6)
CK SERPL-CCNC: 292 U/L — HIGH (ref 30–200)
CO2 SERPL-SCNC: 18 MMOL/L — LOW (ref 22–31)
COLOR SPEC: YELLOW — SIGNIFICANT CHANGE UP
CREAT SERPL-MCNC: 0.69 MG/DL — SIGNIFICANT CHANGE UP (ref 0.5–1.3)
GLUCOSE SERPL-MCNC: 121 MG/DL — HIGH (ref 70–99)
GLUCOSE UR-MCNC: NEGATIVE — SIGNIFICANT CHANGE UP
HBA1C BLD-MCNC: 5.8 % — HIGH (ref 4–5.6)
HCT VFR BLD CALC: 40.4 % — SIGNIFICANT CHANGE UP (ref 39–50)
HDLC SERPL-MCNC: 47 MG/DL — SIGNIFICANT CHANGE UP (ref 35–55)
HGB BLD-MCNC: 13.6 G/DL — SIGNIFICANT CHANGE UP (ref 13–17)
KETONES UR-MCNC: SIGNIFICANT CHANGE UP
LEUKOCYTE ESTERASE UR-ACNC: NEGATIVE — SIGNIFICANT CHANGE UP
LIPID PNL WITH DIRECT LDL SERPL: 79 MG/DL — SIGNIFICANT CHANGE UP
MAGNESIUM SERPL-MCNC: 2 MG/DL — SIGNIFICANT CHANGE UP (ref 1.6–2.6)
MAGNESIUM SERPL-MCNC: 2.1 MG/DL — SIGNIFICANT CHANGE UP (ref 1.6–2.6)
MCHC RBC-ENTMCNC: 31.6 PG — SIGNIFICANT CHANGE UP (ref 27–34)
MCHC RBC-ENTMCNC: 33.7 % — SIGNIFICANT CHANGE UP (ref 32–36)
MCV RBC AUTO: 94 FL — SIGNIFICANT CHANGE UP (ref 80–100)
MUCOUS THREADS # UR AUTO: SIGNIFICANT CHANGE UP
NITRITE UR-MCNC: NEGATIVE — SIGNIFICANT CHANGE UP
NON-SQ EPI CELLS # UR AUTO: <1 — SIGNIFICANT CHANGE UP
NRBC # FLD: 0 — SIGNIFICANT CHANGE UP
PH UR: 6 — SIGNIFICANT CHANGE UP (ref 4.6–8)
PHOSPHATE SERPL-MCNC: 2 MG/DL — LOW (ref 2.5–4.5)
PLATELET # BLD AUTO: 95 K/UL — LOW (ref 150–400)
PMV BLD: 11.4 FL — SIGNIFICANT CHANGE UP (ref 7–13)
POTASSIUM SERPL-MCNC: 4 MMOL/L — SIGNIFICANT CHANGE UP (ref 3.5–5.3)
POTASSIUM SERPL-SCNC: 4 MMOL/L — SIGNIFICANT CHANGE UP (ref 3.5–5.3)
PROT UR-MCNC: 100 MG/DL — SIGNIFICANT CHANGE UP
RBC # BLD: 4.3 M/UL — SIGNIFICANT CHANGE UP (ref 4.2–5.8)
RBC # FLD: 14.4 % — SIGNIFICANT CHANGE UP (ref 10.3–14.5)
RBC CASTS # UR COMP ASSIST: HIGH (ref 0–?)
SODIUM SERPL-SCNC: 135 MMOL/L — SIGNIFICANT CHANGE UP (ref 135–145)
SP GR SPEC: 1.03 — SIGNIFICANT CHANGE UP (ref 1–1.04)
SQUAMOUS # UR AUTO: SIGNIFICANT CHANGE UP
TRIGL SERPL-MCNC: 72 MG/DL — SIGNIFICANT CHANGE UP (ref 10–149)
TROPONIN T, HIGH SENSITIVITY: 16 NG/L — SIGNIFICANT CHANGE UP (ref ?–14)
TSH SERPL-MCNC: 1.92 UIU/ML — SIGNIFICANT CHANGE UP (ref 0.27–4.2)
UROBILINOGEN FLD QL: 1 MG/DL — SIGNIFICANT CHANGE UP
WBC # BLD: 9.47 K/UL — SIGNIFICANT CHANGE UP (ref 3.8–10.5)
WBC # FLD AUTO: 9.47 K/UL — SIGNIFICANT CHANGE UP (ref 3.8–10.5)
WBC UR QL: HIGH (ref 0–?)

## 2018-07-29 PROCEDURE — 93010 ELECTROCARDIOGRAM REPORT: CPT

## 2018-07-29 RX ORDER — CARVEDILOL PHOSPHATE 80 MG/1
12.5 CAPSULE, EXTENDED RELEASE ORAL EVERY 12 HOURS
Qty: 0 | Refills: 0 | Status: DISCONTINUED | OUTPATIENT
Start: 2018-07-29 | End: 2018-07-31

## 2018-07-29 RX ORDER — NICOTINE POLACRILEX 2 MG
2 GUM BUCCAL
Qty: 0 | Refills: 0 | Status: DISCONTINUED | OUTPATIENT
Start: 2018-07-29 | End: 2018-07-31

## 2018-07-29 RX ADMIN — ATORVASTATIN CALCIUM 40 MILLIGRAM(S): 80 TABLET, FILM COATED ORAL at 21:53

## 2018-07-29 RX ADMIN — CLOPIDOGREL BISULFATE 75 MILLIGRAM(S): 75 TABLET, FILM COATED ORAL at 12:03

## 2018-07-29 RX ADMIN — Medication 650 MILLIGRAM(S): at 18:15

## 2018-07-29 RX ADMIN — Medication 650 MILLIGRAM(S): at 17:44

## 2018-07-29 RX ADMIN — Medication 81 MILLIGRAM(S): at 12:03

## 2018-07-29 RX ADMIN — HEPARIN SODIUM 5000 UNIT(S): 5000 INJECTION INTRAVENOUS; SUBCUTANEOUS at 17:12

## 2018-07-29 RX ADMIN — AMLODIPINE BESYLATE 10 MILLIGRAM(S): 2.5 TABLET ORAL at 05:36

## 2018-07-29 RX ADMIN — CARVEDILOL PHOSPHATE 12.5 MILLIGRAM(S): 80 CAPSULE, EXTENDED RELEASE ORAL at 17:12

## 2018-07-29 RX ADMIN — PRAMIPEXOLE DIHYDROCHLORIDE 0.25 MILLIGRAM(S): 0.12 TABLET ORAL at 21:53

## 2018-07-29 RX ADMIN — HEPARIN SODIUM 5000 UNIT(S): 5000 INJECTION INTRAVENOUS; SUBCUTANEOUS at 05:36

## 2018-07-29 RX ADMIN — CARVEDILOL PHOSPHATE 6.25 MILLIGRAM(S): 80 CAPSULE, EXTENDED RELEASE ORAL at 05:36

## 2018-07-29 NOTE — CONSULT NOTE ADULT - ASSESSMENT
78 y/o M with PMH of symptomatic SSS(s/p St Denis PPM), Dementia, HTN, CAD, RLS presented with fall while in shower few days ago.      Problem/Plan - 1:  ·  Problem: Near syncope.  Plan: Ct scan noted ..< from: CT Cervical Spine No Cont (07.28.18 @ 17:52) >  IMPRESSION:   CT Head:  No CT evidence of acute intracranial hemorrhage, extra-axial   collection, mass effect or calvarial fracture.    CT Cervical Spine: No CT evidence of cervical spine fracture or traumatic   malalignment.  Advanced degenerative changes.  At C2-C3 there is 8 mm  hyperattenuating focus in the dorsal sac that probably reflects   ligamentum flavum hypertrophy although blood products or tumor could have   a similar appearance.  This contributes to moderate to severe spinal   canal stenosis and dorsal cord impingement at C2-C3.  MRI follow-up can   be performed as clinically warranted.    < end of copied text >    TTE ordered to evaluate LVEF   Orthostatics ordered  Fall precautions ordered   Awaiting  PPM interrogation(St. Denis)  Will get Neurosurgery to see patient.   PT consult ordered   Cardiology following.      Problem/Plan - 2:  ·  Problem: Essential hypertension.  Plan: BP readings high so increased BB.  DASH diet recommended.      Problem/Plan - 3:  ·  Problem: HLD (hyperlipidemia).  Plan: Continue with Lipitor daily   Lipid profile in am, low cholesterol diet recommended.      Problem/Plan - 4:  ·  Problem: Smoking trying to quit.  Plan: Smoking cessation education ordered  Nicotine gum ordered.      Problem/Plan - 5:  ·  Problem: Thrombocytopenia .  Plan: Chronic but slight drop so watching closely.      Problem/Plan - 6:  ·  Problem: Need for prophylactic measure.  Plan: On heparin sq 5000U q12hrs.

## 2018-07-29 NOTE — CONSULT NOTE ADULT - SUBJECTIVE AND OBJECTIVE BOX
Patient is a 77y Male who presents s/p possible syncopal fall in shower. Unknown HS/LOC. Has some pain in R shoulder due to fall, but X rays negative. Denies numbness/tingling/paresthesias/weakness. Denies bowel/bladder incontinence. Denies fevers/chills. No other complaints at this time.    HEALTH ISSUES - PROBLEM Dx:  Need for prophylactic measure: Need for prophylactic measure  Smoking trying to quit: Smoking trying to quit  HLD (hyperlipidemia): HLD (hyperlipidemia)  Parkinsons disease: Parkinsons disease  Essential hypertension: Essential hypertension  Near syncope: Near syncope    MEDICATIONS  (STANDING):  amLODIPine   Tablet 10 milliGRAM(s) Oral daily  aspirin enteric coated 81 milliGRAM(s) Oral daily  atorvastatin 40 milliGRAM(s) Oral at bedtime  carvedilol 12.5 milliGRAM(s) Oral every 12 hours  clopidogrel Tablet 75 milliGRAM(s) Oral daily  heparin  Injectable 5000 Unit(s) SubCutaneous every 12 hours  pramipexole 0.25 milliGRAM(s) Oral at bedtime    Allergies  No Known Allergies    PAST MEDICAL & SURGICAL HISTORY:  RLS (restless legs syndrome)  Parkinsons disease  HTN (hypertension)  Pacemaker  CAD (coronary artery disease)  Dementia  HTN (hypertension)  CAD (coronary artery disease)  Artificial pacemaker: [employer]. Denis  H/O hydrocele  History of appendectomy  S/P skin biopsy: Behind L ear  History of appendectomy  No significant past surgical history                        13.6   9.47  )-----------( 95       ( 2018 07:18 )             40.4     2018 07:18    135    |  100    |  15     ----------------------------<  121    4.0     |  18     |  0.69     Ca    8.9        2018 07:18  Phos  2.0       2018 07:18  Mg     2.0       2018 07:18    TPro  7.8    /  Alb  3.8    /  TBili  0.8    /  DBili  x      /  AST  28     /  ALT  20     /  AlkPhos  90     2018 15:40    Urinalysis Basic - ( 2018 01:15 )  Color: YELLOW / Appearance: CLEAR / S.028 / pH: 6.0  Gluc: NEGATIVE / Ketone: LARGE  / Bili: NEGATIVE / Urobili: 1 mg/dL   Blood: TRACE / Protein: 100 mg/dL / Nitrite: NEGATIVE   Leuk Esterase: NEGATIVE / RBC: 5-10 / WBC 5-10   Sq Epi: OCC / Non Sq Epi: x / Bacteria: FEW    Vital Signs Last 24 Hrs  T(C): 37.2 (18 @ 11:43), Max: 37.3 (18 @ 05:35)  T(F): 99 (18 @ 11:43), Max: 99.1 (18 @ 05:35)  HR: 82 (18 @ 05:35) (70 - 86)  BP: 196/93 (18 @ 05:35) (158/75 - 200/96)  RR: 19 (18 @ 11:43) (16 - 19)  SpO2: 95% (18 @ 11:43) (94% - 100%)    Gen: NAD  Spine PE:  Skin intact  No gross deformity  No midline TTP C/T/L/S spine  No bony step offs  No paraspinal muscle ttp/hypertonicity   Negative clonus  Negative quinn    Motor:                   C5                C6              C7               C8           T1   R            5/5                5/5            5/5             5/5          5/5  L             5/5               5/5             5/5             5/5          5/5                L2             L3             L4               L5            S1  R         5/5           5/5          5/5             5/5           5/5  L          5/5          5/5           5/5             5/5           5/5    Sensory:            C5         C6         C7      C8       T1        (0=absent, 1=impaired, 2=normal, NT=not testable)  R         2            2           2        2         2  L          2            2           2        2         2               L2          L3         L4      L5       S1         (0=absent, 1=impaired, 2=normal, NT=not testable)  R         2            2            2        2        2  L          2            2           2        2         2    Imaging:  CT c spine with chronic degenerative changes, 8mm hyperattenuating focus at C2-3 near dorsal sac, likely ligamentum hypertrophy but possible blood vs. mass    A/P: 77y Male with incidental finding of C2-3 hyperattenuating focus with no symptoms    Recommend MRI if pacemaker is MRI compatible  If not compatible, repeat CT scan in 24-48 hours  WBAT with assistive devices as needed  C-collar until imaging can be obtained  Neurological checks q4 hours    Patient discussed with Dr. Zeenat Brennan MD

## 2018-07-29 NOTE — CONSULT NOTE ADULT - SUBJECTIVE AND OBJECTIVE BOX
Patient is a 77y old  Male who presents with a chief complaint of Unwitnessed fall       HPI:  76 y/o M with PMH of symptomatic SSS(s/p [facility] Denis PPM), Dementia, HTN, CAD, RLS presented with fall while in shower few days ago. As per the patient he had an unwitnessed fall few days ago. Patient stated that he was standing at the sink and went to turn around and then fell onto the tub on his side. Patient denied any pre-fall triggers such as lightheadedness/dizziness or palpitations. Patient stated that he did not hit his head or have any LOC. His brother was home and he helped him up when he had fallen. Patient stated that he had a fall couple of months ago at home. Patient denied any seizure like activity, bowel or bladder incontinence. As per ED note, patient had fallen about 2 times this week alone and reports pain to the right shoulder. Patient denied any CP, SOB, fevers, chills, N/V/D/C, abdominal pain, cough, dysuria, melena, hematochezia, recent travel, sick contact, pleuritic or positional chest pain.     On ED admission EKG revealed NSR at a rate of 76 with RBBB and QTc of 533, WBC: 11.0, Plt: 97, Gluc: 111. CT Head: No CT evidence of acute intracranial hemorrhage, extra-axial collection, mass effect or calvarial fracture. CT Cervical Spine: No CT evidence of cervical spine fracture or traumatic malalignment. Advanced degenerative changes.  At C2-C3 there is 8 mm hyperattenuating focus in the dorsal sac that probably reflects ligamentum flavum hypertrophy although blood products or tumor could have a similar appearance. This contributes to moderate to severe spinal canal stenosis and dorsal cord impingement at C2-C3.  MRI follow-up can be performed as clinically warranted. Prelim CXR: No emergent findings. Prelim Right scapula Xray: No emergent findings. When examined patient is resting in the stretcher and denied any current complaints.       PAST MEDICAL & SURGICAL HISTORY:  RLS (restless legs syndrome)  Pacemaker  Dementia  HTN (hypertension)  CAD (coronary artery disease)  Artificial pacemaker: [employer] [facility]  H/O hydrocele  S/P skin biopsy: Behind L ear  History of appendectomy      Social History: Smokes 10 cigarettes a day ,denies alcohol or drug abuse . Lives by himself     FAMILY HISTORY:  No pertinent family history in first degree relatives      Allergies    No Known Allergies    Intolerances        REVIEW OF SYSTEMS:    CONSTITUTIONAL: No fever, weight loss, or fatigue  EYES: No eye pain, visual disturbances, or discharge  RESPIRATORY: No cough, wheezing, chills or hemoptysis; No shortness of breath  CARDIOVASCULAR: No chest pain, palpitations, dizziness, or leg swelling  GASTROINTESTINAL: No abdominal or epigastric pain. No nausea, vomiting, or hematemesis; No diarrhea or constipation. No melena or hematochezia.  GENITOURINARY: No dysuria, frequency, hematuria, or incontinence  NEUROLOGICAL: No headaches, memory loss, loss of strength, numbness, or tremors  SKIN: No itching, burning, rashes, or lesions   ENDOCRINE: No heat or cold intolerance; No hair loss  MUSCULOSKELETAL: No joint pain or swelling; No muscle, back, or extremity pain  PSYCHIATRIC: No depression, anxiety, mood swings, or difficulty sleeping      MEDICATIONS  (STANDING):  amLODIPine   Tablet 10 milliGRAM(s) Oral daily  aspirin enteric coated 81 milliGRAM(s) Oral daily  atorvastatin 40 milliGRAM(s) Oral at bedtime  carvedilol 6.25 milliGRAM(s) Oral every 12 hours  clopidogrel Tablet 75 milliGRAM(s) Oral daily  heparin  Injectable 5000 Unit(s) SubCutaneous every 12 hours  pramipexole 0.25 milliGRAM(s) Oral at bedtime    MEDICATIONS  (PRN):  acetaminophen   Tablet. 650 milliGRAM(s) Oral every 6 hours PRN Mild Pain (1 - 3)  nicotine  Polacrilex Gum 2 milliGRAM(s) Oral every 2 hours PRN breakthrough cravings      Vital Signs Last 24 Hrs  T(C): 37.3 (2018 05:35), Max: 37.3 (2018 05:35)  T(F): 99.1 (2018 05:35), Max: 99.1 (2018 05:35)  HR: 82 (2018 05:35) (70 - 86)  BP: 196/93 (2018 05:35) (158/75 - 203/96)  BP(mean): --  RR: 19 (2018 05:35) (16 - 19)  SpO2: 95% (2018 05:35) (94% - 100%)    PHYSICAL EXAM:    GENERAL: NAD, well-groomed, well-developed  HEAD:  Atraumatic, Normocephalic  EYES: EOMI, PERRLA, conjunctiva and sclera clear  ENMT: No tonsillar erythema, exudates, or enlargement; Moist mucous membranes, Good dentition, No lesions  NECK: Supple, No JVD, Normal thyroid  NERVOUS SYSTEM:  Alert & Oriented X3, Good concentration; Motor Strength 5/5 B/L upper and lower extremities; DTRs 2+ intact and symmetric  CHEST/LUNG: Clear to percussion bilaterally; No rales, rhonchi, wheezing, or rubs  HEART: Regular rate and rhythm; No murmurs, rubs, or gallops  ABDOMEN: Soft, Nontender, Nondistended; Bowel sounds present  EXTREMITIES:  2+ Peripheral Pulses, No clubbing, cyanosis, or edema  LYMPH: No lymphadenopathy noted  SKIN: No rashes or lesions    LABS:                        13.6   9.47  )-----------( 95       ( 2018 07:18 )             40.4     -    135  |  100  |  15  ----------------------------<  121<H>  4.0   |  18<L>  |  0.69    Ca    8.9      2018 07:18  Phos  2.0       Mg     2.0         TPro  7.8  /  Alb  3.8  /  TBili  0.8  /  DBili  x   /  AST  28  /  ALT  20  /  AlkPhos  90  -      Urinalysis Basic - ( 2018 01:15 )    Color: YELLOW / Appearance: CLEAR / S.028 / pH: 6.0  Gluc: NEGATIVE / Ketone: LARGE  / Bili: NEGATIVE / Urobili: 1 mg/dL   Blood: TRACE / Protein: 100 mg/dL / Nitrite: NEGATIVE   Leuk Esterase: NEGATIVE / RBC: 5-10 / WBC 5-10   Sq Epi: OCC / Non Sq Epi: x / Bacteria: FEW          RADIOLOGY & ADDITIONAL STUDIES:

## 2018-07-30 LAB
BUN SERPL-MCNC: 24 MG/DL — HIGH (ref 7–23)
CALCIUM SERPL-MCNC: 8.9 MG/DL — SIGNIFICANT CHANGE UP (ref 8.4–10.5)
CHLORIDE SERPL-SCNC: 99 MMOL/L — SIGNIFICANT CHANGE UP (ref 98–107)
CO2 SERPL-SCNC: 18 MMOL/L — LOW (ref 22–31)
CREAT SERPL-MCNC: 0.68 MG/DL — SIGNIFICANT CHANGE UP (ref 0.5–1.3)
GLUCOSE SERPL-MCNC: 94 MG/DL — SIGNIFICANT CHANGE UP (ref 70–99)
HCT VFR BLD CALC: 37.3 % — LOW (ref 39–50)
HGB BLD-MCNC: 12.6 G/DL — LOW (ref 13–17)
MAGNESIUM SERPL-MCNC: 2.2 MG/DL — SIGNIFICANT CHANGE UP (ref 1.6–2.6)
MCHC RBC-ENTMCNC: 31.7 PG — SIGNIFICANT CHANGE UP (ref 27–34)
MCHC RBC-ENTMCNC: 33.8 % — SIGNIFICANT CHANGE UP (ref 32–36)
MCV RBC AUTO: 93.7 FL — SIGNIFICANT CHANGE UP (ref 80–100)
NRBC # FLD: 0 — SIGNIFICANT CHANGE UP
PLATELET # BLD AUTO: 105 K/UL — LOW (ref 150–400)
PMV BLD: 11.5 FL — SIGNIFICANT CHANGE UP (ref 7–13)
POTASSIUM SERPL-MCNC: 4.1 MMOL/L — SIGNIFICANT CHANGE UP (ref 3.5–5.3)
POTASSIUM SERPL-SCNC: 4.1 MMOL/L — SIGNIFICANT CHANGE UP (ref 3.5–5.3)
RBC # BLD: 3.98 M/UL — LOW (ref 4.2–5.8)
RBC # FLD: 14.5 % — SIGNIFICANT CHANGE UP (ref 10.3–14.5)
SODIUM SERPL-SCNC: 134 MMOL/L — LOW (ref 135–145)
WBC # BLD: 8.13 K/UL — SIGNIFICANT CHANGE UP (ref 3.8–10.5)
WBC # FLD AUTO: 8.13 K/UL — SIGNIFICANT CHANGE UP (ref 3.8–10.5)

## 2018-07-30 PROCEDURE — 93280 PM DEVICE PROGR EVAL DUAL: CPT | Mod: 26

## 2018-07-30 PROCEDURE — 72125 CT NECK SPINE W/O DYE: CPT | Mod: 26

## 2018-07-30 RX ADMIN — HEPARIN SODIUM 5000 UNIT(S): 5000 INJECTION INTRAVENOUS; SUBCUTANEOUS at 06:28

## 2018-07-30 RX ADMIN — CLOPIDOGREL BISULFATE 75 MILLIGRAM(S): 75 TABLET, FILM COATED ORAL at 12:58

## 2018-07-30 RX ADMIN — CARVEDILOL PHOSPHATE 12.5 MILLIGRAM(S): 80 CAPSULE, EXTENDED RELEASE ORAL at 18:09

## 2018-07-30 RX ADMIN — ATORVASTATIN CALCIUM 40 MILLIGRAM(S): 80 TABLET, FILM COATED ORAL at 21:25

## 2018-07-30 RX ADMIN — CARVEDILOL PHOSPHATE 12.5 MILLIGRAM(S): 80 CAPSULE, EXTENDED RELEASE ORAL at 06:27

## 2018-07-30 RX ADMIN — AMLODIPINE BESYLATE 10 MILLIGRAM(S): 2.5 TABLET ORAL at 06:28

## 2018-07-30 RX ADMIN — HEPARIN SODIUM 5000 UNIT(S): 5000 INJECTION INTRAVENOUS; SUBCUTANEOUS at 18:09

## 2018-07-30 RX ADMIN — Medication 650 MILLIGRAM(S): at 11:21

## 2018-07-30 RX ADMIN — PRAMIPEXOLE DIHYDROCHLORIDE 0.25 MILLIGRAM(S): 0.12 TABLET ORAL at 21:25

## 2018-07-30 RX ADMIN — Medication 650 MILLIGRAM(S): at 10:21

## 2018-07-30 RX ADMIN — Medication 81 MILLIGRAM(S): at 12:58

## 2018-07-30 NOTE — PROGRESS NOTE ADULT - SUBJECTIVE AND OBJECTIVE BOX
INTERVAL HPI/OVERNIGHT EVENTS: I feel fine.   Vital Signs Last 24 Hrs  T(C): 36.7 (2018 12:53), Max: 37.1 (2018 21:15)  T(F): 98.1 (2018 12:53), Max: 98.7 (2018 21:15)  HR: 60 (2018 12:53) (60 - 66)  BP: 140/73 (2018 12:53) (140/73 - 155/69)  BP(mean): --  RR: 18 (2018 12:53) (18 - 18)  SpO2: 96% (2018 12:53) (95% - 96%)  I&O's Summary    2018 07:01  -  2018 07:00  --------------------------------------------------------  IN: 600 mL / OUT: 0 mL / NET: 600 mL      MEDICATIONS  (STANDING):  amLODIPine   Tablet 10 milliGRAM(s) Oral daily  aspirin enteric coated 81 milliGRAM(s) Oral daily  atorvastatin 40 milliGRAM(s) Oral at bedtime  carvedilol 12.5 milliGRAM(s) Oral every 12 hours  clopidogrel Tablet 75 milliGRAM(s) Oral daily  heparin  Injectable 5000 Unit(s) SubCutaneous every 12 hours  pramipexole 0.25 milliGRAM(s) Oral at bedtime    MEDICATIONS  (PRN):  acetaminophen   Tablet. 650 milliGRAM(s) Oral every 6 hours PRN Mild Pain (1 - 3)  nicotine  Polacrilex Gum 2 milliGRAM(s) Oral every 2 hours PRN breakthrough cravings    LABS:                        12.6   8.13  )-----------( 105      ( 2018 10:55 )             37.3     07-30    134<L>  |  99  |  24<H>  ----------------------------<  94  4.1   |  18<L>  |  0.68    Ca    8.9      2018 06:30  Phos  2.0     07-29  Mg     2.2     07-30    TPro  7.8  /  Alb  3.8  /  TBili  0.8  /  DBili  x   /  AST  28  /  ALT  20  /  AlkPhos  90        Urinalysis Basic - ( 2018 01:15 )    Color: YELLOW / Appearance: CLEAR / S.028 / pH: 6.0  Gluc: NEGATIVE / Ketone: LARGE  / Bili: NEGATIVE / Urobili: 1 mg/dL   Blood: TRACE / Protein: 100 mg/dL / Nitrite: NEGATIVE   Leuk Esterase: NEGATIVE / RBC: 5-10 / WBC 5-10   Sq Epi: OCC / Non Sq Epi: x / Bacteria: FEW      CAPILLARY BLOOD GLUCOSE            Urinalysis Basic - ( 2018 01:15 )    Color: YELLOW / Appearance: CLEAR / S.028 / pH: 6.0  Gluc: NEGATIVE / Ketone: LARGE  / Bili: NEGATIVE / Urobili: 1 mg/dL   Blood: TRACE / Protein: 100 mg/dL / Nitrite: NEGATIVE   Leuk Esterase: NEGATIVE / RBC: 5-10 / WBC 5-10   Sq Epi: OCC / Non Sq Epi: x / Bacteria: FEW      REVIEW OF SYSTEMS:  CONSTITUTIONAL: No fever, weight loss, or fatigue  EYES: No eye pain, visual disturbances, or discharge  ENMT:  No difficulty hearing, tinnitus, vertigo; No sinus or throat pain  NECK: No pain or stiffness  BREASTS: No pain, masses, or nipple discharge  RESPIRATORY: No cough, wheezing, chills or hemoptysis; No shortness of breath  CARDIOVASCULAR: No chest pain, palpitations, dizziness, or leg swelling  GASTROINTESTINAL: No abdominal or epigastric pain. No nausea, vomiting, or hematemesis; No diarrhea or constipation. No melena or hematochezia.  GENITOURINARY: No dysuria, frequency, hematuria, or incontinence  NEUROLOGICAL: No headaches, memory loss, loss of strength, numbness, or tremors    Consultant(s) Notes Reviewed:  [x ] YES  [ ] NO    PHYSICAL EXAM:  GENERAL: NAD, well-groomed, well-developed ,not in any distress ,  HEAD:  Atraumatic, Normocephalic  EYES: EOMI, PERRLA, conjunctiva and sclera clear  ENMT: No tonsillar erythema, exudates, or enlargement; Moist mucous membranes, Good dentition, No lesions  NECK: Supple, No JVD, Normal thyroid  NERVOUS SYSTEM:  Alert & Oriented X3, No focal deficit   CHEST/LUNG: Good air entry bilateral with no  rales, rhonchi, wheezing, or rubs  HEART: Regular rate and rhythm; No murmurs, rubs, or gallops  ABDOMEN: Soft, Nontender, Nondistended; Bowel sounds present  EXTREMITIES:  2+ Peripheral Pulses, No clubbing, cyanosis, or edema  SKIN: No rashes or lesions    Care Discussed with Consultants/Other Providers [ x] YES  [ ] NO

## 2018-07-30 NOTE — PROGRESS NOTE ADULT - SUBJECTIVE AND OBJECTIVE BOX
S/  Pt denies chest pain, SOB, dizziness and palpitations      REVIEW OF SYSTEMS:  CONSTITUTIONAL: No fever, weight loss, or fatigue  EYES: No eye pain, visual disturbances, or discharge  ENMT:  No difficulty hearing, tinnitus, vertigo; No sinus or throat pain  NECK: No pain or stiffness  BREASTS: No pain, masses, or nipple discharge  RESPIRATORY: No cough, wheezing, chills or hemoptysis; No shortness of breath  CARDIOVASCULAR: No chest pain, palpitations, dizziness, or leg swelling  GASTROINTESTINAL: No abdominal or epigastric pain. No nausea, vomiting, or hematemesis; No diarrhea or constipation. No melena or hematochezia.  GENITOURINARY: No dysuria, frequency, hematuria, or incontinence  NEUROLOGICAL: No headaches, memory loss, loss of strength, numbness, or tremors      O/    Vital Signs Last 24 Hrs  T(C): 36.7 (2018 12:53), Max: 37.1 (2018 21:15)  T(F): 98.1 (2018 12:53), Max: 98.7 (2018 21:15)  HR: 60 (2018 12:53) (60 - 66)  BP: 140/73 (2018 12:53) (140/73 - 155/69)  BP(mean): --  RR: 18 (2018 12:53) (18 - 18)  SpO2: 96% (2018 12:53) (95% - 96%)  I&O's Summary    2018 07:01  -  2018 07:00  --------------------------------------------------------  IN: 600 mL / OUT: 0 mL / NET: 600 mL    PHYSICAL EXAM:  GENERAL: NAD, well-groomed, well-developed ,not in any distress ,  HEAD:  Atraumatic, Normocephalic  EYES: EOMI, PERRLA, conjunctiva and sclera clear  ENMT: No tonsillar erythema, exudates, or enlargement; Moist mucous membranes, Good dentition, No lesions  NECK: Supple, No JVD, Normal thyroid  NERVOUS SYSTEM:  Alert & Oriented X3, No focal deficit   CHEST/LUNG: Good air entry bilateral with no  rales, rhonchi, wheezing, or rubs  HEART: Regular rate and rhythm; No murmurs, rubs, or gallops  ABDOMEN: Soft, Nontender, Nondistended; Bowel sounds present  EXTREMITIES:  2+ Peripheral Pulses, No clubbing, cyanosis, or edema  SKIN: No rashes or lesions      MEDICATIONS  (STANDING):  amLODIPine   Tablet 10 milliGRAM(s) Oral daily  aspirin enteric coated 81 milliGRAM(s) Oral daily  atorvastatin 40 milliGRAM(s) Oral at bedtime  carvedilol 12.5 milliGRAM(s) Oral every 12 hours  clopidogrel Tablet 75 milliGRAM(s) Oral daily  heparin  Injectable 5000 Unit(s) SubCutaneous every 12 hours  pramipexole 0.25 milliGRAM(s) Oral at bedtime    MEDICATIONS  (PRN):  acetaminophen   Tablet. 650 milliGRAM(s) Oral every 6 hours PRN Mild Pain (1 - 3)  nicotine  Polacrilex Gum 2 milliGRAM(s) Oral every 2 hours PRN breakthrough cravings    LABS:                        12.6   8.13  )-----------( 105      ( 2018 10:55 )             37.3     07-30    134<L>  |  99  |  24<H>  ----------------------------<  94  4.1   |  18<L>  |  0.68    Ca    8.9      2018 06:30  Phos  2.0     07-29  Mg     2.2     07-30    TPro  7.8  /  Alb  3.8  /  TBili  0.8  /  DBili  x   /  AST  28  /  ALT  20  /  AlkPhos  90  07-28      Urinalysis Basic - ( 2018 01:15 )    Color: YELLOW / Appearance: CLEAR / S.028 / pH: 6.0  Gluc: NEGATIVE / Ketone: LARGE  / Bili: NEGATIVE / Urobili: 1 mg/dL   Blood: TRACE / Protein: 100 mg/dL / Nitrite: NEGATIVE   Leuk Esterase: NEGATIVE / RBC: 5-10 / WBC 5-10   Sq Epi: OCC / Non Sq Epi: x / Bacteria: FEW      CAPILLARY BLOOD GLUCOSE            Urinalysis Basic - ( 2018 01:15 )    Color: YELLOW / Appearance: CLEAR / S.028 / pH: 6.0  Gluc: NEGATIVE / Ketone: LARGE  / Bili: NEGATIVE / Urobili: 1 mg/dL   Blood: TRACE / Protein: 100 mg/dL / Nitrite: NEGATIVE   Leuk Esterase: NEGATIVE / RBC: 5-10 / WBC 5-10   Sq Epi: OCC / Non Sq Epi: x / Bacteria: FEW

## 2018-07-30 NOTE — CHART NOTE - NSCHARTNOTEFT_GEN_A_CORE
ELECTROPHYSIOLOGY    Device Interrogation Performed                                  Date/Time:  07/30/2018   @ 18:30  :      [employer]                                  Model:      Accent DR dual chamber pacemaker                           Mode:            DDDR                                           Rate:  60/110     Atrial Lead:  P wave amplitude:             3.1             mv            Impedance              450                         Ohms  Threshold               0.75                           V @   0.4             ms      Ventricular Lead: RV  R wave amplitude             10.4              mv  Impedance                     340                  Ohms  Threshold              1.25                           V @      0.5           ms                                        Battery Status:                     Good             Underlying Rhythm:           Sinus bradycardia 56bpm    Events/Observation:          none     Impression/Plan:  Normal PPM function.  Normal sensing and pacing via iterative testing. Good battery status. Excellent threshold capture.  No reprogramming.

## 2018-07-31 ENCOUNTER — TRANSCRIPTION ENCOUNTER (OUTPATIENT)
Age: 78
End: 2018-07-31

## 2018-07-31 VITALS
OXYGEN SATURATION: 100 % | SYSTOLIC BLOOD PRESSURE: 158 MMHG | HEART RATE: 60 BPM | TEMPERATURE: 98 F | DIASTOLIC BLOOD PRESSURE: 76 MMHG | RESPIRATION RATE: 18 BRPM

## 2018-07-31 LAB
BUN SERPL-MCNC: 24 MG/DL — HIGH (ref 7–23)
CALCIUM SERPL-MCNC: 8.7 MG/DL — SIGNIFICANT CHANGE UP (ref 8.4–10.5)
CHLORIDE SERPL-SCNC: 102 MMOL/L — SIGNIFICANT CHANGE UP (ref 98–107)
CO2 SERPL-SCNC: 21 MMOL/L — LOW (ref 22–31)
CREAT SERPL-MCNC: 0.74 MG/DL — SIGNIFICANT CHANGE UP (ref 0.5–1.3)
GLUCOSE SERPL-MCNC: 108 MG/DL — HIGH (ref 70–99)
HCT VFR BLD CALC: 39.7 % — SIGNIFICANT CHANGE UP (ref 39–50)
HGB BLD-MCNC: 13 G/DL — SIGNIFICANT CHANGE UP (ref 13–17)
MCHC RBC-ENTMCNC: 32.1 PG — SIGNIFICANT CHANGE UP (ref 27–34)
MCHC RBC-ENTMCNC: 32.7 % — SIGNIFICANT CHANGE UP (ref 32–36)
MCV RBC AUTO: 98 FL — SIGNIFICANT CHANGE UP (ref 80–100)
NRBC # FLD: 0 — SIGNIFICANT CHANGE UP
PLATELET # BLD AUTO: 114 K/UL — LOW (ref 150–400)
PMV BLD: 11.8 FL — SIGNIFICANT CHANGE UP (ref 7–13)
POTASSIUM SERPL-MCNC: 3.9 MMOL/L — SIGNIFICANT CHANGE UP (ref 3.5–5.3)
POTASSIUM SERPL-SCNC: 3.9 MMOL/L — SIGNIFICANT CHANGE UP (ref 3.5–5.3)
RBC # BLD: 4.05 M/UL — LOW (ref 4.2–5.8)
RBC # FLD: 14.6 % — HIGH (ref 10.3–14.5)
SODIUM SERPL-SCNC: 138 MMOL/L — SIGNIFICANT CHANGE UP (ref 135–145)
WBC # BLD: 7.28 K/UL — SIGNIFICANT CHANGE UP (ref 3.8–10.5)
WBC # FLD AUTO: 7.28 K/UL — SIGNIFICANT CHANGE UP (ref 3.8–10.5)

## 2018-07-31 RX ORDER — CARVEDILOL PHOSPHATE 80 MG/1
1 CAPSULE, EXTENDED RELEASE ORAL
Qty: 60 | Refills: 0 | OUTPATIENT
Start: 2018-07-31 | End: 2018-08-29

## 2018-07-31 RX ADMIN — AMLODIPINE BESYLATE 10 MILLIGRAM(S): 2.5 TABLET ORAL at 06:14

## 2018-07-31 RX ADMIN — CARVEDILOL PHOSPHATE 12.5 MILLIGRAM(S): 80 CAPSULE, EXTENDED RELEASE ORAL at 06:14

## 2018-07-31 RX ADMIN — Medication 81 MILLIGRAM(S): at 11:56

## 2018-07-31 RX ADMIN — CLOPIDOGREL BISULFATE 75 MILLIGRAM(S): 75 TABLET, FILM COATED ORAL at 11:55

## 2018-07-31 RX ADMIN — HEPARIN SODIUM 5000 UNIT(S): 5000 INJECTION INTRAVENOUS; SUBCUTANEOUS at 06:14

## 2018-07-31 NOTE — PROGRESS NOTE ADULT - SUBJECTIVE AND OBJECTIVE BOX
INTERVAL HPI/OVERNIGHT EVENTS: Seen and examined with brother in room. Going to [facility] today.   Vital Signs Last 24 Hrs  T(C): 36.6 (31 Jul 2018 05:39), Max: 37.2 (30 Jul 2018 22:05)  T(F): 97.8 (31 Jul 2018 05:39), Max: 98.9 (30 Jul 2018 22:05)  HR: 59 (31 Jul 2018 05:39) (59 - 62)  BP: 164/80 (31 Jul 2018 05:39) (130/67 - 164/80)  BP(mean): --  RR: 18 (31 Jul 2018 05:39) (18 - 18)  SpO2: 95% (31 Jul 2018 05:39) (94% - 96%)  I&O's Summary    30 Jul 2018 07:01  -  31 Jul 2018 07:00  --------------------------------------------------------  IN: 0 mL / OUT: 200 mL / NET: -200 mL      MEDICATIONS  (STANDING):  amLODIPine   Tablet 10 milliGRAM(s) Oral daily  aspirin enteric coated 81 milliGRAM(s) Oral daily  atorvastatin 40 milliGRAM(s) Oral at bedtime  carvedilol 12.5 milliGRAM(s) Oral every 12 hours  clopidogrel Tablet 75 milliGRAM(s) Oral daily  heparin  Injectable 5000 Unit(s) SubCutaneous every 12 hours  pramipexole 0.25 milliGRAM(s) Oral at bedtime    MEDICATIONS  (PRN):  acetaminophen   Tablet. 650 milliGRAM(s) Oral every 6 hours PRN Mild Pain (1 - 3)  nicotine  Polacrilex Gum 2 milliGRAM(s) Oral every 2 hours PRN breakthrough cravings    LABS:                        13.0   7.28  )-----------( 114      ( 31 Jul 2018 06:30 )             39.7     07-31    138  |  102  |  24<H>  ----------------------------<  108<H>  3.9   |  21<L>  |  0.74    Ca    8.7      31 Jul 2018 06:30  Mg     2.2     07-30          CAPILLARY BLOOD GLUCOSE              REVIEW OF SYSTEMS:  CONSTITUTIONAL: No fever, weight loss, or fatigue  EYES: No eye pain, visual disturbances, or discharge  ENMT:  No difficulty hearing, tinnitus, vertigo; No sinus or throat pain  NECK: No pain or stiffness  RESPIRATORY: No cough, wheezing, chills or hemoptysis; No shortness of breath  CARDIOVASCULAR: No chest pain, palpitations, dizziness, or leg swelling  GASTROINTESTINAL: No abdominal or epigastric pain. No nausea, vomiting, or hematemesis; No diarrhea or constipation. No melena or hematochezia.  GENITOURINARY: No dysuria, frequency, hematuria, or incontinence  NEUROLOGICAL: No headaches, memory loss, loss of strength, numbness, or tremors  SKIN: No itching, burning, rashes, or lesions   LYMPH NODES: No enlarged glands  ENDOCRINE: No heat or cold intolerance; No hair loss  MUSCULOSKELETAL: No joint pain or swelling; No muscle, back, or extremity pain      Consultant(s) Notes Reviewed:  [x ] YES  [ ] NO    PHYSICAL EXAM:  GENERAL: NAD, well-groomed, well-developed, not in any distress ,  HEAD:  Atraumatic, Normocephalic  EYES: EOMI, PERRLA, conjunctiva and sclera clear  ENMT: No tonsillar erythema, exudates, or enlargement; Moist mucous membranes, Good dentition, No lesions  NECK: Supple, No JVD, Normal thyroid  NERVOUS SYSTEM:  Alert & Oriented X3, No focal deficit   CHEST/LUNG: Good air entry bilateral with no  rales, rhonchi, wheezing, or rubs  HEART: Regular rate and rhythm; No murmurs, rubs, or gallops  ABDOMEN: Soft, Nontender, Nondistended; Bowel sounds present  EXTREMITIES:  2+ Peripheral Pulses, No clubbing, cyanosis, or edema  SKIN: No rashes or lesions    Care Discussed with Consultants/Other Providers [ x] YES  [ ] NO

## 2018-07-31 NOTE — DISCHARGE NOTE ADULT - COMMUNITY RESOURCES
[place] [id number], [place], [phone] [facility] [address] [place] [phone] [place], [phone], [facility] [phone]

## 2018-07-31 NOTE — DISCHARGE NOTE ADULT - MEDICATION SUMMARY - MEDICATIONS TO CHANGE
I will SWITCH the dose or number of times a day I take the medications listed below when I get home from the hospital:    carvedilol 6.25 mg oral tablet  -- 1 tab(s) by mouth every 12 hours

## 2018-07-31 NOTE — PROGRESS NOTE ADULT - ASSESSMENT
76 y/o M with PMH of symptomatic SSS(s/p [facility] Denis PPM), Dementia, HTN, CAD s/p Stents; COPD, RLS presented with fall while in shower few days ago.     Near Syncope ---more likely mechanical fall  8mm hyperattenuating focus at C2-3 near dorsal sac, likely ligamentum hypertrophy but possible blood vs. mass    PPM interrogation---no events; normal function  echo may be done as outpt  continue current medications  no further cardiac testing  Medical care is appreciated  ffup Orthopedic recs  PT and discharge planning if cleared by Orthopedics
76 y/o M with PMH of symptomatic SSS(s/p [facility] Denis PPM), Dementia, HTN, CAD, RLS presented with fall while in shower few days ago.      Problem/Plan - 1:  ·  Problem: Near syncope.  Plan: Ct scan noted ..< from: CT Cervical Spine No Cont (07.28.18 @ 17:52) >  IMPRESSION:   CT Head:  No CT evidence of acute intracranial hemorrhage, extra-axial   collection, mass effect or calvarial fracture.    CT Cervical Spine: No CT evidence of cervical spine fracture or traumatic   malalignment.  Advanced degenerative changes.  At C2-C3 there is 8 mm  hyperattenuating focus in the dorsal sac that probably reflects   ligamentum flavum hypertrophy although blood products or tumor could have   a similar appearance.  This contributes to moderate to severe spinal   canal stenosis and dorsal cord impingement at C2-C3.  MRI follow-up can   be performed as clinically warranted.    < end of copied text >    TTE ordered to evaluate LVEF   Orthostatics negative.   Fall precautions ordered   S/P PPM interrogation(St. Denis)  Neurosurgery consult noted . Rpt CT Cx spine noted.   PT helping .DEWEY.   Cardiology following.      Problem/Plan - 2:  ·  Problem: Essential hypertension.  Plan: BP readings better.   DASH diet recommended.      Problem/Plan - 3:  ·  Problem: HLD (hyperlipidemia).  Plan: Continue with Lipitor daily   Lipid profile in am, low cholesterol diet recommended.      Problem/Plan - 4:  ·  Problem: Smoking trying to quit.  Plan: Smoking cessation education ordered  Nicotine gum ordered.      Problem/Plan - 5:  ·  Problem: Thrombocytopenia .  Plan: Chronic but slight drop so watching closely.      Problem/Plan - 6:  ·  Problem: Need for prophylactic measure.  Plan: On heparin sq 5000U q12hrs.
78 y/o M with PMH of symptomatic SSS(s/p [facility] Denis PPM), Dementia, HTN, CAD, RLS presented with fall while in shower few days ago.      Problem/Plan - 1:  ·  Problem: Near syncope.  Plan: Ct scan noted ..< from: CT Cervical Spine No Cont (07.28.18 @ 17:52) >  IMPRESSION:   CT Head:  No CT evidence of acute intracranial hemorrhage, extra-axial   collection, mass effect or calvarial fracture.    CT Cervical Spine: No CT evidence of cervical spine fracture or traumatic   malalignment.  Advanced degenerative changes.  At C2-C3 there is 8 mm  hyperattenuating focus in the dorsal sac that probably reflects   ligamentum flavum hypertrophy although blood products or tumor could have   a similar appearance.  This contributes to moderate to severe spinal   canal stenosis and dorsal cord impingement at C2-C3.  MRI follow-up can   be performed as clinically warranted.    < end of copied text >    TTE ordered to evaluate LVEF   Orthostatics negative.   Fall precautions ordered   Awaiting  PPM interrogation([employer]. Denis)  Neurosurgery consult noted . Rpt CT Cx spine noted.   PT helping .DEWEY.   Cardiology following.      Problem/Plan - 2:  ·  Problem: Essential hypertension.  Plan: BP readings better.   DASH diet recommended.      Problem/Plan - 3:  ·  Problem: HLD (hyperlipidemia).  Plan: Continue with Lipitor daily   Lipid profile in am, low cholesterol diet recommended.      Problem/Plan - 4:  ·  Problem: Smoking trying to quit.  Plan: Smoking cessation education ordered  Nicotine gum ordered.      Problem/Plan - 5:  ·  Problem: Thrombocytopenia .  Plan: Chronic but slight drop so watching closely.      Problem/Plan - 6:  ·  Problem: Need for prophylactic measure.  Plan: On heparin sq 5000U q12hrs.
77y Male with incidental finding of C2-3 hyperattenuating focus with no symptoms  - cervical spine stable, removed c-collar   - ortho signing off  - patient to follow up with Dr. Ace Epperson in clinic in 1-2 weeks [phone]

## 2018-07-31 NOTE — PROGRESS NOTE ADULT - SUBJECTIVE AND OBJECTIVE BOX
Orthopaedic Surgery Progress Note    Subjective: No acute events overnight, patient denies any cervical pain, denies numbness/tingling, nausea/vomiting, chest pain, SOB, dizziness.    Objective:  T(C): 36.6 (07-31-18 @ 05:39), Max: 37.2 (07-30-18 @ 22:05)  HR: 59 (07-31-18 @ 05:39) (59 - 62)  BP: 164/80 (07-31-18 @ 05:39) (130/67 - 164/80)  RR: 18 (07-31-18 @ 05:39) (18 - 18)  SpO2: 95% (07-31-18 @ 05:39) (94% - 96%)  Wt(kg): --    07-30 @ 07:01  -  07-31 @ 07:00  --------------------------------------------------------  IN: 0 mL / OUT: 200 mL / NET: -200 mL      PE    Gen: NAD, patient lying comfortably in bed with c-collar in place  Neuro:  SILT all upper extremity dermatomes.  Motor intact upper extremity.    WWP                          12.6   8.13  )-----------( 105      ( 30 Jul 2018 10:55 )             37.3     07-30    134<L>  |  99  |  24<H>  ----------------------------<  94  4.1   |  18<L>  |  0.68    Ca    8.9      30 Jul 2018 06:30  Mg     2.2     07-30            77y Male s/p NANNETTE  - Pain control  - WBAT  - Posterior Anterior dislocation precautions  - PT/OT/OOB  - DVT ppx  - Dispo planning    Larry Brennan MD Orthopaedic Surgery Progress Note    Subjective: No acute events overnight, patient denies any cervical pain, denies numbness/tingling, nausea/vomiting, chest pain, SOB, dizziness.    Objective:  T(C): 36.6 (07-31-18 @ 05:39), Max: 37.2 (07-30-18 @ 22:05)  HR: 59 (07-31-18 @ 05:39) (59 - 62)  BP: 164/80 (07-31-18 @ 05:39) (130/67 - 164/80)  RR: 18 (07-31-18 @ 05:39) (18 - 18)  SpO2: 95% (07-31-18 @ 05:39) (94% - 96%)  Wt(kg): --    07-30 @ 07:01  -  07-31 @ 07:00  --------------------------------------------------------  IN: 0 mL / OUT: 200 mL / NET: -200 mL      PE    Gen: NAD, patient lying comfortably in bed with c-collar in place  Neuro:  SILT all upper extremity dermatomes bilaterally, motor intact M/U/R upper extremities bilaterally.    Spine: No midline TTP C/T/L/S spine  WWP                          12.6   8.13  )-----------( 105      ( 30 Jul 2018 10:55 )             37.3     07-30    134<L>  |  99  |  24<H>  ----------------------------<  94  4.1   |  18<L>  |  0.68    Ca    8.9      30 Jul 2018 06:30  Mg     2.2     07-30      Imaging:    < from: CT Cervical Spine No Cont (07.30.18 @ 09:39) >  Impression: No significant change when allowing for differences in   technique.    < end of copied text >

## 2018-07-31 NOTE — DISCHARGE NOTE ADULT - MEDICATION SUMMARY - MEDICATIONS TO TAKE
I will START or STAY ON the medications listed below when I get home from the hospital:    aspirin 81 mg oral delayed release tablet  -- 1 tab(s) by mouth once a day  -- Indication: For cardioprotective    Lipitor 40 mg oral tablet  -- 1 tab(s) by mouth once a day  -- Indication: For cholesterol    pramipexole 0.25 mg oral tablet  -- 1 tab(s) by mouth once a day (at bedtime)  -- Indication: For Parkinsons disease    clopidogrel 75 mg oral tablet  -- 1 tab(s) by mouth once a day  -- Indication: For CAD    carvedilol 12.5 mg oral tablet  -- 1 tab(s) by mouth every 12 hours  -- Indication: For HTN    amLODIPine 10 mg oral tablet  -- 1 tab(s) by mouth once a day  -- Indication: For HTN

## 2018-07-31 NOTE — DISCHARGE NOTE ADULT - PATIENT PORTAL LINK FT
You can access the [facility] Portal, offered by [facility], by registering with the following website: [email or web address]

## 2018-07-31 NOTE — DISCHARGE NOTE ADULT - CARE PLAN
Principal Discharge DX:	Fall  Goal:	To prevent or reduce the incidence of falls and injuries.  To increase mobility and function and environmental safety.  Assessment and plan of treatment:	Fall precautions: keep your area clutter free, place night lights in hallways and stairwells, and add non-slip mats in the kitchen and bathrooms.  Exercise daily to improve muscle strength to avoid deconditioning.  Secondary Diagnosis:	HTN (hypertension)  Goal:	To maintain a normal blood pressure to prevent heart attack, stroke and renal failure.  Assessment and plan of treatment:	Low sodium and fat diet, continue anti-hypertensive medications, and follow up with primary care physician. Principal Discharge DX:	Fall  Goal:	To prevent or reduce the incidence of falls and injuries.  To increase mobility and function and environmental safety.  Assessment and plan of treatment:	Fall precautions: keep your area clutter free, place night lights in hallways and stairwells, and add non-slip mats in the kitchen and bathrooms.  Exercise daily to improve muscle strength to avoid deconditioning.  There was a hyperattenuating focus on your spine at C2-C3 for which you should follow up with orthopedist Dr. Epperson in 1-2 weeks.  Secondary Diagnosis:	HTN (hypertension)  Goal:	To maintain a normal blood pressure to prevent heart attack, stroke and renal failure.  Assessment and plan of treatment:	Low sodium and fat diet, continue anti-hypertensive medications, and follow up with primary care physician.

## 2018-07-31 NOTE — DISCHARGE NOTE ADULT - PLAN OF CARE
To prevent or reduce the incidence of falls and injuries.  To increase mobility and function and environmental safety. Fall precautions: keep your area clutter free, place night lights in hallways and stairwells, and add non-slip mats in the kitchen and bathrooms.  Exercise daily to improve muscle strength to avoid deconditioning. To maintain a normal blood pressure to prevent heart attack, stroke and renal failure. Low sodium and fat diet, continue anti-hypertensive medications, and follow up with primary care physician. Fall precautions: keep your area clutter free, place night lights in hallways and stairwells, and add non-slip mats in the kitchen and bathrooms.  Exercise daily to improve muscle strength to avoid deconditioning.  There was a hyperattenuating focus on your spine at C2-C3 for which you should follow up with orthopedist Dr. Epperson in 1-2 weeks.

## 2018-07-31 NOTE — DISCHARGE NOTE ADULT - HOSPITAL COURSE
76 y/o M with PMH of symptomatic SSS(s/p St Denis PPM), Dementia, HTN, CAD, RLS presented with fall while in shower few days ago. As per the patient he had an unwitnessed fall few days ago. Patient stated that he was standing at the sink and went to turn around and then fell onto the tub on his side. Patient denied any pre-fall triggers such as lightheadedness/dizziness or palpitations. Patient stated that he did not hit his head or have any LOC.     On ED admission EKG revealed NSR at a rate of 76 with RBBB and QTc of 533, WBC: 11.0, Plt: 97, Gluc: 111. CT Head: No CT evidence of acute intracranial hemorrhage, extra-axial collection, mass effect or calvarial fracture. CT Cervical Spine: No CT evidence of cervical spine fracture or traumatic malalignment. Advanced degenerative changes.  At C2-C3 there is 8 mm hyperattenuating focus in the dorsal sac that probably reflects ligamentum flavum hypertrophy although blood products or tumor could have a similar appearance. This contributes to moderate to severe spinal canal stenosis and dorsal cord impingement at C2-C3. 78 y/o M with PMH of symptomatic SSS(s/p St Denis PPM), Dementia, HTN, CAD, RLS presented with fall while in shower few days ago. As per the patient he had an unwitnessed fall few days ago. Patient stated that he was standing at the sink and went to turn around and then fell onto the tub on his side. Patient denied any pre-fall triggers such as lightheadedness/dizziness or palpitations. Patient stated that he did not hit his head or have any LOC.     On ED admission EKG revealed NSR at a rate of 76 with RBBB and QTc of 533, WBC: 11.0, Plt: 97, Gluc: 111. CT Head: No CT evidence of acute intracranial hemorrhage, extra-axial collection, mass effect or calvarial fracture. CT Cervical Spine: No CT evidence of cervical spine fracture or traumatic malalignment. Advanced degenerative changes.  At C2-C3 there is 8 mm hyperattenuating focus in the dorsal sac that probably reflects ligamentum flavum hypertrophy although blood products or tumor could have a similar appearance. This contributes to moderate to severe spinal canal stenosis and dorsal cord impingement at C2-C3. MRI cannot be done due to St. Denis PPM so repeat CT spine done. Orthopedics called to evaluate, and being that pt has no symptoms, recommended outpatient follow up. Pt is now medically stable for discharge to rehab.

## 2018-07-31 NOTE — DISCHARGE NOTE ADULT - CARE PROVIDER_API CALL
Lakhwinder Martinez), Cardiovascular Disease; Internal Medicine  [address] [place] [address]  Phone: [phone]  Fax: [phone] Lakhwinder Martinez), Cardiovascular Disease; Internal Medicine  [address] [place] [address]  Phone: [phone]  Fax: [phone]    Bolivar Epperson (MD), Orthopaedic Surgery  [address] [facility] [address]  [place] [address]  Phone: [phone]  Fax: [phone]

## 2018-07-31 NOTE — DISCHARGE NOTE ADULT - CARE PROVIDERS DIRECT ADDRESSES
[email or web address]

## 2018-12-11 NOTE — PROGRESS NOTE ADULT - ATTENDING COMMENTS
SUBJECTIVE:  [place] is now approximately 2 weeks out from Right  total knee replacement.    Patient states that she is doing excellent and has minimal pain.  she is tolerating the pain medication but has felt really constipated.  she is getting about with ambulatory aids and has begun PT. She had swallowing issues postop but that resolved prior to discharge. She had a doppler US yesterday which resulted as negative.     OBJECTIVE:   Physical exam demonstrates wound to be healing well.  It has minimal swelling, some ecchymosis and expected warmth.  There is no drainage, effusion or significant erythema.    ROM is  4-114 degrees actively.  Patella is tracking well.   Ligamentous exam demonstrates the knee to be stable to varus/valgus stress, AP drawer.  Distally there is no calf swelling with a negative Jon's sign and the limb is NV intact.     IMPRESSION:   S/P Right  total knee replacement    PLAN:  I am pleased with the progress and have recommended vigorous PT to maximize ROM and strength.  The therapist will wean her from crutches to a cane.  Specific instructions were given to the patient in regards to signs and symptoms of complications. she will call with any problems or questions.  I will see her back at the 6 week visit, earlier on a PRN basis.  X-rays will not be needed unless a problem were to occur.  Prescription for pain medication given.  I have reminded the patient of the need for antibiotic prophylaxis.    Take pain medication, ice, and elevate every 6 hours.   12/11/18 - 9:30am, 3:30pm, 9:30pm.   12/12/18 and beyond - 8:00am, 2:00pm, 8:00pm.   Take every 4 hours for the next 2 weeks then can begin to wean back to every 6 hours (3 times per day) the following week then every 8 hours the week after. You can take 1,000 mg of tylenol with each dose of pain medication (4 times per day).  If you need a refill, please call Monday through Thursday.   When you ice and elevate your leg do so for 20 minutes 
at a time 3-4 times per day.   Take a stool softener 1-3 times per day, but start with a fleets enema today.     X-RAY:  AP/Lat/Patellar views of Right  knee were obtained and demonstrate   excellent overall alignment and position of implants.  No radiographic lucencies  and Excellent tracking of the patella.  No fractures seen.    Supervising Physician for this date and note:   Cal Blanton MD   
CARDIOLOGY ATTENDING    Agree with above. 76-year-old male PMH symptomatic sick sinus syndrome who originally had a St Denis dual-chamber pacemaker implanted at [facility] in October 2013. Device interrogation in the office 2 weeks ago demonstrated excellent right atrial and right ventricular lead function with over 8 years remaining of pacemaker longevity. He A paces 80% of the time but never requires ventricular pacing. He is now admitted with a fall rule out syncope. EKG/Tele/echo all unremarkable. Recommend call St Denis for PPM interrogation and d/c home today if PPM showed no events corresponding to fall. F/u with me and cardiologist Dr. Lakhwinder Martinez as scheduled
DISPO- discharge planning
d/c planning likely in am - if no arrythmia and remain stable.

## 2019-01-26 VITALS
RESPIRATION RATE: 18 BRPM | HEART RATE: 57 BPM | OXYGEN SATURATION: 98 % | SYSTOLIC BLOOD PRESSURE: 153 MMHG | DIASTOLIC BLOOD PRESSURE: 80 MMHG | TEMPERATURE: 98 F

## 2019-01-26 PROBLEM — G25.81 RESTLESS LEGS SYNDROME: Chronic | Status: ACTIVE | Noted: 2018-07-29

## 2019-01-26 LAB
ALBUMIN SERPL ELPH-MCNC: 3.4 G/DL — SIGNIFICANT CHANGE UP (ref 3.3–5)
ALP SERPL-CCNC: 83 U/L — SIGNIFICANT CHANGE UP (ref 40–120)
ALT FLD-CCNC: 14 U/L — SIGNIFICANT CHANGE UP (ref 4–41)
ANION GAP SERPL CALC-SCNC: 11 MMO/L — SIGNIFICANT CHANGE UP (ref 7–14)
ANISOCYTOSIS BLD QL: SLIGHT — SIGNIFICANT CHANGE UP
APPEARANCE UR: CLEAR — SIGNIFICANT CHANGE UP
APTT BLD: 29.8 SEC — SIGNIFICANT CHANGE UP (ref 27.5–36.3)
AST SERPL-CCNC: 24 U/L — SIGNIFICANT CHANGE UP (ref 4–40)
BACTERIA # UR AUTO: NEGATIVE — SIGNIFICANT CHANGE UP
BASE EXCESS BLDV CALC-SCNC: 1.3 MMOL/L — SIGNIFICANT CHANGE UP
BASE EXCESS BLDV CALC-SCNC: 2.9 MMOL/L — SIGNIFICANT CHANGE UP
BASE EXCESS BLDV CALC-SCNC: 4.2 MMOL/L — SIGNIFICANT CHANGE UP
BASOPHILS # BLD AUTO: 0.03 K/UL — SIGNIFICANT CHANGE UP (ref 0–0.2)
BASOPHILS NFR BLD AUTO: 0.7 % — SIGNIFICANT CHANGE UP (ref 0–2)
BASOPHILS NFR SPEC: 0 % — SIGNIFICANT CHANGE UP (ref 0–2)
BILIRUB SERPL-MCNC: 0.6 MG/DL — SIGNIFICANT CHANGE UP (ref 0.2–1.2)
BILIRUB UR-MCNC: NEGATIVE — SIGNIFICANT CHANGE UP
BLASTS # FLD: 0 % — SIGNIFICANT CHANGE UP (ref 0–0)
BLOOD GAS VENOUS - CREATININE: 0.95 MG/DL — SIGNIFICANT CHANGE UP (ref 0.5–1.3)
BLOOD GAS VENOUS - CREATININE: 0.96 MG/DL — SIGNIFICANT CHANGE UP (ref 0.5–1.3)
BLOOD GAS VENOUS - CREATININE: 0.96 MG/DL — SIGNIFICANT CHANGE UP (ref 0.5–1.3)
BLOOD UR QL VISUAL: NEGATIVE — SIGNIFICANT CHANGE UP
BUN SERPL-MCNC: 15 MG/DL — SIGNIFICANT CHANGE UP (ref 7–23)
CALCIUM SERPL-MCNC: 8.8 MG/DL — SIGNIFICANT CHANGE UP (ref 8.4–10.5)
CHLORIDE BLDV-SCNC: 104 MMOL/L — SIGNIFICANT CHANGE UP (ref 96–108)
CHLORIDE BLDV-SCNC: 104 MMOL/L — SIGNIFICANT CHANGE UP (ref 96–108)
CHLORIDE BLDV-SCNC: 106 MMOL/L — SIGNIFICANT CHANGE UP (ref 96–108)
CHLORIDE SERPL-SCNC: 100 MMOL/L — SIGNIFICANT CHANGE UP (ref 98–107)
CO2 SERPL-SCNC: 25 MMOL/L — SIGNIFICANT CHANGE UP (ref 22–31)
COLOR SPEC: YELLOW — SIGNIFICANT CHANGE UP
CREAT SERPL-MCNC: 1.08 MG/DL — SIGNIFICANT CHANGE UP (ref 0.5–1.3)
EOSINOPHIL # BLD AUTO: 0.09 K/UL — SIGNIFICANT CHANGE UP (ref 0–0.5)
EOSINOPHIL NFR BLD AUTO: 2.1 % — SIGNIFICANT CHANGE UP (ref 0–6)
EOSINOPHIL NFR FLD: 1.8 % — SIGNIFICANT CHANGE UP (ref 0–6)
GAS PNL BLDV: 131 MMOL/L — LOW (ref 136–146)
GAS PNL BLDV: 132 MMOL/L — LOW (ref 136–146)
GAS PNL BLDV: 132 MMOL/L — LOW (ref 136–146)
GIANT PLATELETS BLD QL SMEAR: PRESENT — SIGNIFICANT CHANGE UP
GLUCOSE BLDV-MCNC: 115 — HIGH (ref 70–99)
GLUCOSE BLDV-MCNC: 135 — HIGH (ref 70–99)
GLUCOSE BLDV-MCNC: 98 — SIGNIFICANT CHANGE UP (ref 70–99)
GLUCOSE SERPL-MCNC: 118 MG/DL — HIGH (ref 70–99)
GLUCOSE UR-MCNC: NEGATIVE — SIGNIFICANT CHANGE UP
HCO3 BLDV-SCNC: 22 MMOL/L — SIGNIFICANT CHANGE UP (ref 20–27)
HCO3 BLDV-SCNC: 24 MMOL/L — SIGNIFICANT CHANGE UP (ref 20–27)
HCO3 BLDV-SCNC: 26 MMOL/L — SIGNIFICANT CHANGE UP (ref 20–27)
HCT VFR BLD CALC: 44.4 % — SIGNIFICANT CHANGE UP (ref 39–50)
HCT VFR BLDV CALC: 38.8 % — LOW (ref 39–51)
HCT VFR BLDV CALC: 43.6 % — SIGNIFICANT CHANGE UP (ref 39–51)
HCT VFR BLDV CALC: 43.8 % — SIGNIFICANT CHANGE UP (ref 39–51)
HGB BLD-MCNC: 14 G/DL — SIGNIFICANT CHANGE UP (ref 13–17)
HGB BLDV-MCNC: 12.6 G/DL — LOW (ref 13–17)
HGB BLDV-MCNC: 14.2 G/DL — SIGNIFICANT CHANGE UP (ref 13–17)
HGB BLDV-MCNC: 14.3 G/DL — SIGNIFICANT CHANGE UP (ref 13–17)
HYPOCHROMIA BLD QL: SLIGHT — SIGNIFICANT CHANGE UP
IMM GRANULOCYTES NFR BLD AUTO: 0.5 % — SIGNIFICANT CHANGE UP (ref 0–1.5)
INR BLD: 1.15 — SIGNIFICANT CHANGE UP (ref 0.88–1.17)
KETONES UR-MCNC: SIGNIFICANT CHANGE UP
LACTATE BLDV-MCNC: 1.1 MMOL/L — SIGNIFICANT CHANGE UP (ref 0.5–2)
LACTATE BLDV-MCNC: 2.3 MMOL/L — HIGH (ref 0.5–2)
LACTATE BLDV-MCNC: 4.5 MMOL/L — CRITICAL HIGH (ref 0.5–2)
LEUKOCYTE ESTERASE UR-ACNC: NEGATIVE — SIGNIFICANT CHANGE UP
LYMPHOCYTES # BLD AUTO: 0.92 K/UL — LOW (ref 1–3.3)
LYMPHOCYTES # BLD AUTO: 21.6 % — SIGNIFICANT CHANGE UP (ref 13–44)
LYMPHOCYTES NFR SPEC AUTO: 8.2 % — LOW (ref 13–44)
MACROCYTES BLD QL: SLIGHT — SIGNIFICANT CHANGE UP
MCHC RBC-ENTMCNC: 31 PG — SIGNIFICANT CHANGE UP (ref 27–34)
MCHC RBC-ENTMCNC: 31.5 % — LOW (ref 32–36)
MCV RBC AUTO: 98.2 FL — SIGNIFICANT CHANGE UP (ref 80–100)
METAMYELOCYTES # FLD: 0 % — SIGNIFICANT CHANGE UP (ref 0–1)
MONOCYTES # BLD AUTO: 0.72 K/UL — SIGNIFICANT CHANGE UP (ref 0–0.9)
MONOCYTES NFR BLD AUTO: 16.9 % — HIGH (ref 2–14)
MONOCYTES NFR BLD: 17.3 % — HIGH (ref 2–9)
MUCOUS THREADS # UR AUTO: SIGNIFICANT CHANGE UP
MYELOCYTES NFR BLD: 0 % — SIGNIFICANT CHANGE UP (ref 0–0)
NEUTROPHIL AB SER-ACNC: 44.5 % — SIGNIFICANT CHANGE UP (ref 43–77)
NEUTROPHILS # BLD AUTO: 2.47 K/UL — SIGNIFICANT CHANGE UP (ref 1.8–7.4)
NEUTROPHILS NFR BLD AUTO: 58.2 % — SIGNIFICANT CHANGE UP (ref 43–77)
NEUTS BAND # BLD: 20.9 % — HIGH (ref 0–6)
NITRITE UR-MCNC: NEGATIVE — SIGNIFICANT CHANGE UP
NRBC # FLD: 0 K/UL — LOW (ref 25–125)
OTHER - HEMATOLOGY %: 0 — SIGNIFICANT CHANGE UP
PCO2 BLDV: 50 MMHG — SIGNIFICANT CHANGE UP (ref 41–51)
PCO2 BLDV: 58 MMHG — HIGH (ref 41–51)
PCO2 BLDV: 67 MMHG — HIGH (ref 41–51)
PH BLDV: 7.25 PH — LOW (ref 7.32–7.43)
PH BLDV: 7.32 PH — SIGNIFICANT CHANGE UP (ref 7.32–7.43)
PH BLDV: 7.39 PH — SIGNIFICANT CHANGE UP (ref 7.32–7.43)
PH UR: 6 — SIGNIFICANT CHANGE UP (ref 5–8)
PLATELET # BLD AUTO: 81 K/UL — LOW (ref 150–400)
PLATELET COUNT - ESTIMATE: SIGNIFICANT CHANGE UP
PMV BLD: 10.9 FL — SIGNIFICANT CHANGE UP (ref 7–13)
PO2 BLDV: 22 MMHG — LOW (ref 35–40)
PO2 BLDV: 25 MMHG — LOW (ref 35–40)
PO2 BLDV: < 24 MMHG — LOW (ref 35–40)
POLYCHROMASIA BLD QL SMEAR: SLIGHT — SIGNIFICANT CHANGE UP
POTASSIUM BLDV-SCNC: 3.6 MMOL/L — SIGNIFICANT CHANGE UP (ref 3.4–4.5)
POTASSIUM BLDV-SCNC: 3.7 MMOL/L — SIGNIFICANT CHANGE UP (ref 3.4–4.5)
POTASSIUM BLDV-SCNC: 3.8 MMOL/L — SIGNIFICANT CHANGE UP (ref 3.4–4.5)
POTASSIUM SERPL-MCNC: 3.9 MMOL/L — SIGNIFICANT CHANGE UP (ref 3.5–5.3)
POTASSIUM SERPL-SCNC: 3.9 MMOL/L — SIGNIFICANT CHANGE UP (ref 3.5–5.3)
PROMYELOCYTES # FLD: 0 % — SIGNIFICANT CHANGE UP (ref 0–0)
PROT SERPL-MCNC: 7 G/DL — SIGNIFICANT CHANGE UP (ref 6–8.3)
PROT UR-MCNC: 100 — HIGH
PROTHROM AB SERPL-ACNC: 13.2 SEC — HIGH (ref 9.8–13.1)
RBC # BLD: 4.52 M/UL — SIGNIFICANT CHANGE UP (ref 4.2–5.8)
RBC # FLD: 13.3 % — SIGNIFICANT CHANGE UP (ref 10.3–14.5)
RBC CASTS # UR COMP ASSIST: SIGNIFICANT CHANGE UP (ref 0–?)
SAO2 % BLDV: 21.2 % — LOW (ref 60–85)
SAO2 % BLDV: 31.1 % — LOW (ref 60–85)
SAO2 % BLDV: 32.9 % — LOW (ref 60–85)
SMUDGE CELLS # BLD: PRESENT — SIGNIFICANT CHANGE UP
SODIUM SERPL-SCNC: 136 MMOL/L — SIGNIFICANT CHANGE UP (ref 135–145)
SP GR SPEC: 1.03 — SIGNIFICANT CHANGE UP (ref 1–1.04)
SQUAMOUS # UR AUTO: SIGNIFICANT CHANGE UP
UROBILINOGEN FLD QL: SIGNIFICANT CHANGE UP
VARIANT LYMPHS # BLD: 6.4 % — SIGNIFICANT CHANGE UP
WBC # BLD: 4.25 K/UL — SIGNIFICANT CHANGE UP (ref 3.8–10.5)
WBC # FLD AUTO: 4.25 K/UL — SIGNIFICANT CHANGE UP (ref 3.8–10.5)
WBC UR QL: SIGNIFICANT CHANGE UP (ref 0–?)

## 2019-01-26 PROCEDURE — 70486 CT MAXILLOFACIAL W/O DYE: CPT | Mod: 26

## 2019-01-26 PROCEDURE — 71045 X-RAY EXAM CHEST 1 VIEW: CPT | Mod: 26

## 2019-01-26 PROCEDURE — 70450 CT HEAD/BRAIN W/O DYE: CPT | Mod: 26

## 2019-01-26 PROCEDURE — 72125 CT NECK SPINE W/O DYE: CPT | Mod: 26

## 2019-01-26 RX ORDER — VANCOMYCIN HCL 1 G
1000 VIAL (EA) INTRAVENOUS ONCE
Qty: 0 | Refills: 0 | Status: COMPLETED | OUTPATIENT
Start: 2019-01-26 | End: 2019-01-26

## 2019-01-26 RX ORDER — PIPERACILLIN AND TAZOBACTAM 4; .5 G/20ML; G/20ML
3.38 INJECTION, POWDER, LYOPHILIZED, FOR SOLUTION INTRAVENOUS ONCE
Qty: 0 | Refills: 0 | Status: COMPLETED | OUTPATIENT
Start: 2019-01-26 | End: 2019-01-26

## 2019-01-26 RX ORDER — SODIUM CHLORIDE 9 MG/ML
1000 INJECTION, SOLUTION INTRAVENOUS ONCE
Qty: 0 | Refills: 0 | Status: COMPLETED | OUTPATIENT
Start: 2019-01-26 | End: 2019-01-26

## 2019-01-26 RX ADMIN — SODIUM CHLORIDE 1000 MILLILITER(S): 9 INJECTION, SOLUTION INTRAVENOUS at 16:32

## 2019-01-26 RX ADMIN — Medication 250 MILLIGRAM(S): at 19:30

## 2019-01-26 RX ADMIN — SODIUM CHLORIDE 1000 MILLILITER(S): 9 INJECTION, SOLUTION INTRAVENOUS at 20:28

## 2019-01-26 RX ADMIN — PIPERACILLIN AND TAZOBACTAM 3.38 GRAM(S): 4; .5 INJECTION, POWDER, LYOPHILIZED, FOR SOLUTION INTRAVENOUS at 18:30

## 2019-01-26 RX ADMIN — PIPERACILLIN AND TAZOBACTAM 200 GRAM(S): 4; .5 INJECTION, POWDER, LYOPHILIZED, FOR SOLUTION INTRAVENOUS at 18:30

## 2019-01-26 NOTE — ED PROVIDER NOTE - PMH
CAD (coronary artery disease)    Dementia    HTN (hypertension)    Pacemaker    RLS (restless legs syndrome)

## 2019-01-26 NOTE — ED ADULT TRIAGE NOTE - CHIEF COMPLAINT QUOTE
Pt s/p fall, unable to get up, found by nephew a few hours later. Pt mildly confused states he lost his balance then fell. Lac noted to nose.    in field. Pt s/p fall, unable to get up, found by nephew a few hours later. Pt mildly confused states he lost his balance then fell. Lac noted to nose. Pt unsure of medications    in field.

## 2019-01-26 NOTE — ED PROVIDER NOTE - ATTENDING CONTRIBUTION TO CARE
78M presents after a fall. States he has been unsteady on his feet and has had multiple falls. Last fall yesterday, unknown head strike but has an abrasion on his nose. Recently moved in w brother. No reports of pain currently. Does not remember any CP or dizziness before falls, however poor historian due to decreased short-term memory.  Does not know why he is in the ED. On exam well appearing, nad, A&Ox2, abrasion w dried blood to bridge of nose, PERRL, mmm, lungs clear, rrr, abd soft NT/ND, no edema, 2+ pulses, no focal neuro deficits. Plan for CT head, face, labs, UA, likely admission.

## 2019-01-26 NOTE — ED ADULT NURSE NOTE - ED STAT RN HANDOFF DETAILS
Report given to NORA Russell in ESSU2, patient sent in stable condition with vancomycin infusing.. AM care provided prior to transfer.

## 2019-01-26 NOTE — ED PROVIDER NOTE - NS ED ROS FT
Constitutional: no fever  Eyes: no conjunctivitis  Ears: no ear pain   Nose: no nasal congestion, Mouth/Throat: no throat pain, Neck: no stiffness  Cardiovascular: no chest pain  Chest: no cough  Gastrointestinal: no abdominal pain, no vomiting and diarrhea  MSK: no joint pain  : no dysuria  Skin: +abrasion   Neuro: no LOC

## 2019-01-26 NOTE — ED ADULT NURSE NOTE - NSIMPLEMENTINTERV_GEN_ALL_ED
Implemented All Fall with Harm Risk Interventions:  [place] to call system. Call bell, personal items and telephone within reach. Instruct patient to call for assistance. Room bathroom lighting operational. Non-slip footwear when patient is off stretcher. Physically safe environment: no spills, clutter or unnecessary equipment. Stretcher in lowest position, wheels locked, appropriate side rails in place. Provide visual cue, wrist band, yellow gown, etc. Monitor gait and stability. Monitor for mental status changes and reorient to person, place, and time. Review medications for side effects contributing to fall risk. Reinforce activity limits and safety measures with patient and family. Provide visual clues: red socks.

## 2019-01-26 NOTE — ED PROVIDER NOTE - PHYSICAL EXAMINATION
gen: well appearing  Mentation: AAO x 2  psych: mood appropriate  ENT: airway patent  Eyes: conjunctivae clear bilaterally  Cardio: RRR, no m/r/g  Resp: normal BS b/l  GI: s/nt/nd   Neuro: AAO x 2, sensation and motor function intact,  Skin: abrasion over nasal bridge, erythematous scaly rash over the posterior left shoulder radiating into the left anterior shoulder   MSK: normal movement of all extremities

## 2019-01-26 NOTE — ED PROVIDER NOTE - OBJECTIVE STATEMENT
77 yo male presenting after fall yesterday.  states that he felt unsteady on his feet.  currently denies any symptoms, did not take anything for his symptoms.  unsure why he is in the emergency room.  states that he usually lives at home alone but has been living with his brother this week.

## 2019-01-26 NOTE — ED PROVIDER NOTE - PROGRESS NOTE DETAILS
Lactate uptrending but repeat abd exam benign and patient not feeling any different, will admit for frequent falls, infection of unknown origin. -SM

## 2019-01-26 NOTE — ED PROVIDER NOTE - NS ED MD DISPO ISOLATION TYPES
MIDDLE EAR INFECTION [Adult]    You have an infection of the middle ear (the space behind the eardrum). It can occur as a result of the common cold. This is because congestion can block the internal passage (\"Eustachian Tube\") that drains fluid from the middle ear. When the middle ear fills with fluid, bacteria can grow there and cause an infection. Oral antibiotics are used to treat this illness, not ear drops. Symptoms usually start to improve within 1-2 days of treatment.  HOME CARE:  1. Finish all of the antibiotic medicine prescribed, even though you may feel better after the first few days.  2. You may use acetaminophen (Tylenol) or ibuprofen (Motrin, Advil) to control pain, unless something else was prescribed. [NOTE: If you have chronic liver or kidney disease or have ever had a stomach ulcer or GI bleeding, talk with your doctor before using these medicines.] (Do not give aspirin to anyone under 18 years of age who is ill with a fever. It may cause severe liver damage.)  3. If you were given a narcotic pain medication please read and follow instructions carefully.  FOLLOW UP with your doctor or this facility in two weeks if all symptoms have not cleared, or if hearing does not return to normal within one month.  GET PROMPT MEDICAL ATTENTION if any of the following occur:  · Ear pain gets worse or does not improve after three days of treatment  · Unusual drowsiness or confusion  · Neck pain, stiff neck or headache  · Fluid or blood draining from the ear canal  · Fever over 100.5ºF (38.0ºC) after 3 days of antibiotics  · Convulsion (seizure)  © 9123-9889 [facility], [address] [place] [address], [place] [address]. All rights reserved. This information is not intended as a substitute for professional medical care. Always follow your healthcare professional's instructions.    Follow up with your primary care provider in 7 days if no improvement sooner if worse.    
None

## 2019-01-26 NOTE — ED ADULT NURSE NOTE - CHIEF COMPLAINT QUOTE
Pt s/p fall, unable to get up, found by nephew a few hours later. Pt mildly confused states he lost his balance then fell. Lac noted to nose. Pt unsure of medications    in field.

## 2019-01-26 NOTE — ED ADULT NURSE NOTE - OBJECTIVE STATEMENT
Pt presents to ED after multiple falls at home. Pt states he is staying at his brothers house this week and has been unsteady and fell multiple times. He states today he fell and his brother called the ambulance. Pt denies hitting his head but does not know what he fell on. Pt states he is on blood thinners but does not know what blood thinners he takes. Pt is AxOx2. Flat affect noted. Pt denies chest pain, lightheadedness, dizziness and headache. Pt denies shortness of breath. Breathing even and unlabored. Upon arrival to room patient's appearance disheveled. Dried blood noted to bridge of nose and left hand. Rash noted to neck and shoulders. MD at bedside evaluating patient. 20G IVL placed in the R AC. Labs drawn and sent. Will continue to monitor.

## 2019-01-27 ENCOUNTER — INPATIENT (INPATIENT)
Facility: HOSPITAL | Age: 79
LOS: 9 days | Discharge: NURSING, REHAB OR LONG-TERM CARE | End: 2019-02-06
Attending: INTERNAL MEDICINE | Admitting: INTERNAL MEDICINE
Payer: MEDICARE

## 2019-01-27 DIAGNOSIS — Z90.49 ACQUIRED ABSENCE OF OTHER SPECIFIED PARTS OF DIGESTIVE TRACT: Chronic | ICD-10-CM

## 2019-01-27 DIAGNOSIS — Z98.890 OTHER SPECIFIED POSTPROCEDURAL STATES: Chronic | ICD-10-CM

## 2019-01-27 DIAGNOSIS — I10 ESSENTIAL (PRIMARY) HYPERTENSION: ICD-10-CM

## 2019-01-27 DIAGNOSIS — Z95.0 PRESENCE OF CARDIAC PACEMAKER: Chronic | ICD-10-CM

## 2019-01-27 DIAGNOSIS — R50.9 FEVER, UNSPECIFIED: ICD-10-CM

## 2019-01-27 DIAGNOSIS — R79.89 OTHER SPECIFIED ABNORMAL FINDINGS OF BLOOD CHEMISTRY: ICD-10-CM

## 2019-01-27 DIAGNOSIS — D69.6 THROMBOCYTOPENIA, UNSPECIFIED: ICD-10-CM

## 2019-01-27 DIAGNOSIS — Z29.9 ENCOUNTER FOR PROPHYLACTIC MEASURES, UNSPECIFIED: ICD-10-CM

## 2019-01-27 DIAGNOSIS — G25.81 RESTLESS LEGS SYNDROME: ICD-10-CM
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
## 2019-01-27 DIAGNOSIS — Z87.438 PERSONAL HISTORY OF OTHER DISEASES OF MALE GENITAL ORGANS: Chronic | ICD-10-CM

## 2019-01-27 DIAGNOSIS — D72.825 BANDEMIA: ICD-10-CM

## 2019-01-27 DIAGNOSIS — R21 RASH AND OTHER NONSPECIFIC SKIN ERUPTION: ICD-10-CM

## 2019-01-27 LAB
B PERT DNA SPEC QL NAA+PROBE: NOT DETECTED — SIGNIFICANT CHANGE UP
C PNEUM DNA SPEC QL NAA+PROBE: NOT DETECTED — SIGNIFICANT CHANGE UP
FLUAV H1 2009 PAND RNA SPEC QL NAA+PROBE: NOT DETECTED — SIGNIFICANT CHANGE UP
FLUAV H1 RNA SPEC QL NAA+PROBE: NOT DETECTED — SIGNIFICANT CHANGE UP
FLUAV H3 RNA SPEC QL NAA+PROBE: NOT DETECTED — SIGNIFICANT CHANGE UP
FLUAV SUBTYP SPEC NAA+PROBE: NOT DETECTED — SIGNIFICANT CHANGE UP
FLUBV RNA SPEC QL NAA+PROBE: NOT DETECTED — SIGNIFICANT CHANGE UP
HADV DNA SPEC QL NAA+PROBE: NOT DETECTED — SIGNIFICANT CHANGE UP
HCOV PNL SPEC NAA+PROBE: SIGNIFICANT CHANGE UP
HMPV RNA SPEC QL NAA+PROBE: NOT DETECTED — SIGNIFICANT CHANGE UP
HPIV1 RNA SPEC QL NAA+PROBE: NOT DETECTED — SIGNIFICANT CHANGE UP
HPIV2 RNA SPEC QL NAA+PROBE: NOT DETECTED — SIGNIFICANT CHANGE UP
HPIV3 RNA SPEC QL NAA+PROBE: NOT DETECTED — SIGNIFICANT CHANGE UP
HPIV4 RNA SPEC QL NAA+PROBE: NOT DETECTED — SIGNIFICANT CHANGE UP
METHOD TYPE: SIGNIFICANT CHANGE UP
ORGANISM # SPEC MICROSCOPIC CNT: SIGNIFICANT CHANGE UP
ORGANISM # SPEC MICROSCOPIC CNT: SIGNIFICANT CHANGE UP
RSV RNA SPEC QL NAA+PROBE: NOT DETECTED — SIGNIFICANT CHANGE UP
RV+EV RNA SPEC QL NAA+PROBE: NOT DETECTED — SIGNIFICANT CHANGE UP
SPECIMEN SOURCE: SIGNIFICANT CHANGE UP
SPECIMEN SOURCE: SIGNIFICANT CHANGE UP

## 2019-01-27 PROCEDURE — 99222 1ST HOSP IP/OBS MODERATE 55: CPT | Mod: GC

## 2019-01-27 PROCEDURE — 99223 1ST HOSP IP/OBS HIGH 75: CPT | Mod: GC

## 2019-01-27 RX ORDER — VANCOMYCIN HCL 1 G
1000 VIAL (EA) INTRAVENOUS EVERY 12 HOURS
Qty: 0 | Refills: 0 | Status: DISCONTINUED | OUTPATIENT
Start: 2019-01-27 | End: 2019-01-28

## 2019-01-27 RX ORDER — IPRATROPIUM/ALBUTEROL SULFATE 18-103MCG
3 AEROSOL WITH ADAPTER (GRAM) INHALATION ONCE
Qty: 0 | Refills: 0 | Status: COMPLETED | OUTPATIENT
Start: 2019-01-27 | End: 2019-01-27

## 2019-01-27 RX ORDER — ATORVASTATIN CALCIUM 80 MG/1
1 TABLET, FILM COATED ORAL
Qty: 0 | Refills: 0 | COMMUNITY

## 2019-01-27 RX ORDER — AMLODIPINE BESYLATE 2.5 MG/1
10 TABLET ORAL DAILY
Qty: 0 | Refills: 0 | Status: DISCONTINUED | OUTPATIENT
Start: 2019-01-27 | End: 2019-02-06

## 2019-01-27 RX ORDER — ALBUTEROL 90 UG/1
2 AEROSOL, METERED ORAL
Qty: 0 | Refills: 0 | COMMUNITY

## 2019-01-27 RX ORDER — ACYCLOVIR SODIUM 500 MG
750 VIAL (EA) INTRAVENOUS ONCE
Qty: 0 | Refills: 0 | Status: COMPLETED | OUTPATIENT
Start: 2019-01-27 | End: 2019-01-27

## 2019-01-27 RX ORDER — ACYCLOVIR SODIUM 500 MG
VIAL (EA) INTRAVENOUS
Qty: 0 | Refills: 0 | Status: DISCONTINUED | OUTPATIENT
Start: 2019-01-27 | End: 2019-02-04

## 2019-01-27 RX ORDER — ACYCLOVIR SODIUM 500 MG
750 VIAL (EA) INTRAVENOUS EVERY 12 HOURS
Qty: 0 | Refills: 0 | Status: DISCONTINUED | OUTPATIENT
Start: 2019-01-28 | End: 2019-02-04

## 2019-01-27 RX ORDER — ACETAMINOPHEN 500 MG
650 TABLET ORAL EVERY 6 HOURS
Qty: 0 | Refills: 0 | Status: DISCONTINUED | OUTPATIENT
Start: 2019-01-27 | End: 2019-02-06

## 2019-01-27 RX ORDER — ACYCLOVIR SODIUM 500 MG
VIAL (EA) INTRAVENOUS
Qty: 0 | Refills: 0 | Status: DISCONTINUED | OUTPATIENT
Start: 2019-01-27 | End: 2019-01-27

## 2019-01-27 RX ORDER — PIPERACILLIN AND TAZOBACTAM 4; .5 G/20ML; G/20ML
3.38 INJECTION, POWDER, LYOPHILIZED, FOR SOLUTION INTRAVENOUS EVERY 8 HOURS
Qty: 0 | Refills: 0 | Status: DISCONTINUED | OUTPATIENT
Start: 2019-01-27 | End: 2019-01-27

## 2019-01-27 RX ORDER — LACTOBACILLUS ACIDOPHILUS 100MM CELL
1 CAPSULE ORAL
Qty: 0 | Refills: 0 | Status: DISCONTINUED | OUTPATIENT
Start: 2019-01-27 | End: 2019-02-06

## 2019-01-27 RX ORDER — SODIUM CHLORIDE 9 MG/ML
1000 INJECTION INTRAMUSCULAR; INTRAVENOUS; SUBCUTANEOUS
Qty: 0 | Refills: 0 | Status: DISCONTINUED | OUTPATIENT
Start: 2019-01-27 | End: 2019-01-31

## 2019-01-27 RX ADMIN — Medication 5 MILLIGRAM(S): at 10:11

## 2019-01-27 RX ADMIN — PIPERACILLIN AND TAZOBACTAM 25 GRAM(S): 4; .5 INJECTION, POWDER, LYOPHILIZED, FOR SOLUTION INTRAVENOUS at 07:18

## 2019-01-27 RX ADMIN — Medication 265 MILLIGRAM(S): at 23:10

## 2019-01-27 RX ADMIN — Medication 250 MILLIGRAM(S): at 18:44

## 2019-01-27 RX ADMIN — PIPERACILLIN AND TAZOBACTAM 25 GRAM(S): 4; .5 INJECTION, POWDER, LYOPHILIZED, FOR SOLUTION INTRAVENOUS at 14:30

## 2019-01-27 RX ADMIN — Medication 3 MILLILITER(S): at 06:50

## 2019-01-27 RX ADMIN — Medication 250 MILLIGRAM(S): at 06:08

## 2019-01-27 RX ADMIN — AMLODIPINE BESYLATE 10 MILLIGRAM(S): 2.5 TABLET ORAL at 07:03

## 2019-01-27 RX ADMIN — Medication 1 TABLET(S): at 18:44

## 2019-01-27 RX ADMIN — Medication 1 TABLET(S): at 10:11

## 2019-01-27 NOTE — H&P ADULT - PROBLEM SELECTOR PLAN 4
Patient initially presented w/ lactate of 2.3, which uptrended to 4.5 despite IVF, but subsequently downtrended to 1.1 following another bolus of IVF.

## 2019-01-27 NOTE — H&P ADULT - NSHPPHYSICALEXAM_GEN_ALL_CORE
Vital Signs Last 24 Hrs  T(C): 37.7 (27 Jan 2019 02:04), Max: 37.8 (26 Jan 2019 16:18)  T(F): 99.8 (27 Jan 2019 02:04), Max: 100.1 (26 Jan 2019 16:18)  HR: 65 (27 Jan 2019 02:04) (57 - 71)  BP: 169/85 (27 Jan 2019 02:04) (124/86 - 173/76)  BP(mean): --  RR: 18 (27 Jan 2019 02:04) (16 - 18)  SpO2: 98% (27 Jan 2019 02:04) (97% - 98%)    PHYSICAL EXAM:   GENERAL: no acute distress  HEENT: NC/AT, EOMI, neck supple, MMM  RESPIRATORY: LCTAB/L, no rhonchi, rales, or wheezing  CARDIOVASCULAR: RRR, no murmurs, gallops, rubs  ABDOMINAL: soft, non-tender, non-distended, positive bowel sounds   EXTREMITIES: no clubbing, cyanosis, or edema  NEUROLOGICAL: alert and oriented x 2 (self, place), non-focal  SKIN: +erythematous, non-pruritic, scaly rash over left shoulder and anterior chest wall  PSYCH: normal affect, linear thought process  MUSCULOSKELETAL: no gross joint deformity

## 2019-01-27 NOTE — PHYSICAL THERAPY INITIAL EVALUATION ADULT - PERTINENT HX OF CURRENT PROBLEM, REHAB EVAL
77 y/o M w/ a PMH significant for SSS s/p PPM, dementia, HTN, and restless leg syndrome who presented to the ED s/p fall.

## 2019-01-27 NOTE — H&P ADULT - PROBLEM SELECTOR PLAN 8
DVT ppx: SCDs given thrombocytopenia    Prasanth Briones MD  PGY-2, Internal Medicine  [place]: [phone]  [facility]: [id number]

## 2019-01-27 NOTE — ED ADULT NURSE REASSESSMENT NOTE - CONDITION
A & O x 1. Pt. placed on enhanced supervision after pt. attempted to get out of bed and pulled out IV.

## 2019-01-27 NOTE — PHYSICAL THERAPY INITIAL EVALUATION ADULT - PATIENT PROFILE REVIEW, REHAB EVAL
No Formal Activity order in the computer; spoke with RN Marissa Gresham prior to PT evaluation--> Pt OK for PT consult/OOB activity/yes

## 2019-01-27 NOTE — H&P ADULT - NSHPSOCIALHISTORY_GEN_ALL_CORE
Patient is a former smoker, smoked 1 ppd x60 years, quit 4 months ago. Denies EtOH or illicit drug use. Lives at home alone, independent of ADLs.

## 2019-01-27 NOTE — H&P ADULT - PROBLEM SELECTOR PLAN 1
Per chart review, patient has a longstanding h/o vertigo and falls. Repeated admissions for falls. Unclear etiology. CT head, C-spine negative. EKG at baseline.  - PT consult

## 2019-01-27 NOTE — PHYSICAL THERAPY INITIAL EVALUATION ADULT - ADDITIONAL COMMENTS
Pt reports that he lives alone in an apartment with ~4STE; (+)bilateral handrails far apart; elevator inside. Prior to hospital admission pt was completely independent and used a single axis cane PRN. Pt does have rolling walker @ home as well if he needs and denies any recent falls other than the one that brought him in.    Pt left comfortable on stretcher, NAD, all lines intact, all precautions maintained, with call bell in reach, and RN aware of PT evaluation.

## 2019-01-27 NOTE — PHYSICAL THERAPY INITIAL EVALUATION ADULT - PLANNED THERAPY INTERVENTIONS, PT EVAL
neuromuscular re-education/transfer training/motor coordination training/bed mobility training/strengthening/gait training/balance training

## 2019-01-27 NOTE — H&P ADULT - PROBLEM SELECTOR PLAN 2
Patient has a scaly, erythematous rash on his left shoulder and anterior chest. He states it has been there for the past few weeks, but he never noticed it prior. Patient has a scaly, erythematous rash on his left shoulder and anterior chest. He states it has been there for the past few weeks, but he never noticed it prior. It is possible that this is a cellulitis, but the nature of the rash does not seem consistent.   - will c/w abx for possible cellulitis  - ID c/s in AM for rash and bandemia, consider Dermatology consult in AM as well

## 2019-01-27 NOTE — PHYSICAL THERAPY INITIAL EVALUATION ADULT - GENERAL OBSERVATIONS, REHAB EVAL
Pt encountered in semisupine position on stretcher in the ED, no distress, AxOX4, with +IV, and whole body rash

## 2019-01-27 NOTE — CONSULT NOTE ADULT - ASSESSMENT
79 y/o M w/ a PMH significant for SSS s/p PPM, dementia, HTN, and restless leg syndrome who presented to the ED s/p fall.     In ED with rectal temp of 100.1, bandemia 20%, CXR with clear lungs, RVP (-), CT spine with severe cervical degenerative spondylosis  Patient has a diffuse rash characteristic for disseminated zoster with likely superimposed cellulitis resulting in CoNS bacteremia    Recommend:  DC Zosyn  c/w Vancomycin 1g IV q12h  Check vanco trough prior to 4th dose  Add Acyclovir 10 mg/kg IV q12h with intravenous fluid to prevent nephrotoxicity  Monitor renal function  Ophthalmology eval  Airborne isolation

## 2019-01-27 NOTE — H&P ADULT - HISTORY OF PRESENT ILLNESS
This is a 77 y/o M w/ a PMH significant for SSS s/p PPM, dementia, HTN, and restless leg syndrome who presented to the ED s/p fall. He states he has been very unsteady on his feet, and has had multiple falls over the past few weeks w/ the most recent being the day prior to admission. He denies any preceding prodrome prior to the falls, though he is unsure. He states he remembers the fall, and denies LOC. He denies CP, cough, dyspnea, n/v/d/c, fevers/chills, dysuria, lightheadedness/dizziness. Does endorse a rash that has been over his left shoulder and chest over the past few weeks that he never noticed before. This is a 79 y/o M w/ a PMH significant for SSS s/p PPM, dementia, HTN, and restless leg syndrome who presented to the ED s/p fall. He states he has been very unsteady on his feet, and has had multiple falls over the past few weeks w/ the most recent being the day prior to admission. He denies any preceding prodrome prior to the falls, though he is unsure. He states he remembers the fall, and denies LOC. He denies CP, cough, dyspnea, n/v/d/c, fevers/chills, dysuria, lightheadedness/dizziness. Does endorse a rash that has been over his left shoulder and chest over the past few weeks that he never noticed before. He cannot recall medications that he is taking, but state he is on only one medication for blood pressure, and no other prescriptions.

## 2019-01-27 NOTE — H&P ADULT - NSHPREVIEWOFSYSTEMS_GEN_ALL_CORE
CONSTITUTIONAL: No weakness, fevers or chills  EYES/ENT: No visual changes, no throat pain   RESPIRATORY: No cough, wheezing, hemoptysis; No shortness of breath  CARDIOVASCULAR: No chest pain or palpitations  GASTROINTESTINAL: No abdominal pain, nausea, vomiting, or hematemesis; No diarrhea or constipation. No melena or hematochezia.  GENITOURINARY: No dysuria, frequency or hematuria  NEUROLOGICAL: +unsteadiness, frequent falls  MUSCULOSKELETAL: no joint pain, stiffness  HEME/ENDO: no easy bleeding or bruising, no weight loss or gain  SKIN: +rash on left shoulder and anterior chest  All other review of systems is negative unless indicated above.

## 2019-01-27 NOTE — CHART NOTE - NSCHARTNOTEFT_GEN_A_CORE
Notified by RN patient with tachypnea.   Examined patient at bedside, who appears comfortable, alert and endorses yellow, productive sputum.   He denies any chest pain, palpitations, or other URI symptoms.   Lung sounds clear throughout A+P b/l   CXR - for considation, RVP -   Will give duoneb and antitussive.   sign out given to AM team.   Consider CT chest if symptoms persist or worsen   - ADS CHANO Castañeda

## 2019-01-27 NOTE — PHYSICAL THERAPY INITIAL EVALUATION ADULT - MANUAL MUSCLE TESTING RESULTS, REHAB EVAL
grossly assessed due to/cardiac precautions; right UE 4/5, Left UE 3+/5, Right LE 4/5, Left LE 4-/5; bilateral ankles (+)clonus

## 2019-01-27 NOTE — H&P ADULT - PROBLEM SELECTOR PLAN 7
Patient filled rx for pramipexole in 10/2018 for 30 day supply. He denies taking it recently.   - continue to monitor for symptoms

## 2019-01-27 NOTE — PHYSICAL THERAPY INITIAL EVALUATION ADULT - PRECAUTIONS/LIMITATIONS, REHAB EVAL
cardiac precautions/fall precautions
Skin normal color for race, warm, dry and intact. No evidence of rash.

## 2019-01-27 NOTE — H&P ADULT - PROBLEM SELECTOR PLAN 3
Patient has bandemia to 20.9% w/o leukocytosis or any identifiable source of infection. He received a dose of vanc/zosyn. UA negative; CXR unremarkable.   - f/u blood cx  - monitor off abx Patient has bandemia to 20.9% w/o leukocytosis or any identifiable source of infection. He received a dose of vanc/zosyn. UA negative; CXR unremarkable.   - f/u blood cx  - c/w vanc/zosyn

## 2019-01-27 NOTE — H&P ADULT - NSHPLABSRESULTS_GEN_ALL_CORE
14.0   4.25  )-----------( 81       ( 26 Jan 2019 14:49 )             44.4     01-26    136  |  100  |  15  ----------------------------<  118<H>  3.9   |  25  |  1.08    Ca    8.8      26 Jan 2019 14:49    TPro  7.0  /  Alb  3.4  /  TBili  0.6  /  DBili  x   /  AST  24  /  ALT  14  /  AlkPhos  83  01-26    PT/INR - ( 26 Jan 2019 14:49 )   PT: 13.2 SEC;   INR: 1.15       PTT - ( 26 Jan 2019 14:49 )  PTT:29.8 SEC    EKG: NSR, RBBB, LAFB    < from: CT Maxillofacial No Cont (01.26.19 @ 17:45) >    IMPRESSION:    HEAD CT: No evidence for intracranial hemorrhage, mass effect, or   displaced calvarial fracture.     CERVICAL SPINE CT: No evidence for acute displaced fracture or traumatic   malalignment. Severe cervical degenerative spondylosis which is not   significantly changed from prior study.    MAXILLOFACIAL CT: No acute maxillofacial bone fracture.    < end of copied text >    < from: Xray Chest 1 View- PORTABLE-Urgent (01.26.19 @ 15:57) >    INTERPRETATION:  No focal consolidation.  No pneumothorax.    < end of copied text >    All labs, imaging, and EKG personally reviewed by me.

## 2019-01-27 NOTE — H&P ADULT - PROBLEM SELECTOR PLAN 6
Patient intermittently hypertensive while in ED. He states he only takes one medication for HTN, and no others. Last filled prescriptions for 90 day supplies of amlodipine 10 mg, lasix 40 mg, and coreg 12.5 mg in 11/2018.   - will need to clarify w/ pharmacy in AM Patient intermittently hypertensive while in ED. He states he only takes one medication for HTN, and no others. Last filled prescriptions for 90 day supplies of amlodipine 10 mg, lasix 40 mg, and coreg 12.5 mg in 11/2018.   - will need to clarify w/ pharmacy in AM  - given elevated BPs, will continue 10 mg amlodipine

## 2019-01-27 NOTE — CONSULT NOTE ADULT - SUBJECTIVE AND OBJECTIVE BOX
HPI:  This is a 77 y/o M w/ a PMH significant for SSS s/p PPM, dementia, HTN, and restless leg syndrome who presented to the ED s/p fall. He states he has been very unsteady on his feet, and has had multiple falls over the past few weeks w/ the most recent being the day prior to admission. He denies any preceding prodrome prior to the falls, though he is unsure. He states he remembers the fall, and denies LOC. He denies CP, cough, dyspnea, n/v/d/c, fevers/chills, dysuria, lightheadedness/dizziness. Does endorse a rash that has been over his left shoulder and chest over the past few weeks that he never noticed before. He cannot recall medications that he is taking, but state he is on only one medication for blood pressure, and no other prescriptions. (2019 04:15)    Id note-above reviewed. Patient has had multiple admissions for falls. This time does not recall what happened and why he fell. Denies any fever, but has julio having a cough. Noted  a rash over his left shoulder, unsure how long ago. Denies any burning, itching, pain or tingling at site of rash.     PAST MEDICAL & SURGICAL HISTORY:  RLS (restless legs syndrome)  Pacemaker  Dementia  HTN (hypertension)  CAD (coronary artery disease)  Artificial pacemaker: [employer]  H/O hydrocele  S/P skin biopsy: Behind L ear  History of appendectomy      Allergies  No Known Allergies        ANTIMICROBIALS:  piperacillin/tazobactam IVPB. 3.375 every 8 hours  vancomycin  IVPB 1000 every 12 hours      OTHER MEDS: MEDICATIONS  (STANDING):  amLODIPine   Tablet 10 daily      SOCIAL HISTORY: no smoking etoh or drug use    FAMILY HISTORY:  No pertinent family history in first degree relatives      REVIEW OF SYSTEMS  [  ] ROS unobtainable because:    [x  ] All other systems negative except as noted below:	    Constitutional:  [ ] fever [ ] chills  [ ] weight loss  [ ] weakness  Skin:  [ x] rash [ ] phlebitis	  Eyes: [ ] icterus [ ] pain  [ ] discharge	  ENMT: [ ] sore throat  [ ] thrush [ ] ulcers [ ] exudates  Respiratory: [ ] dyspnea [ ] hemoptysis [ ] cough [ ] sputum	  Cardiovascular:  [ ] chest pain [ ] palpitations [ ] edema	  Gastrointestinal:  [ ] nausea [ ] vomiting [ ] diarrhea [ ] constipation [ ] pain	  Genitourinary:  [ ] dysuria [ ] frequency [ ] hematuria [ ] discharge [ ] flank pain  [ ] incontinence  Musculoskeletal:  [ ] myalgias [ ] arthralgias [ ] arthritis  [ ] back pain  Neurological:  [ ] headache [ ] seizures  [ ] confusion/altered mental status  Psychiatric:  [ ] anxiety [ ] depression	  Hematology/Lymphatics:  [ ] lymphadenopathy  Endocrine:  [ ] adrenal [ ] thyroid  Allergic/Immunologic:	 [ ] transplant [ ] seasonal    Vital Signs Last 24 Hrs  T(F): 98.6 (19 @ 14:55), Max: 100.1 (19 @ 16:18)    Vital Signs Last 24 Hrs  HR: 72 (19 @ 14:55) (61 - 74)  BP: 149/73 (19 @ 14:55) (124/86 - 176/83)  RR: 17 (19 @ 14:55)  SpO2: 96% (19 @ 14:55) (95% - 98%)  Wt(kg): --    PHYSICAL EXAM:  General: non-toxic  HEAD/EYES: anicteric, PERRL conjunctival inflammation  ENT:  supple +hutchinsons sign  Cardiovascular:   S1, S2  Respiratory:  clear bilaterally  GI:  soft, non-tender, normal bowel sounds  :  no CVA tenderness   Musculoskeletal:  no synovitis  Neurologic:  grossly non-focal  Skin:  maculopapular lesions over face,  all extremities chest abdomen and back, crops of vesicular lesion with surrounding erythema over left shoulder and upper arm over C5, some abrasions over neck and back  Lymph: no lymphadenopathy  Psychiatric:  appropriate affect  Vascular:  no phlebitis                                14.0   4.25  )-----------( 81       ( 2019 14:49 )             44.4           136  |  100  |  15  ----------------------------<  118<H>  3.9   |  25  |  1.08    Ca    8.8      2019 14:49    TPro  7.0  /  Alb  3.4  /  TBili  0.6  /  DBili  x   /  AST  24  /  ALT  14  /  AlkPhos  83        Urinalysis Basic - ( 2019 17:00 )    Color: YELLOW / Appearance: CLEAR / S.035 / pH: 6.0  Gluc: NEGATIVE / Ketone: TRACE  / Bili: NEGATIVE / Urobili: TRACE   Blood: NEGATIVE / Protein: 100 / Nitrite: NEGATIVE   Leuk Esterase: NEGATIVE / RBC: 3-5 / WBC 0-2   Sq Epi: OCC / Non Sq Epi: x / Bacteria: NEGATIVE        MICROBIOLOGY:  BLOOD VENOUS  19 --  --  BLOOD CULTURE PCR              v            RADIOLOGY: < from: CT Maxillofacial No Cont (19 @ 17:45) >    IMPRESSION:    HEAD CT: No evidence for intracranial hemorrhage, mass effect, or   displaced calvarial fracture.     If the patient's symptoms persist, consider short interval follow-up head   CT or brain MRI if there are no MRI contraindications.      CERVICAL SPINE CT: No evidence for acute displaced fracture or traumatic   malalignment. Severe cervical degenerative spondylosis which is not   significantly changed from prior study.    MRI would be required to evaluate the ligamentous structures at higher   sensitivity as well as for better evaluation of the cervical canal and   its contents.    MAXILLOFACIAL CT: No acute maxillofacial bone fracture.    < end of copied text >

## 2019-01-27 NOTE — H&P ADULT - ATTENDING COMMENTS
77 y/o male HX of SSS, S/P PPM, Dementia, A+O x 2, obesity, HTN, CAD, Vertigo, Multiple Falls, Lives alone, EX Smoker, Very poor Historian, Restless leg syndrome, Thrombocytopenia, S/P Unwitnessed fall, NO LOC, NO CP, NO SOB, no Fever, RVP Neg., Bandemia, Platelet 81,   CT Head / C Spine/Maxillofacial No FX, + Scaly, Erythematous Rash in left shoulder and Chest , NEW as per pt? No fever, No Pruritis,     ID consult & Derm Consult today, Consider Hem Consult, F/U CBC, CMP, Cultures, IV Zosyn, IV Vanco for now, R/O Cellulitis, Bacid, Venodyne for DVT prophylaxis, Confirm Home Meds, Norvasc 10 mg PO QD,    Case D/W Pt, HS, ADS,    Pt was seen by me, Dr. DASHA Quezada on 1/27/19.

## 2019-01-27 NOTE — H&P ADULT - ASSESSMENT
79 y/o M w/ a PMH significant for SSS s/p PPM, dementia, HTN, and restless leg syndrome who presented to the ED s/p fall.

## 2019-01-27 NOTE — PHYSICAL THERAPY INITIAL EVALUATION ADULT - DIAGNOSIS, PT EVAL
Pt s/p fall; HEAD CT: No evidence for intracranial hemorrhage, mass effect, or displaced calvarial fracture; CT of C/S (-) for fx; pt presents with decreased strength and decreased balance.

## 2019-01-28 LAB
-  COAGULASE NEGATIVE STAPHYLOCOCCUS: SIGNIFICANT CHANGE UP
ALBUMIN SERPL ELPH-MCNC: 3.2 G/DL — LOW (ref 3.3–5)
ALP SERPL-CCNC: 67 U/L — SIGNIFICANT CHANGE UP (ref 40–120)
ALT FLD-CCNC: 17 U/L — SIGNIFICANT CHANGE UP (ref 4–41)
ANION GAP SERPL CALC-SCNC: 12 MMO/L — SIGNIFICANT CHANGE UP (ref 7–14)
AST SERPL-CCNC: 33 U/L — SIGNIFICANT CHANGE UP (ref 4–40)
BACTERIA BLD CULT: SIGNIFICANT CHANGE UP
BACTERIA UR CULT: SIGNIFICANT CHANGE UP
BASOPHILS # BLD AUTO: 0.02 K/UL — SIGNIFICANT CHANGE UP (ref 0–0.2)
BASOPHILS NFR BLD AUTO: 0.5 % — SIGNIFICANT CHANGE UP (ref 0–2)
BILIRUB SERPL-MCNC: 0.5 MG/DL — SIGNIFICANT CHANGE UP (ref 0.2–1.2)
BUN SERPL-MCNC: 10 MG/DL — SIGNIFICANT CHANGE UP (ref 7–23)
CALCIUM SERPL-MCNC: 8.3 MG/DL — LOW (ref 8.4–10.5)
CHLORIDE SERPL-SCNC: 101 MMOL/L — SIGNIFICANT CHANGE UP (ref 98–107)
CO2 SERPL-SCNC: 24 MMOL/L — SIGNIFICANT CHANGE UP (ref 22–31)
CREAT SERPL-MCNC: 0.97 MG/DL — SIGNIFICANT CHANGE UP (ref 0.5–1.3)
EOSINOPHIL # BLD AUTO: 0.04 K/UL — SIGNIFICANT CHANGE UP (ref 0–0.5)
EOSINOPHIL NFR BLD AUTO: 1.1 % — SIGNIFICANT CHANGE UP (ref 0–6)
GLUCOSE SERPL-MCNC: 96 MG/DL — SIGNIFICANT CHANGE UP (ref 70–99)
HCT VFR BLD CALC: 38.2 % — LOW (ref 39–50)
HGB BLD-MCNC: 12.6 G/DL — LOW (ref 13–17)
IMM GRANULOCYTES NFR BLD AUTO: 0.3 % — SIGNIFICANT CHANGE UP (ref 0–1.5)
LYMPHOCYTES # BLD AUTO: 0.98 K/UL — LOW (ref 1–3.3)
LYMPHOCYTES # BLD AUTO: 26.9 % — SIGNIFICANT CHANGE UP (ref 13–44)
MAGNESIUM SERPL-MCNC: 1.7 MG/DL — SIGNIFICANT CHANGE UP (ref 1.6–2.6)
MANUAL SMEAR VERIFICATION: SIGNIFICANT CHANGE UP
MCHC RBC-ENTMCNC: 31.6 PG — SIGNIFICANT CHANGE UP (ref 27–34)
MCHC RBC-ENTMCNC: 33 % — SIGNIFICANT CHANGE UP (ref 32–36)
MCV RBC AUTO: 95.7 FL — SIGNIFICANT CHANGE UP (ref 80–100)
MONOCYTES # BLD AUTO: 0.69 K/UL — SIGNIFICANT CHANGE UP (ref 0–0.9)
MONOCYTES NFR BLD AUTO: 19 % — HIGH (ref 2–14)
NEUTROPHILS # BLD AUTO: 1.9 K/UL — SIGNIFICANT CHANGE UP (ref 1.8–7.4)
NEUTROPHILS NFR BLD AUTO: 52.2 % — SIGNIFICANT CHANGE UP (ref 43–77)
NRBC # FLD: 0 K/UL — LOW (ref 25–125)
ORGANISM # SPEC MICROSCOPIC CNT: SIGNIFICANT CHANGE UP
PHOSPHATE SERPL-MCNC: 2.1 MG/DL — LOW (ref 2.5–4.5)
PLATELET # BLD AUTO: 67 K/UL — LOW (ref 150–400)
PMV BLD: 11.6 FL — SIGNIFICANT CHANGE UP (ref 7–13)
POTASSIUM SERPL-MCNC: 3.6 MMOL/L — SIGNIFICANT CHANGE UP (ref 3.5–5.3)
POTASSIUM SERPL-SCNC: 3.6 MMOL/L — SIGNIFICANT CHANGE UP (ref 3.5–5.3)
PROT SERPL-MCNC: 6.3 G/DL — SIGNIFICANT CHANGE UP (ref 6–8.3)
RBC # BLD: 3.99 M/UL — LOW (ref 4.2–5.8)
RBC # FLD: 13.4 % — SIGNIFICANT CHANGE UP (ref 10.3–14.5)
SODIUM SERPL-SCNC: 137 MMOL/L — SIGNIFICANT CHANGE UP (ref 135–145)
SPECIMEN SOURCE: SIGNIFICANT CHANGE UP
VANCOMYCIN TROUGH SERPL-MCNC: 7.1 UG/ML — LOW (ref 10–20)
WBC # BLD: 3.64 K/UL — LOW (ref 3.8–10.5)
WBC # FLD AUTO: 3.64 K/UL — LOW (ref 3.8–10.5)

## 2019-01-28 PROCEDURE — 99232 SBSQ HOSP IP/OBS MODERATE 35: CPT

## 2019-01-28 RX ORDER — IPRATROPIUM/ALBUTEROL SULFATE 18-103MCG
3 AEROSOL WITH ADAPTER (GRAM) INHALATION EVERY 6 HOURS
Qty: 0 | Refills: 0 | Status: DISCONTINUED | OUTPATIENT
Start: 2019-01-28 | End: 2019-02-01

## 2019-01-28 RX ORDER — SODIUM,POTASSIUM PHOSPHATES 278-250MG
1 POWDER IN PACKET (EA) ORAL
Qty: 0 | Refills: 0 | Status: COMPLETED | OUTPATIENT
Start: 2019-01-28 | End: 2019-01-30

## 2019-01-28 RX ORDER — ALBUTEROL 90 UG/1
1 AEROSOL, METERED ORAL EVERY 4 HOURS
Qty: 0 | Refills: 0 | Status: DISCONTINUED | OUTPATIENT
Start: 2019-01-28 | End: 2019-02-06

## 2019-01-28 RX ORDER — VANCOMYCIN HCL 1 G
VIAL (EA) INTRAVENOUS
Qty: 0 | Refills: 0 | Status: DISCONTINUED | OUTPATIENT
Start: 2019-01-28 | End: 2019-01-29

## 2019-01-28 RX ORDER — VANCOMYCIN HCL 1 G
1250 VIAL (EA) INTRAVENOUS EVERY 12 HOURS
Qty: 0 | Refills: 0 | Status: DISCONTINUED | OUTPATIENT
Start: 2019-01-29 | End: 2019-01-29

## 2019-01-28 RX ORDER — VANCOMYCIN HCL 1 G
1250 VIAL (EA) INTRAVENOUS ONCE
Qty: 0 | Refills: 0 | Status: COMPLETED | OUTPATIENT
Start: 2019-01-28 | End: 2019-01-28

## 2019-01-28 RX ORDER — IPRATROPIUM/ALBUTEROL SULFATE 18-103MCG
3 AEROSOL WITH ADAPTER (GRAM) INHALATION ONCE
Qty: 0 | Refills: 0 | Status: COMPLETED | OUTPATIENT
Start: 2019-01-28 | End: 2019-01-28

## 2019-01-28 RX ADMIN — Medication 1 TABLET(S): at 10:06

## 2019-01-28 RX ADMIN — Medication 3 MILLILITER(S): at 10:57

## 2019-01-28 RX ADMIN — AMLODIPINE BESYLATE 10 MILLIGRAM(S): 2.5 TABLET ORAL at 06:19

## 2019-01-28 RX ADMIN — Medication 650 MILLIGRAM(S): at 18:32

## 2019-01-28 RX ADMIN — Medication 250 MILLIGRAM(S): at 06:19

## 2019-01-28 RX ADMIN — Medication 125 MILLIGRAM(S): at 21:34

## 2019-01-28 RX ADMIN — Medication 3 MILLILITER(S): at 00:05

## 2019-01-28 RX ADMIN — Medication 265 MILLIGRAM(S): at 17:39

## 2019-01-28 RX ADMIN — Medication 650 MILLIGRAM(S): at 18:49

## 2019-01-28 RX ADMIN — Medication 265 MILLIGRAM(S): at 12:01

## 2019-01-28 RX ADMIN — Medication 1 TABLET(S): at 21:34

## 2019-01-28 RX ADMIN — Medication 1 TABLET(S): at 17:39

## 2019-01-28 RX ADMIN — Medication 1 TABLET(S): at 12:00

## 2019-01-28 RX ADMIN — Medication 3 MILLILITER(S): at 22:27

## 2019-01-28 NOTE — PROGRESS NOTE ADULT - ASSESSMENT
disseminated herpes zoster.    no signs of visceral involvement.    bacteremia due to coag neg staph likely due to superinfection.     Suggest: continue acyclovir IV                continue vanco 1 gm iv q 12                 follow final staph ID and sens                 hydrate                  follow creat closely                  maintain airborne/ contact precautions    Mariaa Rich MD  Pager: [phone]  After 5 PM or weekends please call fellow on call or office [phone]

## 2019-01-28 NOTE — PROGRESS NOTE ADULT - SUBJECTIVE AND OBJECTIVE BOX
Follow Up:  disseminated herpes zoster     Inverval History/ROS: feels ok.  no HA, chills, SOB.  Rest of ROS neg.    Allergies  No Known Allergies        ANTIMICROBIALS:  acyclovir IVPB    acyclovir IVPB 750 every 12 hours  vancomycin  IVPB 1000 every 12 hours      OTHER MEDS:  acetaminophen   Tablet .. 650 milliGRAM(s) Oral every 6 hours PRN  amLODIPine   Tablet 10 milliGRAM(s) Oral daily  lactobacillus acidophilus 1 Tablet(s) Oral two times a day with meals  potassium acid phosphate/sodium acid phosphate tablet (K-PHOS No. 2) 1 Tablet(s) Oral four times a day with meals  sodium chloride 0.9%. 1000 milliLiter(s) IV Continuous <Continuous>      Vital Signs Last 24 Hrs  T(C): 36.9 (2019 11:25), Max: 37.8 (2019 20:58)  T(F): 98.5 (2019 11:25), Max: 100.1 (2019 20:58)  HR: 77 (2019 11:25) (56 - 92)  BP: 141/65 (2019 11:25) (138/88 - 176/85)  BP(mean): --  RR: 30 (2019 11:25) (16 - 30)  SpO2: 93% (2019 11:25) (93% - 100%)    PHYSICAL EXAM:  General: [x ] non-toxic, nasal O2  HEAD/EYES: [x ] conj injected  ENT:  [ ] normal [x ] supple [ ] thrush [ ] pharyngeal exudate  Cardiovascular:   [ ] murmur [x ] normal [ ] PPM/AICD  Respiratory:  [x ] BS bilat  GI:  [x ] soft, non-tender, normal bowel sounds  :  [ ] milner [ x] no CVA tenderness   Musculoskeletal:  [x ] no synovitis  Neurologic:  [x ] non-focal exam   Skin:  [x ] vesicles in dermatome left shoulder, upper back, neck.  scattered mac/pap/vesicles face, trunk, ext.  Lymph: [ ] no lymphadenopathy  Psychiatric:  [x ] appropriate affect [x ] alert & oriented  Lines:  [x ] no phlebitis [ ] central line                                12.6   3.64  )-----------( 67       ( 2019 06:27 )             38.2       01-28    137  |  101  |  10  ----------------------------<  96  3.6   |  24  |  0.97    Ca    8.3<L>      2019 06:27  Phos  2.1       Mg     1.7         TPro  6.3  /  Alb  3.2<L>  /  TBili  0.5  /  DBili  x   /  AST  33  /  ALT  17  /  AlkPhos  67        Urinalysis Basic - ( 2019 17:00 )    Color: YELLOW / Appearance: CLEAR / S.035 / pH: 6.0  Gluc: NEGATIVE / Ketone: TRACE  / Bili: NEGATIVE / Urobili: TRACE   Blood: NEGATIVE / Protein: 100 / Nitrite: NEGATIVE   Leuk Esterase: NEGATIVE / RBC: 3-5 / WBC 0-2   Sq Epi: OCC / Non Sq Epi: x / Bacteria: NEGATIVE        MICROBIOLOGY:  v  URINE MIDSTREAM  19 --  --  --      BLOOD PERIPHERAL  19 --  --  BLOOD CULTURE PCR  Gram Pos Cocci to be IDed      Culture - Blood (19 @ 16:59)    Culture - Blood:   ***Blood Panel PCR results on this specimen are available  approximately 3 hours after the Gram stain result***  Gram stain, PCR, and/or culture results may not always  correspond due to difference in methodologies  ------------------------------------------------------------  This PCR assay was performed using [email or web address].  The  following targets are tested for:  Enterococcus, vancomycin  resistant enterococci, Listeria monocytogenes,  coagulase  negative staphylococci, S. aureus, methicillin resistant S.  aureus, Streptococcus agalactiae (Group B), S. pneumoniae,  S. pyogenes (Group A), Acinetobacter baumannii, Enterobacter  cloacae, E. coli, Klebsiella oxytoca, K. pneumoniae, Proteus  sp., Serratia marcescens, Haemophilus influenzae, Neisseria  meningitidis, Pseudomonas aeruginosa, Candida albicans, C.  glabrata, C. krusei, C. parapsilosis, C. tropicalis and the  KPC resistance gene.  **NOTE: Due to technical problems, Proteus sp. will NOT be  reported as part of the BCID paneluntil further notice.    -  Coagulase negative Staphylococcus: + DETECT IFRAH    Specimen Source: BLOOD VENOUS    Organism: BLOOD CULTURE PCR    Organism: Gram Pos Cocci to be IDed    Gram Stain Blood:   ***** CRITICAL RESULT *****  PERSON CALLED / READ-BACK: CINDY MELENDEZ/ELADIA  DATE / TIME CALLED: 19 0851  CALLED BY: URI DOMINGUEZ  GPCCL^Gram Pos Cocci In Clusters  AFTER: 15 HOURS INCUBATION  BOTTLE: AEROBIC BOTTLE    Organism Identification: BLOOD CULTURE PCR  Gram Pos Cocci to be IDed    Method Type: PCR              RADIOLOGY:

## 2019-01-28 NOTE — CHART NOTE - NSCHARTNOTEFT_GEN_A_CORE
Notified by RN that pt with temp 100.9 this evening, confused and disoriented.  Pt was seen and examined at the bedside. VS as noted. Pt was found sleeping in bed easily arousable in no distress. Pt states that he has no complains at present. Lungs with diminished BS, positive wheeze present B/L, S1, S2 present, Abdomen soft, non-tender with BS present in all 4 quadrants. Pt reports voiding freely and moving his bowels. Pt was placed on 1:1 constant observation earlier in a day due to being very confused, getting out of the room naked, difficult with following commands. Case was discussed with Attending Dr Ramirez. Tried to reach out to pts brother Rio at the number provided for med recs and further info gathering, no response, VM left    A/P    This is a 77 y/o M w/ a PMH significant for SSS s/p PPM, Dementia, HTN, and restless leg syndrome who presented to the ED s/p fall, now with fever, disseminated rash, increased confusion, and bacteremic.    - started pt on Duoneb treatments q6hrs  - monitor fever curve, f/u cultures, f/u Vanco trough today  - c/w Vanco IV / Acyclovir per ID recs  - 1/26 BCx- growing GPC / Staph, f/u repeat BCx as of 1/29 for clearance  - ECHO ordered r/o Endocarditis, f/u repeat CT Head 2/2 increased confusion  - cont 1:1 for safety, Neuro checks q6hrs  - f/u Card cs, will call Pulm  house in am per Attending

## 2019-01-28 NOTE — PROGRESS NOTE ADULT - SUBJECTIVE AND OBJECTIVE BOX
SUBJECTIVE / OVERNIGHT EVENTS: pt confused, denies cp      MEDICATIONS  (STANDING):  acyclovir IVPB      acyclovir IVPB 750 milliGRAM(s) IV Intermittent every 12 hours  ALBUTerol    90 MICROgram(s) HFA Inhaler 1 Puff(s) Inhalation every 4 hours  ALBUTerol/ipratropium for Nebulization 3 milliLiter(s) Nebulizer every 6 hours  amLODIPine   Tablet 10 milliGRAM(s) Oral daily  lactobacillus acidophilus 1 Tablet(s) Oral two times a day with meals  potassium acid phosphate/sodium acid phosphate tablet (K-PHOS No. 2) 1 Tablet(s) Oral four times a day with meals  sodium chloride 0.9%. 1000 milliLiter(s) (75 mL/Hr) IV Continuous <Continuous>  vancomycin  IVPB 1250 milliGRAM(s) IV Intermittent once  vancomycin  IVPB        MEDICATIONS  (PRN):  acetaminophen   Tablet .. 650 milliGRAM(s) Oral every 6 hours PRN Temp greater or equal to 38C (100.4F)    Vital Signs Last 24 Hrs  T(C): 38.3 (28 Jan 2019 18:14), Max: 38.3 (28 Jan 2019 18:14)  T(F): 100.9 (28 Jan 2019 18:14), Max: 100.9 (28 Jan 2019 18:14)  HR: 82 (28 Jan 2019 18:14) (77 - 92)  BP: 150/59 (28 Jan 2019 18:14) (141/65 - 176/85)  BP(mean): --  RR: 23 (28 Jan 2019 18:14) (20 - 30)  SpO2: 97% (28 Jan 2019 18:14) (93% - 100%)    CAPILLARY BLOOD GLUCOSE        I&O's Summary    27 Jan 2019 07:01  -  28 Jan 2019 07:00  --------------------------------------------------------  IN: 840 mL / OUT: 175 mL / NET: 665 mL    28 Jan 2019 07:01  -  28 Jan 2019 21:31  --------------------------------------------------------  IN: 0 mL / OUT: 250 mL / NET: -250 mL      PHYSICAL EXAM:  GENERAL: NAD  rash+  EYES: EOMI, PERRLA  NECK: Supple, No JVD  CHEST/LUNG: dec breath sounds at bases   HEART:  S1 , S2 +  ABDOMEN: soft , bs+  EXTREMITIES: trace edema   NEUROLOGY:alert awake confused       LABS:                        12.6   3.64  )-----------( 67       ( 28 Jan 2019 06:27 )             38.2     01-28    137  |  101  |  10  ----------------------------<  96  3.6   |  24  |  0.97    Ca    8.3<L>      28 Jan 2019 06:27  Phos  2.1     01-28  Mg     1.7     01-28    TPro  6.3  /  Alb  3.2<L>  /  TBili  0.5  /  DBili  x   /  AST  33  /  ALT  17  /  AlkPhos  67  01-28              RADIOLOGY & ADDITIONAL TESTS:    Imaging Personally Reviewed:    Consultant(s) Notes Reviewed:      Care Discussed with Consultants/Other Providers:

## 2019-01-29 LAB
ANION GAP SERPL CALC-SCNC: 10 MMO/L — SIGNIFICANT CHANGE UP (ref 7–14)
BASE EXCESS BLDA CALC-SCNC: 4.2 MMOL/L — SIGNIFICANT CHANGE UP
BASOPHILS # BLD AUTO: 0.03 K/UL — SIGNIFICANT CHANGE UP (ref 0–0.2)
BASOPHILS NFR BLD AUTO: 0.9 % — SIGNIFICANT CHANGE UP (ref 0–2)
BUN SERPL-MCNC: 8 MG/DL — SIGNIFICANT CHANGE UP (ref 7–23)
CALCIUM SERPL-MCNC: 8.2 MG/DL — LOW (ref 8.4–10.5)
CHLORIDE SERPL-SCNC: 101 MMOL/L — SIGNIFICANT CHANGE UP (ref 98–107)
CO2 SERPL-SCNC: 26 MMOL/L — SIGNIFICANT CHANGE UP (ref 22–31)
CREAT SERPL-MCNC: 0.99 MG/DL — SIGNIFICANT CHANGE UP (ref 0.5–1.3)
EOSINOPHIL # BLD AUTO: 0.07 K/UL — SIGNIFICANT CHANGE UP (ref 0–0.5)
EOSINOPHIL NFR BLD AUTO: 2 % — SIGNIFICANT CHANGE UP (ref 0–6)
GLUCOSE SERPL-MCNC: 98 MG/DL — SIGNIFICANT CHANGE UP (ref 70–99)
HCO3 BLDA-SCNC: 27 MMOL/L — HIGH (ref 22–26)
HCT VFR BLD CALC: 38.9 % — LOW (ref 39–50)
HCT VFR BLD CALC: 38.9 % — LOW (ref 39–50)
HGB BLD-MCNC: 12.6 G/DL — LOW (ref 13–17)
HGB BLD-MCNC: 12.6 G/DL — LOW (ref 13–17)
IMM GRANULOCYTES NFR BLD AUTO: 0.3 % — SIGNIFICANT CHANGE UP (ref 0–1.5)
LYMPHOCYTES # BLD AUTO: 1.14 K/UL — SIGNIFICANT CHANGE UP (ref 1–3.3)
LYMPHOCYTES # BLD AUTO: 33.2 % — SIGNIFICANT CHANGE UP (ref 13–44)
MAGNESIUM SERPL-MCNC: 2 MG/DL — SIGNIFICANT CHANGE UP (ref 1.6–2.6)
MANUAL SMEAR VERIFICATION: SIGNIFICANT CHANGE UP
MANUAL SMEAR VERIFICATION: SIGNIFICANT CHANGE UP
MCHC RBC-ENTMCNC: 31.3 PG — SIGNIFICANT CHANGE UP (ref 27–34)
MCHC RBC-ENTMCNC: 31.3 PG — SIGNIFICANT CHANGE UP (ref 27–34)
MCHC RBC-ENTMCNC: 32.4 % — SIGNIFICANT CHANGE UP (ref 32–36)
MCHC RBC-ENTMCNC: 32.4 % — SIGNIFICANT CHANGE UP (ref 32–36)
MCV RBC AUTO: 96.5 FL — SIGNIFICANT CHANGE UP (ref 80–100)
MCV RBC AUTO: 96.5 FL — SIGNIFICANT CHANGE UP (ref 80–100)
MONOCYTES # BLD AUTO: 0.64 K/UL — SIGNIFICANT CHANGE UP (ref 0–0.9)
MONOCYTES NFR BLD AUTO: 18.7 % — HIGH (ref 2–14)
NEUTROPHILS # BLD AUTO: 1.54 K/UL — LOW (ref 1.8–7.4)
NEUTROPHILS NFR BLD AUTO: 44.9 % — SIGNIFICANT CHANGE UP (ref 43–77)
NRBC # FLD: 0 K/UL — LOW (ref 25–125)
NRBC # FLD: 0 K/UL — LOW (ref 25–125)
ORGANISM # SPEC MICROSCOPIC CNT: SIGNIFICANT CHANGE UP
ORGANISM # SPEC MICROSCOPIC CNT: SIGNIFICANT CHANGE UP
PCO2 BLDA: 44 MMHG — SIGNIFICANT CHANGE UP (ref 35–48)
PH BLDA: 7.42 PH — SIGNIFICANT CHANGE UP (ref 7.35–7.45)
PHOSPHATE SERPL-MCNC: 3 MG/DL — SIGNIFICANT CHANGE UP (ref 2.5–4.5)
PLATELET # BLD AUTO: 67 K/UL — LOW (ref 150–400)
PLATELET # BLD AUTO: 67 K/UL — LOW (ref 150–400)
PMV BLD: 11.2 FL — SIGNIFICANT CHANGE UP (ref 7–13)
PMV BLD: 11.2 FL — SIGNIFICANT CHANGE UP (ref 7–13)
PO2 BLDA: 32 MMHG — CRITICAL LOW (ref 83–108)
POTASSIUM SERPL-MCNC: 3.6 MMOL/L — SIGNIFICANT CHANGE UP (ref 3.5–5.3)
POTASSIUM SERPL-SCNC: 3.6 MMOL/L — SIGNIFICANT CHANGE UP (ref 3.5–5.3)
RBC # BLD: 4.03 M/UL — LOW (ref 4.2–5.8)
RBC # BLD: 4.03 M/UL — LOW (ref 4.2–5.8)
RBC # FLD: 13.6 % — SIGNIFICANT CHANGE UP (ref 10.3–14.5)
RBC # FLD: 13.6 % — SIGNIFICANT CHANGE UP (ref 10.3–14.5)
SAO2 % BLDA: 59.2 % — LOW (ref 95–99)
SODIUM SERPL-SCNC: 137 MMOL/L — SIGNIFICANT CHANGE UP (ref 135–145)
WBC # BLD: 3.43 K/UL — LOW (ref 3.8–10.5)
WBC # BLD: 3.43 K/UL — LOW (ref 3.8–10.5)
WBC # FLD AUTO: 3.43 K/UL — LOW (ref 3.8–10.5)
WBC # FLD AUTO: 3.43 K/UL — LOW (ref 3.8–10.5)

## 2019-01-29 PROCEDURE — 99232 SBSQ HOSP IP/OBS MODERATE 35: CPT

## 2019-01-29 RX ORDER — CLOPIDOGREL BISULFATE 75 MG/1
75 TABLET, FILM COATED ORAL DAILY
Qty: 0 | Refills: 0 | Status: DISCONTINUED | OUTPATIENT
Start: 2019-01-29 | End: 2019-02-06

## 2019-01-29 RX ORDER — ASPIRIN/CALCIUM CARB/MAGNESIUM 324 MG
81 TABLET ORAL DAILY
Qty: 0 | Refills: 0 | Status: DISCONTINUED | OUTPATIENT
Start: 2019-01-29 | End: 2019-02-06

## 2019-01-29 RX ORDER — ATORVASTATIN CALCIUM 80 MG/1
10 TABLET, FILM COATED ORAL AT BEDTIME
Qty: 0 | Refills: 0 | Status: DISCONTINUED | OUTPATIENT
Start: 2019-01-29 | End: 2019-02-06

## 2019-01-29 RX ORDER — BUDESONIDE AND FORMOTEROL FUMARATE DIHYDRATE 160; 4.5 UG/1; UG/1
2 AEROSOL RESPIRATORY (INHALATION)
Qty: 0 | Refills: 0 | Status: DISCONTINUED | OUTPATIENT
Start: 2019-01-29 | End: 2019-02-06

## 2019-01-29 RX ORDER — CARVEDILOL PHOSPHATE 80 MG/1
12.5 CAPSULE, EXTENDED RELEASE ORAL EVERY 12 HOURS
Qty: 0 | Refills: 0 | Status: DISCONTINUED | OUTPATIENT
Start: 2019-01-29 | End: 2019-02-06

## 2019-01-29 RX ORDER — FUROSEMIDE 40 MG
40 TABLET ORAL
Qty: 0 | Refills: 0 | Status: DISCONTINUED | OUTPATIENT
Start: 2019-01-29 | End: 2019-01-29

## 2019-01-29 RX ADMIN — Medication 3 MILLILITER(S): at 11:01

## 2019-01-29 RX ADMIN — Medication 3 MILLILITER(S): at 16:53

## 2019-01-29 RX ADMIN — SODIUM CHLORIDE 75 MILLILITER(S): 9 INJECTION INTRAMUSCULAR; INTRAVENOUS; SUBCUTANEOUS at 09:04

## 2019-01-29 RX ADMIN — AMLODIPINE BESYLATE 10 MILLIGRAM(S): 2.5 TABLET ORAL at 05:53

## 2019-01-29 RX ADMIN — SODIUM CHLORIDE 75 MILLILITER(S): 9 INJECTION INTRAMUSCULAR; INTRAVENOUS; SUBCUTANEOUS at 20:34

## 2019-01-29 RX ADMIN — Medication 265 MILLIGRAM(S): at 17:04

## 2019-01-29 RX ADMIN — Medication 3 MILLILITER(S): at 21:08

## 2019-01-29 RX ADMIN — Medication 125 MILLIGRAM(S): at 17:06

## 2019-01-29 RX ADMIN — CARVEDILOL PHOSPHATE 12.5 MILLIGRAM(S): 80 CAPSULE, EXTENDED RELEASE ORAL at 17:05

## 2019-01-29 RX ADMIN — Medication 125 MILLIGRAM(S): at 09:03

## 2019-01-29 RX ADMIN — Medication 1 TABLET(S): at 17:05

## 2019-01-29 RX ADMIN — Medication 1 TABLET(S): at 21:53

## 2019-01-29 RX ADMIN — ATORVASTATIN CALCIUM 10 MILLIGRAM(S): 80 TABLET, FILM COATED ORAL at 22:38

## 2019-01-29 RX ADMIN — BUDESONIDE AND FORMOTEROL FUMARATE DIHYDRATE 2 PUFF(S): 160; 4.5 AEROSOL RESPIRATORY (INHALATION) at 21:53

## 2019-01-29 RX ADMIN — Medication 265 MILLIGRAM(S): at 05:53

## 2019-01-29 RX ADMIN — Medication 1 TABLET(S): at 12:43

## 2019-01-29 RX ADMIN — Medication 1 TABLET(S): at 09:03

## 2019-01-29 RX ADMIN — Medication 1 TABLET(S): at 09:02

## 2019-01-29 NOTE — PROGRESS NOTE ADULT - SUBJECTIVE AND OBJECTIVE BOX
Follow Up:  disseminated herpes zoster     Inverval History/ROS: feels better.   no HA, chills, SOB.  no longer requiring nasal O2  Rest of ROS neg.    Allergies  No Known Allergies        ANTIMICROBIALS:  acyclovir IVPB    acyclovir IVPB 750 every 12 hours  vancomycin  IVPB 1250 every 12 hours  vancomycin  IVPB            OTHER MEDS:  acetaminophen   Tablet .. 650 milliGRAM(s) Oral every 6 hours PRN  amLODIPine   Tablet 10 milliGRAM(s) Oral daily  lactobacillus acidophilus 1 Tablet(s) Oral two times a day with meals  potassium acid phosphate/sodium acid phosphate tablet (K-PHOS No. 2) 1 Tablet(s) Oral four times a day with meals  sodium chloride 0.9%. 1000 milliLiter(s) IV Continuous <Continuous>      Vital Signs Last 24 Hrs  T(F): 98.7 (01-29-19 @ 17:00), Max: 100.9 (01-28-19 @ 18:14)  HR: 73 (01-29-19 @ 17:00)  BP: 145/72 (01-29-19 @ 17:00)  RR: 22 (01-29-19 @ 17:00)  SpO2: 96% (01-29-19 @ 17:00) (95% - 98%)    PHYSICAL EXAM:  General: [x ] non-toxic  HEAD/EYES: [x ] conj less injected  ENT:  [ ] normal [x ] supple [ ] thrush [ ] pharyngeal exudate  Cardiovascular:   [ ] murmur [x ] normal [ ] PPM/AICD  Respiratory:  [x ] BS bilat  GI:  [x ] soft, non-tender, normal bowel sounds  :  [ ] milner [ x] no CVA tenderness   Musculoskeletal:  [x ] no synovitis  Neurologic:  [x ] non-focal exam   Skin:  [x ] vesicles in dermatome left shoulder, upper back, neck.  scattered mac/pap/vesicles face, trunk, ext.  Lymph: [ ] no lymphadenopathy  Psychiatric:  [x ] appropriate affect [x ] alert & oriented  Lines:  [x ] no phlebitis [ ] central line                                           12.6   3.43  )-----------( 67       ( 29 Jan 2019 06:15 )             38.9 01-29    137  |  101  |  8   ----------------------------<  98  3.6   |  26  |  0.99  Ca    8.2      29 Jan 2019 06:15Phos  3.0     01-29Mg     2.0     01-29  TPro  6.3  /  Alb  3.2  /  TBili  0.5  /  DBili  x   /  AST  33  /  ALT  17  /  AlkPhos  67  01-28              MICROBIOLOGY:    URINE MIDSTREAM  01-26-19 --  --  --      BLOOD PERIPHERAL    blood culture x 1 staph hominis  blood culture x1 staph epi          RADIOLOGY:

## 2019-01-29 NOTE — PROGRESS NOTE ADULT - ASSESSMENT
disseminated herpes zoster. still with versicles.     leucopenia, thrombocytopenia.     bacteremia 2 species of coag neg staph suggests contaminants    Suggest: continue acyclovir IV                d/c vanco                  hydrate                  follow creat closely                  maintain airborne/ contact precautions    Mariaa Rich MD  Pager: [phone]  After 5 PM or weekends please call fellow on call or office [phone]

## 2019-01-29 NOTE — CHART NOTE - NSCHARTNOTEFT_GEN_A_CORE
ABG resulted with Po2 32, suspected VBG draw, pt not hypercarbic. Pt is currently on RA, Pulse ox sat 95-96%, not in acute distress.

## 2019-01-29 NOTE — CONSULT NOTE ADULT - SUBJECTIVE AND OBJECTIVE BOX
HPI: 78M hx restless legs syndrome, SSS s/p Pacemaker, HTN, CAD w/ x1 stent presented to the ER s/p fall. Found to have disseminated zoster and gram positive cocci bacteremia. Patient started on vancomycin and acyclovir IV. ID following. Pulmonology consulted for tachypenia at rest. Patient seen and examined at bedside sitting comfortably w/o oxygen use supplement. Patient cannot recall events of his fall, states he lives alone and regularly follows up with PCP, last visit approx. 1 month ago. Transported via public transportation by self or driven by siblings. He was feel well until the fall, had not noticed any rashes on his skin, No fevers, chills or night sweats. +chronic cough w/ "gray" sputum production. Patient is a chronic smoker 1ppd for 60 years, he quit 2 months ago abruptly w/o pharmaceutical assistance. Denies orthopnea, chestpain/palpations. Patient unable to recall home medication list.       PAST MEDICAL & SURGICAL HISTORY:  RLS (restless legs syndrome)  Pacemaker  Dementia  HTN (hypertension)  CAD (coronary artery disease)  Artificial pacemaker: [employer]  H/O hydrocele  S/P skin biopsy: Behind L ear  History of appendectomy      FAMILY HISTORY:  No pertinent family history in first degree relatives      SOCIAL HISTORY:  Smokinpacks x 40years  EtOH Use:no  Marital Status:no  Occupation: retired   Exposures: none  Recent Travel:none    Allergies    No Known Allergies    Intolerances        HOME MEDICATIONS:    REVIEW OF SYSTEMS:  Negative except for those in HPI    OBJECTIVE:  ICU Vital Signs Last 24 Hrs  T(C): 36.8 (2019 11:03), Max: 38.3 (2019 18:14)  T(F): 98.3 (2019 11:03), Max: 100.9 (2019 18:14)  HR: 76 (2019 12:28) (70 - 91)  BP: 148/63 (2019 11:03) (123/59 - 155/73)  BP(mean): --  ABP: --  ABP(mean): --  RR: 24 (2019 12:28) (20 - 24)  SpO2: 95% (2019 12:28) (95% - 98%)         @ 07:01  -   @ 07:00  --------------------------------------------------------  IN: 915 mL / OUT: 525 mL / NET: 390 mL     @ 07:01   @ 14:12  --------------------------------------------------------  IN: 0 mL / OUT: 430 mL / NET: -430 mL      CAPILLARY BLOOD GLUCOSE          PHYSICAL EXAM:  General: Awake, alert, oriented X 3.   HEENT: Atraumatic, normocephalic. No nasal congestion. No tonsillar or pharyngeal exudates.  Lymph Nodes: No palpable lymphadenopathy  Neck: No JVD. No carotid bruit.   Respiratory: no wheezing, poor inspiratory flow,   Cardiovascular: S1 S2 normal. No murmurs, rubs or gallops.   Abdomen: Soft, non-tender, non-distended. No organomegaly.  Extremities: Warm to touch. Peripheral pulse palpable. No pedal edema.   Skin: diffuse disseminated upper b/l trunk and back red erythematous weeping lesions,        HOSPITAL MEDICATIONS:  MEDICATIONS  (STANDING):  acyclovir IVPB      acyclovir IVPB 750 milliGRAM(s) IV Intermittent every 12 hours  ALBUTerol    90 MICROgram(s) HFA Inhaler 1 Puff(s) Inhalation every 4 hours  ALBUTerol/ipratropium for Nebulization 3 milliLiter(s) Nebulizer every 6 hours  amLODIPine   Tablet 10 milliGRAM(s) Oral daily  atorvastatin 10 milliGRAM(s) Oral at bedtime  buDESOnide 160 MICROgram(s)/formoterol 4.5 MICROgram(s) Inhaler 2 Puff(s) Inhalation two times a day  carvedilol 12.5 milliGRAM(s) Oral every 12 hours  lactobacillus acidophilus 1 Tablet(s) Oral two times a day with meals  potassium acid phosphate/sodium acid phosphate tablet (K-PHOS No. 2) 1 Tablet(s) Oral four times a day with meals  sodium chloride 0.9%. 1000 milliLiter(s) (75 mL/Hr) IV Continuous <Continuous>  vancomycin  IVPB 1250 milliGRAM(s) IV Intermittent every 12 hours  vancomycin  IVPB        MEDICATIONS  (PRN):  acetaminophen   Tablet .. 650 milliGRAM(s) Oral every 6 hours PRN Temp greater or equal to 38C (100.4F)      LABS:                        12.6   3.43  )-----------( 67       ( 2019 06:15 )             38.9         137  |  101  |  8   ----------------------------<  98  3.6   |  26  |  0.99    Ca    8.2<L>      2019 06:15  Phos  3.0       Mg     2.0         TPro  6.3  /  Alb  3.2<L>  /  TBili  0.5  /  DBili  x   /  AST  33  /  ALT  17  /  AlkPhos  67      MICROBIOLOGY:     RADIOLOGY:  [x] Reviewed and interpreted by me  < from: CT Maxillofacial No Cont (19 @ 17:45) >  HEAD CT: No evidence for intracranial hemorrhage, mass effect, or displaced calvarial fracture. If the patient's symptoms persist, consider short interval follow-up head CT or brain MRI if there are no MRI contraindications.  CERVICAL SPINE CT: No evidence for acute displaced fracture or traumatic malalignment. Severe cervical degenerative spondylosis which is not   significantly changed from prior study. MRI would be required to evaluate the ligamentous structures at higher sensitivity as well as for better evaluation of the cervical canal and its contents. MAXILLOFACIAL CT: No acute maxillofacial bone fracture.  < end of copied text >    Point of Care Ultrasound Findings;    PFT: none

## 2019-01-29 NOTE — PROGRESS NOTE ADULT - SUBJECTIVE AND OBJECTIVE BOX
SUBJECTIVE / OVERNIGHT EVENTS: pt alert awake oriented today  denies cp    MEDICATIONS  (STANDING):  acyclovir IVPB      acyclovir IVPB 750 milliGRAM(s) IV Intermittent every 12 hours  ALBUTerol    90 MICROgram(s) HFA Inhaler 1 Puff(s) Inhalation every 4 hours  ALBUTerol/ipratropium for Nebulization 3 milliLiter(s) Nebulizer every 6 hours  amLODIPine   Tablet 10 milliGRAM(s) Oral daily  atorvastatin 10 milliGRAM(s) Oral at bedtime  buDESOnide 160 MICROgram(s)/formoterol 4.5 MICROgram(s) Inhaler 2 Puff(s) Inhalation two times a day  carvedilol 12.5 milliGRAM(s) Oral every 12 hours  lactobacillus acidophilus 1 Tablet(s) Oral two times a day with meals  potassium acid phosphate/sodium acid phosphate tablet (K-PHOS No. 2) 1 Tablet(s) Oral four times a day with meals  sodium chloride 0.9%. 1000 milliLiter(s) (75 mL/Hr) IV Continuous <Continuous>  vancomycin  IVPB 1250 milliGRAM(s) IV Intermittent every 12 hours  vancomycin  IVPB        MEDICATIONS  (PRN):  acetaminophen   Tablet .. 650 milliGRAM(s) Oral every 6 hours PRN Temp greater or equal to 38C (100.4F)    Vital Signs Last 24 Hrs  T(C): 37.1 (2019 17:00), Max: 38.3 (2019 18:14)  T(F): 98.7 (2019 17:00), Max: 100.9 (2019 18:14)  HR: 73 (2019 17:00) (70 - 91)  BP: 145/72 (2019 17:00) (123/59 - 155/73)  BP(mean): --  RR: 22 (2019 17:00) (20 - 24)  SpO2: 96% (2019 17:00) (95% - 98%)  Constitutional: No fever, fatigue  Skin: No rash.  Eyes: No recent vision problems or eye pain.  ENT: No congestion, ear pain, or sore throat.  Cardiovascular: No chest pain or palpation.  Respiratory: No cough, shortness of breath, congestion, or wheezing.  Gastrointestinal: No abdominal pain, nausea, vomiting, or diarrhea.  Genitourinary: No dysuria.  Musculoskeletal: No joint swelling.  Neurologic: No headache.      PHYSICAL EXAM:  GENERAL: NAD  LABS:      137  |  101  |  8   ----------------------------<  98  3.6   |  26  |  0.99    Ca    8.2<L>      2019 06:15  Phos  3.0       Mg     2.0         TPro  6.3  /  Alb  3.2<L>  /  TBili  0.5  /  DBili      /  AST  33  /  ALT  17  /  AlkPhos  67      Creatinine Trend: 0.99 <--, 0.97 <--, 1.08 <--                        12.6   3.43  )-----------( 67       ( 2019 06:15 )             38.9     Urine Studies:  Urinalysis Basic - ( 2019 17:00 )    Color: YELLOW / Appearance: CLEAR / S.035 / pH: 6.0  Gluc: NEGATIVE / Ketone: TRACE  / Bili: NEGATIVE / Urobili: TRACE   Blood: NEGATIVE / Protein: 100 / Nitrite: NEGATIVE   Leuk Esterase: NEGATIVE / RBC: 3-5 / WBC 0-2   Sq Epi: OCC / Non Sq Epi:  / Bacteria: NEGATIVE            ABG - ( 2019 14:30 )  pH, Arterial: 7.42  pH, Blood: x     /  pCO2: 44    /  pO2: 32    / HCO3: 27    / Base Excess: 4.2   /  SaO2: 59.2              LIVER FUNCTIONS - ( 2019 06:27 )  Alb: 3.2 g/dL / Pro: 6.3 g/dL / ALK PHOS: 67 u/L / ALT: 17 u/L / AST: 33 u/L / GGT: x             rash+  EYES: EOMI, PERRLA  NECK: Supple, No JVD  CHEST/LUNG: dec breath sounds at bases   HEART:  S1 , S2 +  ABDOMEN: soft , bs+  EXTREMITIES: trace edema   NEUROLOGY:alert awake oriented       RADIOLOGY & ADDITIONAL TESTS:    Imaging Personally Reviewed:    Consultant(s) Notes Reviewed:      Care Discussed with Consultants/Other Providers:

## 2019-01-29 NOTE — CONSULT NOTE ADULT - ASSESSMENT
Socorro Tucker 78M hx restless legs syndrome, SSS s/p Pacemaker, HTN, CAD w/ x1 stent presented to the ER s/p fall. Found to have disseminated zoster and gram positive cocci bacteremia. Pulmonology consulted for dyspnea.    #tachypnea: likely in setting of sepsis, patient will likely underlying obstructive pulmonary disease 2/2 extensive smoking history possible exacerbation in setting of disseminated zoster. No previous pulmonology workup. Patient does not recall medications or inhalers if any. however was Rx'd albuterol inhaler in october 2018. Patient currently w/o oxygen requirements /symptoms. Agree with c/w duonebs and symbicort. Obtain CT chest given patient w/ cough and disseminated zoster ?viral pneumonia, CXR read as clear lungs however right lower lobe questionable  perialveolar infiltrate near RA of heart. Obtain blood gas to determine hypercapnia. 78M hx restless legs syndrome, SSS s/p Pacemaker, HTN, CAD w/ x1 stent presented to the ER s/p fall. Found to have disseminated zoster and gram positive cocci bacteremia. Pulmonology consulted for dyspnea.    #tachypnea: likely in setting of sepsis, patient will likely underlying obstructive pulmonary disease 2/2 extensive smoking history possible exacerbation in setting of disseminated zoster. No previous pulmonology workup. Patient does not recall medications or inhalers if any. however was Rx'd albuterol inhaler in october 2018. Patient currently w/o oxygen requirements /symptoms. Agree with c/w duonebs and symbicort. Obtain CT chest given patient w/ cough and disseminated zoster ?viral pneumonia, Obtain blood gas to determine hypercapnia.     case discussed w/ Pulmonology fellow, to be seen by attending in AM.

## 2019-01-30 LAB
ANION GAP SERPL CALC-SCNC: 12 MMO/L — SIGNIFICANT CHANGE UP (ref 7–14)
BUN SERPL-MCNC: 12 MG/DL — SIGNIFICANT CHANGE UP (ref 7–23)
CALCIUM SERPL-MCNC: 8.2 MG/DL — LOW (ref 8.4–10.5)
CHLORIDE SERPL-SCNC: 99 MMOL/L — SIGNIFICANT CHANGE UP (ref 98–107)
CO2 SERPL-SCNC: 22 MMOL/L — SIGNIFICANT CHANGE UP (ref 22–31)
CREAT SERPL-MCNC: 1.09 MG/DL — SIGNIFICANT CHANGE UP (ref 0.5–1.3)
GLUCOSE SERPL-MCNC: 111 MG/DL — HIGH (ref 70–99)
HCT VFR BLD CALC: 39.1 % — SIGNIFICANT CHANGE UP (ref 39–50)
HGB BLD-MCNC: 12.8 G/DL — LOW (ref 13–17)
MCHC RBC-ENTMCNC: 31.1 PG — SIGNIFICANT CHANGE UP (ref 27–34)
MCHC RBC-ENTMCNC: 32.7 % — SIGNIFICANT CHANGE UP (ref 32–36)
MCV RBC AUTO: 94.9 FL — SIGNIFICANT CHANGE UP (ref 80–100)
NRBC # FLD: 0 K/UL — LOW (ref 25–125)
PLATELET # BLD AUTO: 81 K/UL — LOW (ref 150–400)
PMV BLD: 12.2 FL — SIGNIFICANT CHANGE UP (ref 7–13)
POTASSIUM SERPL-MCNC: 3.8 MMOL/L — SIGNIFICANT CHANGE UP (ref 3.5–5.3)
POTASSIUM SERPL-SCNC: 3.8 MMOL/L — SIGNIFICANT CHANGE UP (ref 3.5–5.3)
RBC # BLD: 4.12 M/UL — LOW (ref 4.2–5.8)
RBC # FLD: 13.7 % — SIGNIFICANT CHANGE UP (ref 10.3–14.5)
SODIUM SERPL-SCNC: 133 MMOL/L — LOW (ref 135–145)
SPECIMEN SOURCE: SIGNIFICANT CHANGE UP
SPECIMEN SOURCE: SIGNIFICANT CHANGE UP
WBC # BLD: 4.47 K/UL — SIGNIFICANT CHANGE UP (ref 3.8–10.5)
WBC # FLD AUTO: 4.47 K/UL — SIGNIFICANT CHANGE UP (ref 3.8–10.5)

## 2019-01-30 PROCEDURE — 99222 1ST HOSP IP/OBS MODERATE 55: CPT | Mod: GC

## 2019-01-30 PROCEDURE — 99232 SBSQ HOSP IP/OBS MODERATE 35: CPT

## 2019-01-30 PROCEDURE — 70450 CT HEAD/BRAIN W/O DYE: CPT | Mod: 26

## 2019-01-30 PROCEDURE — 71250 CT THORAX DX C-: CPT | Mod: 26

## 2019-01-30 RX ORDER — PIPERACILLIN AND TAZOBACTAM 4; .5 G/20ML; G/20ML
3.38 INJECTION, POWDER, LYOPHILIZED, FOR SOLUTION INTRAVENOUS EVERY 8 HOURS
Qty: 0 | Refills: 0 | Status: DISCONTINUED | OUTPATIENT
Start: 2019-01-30 | End: 2019-02-05

## 2019-01-30 RX ORDER — MUPIROCIN 20 MG/G
1 OINTMENT TOPICAL
Qty: 0 | Refills: 0 | Status: DISCONTINUED | OUTPATIENT
Start: 2019-01-30 | End: 2019-01-31

## 2019-01-30 RX ORDER — PIPERACILLIN AND TAZOBACTAM 4; .5 G/20ML; G/20ML
3.38 INJECTION, POWDER, LYOPHILIZED, FOR SOLUTION INTRAVENOUS ONCE
Qty: 0 | Refills: 0 | Status: COMPLETED | OUTPATIENT
Start: 2019-01-30 | End: 2019-01-30

## 2019-01-30 RX ORDER — VANCOMYCIN HCL 1 G
1000 VIAL (EA) INTRAVENOUS EVERY 12 HOURS
Qty: 0 | Refills: 0 | Status: DISCONTINUED | OUTPATIENT
Start: 2019-01-30 | End: 2019-02-04

## 2019-01-30 RX ADMIN — Medication 250 MILLIGRAM(S): at 20:07

## 2019-01-30 RX ADMIN — Medication 3 MILLILITER(S): at 11:16

## 2019-01-30 RX ADMIN — Medication 1 TABLET(S): at 10:06

## 2019-01-30 RX ADMIN — Medication 265 MILLIGRAM(S): at 05:55

## 2019-01-30 RX ADMIN — Medication 81 MILLIGRAM(S): at 12:07

## 2019-01-30 RX ADMIN — BUDESONIDE AND FORMOTEROL FUMARATE DIHYDRATE 2 PUFF(S): 160; 4.5 AEROSOL RESPIRATORY (INHALATION) at 20:02

## 2019-01-30 RX ADMIN — BUDESONIDE AND FORMOTEROL FUMARATE DIHYDRATE 2 PUFF(S): 160; 4.5 AEROSOL RESPIRATORY (INHALATION) at 12:07

## 2019-01-30 RX ADMIN — PIPERACILLIN AND TAZOBACTAM 200 GRAM(S): 4; .5 INJECTION, POWDER, LYOPHILIZED, FOR SOLUTION INTRAVENOUS at 23:54

## 2019-01-30 RX ADMIN — Medication 1 TABLET(S): at 10:07

## 2019-01-30 RX ADMIN — Medication 1 TABLET(S): at 18:33

## 2019-01-30 RX ADMIN — ATORVASTATIN CALCIUM 10 MILLIGRAM(S): 80 TABLET, FILM COATED ORAL at 22:00

## 2019-01-30 RX ADMIN — CARVEDILOL PHOSPHATE 12.5 MILLIGRAM(S): 80 CAPSULE, EXTENDED RELEASE ORAL at 06:16

## 2019-01-30 RX ADMIN — Medication 100 MILLIGRAM(S): at 23:53

## 2019-01-30 RX ADMIN — SODIUM CHLORIDE 75 MILLILITER(S): 9 INJECTION INTRAMUSCULAR; INTRAVENOUS; SUBCUTANEOUS at 20:02

## 2019-01-30 RX ADMIN — CLOPIDOGREL BISULFATE 75 MILLIGRAM(S): 75 TABLET, FILM COATED ORAL at 12:07

## 2019-01-30 RX ADMIN — Medication 3 MILLILITER(S): at 16:56

## 2019-01-30 RX ADMIN — AMLODIPINE BESYLATE 10 MILLIGRAM(S): 2.5 TABLET ORAL at 05:55

## 2019-01-30 RX ADMIN — Medication 265 MILLIGRAM(S): at 18:33

## 2019-01-30 RX ADMIN — Medication 3 MILLILITER(S): at 22:50

## 2019-01-30 RX ADMIN — CARVEDILOL PHOSPHATE 12.5 MILLIGRAM(S): 80 CAPSULE, EXTENDED RELEASE ORAL at 18:33

## 2019-01-30 NOTE — PROGRESS NOTE ADULT - ASSESSMENT
disseminated herpes zoster. still with versicles.     leucopenia resolving.  , thrombocytopenia improving.    bacteremia 2 species of coag neg staph suggests contaminants    Suggest: continue acyclovir IV                 continue vanco                 hydrate                  follow creat closely                  maintain airborne/ contact precautions    Mariaa Rich MD  Pager: [phone]  After 5 PM or weekends please call fellow on call or office [phone]

## 2019-01-30 NOTE — PROGRESS NOTE ADULT - SUBJECTIVE AND OBJECTIVE BOX
CHIEF COMPLAINT:    Interval Events: No acute events overnight. Patient sates he is some what depressed of his hospitalization does not intend to harm self or others. Denies SOB, or dyspnea currently, still w/ cough no productive today or yesterday    REVIEW OF SYSTEMS:  Constitutional: No fevers or chills. No weight loss. No fatigue or generalised malaise.  Eyes: No itching or discharge from the eyes  ENT: No ear pain. No ear discharge. No nasal congestion. No post nasal drip. No epistaxis. No throat pain. No sore throat. No difficulty swallowing.   CV: No chest pain. No palpitations. No lightheadedness or dizziness.   Resp: No dyspnea at rest. No dyspnea on exertion. No orthopnea. No wheezing. +cough. No stridor. +sputum production. No chest pain with respiration.  GI: No nausea. No vomiting. No diarrhea.  MSK: No joint pain or pain in any extremities  Integumentary:+skin lesions. No pedal edema.  Neurological: No gross motor weakness. No sensory changes.      OBJECTIVE:  ICU Vital Signs Last 24 Hrs  T(C): 36.5 (30 Jan 2019 12:08), Max: 37.1 (29 Jan 2019 17:00)  T(F): 97.7 (30 Jan 2019 12:08), Max: 98.7 (29 Jan 2019 17:00)  HR: 61 (30 Jan 2019 12:08) (61 - 89)  BP: 117/89 (30 Jan 2019 12:08) (117/89 - 157/77)  BP(mean): --  ABP: --  ABP(mean): --  RR: 16 (30 Jan 2019 12:08) (16 - 22)  SpO2: 98% (30 Jan 2019 12:08) (93% - 98%)        01-29 @ 07:01 - 01-30 @ 07:00  --------------------------------------------------------  IN: 850 mL / OUT: 1080 mL / NET: -230 mL    01-30 @ 07:01 - 01-30 @ 13:37  --------------------------------------------------------  IN: 0 mL / OUT: 300 mL / NET: -300 mL      CAPILLARY BLOOD GLUCOSE          PHYSICAL EXAM:  General: Awake, alert, oriented X 3.   HEENT: Atraumatic, normocephalic. No nasal congestion. No tonsillar or pharyngeal exudates.  Lymph Nodes: No palpable lymphadenopathy  Neck: No JVD. No carotid bruit.   Respiratory: no wheezing, poor inspiratory flow,   Cardiovascular: S1 S2 normal. No murmurs, rubs or gallops.   Abdomen: Soft, non-tender, non-distended. No organomegaly.  Extremities: Warm to touch. Peripheral pulse palpable. No pedal edema.   Skin: diffuse disseminated upper b/l trunk and back red erythematous weeping lesions,      HOSPITAL MEDICATIONS:  MEDICATIONS  (STANDING):  acyclovir IVPB      acyclovir IVPB 750 milliGRAM(s) IV Intermittent every 12 hours  ALBUTerol    90 MICROgram(s) HFA Inhaler 1 Puff(s) Inhalation every 4 hours  ALBUTerol/ipratropium for Nebulization 3 milliLiter(s) Nebulizer every 6 hours  amLODIPine   Tablet 10 milliGRAM(s) Oral daily  aspirin enteric coated 81 milliGRAM(s) Oral daily  atorvastatin 10 milliGRAM(s) Oral at bedtime  buDESOnide 160 MICROgram(s)/formoterol 4.5 MICROgram(s) Inhaler 2 Puff(s) Inhalation two times a day  carvedilol 12.5 milliGRAM(s) Oral every 12 hours  clopidogrel Tablet 75 milliGRAM(s) Oral daily  lactobacillus acidophilus 1 Tablet(s) Oral two times a day with meals  sodium chloride 0.9%. 1000 milliLiter(s) (75 mL/Hr) IV Continuous <Continuous>    MEDICATIONS  (PRN):  acetaminophen   Tablet .. 650 milliGRAM(s) Oral every 6 hours PRN Temp greater or equal to 38C (100.4F)      LABS:                        12.8   4.47  )-----------( 81       ( 30 Jan 2019 06:00 )             39.1     01-30    133<L>  |  99  |  12  ----------------------------<  111<H>  3.8   |  22  |  1.09    Ca    8.2<L>      30 Jan 2019 06:00  Phos  3.0     01-29  Mg     2.0     01-29          Arterial Blood Gas:  01-29 @ 14:30  7.42/44/32/27/59.2/4.2  ABG lactate: --        MICROBIOLOGY:     RADIOLOGY:  [ ] Reviewed and interpreted by me    Point of Care Ultrasound Findings:    PFT:    EKG:

## 2019-01-30 NOTE — CHART NOTE - NSCHARTNOTEFT_GEN_A_CORE
HPI:  This is a 79 y/o M w/ a PMH significant for SSS s/p pacemaker placement, dementia, HTN who is admitted for rash and fall     Patient notes that rash first started on the left upper arm and chest area about two and a half weeks ago consisting of small blisters grouped together. He started getting similar spots on the arms legs and trunk and the rash continued to spread. He denies any fevers or chills. Denies ever having a rash like this before. Denies any sick contacts, has recently been living with his brother. Brother notes patient has been falling more in the recent 2 years and dementia has been worsening. No known hx of HIV or any immunosuppressive condition. Remote hx of throat SCC s/p radiation. Patient is life long heavy smoker, quit two months ago. Remote hx of syphilis which was treated, workup initiated for neurosyphilis but unclear if completed in 2015 per patient's [employer] records.        PAST MEDICAL & SURGICAL HISTORY:  RLS (restless legs syndrome)  Pacemaker  Dementia  HTN (hypertension)  CAD (coronary artery disease)  Artificial pacemaker: [employer]  H/O hydrocele  S/P skin biopsy: Behind L ear  History of appendectomy      REVIEW OF SYSTEMS    General: no fevers/chills, no lethargy	    Skin/Breast: see HPI  	  Ophthalmologic: no eye pain or change in vision  	  ENMT: no dysphagia or change in hearing    Respiratory and Thorax: no SOB or cough  	  Cardiovascular: no palpitations or chest pain    Gastrointestinal: no abdominal pain or blood in stool     Genitourinary: no dysuria or frequency    Musculoskeletal: no joint pains    Neurological: no weakness, numbness , or tingling    MEDICATIONS  (STANDING):  acyclovir IVPB      acyclovir IVPB 750 milliGRAM(s) IV Intermittent every 12 hours  ALBUTerol    90 MICROgram(s) HFA Inhaler 1 Puff(s) Inhalation every 4 hours  ALBUTerol/ipratropium for Nebulization 3 milliLiter(s) Nebulizer every 6 hours  amLODIPine   Tablet 10 milliGRAM(s) Oral daily  aspirin enteric coated 81 milliGRAM(s) Oral daily  atorvastatin 10 milliGRAM(s) Oral at bedtime  buDESOnide 160 MICROgram(s)/formoterol 4.5 MICROgram(s) Inhaler 2 Puff(s) Inhalation two times a day  carvedilol 12.5 milliGRAM(s) Oral every 12 hours  clopidogrel Tablet 75 milliGRAM(s) Oral daily  lactobacillus acidophilus 1 Tablet(s) Oral two times a day with meals  sodium chloride 0.9%. 1000 milliLiter(s) (75 mL/Hr) IV Continuous <Continuous>  vancomycin  IVPB 1000 milliGRAM(s) IV Intermittent every 12 hours    MEDICATIONS  (PRN):  acetaminophen   Tablet .. 650 milliGRAM(s) Oral every 6 hours PRN Temp greater or equal to 38C (100.4F)      Allergies    No Known Allergies    Intolerances        SOCIAL HISTORY:    FAMILY HISTORY:  No pertinent family history in first degree relatives      Vital Signs Last 24 Hrs  T(C): 36.5 (30 Jan 2019 12:08), Max: 36.9 (29 Jan 2019 20:33)  T(F): 97.7 (30 Jan 2019 12:08), Max: 98.4 (29 Jan 2019 20:33)  HR: 82 (30 Jan 2019 16:56) (61 - 89)  BP: 117/89 (30 Jan 2019 12:08) (117/89 - 157/77)  BP(mean): --  RR: 16 (30 Jan 2019 12:08) (16 - 20)  SpO2: 98% (30 Jan 2019 12:08) (93% - 98%)    PHYSICAL EXAM:     The patient was alert and oriented to self, well nourished, and in no  apparent distress.  OP showed no ulcerations  There was no visible lymphadenopathy.  Conjunctiva were non injected  There was no clubbing or edema of extremities.  The scalp, hair, face, eyebrows, lips, OP, neck, chest, back,   extremities X 4, nails were examined.  There was no hyperhidrosis or bromhidrosis.    Of note on skin exam:   the left neck chest and shoulder and upper arm with grouped vesicles and crusted papules on an erythematous base, some appearing impetiginized    the posterior neck with eschar noted     the bilateral arms legs thighs and feet with vesicles on an erythematous base    the abdomen and back with vesicles on an erythematous base     no oral ulcerations noted       LABS:                        12.8   4.47  )-----------( 81       ( 30 Jan 2019 06:00 )             39.1     01-30    133<L>  |  99  |  12  ----------------------------<  111<H>  3.8   |  22  |  1.09    Ca    8.2<L>      30 Jan 2019 06:00  Phos  3.0     01-29  Mg     2.0     01-29      A/P:    1. Disseminated Zoster with evidence of secondary impetiginization on the chest and left shoulder area. Given extensive involvement of vesicles concern for possible underlying immunosuppressed state. Per patient's brother patient has remote hx of throat cancer treated with radiation and remote hx of syphilis treated with penicillin, had work-up in the past for neurosyphilis as well, was supposed to get an LP done but unclear if this was ever done no record of it noted in patient's [employer] chart-possible contributor to patient's unsteady gait and dementia. No previous antibiotic exposure or any new medications in the past 2 months per patient's brother who patient is currently living with.   -Recommended dose for disseminated Zoster is 10mg/kg in 3 divided doses, Q8 hours. Will defer to ID for appropriate renal dosing based on patient's age/GFR.    -VZV PCR sent today, expect results in 24 hours   -Recommend Ophthalmology consultation given facial involvement   -Check HIV  -Mupirocin ointment TID to the open areas and crusted areas on the chest and back   -Vaseline twice daily to the crusted and eschar areas on the posterior neck and back  -Dermatology will continue to follow     Above discussed with attending Dr. Stover    Patient to follow up at [facility] [address] [facility] [address] [phone] when ready for discharge     Tori Rodríguez MD   Dermatology Resident PGY-2   [phone] HPI:  This is a 79 y/o M w/ a PMH significant for SSS s/p pacemaker placement, dementia, HTN who is admitted for rash and fall     Patient notes that rash first started on the left upper arm and chest area about two and a half weeks ago consisting of small blisters grouped together. He started getting similar spots on the arms legs and trunk and the rash continued to spread. He denies any fevers or chills. Denies ever having a rash like this before. Denies any sick contacts, has recently been living with his brother. Brother notes patient has been falling more in the recent 2 years and dementia has been worsening. No known hx of HIV or any immunosuppressive condition. Remote hx of throat SCC s/p radiation. Patient is life long heavy smoker, quit two months ago. Remote hx of syphilis which was treated, workup initiated for neurosyphilis but unclear if completed in 2015 per patient's [employer] records.        PAST MEDICAL & SURGICAL HISTORY:  RLS (restless legs syndrome)  Pacemaker  Dementia  HTN (hypertension)  CAD (coronary artery disease)  Artificial pacemaker: [employer]  H/O hydrocele  S/P skin biopsy: Behind L ear  History of appendectomy      REVIEW OF SYSTEMS    General: no fevers/chills, no lethargy	    Skin/Breast: see HPI  	  Ophthalmologic: no eye pain or change in vision  	  ENMT: no dysphagia or change in hearing    Respiratory and Thorax: no SOB or cough  	  Cardiovascular: no palpitations or chest pain    Gastrointestinal: no abdominal pain or blood in stool     Genitourinary: no dysuria or frequency    Musculoskeletal: no joint pains    Neurological: no weakness, numbness , or tingling    MEDICATIONS  (STANDING):  acyclovir IVPB      acyclovir IVPB 750 milliGRAM(s) IV Intermittent every 12 hours  ALBUTerol    90 MICROgram(s) HFA Inhaler 1 Puff(s) Inhalation every 4 hours  ALBUTerol/ipratropium for Nebulization 3 milliLiter(s) Nebulizer every 6 hours  amLODIPine   Tablet 10 milliGRAM(s) Oral daily  aspirin enteric coated 81 milliGRAM(s) Oral daily  atorvastatin 10 milliGRAM(s) Oral at bedtime  buDESOnide 160 MICROgram(s)/formoterol 4.5 MICROgram(s) Inhaler 2 Puff(s) Inhalation two times a day  carvedilol 12.5 milliGRAM(s) Oral every 12 hours  clopidogrel Tablet 75 milliGRAM(s) Oral daily  lactobacillus acidophilus 1 Tablet(s) Oral two times a day with meals  sodium chloride 0.9%. 1000 milliLiter(s) (75 mL/Hr) IV Continuous <Continuous>  vancomycin  IVPB 1000 milliGRAM(s) IV Intermittent every 12 hours    MEDICATIONS  (PRN):  acetaminophen   Tablet .. 650 milliGRAM(s) Oral every 6 hours PRN Temp greater or equal to 38C (100.4F)      Allergies    No Known Allergies    Intolerances        SOCIAL HISTORY:    FAMILY HISTORY:  No pertinent family history in first degree relatives      Vital Signs Last 24 Hrs  T(C): 36.5 (30 Jan 2019 12:08), Max: 36.9 (29 Jan 2019 20:33)  T(F): 97.7 (30 Jan 2019 12:08), Max: 98.4 (29 Jan 2019 20:33)  HR: 82 (30 Jan 2019 16:56) (61 - 89)  BP: 117/89 (30 Jan 2019 12:08) (117/89 - 157/77)  BP(mean): --  RR: 16 (30 Jan 2019 12:08) (16 - 20)  SpO2: 98% (30 Jan 2019 12:08) (93% - 98%)    PHYSICAL EXAM:     The patient was alert and oriented to self, well nourished, and in no  apparent distress.  OP showed no ulcerations  There was no visible lymphadenopathy.  Conjunctiva were non injected  There was no clubbing or edema of extremities.  The scalp, hair, face, eyebrows, lips, OP, neck, chest, back,   extremities X 4, nails were examined.  There was no hyperhidrosis or bromhidrosis.    Of note on skin exam:   the left neck chest and shoulder and upper arm with grouped vesicles and crusted papules on an erythematous base, some appearing impetiginized    the posterior neck with eschar noted     the bilateral arms legs thighs and feet with vesicles on an erythematous base    the abdomen and back with vesicles on an erythematous base     no oral ulcerations noted       LABS:                        12.8   4.47  )-----------( 81       ( 30 Jan 2019 06:00 )             39.1     01-30    133<L>  |  99  |  12  ----------------------------<  111<H>  3.8   |  22  |  1.09    Ca    8.2<L>      30 Jan 2019 06:00  Phos  3.0     01-29  Mg     2.0     01-29      A/P:    1. Disseminated Zoster with evidence of secondary impetiginization on the chest and left shoulder area. Given extensive involvement of vesicles concern for possible underlying immunosuppressed state. Per patient's brother patient has remote hx of throat cancer treated with radiation and remote hx of syphilis treated with penicillin, had work-up in the past for neurosyphilis as well, was supposed to get an LP done but unclear if this was ever done no record of it noted in patient's [employer] chart-possible contributor to patient's unsteady gait and dementia. No previous antibiotic exposure or any new medications in the past 2 months per patient's brother who patient is currently living with.   -Recommended dose for disseminated Zoster is 10mg/kg in 3 divided doses, Q8 hours. Will defer to ID for appropriate renal dosing based on patient's age/GFR.    -VZV PCR sent today, expect results in 24 hours   -Recommend Ophthalmology consultation given facial involvement   -Check HIV  -Mupirocin ointment TID to the open areas and crusted areas on the chest and back   -Vaseline twice daily to the crusted and eschar areas on the posterior neck and back  -Dermatology will continue to follow     Above discussed with attending Dr. Stover    Patient to follow up at [facility] [address] [facility] [address] [phone] when ready for discharge     Tori Rodríguez MD   Dermatology Resident PGY-2   [phone]      Attending Addendum:  1/31/19    Patient seen and examined. Agree with above. In the context of morphology and +VZV PCR, presentation c/w disseminated herpes zoster. Would recommend w/u for immunosuppression (i.e. malignancy, HIV, etc) as he has no overt risk factors for developing disseminated zoster. Continue IV acyclovir as per ID. Topical use as above. Will continue to follow.    Jose Stover MD  Dermatology Attending

## 2019-01-30 NOTE — PROGRESS NOTE ADULT - ASSESSMENT
78M hx restless legs syndrome, SSS s/p Pacemaker, HTN, CAD w/ x1 stent presented to the ER s/p fall. Found to have disseminated zoster and gram positive cocci bacteremia. Pulmonology consulted for dyspnea.    #tachypnea: likely in setting of sepsis, patient will likely underlying obstructive pulmonary disease 2/2 extensive smoking history possible exacerbation in setting of disseminated zoster. No previous pulmonology workup. Patient does not recall medications or inhalers if any. However was Rx'd albuterol inhaler in october 2018. Patient currently w/o oxygen requirements and asymptomatic. Agree with c/w duonebs and symbicort. Pending CT chest.

## 2019-01-30 NOTE — PROGRESS NOTE ADULT - SUBJECTIVE AND OBJECTIVE BOX
SUBJECTIVE / OVERNIGHT EVENTS: pt alert awake oriented today  denies cp    MEDICATIONS  (STANDING):  acyclovir IVPB      acyclovir IVPB 750 milliGRAM(s) IV Intermittent every 12 hours  ALBUTerol    90 MICROgram(s) HFA Inhaler 1 Puff(s) Inhalation every 4 hours  ALBUTerol/ipratropium for Nebulization 3 milliLiter(s) Nebulizer every 6 hours  amLODIPine   Tablet 10 milliGRAM(s) Oral daily  aspirin enteric coated 81 milliGRAM(s) Oral daily  atorvastatin 10 milliGRAM(s) Oral at bedtime  buDESOnide 160 MICROgram(s)/formoterol 4.5 MICROgram(s) Inhaler 2 Puff(s) Inhalation two times a day  carvedilol 12.5 milliGRAM(s) Oral every 12 hours  clopidogrel Tablet 75 milliGRAM(s) Oral daily  lactobacillus acidophilus 1 Tablet(s) Oral two times a day with meals  mupirocin 2% Ointment 1 Application(s) Topical two times a day  sodium chloride 0.9%. 1000 milliLiter(s) (75 mL/Hr) IV Continuous <Continuous>  vancomycin  IVPB 1000 milliGRAM(s) IV Intermittent every 12 hours    MEDICATIONS  (PRN):  acetaminophen   Tablet .. 650 milliGRAM(s) Oral every 6 hours PRN Temp greater or equal to 38C (100.4F)    Vital Signs Last 24 Hrs  T(C): 36.9 (30 Jan 2019 19:51), Max: 37.1 (30 Jan 2019 18:48)  T(F): 98.4 (30 Jan 2019 19:51), Max: 98.7 (30 Jan 2019 18:48)  HR: 69 (30 Jan 2019 19:51) (61 - 89)  BP: 147/59 (30 Jan 2019 19:51) (117/89 - 157/77)  BP(mean): --  RR: 18 (30 Jan 2019 19:51) (16 - 20)  SpO2: 93% (30 Jan 2019 19:51) (93% - 98%)    Constitutional: No fever, fatigue  Skin: No rash.  Eyes: No recent vision problems or eye pain.  ENT: No congestion, ear pain, or sore throat.  Cardiovascular: No chest pain or palpation.  Respiratory: No cough, shortness of breath, congestion, or wheezing.  Gastrointestinal: No abdominal pain, nausea, vomiting, or diarrhea.  Genitourinary: No dysuria.  Musculoskeletal: No joint swelling.  Neurologic: No headache.      PHYSICAL EXAM:  GENERAL: NAD  EYES: EOMI, PERRLA  NECK: Supple, No JVD  CHEST/LUNG: dec breath sounds at bases   HEART:  S1 , S2 +  ABDOMEN: soft , bs+  EXTREMITIES: trace edema   NEUROLOGY:alert awake oriented       RADIOLOGY & ADDITIONAL TESTS:    Imaging Personally Reviewed:    Consultant(s) Notes Reviewed:      Care Discussed with Consultants/Other Providers:

## 2019-01-30 NOTE — PROGRESS NOTE ADULT - SUBJECTIVE AND OBJECTIVE BOX
Follow Up:  disseminated herpes zoster     Inverval History/ROS:  no complaint. no HA, chills, SOB.  no longer requiring nasal O2  Rest of ROS neg.    Allergies  No Known Allergies        acyclovir IVPB    acyclovir IVPB 750 every 12 hours            OTHER MEDS:  acetaminophen   Tablet .. 650 milliGRAM(s) Oral every 6 hours PRN  amLODIPine   Tablet 10 milliGRAM(s) Oral daily  lactobacillus acidophilus 1 Tablet(s) Oral two times a day with meals  potassium acid phosphate/sodium acid phosphate tablet (K-PHOS No. 2) 1 Tablet(s) Oral four times a day with meals  sodium chloride 0.9%. 1000 milliLiter(s) IV Continuous <Continuous>      Vital Signs Last 24 Hrs  T(F): 97.7 (01-30-19 @ 12:08), Max: 98.4 (01-29-19 @ 20:33)  HR: 82 (01-30-19 @ 16:56)  BP: 117/89 (01-30-19 @ 12:08)  RR: 16 (01-30-19 @ 12:08)  SpO2: 98% (01-30-19 @ 12:08) (93% - 98%)    PHYSICAL EXAM:  General: [x ] non-toxic  HEAD/EYES: [x ] conj less injected  ENT:  [ ] normal [x ] supple [ ] thrush [ ] pharyngeal exudate  Cardiovascular:   [ ] murmur [x ] normal [ ] PPM/AICD  Respiratory:  [x ] BS bilat  GI:  [x ] soft, non-tender, normal bowel sounds  :  [ ] milner [ x] no CVA tenderness   Musculoskeletal:  [x ] no synovitis  Neurologic:  [x ] non-focal exam   Skin:  [x ] vesicles in dermatome left shoulder, upper back, neck.  scattered mac/pap/vesicles face, trunk, ext. petechiae palms.   Lymph: [ ] no lymphadenopathy  Psychiatric:  [x ] appropriate affect [x ] alert & oriented  Lines:  [x ] no phlebitis [ ] central line                                12.8   4.47  )-----------( 81       ( 30 Jan 2019 06:00 )             39.1 01-30    133  |  99  |  12  ----------------------------<  111  3.8   |  22  |  1.09  Ca    8.2      30 Jan 2019 06:00Phos  3.0     01-29Mg     2.0     01-29                MICROBIOLOGY:    URINE MIDSTREAM  01-26-19 --  --  --      BLOOD PERIPHERAL    blood culture x 1 staph hominis  blood culture x1 staph epi          RADIOLOGY:    < from: Xray Chest 1 View- PORTABLE-Urgent (01.26.19 @ 15:57) >      IMPRESSION:     Clear lungs.     < end of copied text >

## 2019-01-31 LAB
-  CEFAZOLIN: SIGNIFICANT CHANGE UP
-  CEFAZOLIN: SIGNIFICANT CHANGE UP
-  CIPROFLOXACIN: SIGNIFICANT CHANGE UP
-  CIPROFLOXACIN: SIGNIFICANT CHANGE UP
-  CLINDAMYCIN: SIGNIFICANT CHANGE UP
-  CLINDAMYCIN: SIGNIFICANT CHANGE UP
-  COAGULASE NEGATIVE STAPHYLOCOCCUS: SIGNIFICANT CHANGE UP
-  ERYTHROMYCIN: SIGNIFICANT CHANGE UP
-  ERYTHROMYCIN: SIGNIFICANT CHANGE UP
-  GENTAMICIN: SIGNIFICANT CHANGE UP
-  GENTAMICIN: SIGNIFICANT CHANGE UP
-  LEVOFLOXACIN: SIGNIFICANT CHANGE UP
-  LEVOFLOXACIN: SIGNIFICANT CHANGE UP
-  MOXIFLOXACIN(AEROBIC): SIGNIFICANT CHANGE UP
-  MOXIFLOXACIN(AEROBIC): SIGNIFICANT CHANGE UP
-  OXACILLIN: SIGNIFICANT CHANGE UP
-  OXACILLIN: SIGNIFICANT CHANGE UP
-  PENICILLIN: SIGNIFICANT CHANGE UP
-  PENICILLIN: SIGNIFICANT CHANGE UP
-  RIFAMPIN.: SIGNIFICANT CHANGE UP
-  RIFAMPIN.: SIGNIFICANT CHANGE UP
-  TETRACYCLINE: SIGNIFICANT CHANGE UP
-  TETRACYCLINE: SIGNIFICANT CHANGE UP
-  TRIMETHOPRIM/SULFAMETHOXAZOLE: SIGNIFICANT CHANGE UP
-  TRIMETHOPRIM/SULFAMETHOXAZOLE: SIGNIFICANT CHANGE UP
-  VANCOMYCIN: SIGNIFICANT CHANGE UP
-  VANCOMYCIN: SIGNIFICANT CHANGE UP
ANION GAP SERPL CALC-SCNC: 10 MMO/L — SIGNIFICANT CHANGE UP (ref 7–14)
BACTERIA BLD CULT: SIGNIFICANT CHANGE UP
BUN SERPL-MCNC: 12 MG/DL — SIGNIFICANT CHANGE UP (ref 7–23)
CALCIUM SERPL-MCNC: 8.2 MG/DL — LOW (ref 8.4–10.5)
CHLORIDE SERPL-SCNC: 102 MMOL/L — SIGNIFICANT CHANGE UP (ref 98–107)
CO2 SERPL-SCNC: 25 MMOL/L — SIGNIFICANT CHANGE UP (ref 22–31)
CREAT SERPL-MCNC: 1.16 MG/DL — SIGNIFICANT CHANGE UP (ref 0.5–1.3)
GLUCOSE SERPL-MCNC: 91 MG/DL — SIGNIFICANT CHANGE UP (ref 70–99)
HCT VFR BLD CALC: 35.9 % — LOW (ref 39–50)
HGB BLD-MCNC: 11.8 G/DL — LOW (ref 13–17)
HIV 1+2 AB+HIV1 P24 AG SERPL QL IA: SIGNIFICANT CHANGE UP
HSV+VZV DNA SPEC QL NAA+PROBE: SIGNIFICANT CHANGE UP
MAGNESIUM SERPL-MCNC: 1.8 MG/DL — SIGNIFICANT CHANGE UP (ref 1.6–2.6)
MCHC RBC-ENTMCNC: 31.1 PG — SIGNIFICANT CHANGE UP (ref 27–34)
MCHC RBC-ENTMCNC: 32.9 % — SIGNIFICANT CHANGE UP (ref 32–36)
MCV RBC AUTO: 94.7 FL — SIGNIFICANT CHANGE UP (ref 80–100)
METHOD TYPE: SIGNIFICANT CHANGE UP
METHOD TYPE: SIGNIFICANT CHANGE UP
NRBC # FLD: 0 K/UL — LOW (ref 25–125)
ORGANISM # SPEC MICROSCOPIC CNT: SIGNIFICANT CHANGE UP
PHOSPHATE SERPL-MCNC: 2.7 MG/DL — SIGNIFICANT CHANGE UP (ref 2.5–4.5)
PLATELET # BLD AUTO: 88 K/UL — LOW (ref 150–400)
PMV BLD: 11.8 FL — SIGNIFICANT CHANGE UP (ref 7–13)
POTASSIUM SERPL-MCNC: 3.7 MMOL/L — SIGNIFICANT CHANGE UP (ref 3.5–5.3)
POTASSIUM SERPL-SCNC: 3.7 MMOL/L — SIGNIFICANT CHANGE UP (ref 3.5–5.3)
RBC # BLD: 3.79 M/UL — LOW (ref 4.2–5.8)
RBC # FLD: 13.5 % — SIGNIFICANT CHANGE UP (ref 10.3–14.5)
SODIUM SERPL-SCNC: 137 MMOL/L — SIGNIFICANT CHANGE UP (ref 135–145)
SPECIMEN SOURCE: SIGNIFICANT CHANGE UP
WBC # BLD: 4.77 K/UL — SIGNIFICANT CHANGE UP (ref 3.8–10.5)
WBC # FLD AUTO: 4.77 K/UL — SIGNIFICANT CHANGE UP (ref 3.8–10.5)

## 2019-01-31 PROCEDURE — 99232 SBSQ HOSP IP/OBS MODERATE 35: CPT | Mod: GC

## 2019-01-31 PROCEDURE — 99223 1ST HOSP IP/OBS HIGH 75: CPT

## 2019-01-31 RX ORDER — MUPIROCIN 20 MG/G
1 OINTMENT TOPICAL THREE TIMES A DAY
Qty: 0 | Refills: 0 | Status: DISCONTINUED | OUTPATIENT
Start: 2019-01-31 | End: 2019-02-06

## 2019-01-31 RX ORDER — PETROLATUM,WHITE
1 JELLY (GRAM) TOPICAL
Qty: 0 | Refills: 0 | Status: DISCONTINUED | OUTPATIENT
Start: 2019-01-31 | End: 2019-02-06

## 2019-01-31 RX ADMIN — MUPIROCIN 1 APPLICATION(S): 20 OINTMENT TOPICAL at 21:05

## 2019-01-31 RX ADMIN — ATORVASTATIN CALCIUM 10 MILLIGRAM(S): 80 TABLET, FILM COATED ORAL at 21:04

## 2019-01-31 RX ADMIN — Medication 265 MILLIGRAM(S): at 05:48

## 2019-01-31 RX ADMIN — Medication 81 MILLIGRAM(S): at 11:39

## 2019-01-31 RX ADMIN — PIPERACILLIN AND TAZOBACTAM 25 GRAM(S): 4; .5 INJECTION, POWDER, LYOPHILIZED, FOR SOLUTION INTRAVENOUS at 17:34

## 2019-01-31 RX ADMIN — Medication 1 TABLET(S): at 17:35

## 2019-01-31 RX ADMIN — CARVEDILOL PHOSPHATE 12.5 MILLIGRAM(S): 80 CAPSULE, EXTENDED RELEASE ORAL at 17:35

## 2019-01-31 RX ADMIN — Medication 265 MILLIGRAM(S): at 17:35

## 2019-01-31 RX ADMIN — PIPERACILLIN AND TAZOBACTAM 25 GRAM(S): 4; .5 INJECTION, POWDER, LYOPHILIZED, FOR SOLUTION INTRAVENOUS at 08:54

## 2019-01-31 RX ADMIN — BUDESONIDE AND FORMOTEROL FUMARATE DIHYDRATE 2 PUFF(S): 160; 4.5 AEROSOL RESPIRATORY (INHALATION) at 21:07

## 2019-01-31 RX ADMIN — CARVEDILOL PHOSPHATE 12.5 MILLIGRAM(S): 80 CAPSULE, EXTENDED RELEASE ORAL at 06:23

## 2019-01-31 RX ADMIN — MUPIROCIN 1 APPLICATION(S): 20 OINTMENT TOPICAL at 14:18

## 2019-01-31 RX ADMIN — Medication 1 APPLICATION(S): at 17:35

## 2019-01-31 RX ADMIN — Medication 3 MILLILITER(S): at 21:44

## 2019-01-31 RX ADMIN — Medication 250 MILLIGRAM(S): at 21:05

## 2019-01-31 RX ADMIN — Medication 3 MILLILITER(S): at 10:22

## 2019-01-31 RX ADMIN — AMLODIPINE BESYLATE 10 MILLIGRAM(S): 2.5 TABLET ORAL at 05:47

## 2019-01-31 RX ADMIN — BUDESONIDE AND FORMOTEROL FUMARATE DIHYDRATE 2 PUFF(S): 160; 4.5 AEROSOL RESPIRATORY (INHALATION) at 08:54

## 2019-01-31 RX ADMIN — MUPIROCIN 1 APPLICATION(S): 20 OINTMENT TOPICAL at 05:48

## 2019-01-31 RX ADMIN — Medication 1 TABLET(S): at 07:45

## 2019-01-31 RX ADMIN — Medication 3 MILLILITER(S): at 15:18

## 2019-01-31 RX ADMIN — CLOPIDOGREL BISULFATE 75 MILLIGRAM(S): 75 TABLET, FILM COATED ORAL at 11:39

## 2019-01-31 RX ADMIN — Medication 250 MILLIGRAM(S): at 07:44

## 2019-01-31 NOTE — PROGRESS NOTE ADULT - SUBJECTIVE AND OBJECTIVE BOX
Cardiology consult  78 M from my private office with hx CAD s/p PCI stent. PPM, COPD, Vascular dementia, HTN, HLD presented with fall.  Pt AMS and found to have diffuse rash--sec to disseminated herpes zoster.  Per last ID note --gm +coag neg suggests contaminant.  Pt currently awake and alert.  Denies chest pain, SOB, dizziness and palpitations.     Past Med Surg Hx:  CAD s/p PCI stent. PPM, COPD, Vascular dementia, HTN, HLD    allergy: NKDA    Meds:   HOSPITAL MEDICATIONS:  MEDICATIONS  (STANDING):  acyclovir IVPB      acyclovir IVPB 750 milliGRAM(s) IV Intermittent every 12 hours  ALBUTerol    90 MICROgram(s) HFA Inhaler 1 Puff(s) Inhalation every 4 hours  ALBUTerol/ipratropium for Nebulization 3 milliLiter(s) Nebulizer every 6 hours  amLODIPine   Tablet 10 milliGRAM(s) Oral daily  aspirin enteric coated 81 milliGRAM(s) Oral daily  atorvastatin 10 milliGRAM(s) Oral at bedtime  buDESOnide 160 MICROgram(s)/formoterol 4.5 MICROgram(s) Inhaler 2 Puff(s) Inhalation two times a day  carvedilol 12.5 milliGRAM(s) Oral every 12 hours  clopidogrel Tablet 75 milliGRAM(s) Oral daily  lactobacillus acidophilus 1 Tablet(s) Oral two times a day with meals  mupirocin 2% Ointment 1 Application(s) Topical three times a day  petrolatum white Ointment 1 Application(s) Topical two times a day  piperacillin/tazobactam IVPB. 3.375 Gram(s) IV Intermittent every 8 hours  vancomycin  IVPB 1000 milliGRAM(s) IV Intermittent every 12 hours    MEDICATIONS  (PRN):  acetaminophen   Tablet .. 650 milliGRAM(s) Oral every 6 hours PRN Temp greater or equal to 38C (100.4F)  guaiFENesin    Syrup 100 milliGRAM(s) Oral every 6 hours PRN Cough    SocHx: +smoker and etoh    Family Hx: CAD    REVIEW OF SYSTEMS:  Constitutional: No fevers or chills. No weight loss. No fatigue or generalised malaise.  Eyes: No itching or discharge from the eyes  ENT: No ear pain. No ear discharge. No nasal congestion. No post nasal drip. No epistaxis. No throat pain. No sore throat. No difficulty swallowing.   CV: No chest pain. No palpitations. No lightheadedness or dizziness.   Resp: No dyspnea at rest. No dyspnea on exertion. No orthopnea. No wheezing. +cough. No stridor. +sputum production. No chest pain with respiration.  GI: No nausea. No vomiting. No diarrhea.  MSK: No joint pain or pain in any extremities  Integumentary:+skin lesions. No pedal edema.  Neurological: No gross motor weakness. No sensory changes.    OBJECTIVE:  ICU Vital Signs Last 24 Hrs  T(C): 36.8 (31 Jan 2019 15:08), Max: 37.1 (30 Jan 2019 18:48)  T(F): 98.3 (31 Jan 2019 15:08), Max: 98.7 (30 Jan 2019 18:48)  HR: 69 (31 Jan 2019 15:18) (62 - 85)  BP: 149/78 (31 Jan 2019 15:08) (134/90 - 166/65)  RR: 24 (31 Jan 2019 15:08) (17 - 24)  SpO2: 100% (31 Jan 2019 15:08) (93% - 100%)      PHYSICAL EXAM:  General: Awake, alert, oriented X 3.   HEENT: Atraumatic, normocephalic. No nasal congestion. No tonsillar or pharyngeal exudates.  Lymph Nodes: No palpable lymphadenopathy  Neck: No JVD. No carotid bruit.   Respiratory: no wheezing, poor inspiratory flow,   Cardiovascular: S1 S2 normal. No murmurs, rubs or gallops.   Abdomen: Soft, non-tender, non-distended. No organomegaly.  Extremities: Warm to touch. Peripheral pulse palpable. No pedal edema.   Skin: diffuse disseminated upper b/l trunk and back red erythematous weeping lesions,          LABS:                                   11.8   4.77  )-----------( 88       ( 31 Jan 2019 04:40 )             35.9     01-31    137  |  102  |  12  ----------------------------<  91  3.7   |  25  |  1.16    Ca    8.2<L>      31 Jan 2019 04:40  Phos  2.7     01-31  Mg     1.8     01-31      CT CHEST - Patchy DANIEL consolidation/atelectasis. Otherwise wnl.

## 2019-01-31 NOTE — PROGRESS NOTE ADULT - ASSESSMENT
·	78M hx restless legs syndrome, SSS s/p Pacemaker, HTN, CAD w/ x1 stent presented to the ER s/p fall. Found to have disseminated zoster and gram positive cocci bacteremia. With wheezing, significant component is upper airway but also has some generalized wheezing. Tachypnea likely multifactorial, in the setting of above.     Recommendations:     - Continue bronchodilators every 6 hours.  - CT scan noted. No acute findings.   - Would avoid steroids in the setting of a disseminated infection.   - Abx optimization and course per ID.    Please call with questions.    Enrique Pierre MD  Fellow (PGY 6),  Pulmonary & Critical Care Medicine.  Pager - [phone] ([facility])/ [phone] ([place])

## 2019-01-31 NOTE — PROGRESS NOTE ADULT - SUBJECTIVE AND OBJECTIVE BOX
CHIEF COMPLAINT:    Interval Events: No acute events overnight. Patient sates he is some what depressed of his hospitalization does not intend to harm self or others. Denies SOB, or dyspnea currently, still w/ cough no productive today or yesterday    REVIEW OF SYSTEMS:  Constitutional: No fevers or chills. No weight loss. No fatigue or generalised malaise.  Eyes: No itching or discharge from the eyes  ENT: No ear pain. No ear discharge. No nasal congestion. No post nasal drip. No epistaxis. No throat pain. No sore throat. No difficulty swallowing.   CV: No chest pain. No palpitations. No lightheadedness or dizziness.   Resp: No dyspnea at rest. No dyspnea on exertion. No orthopnea. No wheezing. +cough. No stridor. +sputum production. No chest pain with respiration.  GI: No nausea. No vomiting. No diarrhea.  MSK: No joint pain or pain in any extremities  Integumentary:+skin lesions. No pedal edema.  Neurological: No gross motor weakness. No sensory changes.    OBJECTIVE:  ICU Vital Signs Last 24 Hrs  T(C): 36.8 (31 Jan 2019 15:08), Max: 37.1 (30 Jan 2019 18:48)  T(F): 98.3 (31 Jan 2019 15:08), Max: 98.7 (30 Jan 2019 18:48)  HR: 69 (31 Jan 2019 15:18) (62 - 85)  BP: 149/78 (31 Jan 2019 15:08) (134/90 - 166/65)  RR: 24 (31 Jan 2019 15:08) (17 - 24)  SpO2: 100% (31 Jan 2019 15:08) (93% - 100%)      PHYSICAL EXAM:  General: Awake, alert, oriented X 3.   HEENT: Atraumatic, normocephalic. No nasal congestion. No tonsillar or pharyngeal exudates.  Lymph Nodes: No palpable lymphadenopathy  Neck: No JVD. No carotid bruit.   Respiratory: no wheezing, poor inspiratory flow,   Cardiovascular: S1 S2 normal. No murmurs, rubs or gallops.   Abdomen: Soft, non-tender, non-distended. No organomegaly.  Extremities: Warm to touch. Peripheral pulse palpable. No pedal edema.   Skin: diffuse disseminated upper b/l trunk and back red erythematous weeping lesions,      HOSPITAL MEDICATIONS:  MEDICATIONS  (STANDING):  acyclovir IVPB      acyclovir IVPB 750 milliGRAM(s) IV Intermittent every 12 hours  ALBUTerol    90 MICROgram(s) HFA Inhaler 1 Puff(s) Inhalation every 4 hours  ALBUTerol/ipratropium for Nebulization 3 milliLiter(s) Nebulizer every 6 hours  amLODIPine   Tablet 10 milliGRAM(s) Oral daily  aspirin enteric coated 81 milliGRAM(s) Oral daily  atorvastatin 10 milliGRAM(s) Oral at bedtime  buDESOnide 160 MICROgram(s)/formoterol 4.5 MICROgram(s) Inhaler 2 Puff(s) Inhalation two times a day  carvedilol 12.5 milliGRAM(s) Oral every 12 hours  clopidogrel Tablet 75 milliGRAM(s) Oral daily  lactobacillus acidophilus 1 Tablet(s) Oral two times a day with meals  mupirocin 2% Ointment 1 Application(s) Topical three times a day  petrolatum white Ointment 1 Application(s) Topical two times a day  piperacillin/tazobactam IVPB. 3.375 Gram(s) IV Intermittent every 8 hours  vancomycin  IVPB 1000 milliGRAM(s) IV Intermittent every 12 hours    MEDICATIONS  (PRN):  acetaminophen   Tablet .. 650 milliGRAM(s) Oral every 6 hours PRN Temp greater or equal to 38C (100.4F)  guaiFENesin    Syrup 100 milliGRAM(s) Oral every 6 hours PRN Cough      LABS:                                   11.8   4.77  )-----------( 88       ( 31 Jan 2019 04:40 )             35.9     01-31    137  |  102  |  12  ----------------------------<  91  3.7   |  25  |  1.16    Ca    8.2<L>      31 Jan 2019 04:40  Phos  2.7     01-31  Mg     1.8     01-31      CT CHEST - Patchy DANIEL consolidation/atelectasis. Otherwise wnl.

## 2019-01-31 NOTE — PROGRESS NOTE ADULT - ASSESSMENT
78 M from my private office with hx CAD s/p PCI stent. PPM, COPD, Vascular dementia, HTN, HLD presented with fall.  Pt AMS and found to have diffuse rash--sec to disseminated herpes zoster.     Disseminated herpes zoster  hx CAD s/p PCI stent  s/p PPM    obtain TTE to r/o gross vegetations or severe regurgitant lesions    continue antibiotics per ID

## 2019-01-31 NOTE — CONSULT NOTE ADULT - ATTENDING COMMENTS
Mariaa Rich MD  Pager: [phone]  After 5 PM or weekends please call fellow on call or office [phone]
pt with underlying dementia, multiple falls over past few months. likely has COPD from former smoking but not formally diagnosed nor on routine inhalers.   pt with disseminated zoster, former smoker and now wheezing. Continue DuoNebs, symbicort. If no response and tachypnea worsens, can try bilevel 12/5. On exam, wheezing sounded upper airway but lung sounds are diminished.   Consider derm consult - pustular lesions all over back and anterior chest, abdomen, legs are concerning.
Patient chart reviewed in detail and discussed with resident and I agree with above assessment and plan as outlined. Patient with disseminated herpes zoster and would advice against spinal tap at this time to prevent further dissemination into spinal fluid.  Agree with plan to assess vitamin, nutritional and metabolic deficiencies.   Continue with ID and medical management.

## 2019-01-31 NOTE — CONSULT NOTE ADULT - ASSESSMENT
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 Neurology consulted for r/o neurosyphillis. CTH showed chronic small infarcts in bi/l cerebellar hemispheres, lacunar infarcts involving R lentiform nucleus and L internal capsule.    Impression: Dementia - multifactorial; Pt has evidence of multiple chronic strokes with vascular risk factors which could play role in vascular type dementia; fitting his dysmetria/ataxia and falls vs. his remote diagnosis and Tx for latent syphilis; potentially neurosyphilis. Regardless, unlikely to reverse his current mental state but may Dx and Tx may slow progression. Need to r/o nutritional deficiency also (big beefy tongue)    [] RPR, Treponema AB Positive from 3/2017  [] Please check Vit. 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 Neurology consulted for r/o neurosyphillis. CTH showed chronic small infarcts in bi/l cerebellar hemispheres, lacunar infarcts involving R lentiform nucleus and L internal capsule.    Impression: Dementia - multifactorial; Pt has evidence of multiple chronic strokes with vascular risk factors which could play role in vascular type dementia; fitting his dysmetria/ataxia and falls vs. his remote diagnosis and Tx for latent syphilis; potentially neurosyphilis. Regardless, unlikely to reverse his current mental state but may Dx and Tx may slow progression. Need to r/o nutritional deficiency also (big beefy tongue).     [] RPR, Treponema AB Positive from 3/2017  [] Please check serum Vit. B12, B1, B3(niacin) ammonia, HIV, TSH, FT4  [] After above labs drawn, Please give IV thiamine 500mg BID x3 days  [] Check PPM compatibility for MRI Brain w/ and w/o con  & MRA Head w/o, MRA Neck w/  [] Diagnosing neurosyphillis requires LP; which would be ill-advised given active widespread herpes zoster infection; Can be pursued w/ outpatient ID/neuro    d/w neuro attending Dr. Rhoades. 79 y/o male HX of SSS, S/P PPM, Dementia, obesity, HTN, CAD, Vertigo, hx of Syphilis (in 1950s); Multiple Falls, Lives w/ brother, EX Smoker (quit 2 months ago), Very poor Historian, Restless leg syndrome, Thrombocytopenia admitted s/p fall; found to have disseminated zoster infection. Neurology consulted for r/o neurosyphillis. CTH showed chronic small infarcts in bi/l cerebellar hemispheres, lacunar infarcts involving R lentiform nucleus and L internal capsule.    Impression: Dementia - multifactorial; Pt has evidence of multiple chronic strokes with vascular risk factors which could play role in vascular type dementia; fitting his dysmetria/ataxia and falls vs. his remote diagnosis and Tx for latent syphilis; potentially neurosyphilis. Regardless, unlikely to reverse his current mental state but may Dx and Tx may slow progression. Need to r/o nutritional deficiency also (big beefy tongue).     [] RPR, Treponema AB Positive from 3/2017  [] Please check serum Vit. B12, B1, B3(niacin) ammonia, HIV, TSH, FT4  [] After above labs drawn, Please give IV thiamine 500mg BID x3 days  [] Check PPM compatibility for MRI Brain w/ and w/o con  & MRA Head w/o, MRA Neck w/  [] Diagnosing neurosyphillis requires LP; which would be ill-advised given active widespread herpes zoster infection given risk of introducing in CSF; Can likely be pursued w/ outpatient ID/neuro  [] PT/OT eval    d/w neuro attending Dr. Rhoades. 77 y/o male HX of SSS, S/P PPM, Dementia, obesity, HTN, CAD, Vertigo, hx of Syphilis (in 1950s); Multiple Falls, Lives w/ brother, EX Smoker (quit 2 months ago), Very poor Historian, Restless leg syndrome, Thrombocytopenia admitted s/p fall; found to have disseminated zoster infection. Neurology consulted for r/o neurosyphillis. CTH showed chronic small infarcts in bi/l cerebellar hemispheres, lacunar infarcts involving R lentiform nucleus and L internal capsule.    Impression: Dementia - multifactorial; Pt has evidence of multiple chronic strokes with vascular risk factors which could play role in vascular type dementia; fitting his dysmetria/ataxia and falls vs. his remote diagnosis and Tx for latent syphilis; potentially neurosyphilis. Regardless, unlikely to reverse his current mental state but may Dx and Tx may slow progression. Need to r/o nutritional deficiency also (big beefy tongue).     [] RPR, Treponema AB Positive from 3/2017  [] Please check serum Vit. B12, B1, B3(niacin) ammonia, HIV, TSH, FT4  [] After above labs drawn, Please give IV thiamine 500mg BID x3 days  [] Check PPM compatibility for MRI Brain w/ and w/o con  & MRA Head w/o, MRA Neck w/  [] Diagnosing neurosyphillis requires LP; which would be ill-advised given active widespread herpes zoster infection given risk of introducing in CSF; Can likely be pursued w/ outpatient ID/neuro  [] Tx infection as per ID; Derm input noted  [] PT/OT eval    d/w neuro attending Dr. Rhoades.

## 2019-01-31 NOTE — CONSULT NOTE ADULT - SUBJECTIVE AND OBJECTIVE BOX
*************************************  NEUROLOGY CONSULT  NOTE  **************************************    AISHWARYA CACERES  Male  MRN-[id number]    HPI:  This is a 79 y/o M w/ a PMH significant for SSS s/p PPM, dementia, HTN, and restless leg syndrome who presented to the ED s/p fall. He states he has been very unsteady on his feet, and has had multiple falls over the past few weeks w/ the most recent being the day prior to admission. He denies any preceding prodrome prior to the falls, though he is unsure. He states he remembers the fall, and denies LOC. He denies CP, cough, dyspnea, n/v/d/c, fevers/chills, dysuria, lightheadedness/dizziness. Does endorse a rash that has been over his left shoulder and chest over the past few weeks that he never noticed before. He cannot recall medications that he is taking, but state he is on only one medication for blood pressure, and no other prescriptions. (27 Jan 2019 04:15)        ROS: All negative except as mentioned in HPI    PAST MEDICAL & SURGICAL HISTORY:  RLS (restless legs syndrome)  Pacemaker  Dementia  HTN (hypertension)  CAD (coronary artery disease)  Artificial pacemaker: [employer]  H/O hydrocele  S/P skin biopsy: Behind L ear  History of appendectomy    MEDICATIONS  (STANDING):  acyclovir IVPB      acyclovir IVPB 750 milliGRAM(s) IV Intermittent every 12 hours  ALBUTerol    90 MICROgram(s) HFA Inhaler 1 Puff(s) Inhalation every 4 hours  ALBUTerol/ipratropium for Nebulization 3 milliLiter(s) Nebulizer every 6 hours  amLODIPine   Tablet 10 milliGRAM(s) Oral daily  aspirin enteric coated 81 milliGRAM(s) Oral daily  atorvastatin 10 milliGRAM(s) Oral at bedtime  buDESOnide 160 MICROgram(s)/formoterol 4.5 MICROgram(s) Inhaler 2 Puff(s) Inhalation two times a day  carvedilol 12.5 milliGRAM(s) Oral every 12 hours  clopidogrel Tablet 75 milliGRAM(s) Oral daily  lactobacillus acidophilus 1 Tablet(s) Oral two times a day with meals  mupirocin 2% Ointment 1 Application(s) Topical three times a day  petrolatum white Ointment 1 Application(s) Topical two times a day  piperacillin/tazobactam IVPB. 3.375 Gram(s) IV Intermittent every 8 hours  vancomycin  IVPB 1000 milliGRAM(s) IV Intermittent every 12 hours    MEDICATIONS  (PRN):  acetaminophen   Tablet .. 650 milliGRAM(s) Oral every 6 hours PRN Temp greater or equal to 38C (100.4F)  guaiFENesin    Syrup 100 milliGRAM(s) Oral every 6 hours PRN Cough    Allergies    No Known Allergies    Intolerances        VITAL SIGNS:  Vital Signs Last 24 Hrs  T(C): 36.4 (31 Jan 2019 07:57), Max: 37.1 (30 Jan 2019 18:48)  T(F): 97.6 (31 Jan 2019 07:57), Max: 98.7 (30 Jan 2019 18:48)  HR: 64 (31 Jan 2019 10:22) (62 - 85)  BP: 148/70 (31 Jan 2019 07:57) (134/90 - 166/65)  BP(mean): --  RR: 20 (31 Jan 2019 07:57) (17 - 20)  SpO2: 94% (31 Jan 2019 07:57) (93% - 98%)    PHYSICAL EXAMINATION:  General: Well-developed, well nourished, in no acute distress.  Eyes: Conjunctiva and sclera clear.  Neck: Supple, nontender  Skin: no rash, no edema noted  Lung: no respiratory distress noted  Neurologic:  - Mental Status:  Alert, awake, oriented to person, place, and time; Speech is fluent with intact naming, repetition, and comprehension; crosses midline, no extinction or neglect noted; good recall  - Cranial Nerves II-XII:  VFF, No nystagmus or APD noted, EOMI, PERRLA, V1-V3 intact, no facial asymmetry, t/p midline, SCM/trap intact.  - Motor:  Strength is 5/5 throughout.  There is no pronator drift.  Normal muscle bulk and tone throughout. No myoclonus or tremor.  - Reflexes:  2+ and symmetric at the biceps, triceps, brachioradialis, knees, and ankles.  Plantar responses flexor.  - Sensory:  Intact to light touch, pin prick, vibration, and joint-position sense throughout.  - Coordination:  Finger-nose-finger and heel-knee-shin intact without dysmetria.  Rapid alternating hand movements intact.  - Gait:   Normal steps, base, arm swing, and turning.  Heel and toe walking are normal.  Tandem gait is normal.  Romberg testing is negative.    LABS:                          11.8   4.77  )-----------( 88       ( 31 Jan 2019 04:40 )             35.9     01-31    137  |  102  |  12  ----------------------------<  91  3.7   |  25  |  1.16    Ca    8.2<L>      31 Jan 2019 04:40  Phos  2.7     01-31  Mg     1.8     01-31          RADIOLOGY & ADDITIONAL STUDIES: *************************************  NEUROLOGY CONSULT  NOTE  **************************************    AISHWARYA CACERES  Male  MRN-[id number]    BACKGROUND  This is a 77 y/o M w/ a PMH significant for SSS s/p PPM, dementia, HTN, and restless leg syndrome who presented to the ED s/p fall. He states he has been very unsteady on his feet, and has had multiple falls over the past few weeks w/ the most recent being the day prior to admission. He denies any preceding prodrome prior to the falls, though he is unsure. He states he remembers the fall, and denies LOC. He denies CP, cough, dyspnea, n/v/d/c, fevers/chills, dysuria, lightheadedness/dizziness. Does endorse a rash that has been over his left shoulder and chest over the past few weeks that he never noticed before. He cannot recall medications that he is taking, but state he is on only one medication for blood pressure, and no other prescriptions. 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 Pt was oriented x3 for my evaluation, but as per RN fluctuates to Orientation x1 different times of day. Please see physical exam section.  Patient denies LOC, fever, chills, HA, vision changes, tinnitus, dysarthria, dysphagia, dizziness, chest pain, palpitations, nausea, vomiting, diarrhea, dysuria and incontinence.      ROS: All negative except as mentioned in HPI    PAST MEDICAL & SURGICAL HISTORY:  RLS (restless legs syndrome)  Pacemaker  Dementia  HTN (hypertension)  CAD (coronary artery disease)  Artificial pacemaker: [employer]  H/O hydrocele  S/P skin biopsy: Behind L ear  History of appendectomy    MEDICATIONS  (STANDING):  acyclovir IVPB      acyclovir IVPB 750 milliGRAM(s) IV Intermittent every 12 hours  ALBUTerol    90 MICROgram(s) HFA Inhaler 1 Puff(s) Inhalation every 4 hours  ALBUTerol/ipratropium for Nebulization 3 milliLiter(s) Nebulizer every 6 hours  amLODIPine   Tablet 10 milliGRAM(s) Oral daily  aspirin enteric coated 81 milliGRAM(s) Oral daily  atorvastatin 10 milliGRAM(s) Oral at bedtime  buDESOnide 160 MICROgram(s)/formoterol 4.5 MICROgram(s) Inhaler 2 Puff(s) Inhalation two times a day  carvedilol 12.5 milliGRAM(s) Oral every 12 hours  clopidogrel Tablet 75 milliGRAM(s) Oral daily  lactobacillus acidophilus 1 Tablet(s) Oral two times a day with meals  mupirocin 2% Ointment 1 Application(s) Topical three times a day  petrolatum white Ointment 1 Application(s) Topical two times a day  piperacillin/tazobactam IVPB. 3.375 Gram(s) IV Intermittent every 8 hours  vancomycin  IVPB 1000 milliGRAM(s) IV Intermittent every 12 hours    MEDICATIONS  (PRN):  acetaminophen   Tablet .. 650 milliGRAM(s) Oral every 6 hours PRN Temp greater or equal to 38C (100.4F)  guaiFENesin    Syrup 100 milliGRAM(s) Oral every 6 hours PRN Cough    Allergies    No Known Allergies    Intolerances        VITAL SIGNS:  Vital Signs Last 24 Hrs  T(C): 36.4 (31 Jan 2019 07:57), Max: 37.1 (30 Jan 2019 18:48)  T(F): 97.6 (31 Jan 2019 07:57), Max: 98.7 (30 Jan 2019 18:48)  HR: 64 (31 Jan 2019 10:22) (62 - 85)  BP: 148/70 (31 Jan 2019 07:57) (134/90 - 166/65)  BP(mean): --  RR: 20 (31 Jan 2019 07:57) (17 - 20)  SpO2: 94% (31 Jan 2019 07:57) (93% - 98%)    PHYSICAL EXAMINATION:  General: obese, disheveled; in no acute distress.  Eyes: Conjunctiva and sclera clear.  Neck: Supple, nontender  Skin: Diffuse papular/vesicular rash with crusting all over body  Lung: audible wheezing, but NAD  Neurologic:  - Mental Status:  Alert, awake, oriented to person, place, and time (knew month, year, [facility]; but said Florentin is president); Flat affect; Speech is fluent with intact naming, repetition, comprehension, but cognition is slowed; ; crosses midline, no extinction or neglect noted; Poor Recall; remembered 1/3 words at 3 mins and 0/3 words at 6 mins  - Cranial Nerves II-XII:  VFF; hypometric saccades, No nystagmus or [place] pupil noted, EOMI, PERRLA, V1-V3 intact, no facial asymmetry, t/p midline, SCM/trap intact. Beefy tongue  - Motor:  Strength is 5/5 throughout.  There is no pronator drift.  Normal muscle bulk and tone throughout. There was sustained clonus in both LEs when actively dorsiflexed R>L. No Startle myoclonus  - Reflexes:  2+ and symmetric at the biceps, triceps, brachioradialis, knees, and ankles.  Plantar responses extensor b/l  - Sensory:  Intact to light touch and joint-position sense.  - Coordination:  Finger-nose-finger and heel-knee-shin w/ dysmetria bilaterally L>R.    - Gait:   deferred as pt wheezing in bed    LABS:                          11.8   4.77  )-----------( 88       ( 31 Jan 2019 04:40 )             35.9     01-31    137  |  102  |  12  ----------------------------<  91  3.7   |  25  |  1.16    Ca    8.2<L>      31 Jan 2019 04:40  Phos  2.7     01-31  Mg     1.8     01-31        RADIOLOGY & ADDITIONAL STUDIES:      < from: CT Head No Cont (01.30.19 @ 18:12) >  Chronic small infarcts are again noted involving the bilateral cerebellar   hemispheres, unchanged. Chronic lacunar infarcts involve the right   lentiform nucleus and left internal capsule, also unchanged.    There is no evidence for acute infarct, acute hemorrhage, mass effect,   calvarial fracture, or hydrocephalus.    Moderately extensive patchy hypodensity without mass effect is noted in   the periventricular white matter which most likely represents chronic   microvascular ischemic changes given the patient's age.    Cerebral volume loss is present with secondary proportional prominence of   the sulci and ventricles.    A right ethmoid air cell is opacified.    IMPRESSION:    No acute intracranial pathology is noted. If the patient has new and   persistent symptoms, consider short interval follow-up exam.      < end of copied text > *************************************  NEUROLOGY CONSULT  NOTE  **************************************    AISHWARYA CACERES  Male  MRN-[id number]    BACKGROUND  This is a 77 y/o M w/ a PMH significant for SSS s/p PPM, dementia, HTN, and restless leg syndrome who presented to the ED s/p fall. He states he has been very unsteady on his feet, and has had multiple falls over the past few weeks w/ the most recent being the day prior to admission. He denies any preceding prodrome prior to the falls, though he is unsure. He states he remembers the fall, and denies LOC. He denies CP, cough, dyspnea, n/v/d/c, fevers/chills, dysuria, lightheadedness/dizziness. Does endorse a rash that has been over his left shoulder and chest over the past few weeks that he never noticed before. He cannot recall medications that he is taking, but state he is on only one medication for blood pressure, and no other prescriptions. 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 Pt was seen by neurology in 2017 for unsteady gait also.  Pt is a poor Hx and collateral obtained from chart. 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 Pt was oriented x3 for my evaluation, but as per RN fluctuates to Orientation x1 different times of day. Please see physical exam section.  Patient denies LOC, fever, chills, HA, vision changes, tinnitus, dysarthria, dysphagia, dizziness, chest pain, palpitations, nausea, vomiting, diarrhea, dysuria and incontinence.      ROS: All negative except as mentioned in HPI    PAST MEDICAL & SURGICAL HISTORY:  RLS (restless legs syndrome)  Pacemaker  Dementia  HTN (hypertension)  CAD (coronary artery disease)  Artificial pacemaker: [employer]  H/O hydrocele  S/P skin biopsy: Behind L ear  History of appendectomy    MEDICATIONS  (STANDING):  acyclovir IVPB      acyclovir IVPB 750 milliGRAM(s) IV Intermittent every 12 hours  ALBUTerol    90 MICROgram(s) HFA Inhaler 1 Puff(s) Inhalation every 4 hours  ALBUTerol/ipratropium for Nebulization 3 milliLiter(s) Nebulizer every 6 hours  amLODIPine   Tablet 10 milliGRAM(s) Oral daily  aspirin enteric coated 81 milliGRAM(s) Oral daily  atorvastatin 10 milliGRAM(s) Oral at bedtime  buDESOnide 160 MICROgram(s)/formoterol 4.5 MICROgram(s) Inhaler 2 Puff(s) Inhalation two times a day  carvedilol 12.5 milliGRAM(s) Oral every 12 hours  clopidogrel Tablet 75 milliGRAM(s) Oral daily  lactobacillus acidophilus 1 Tablet(s) Oral two times a day with meals  mupirocin 2% Ointment 1 Application(s) Topical three times a day  petrolatum white Ointment 1 Application(s) Topical two times a day  piperacillin/tazobactam IVPB. 3.375 Gram(s) IV Intermittent every 8 hours  vancomycin  IVPB 1000 milliGRAM(s) IV Intermittent every 12 hours    MEDICATIONS  (PRN):  acetaminophen   Tablet .. 650 milliGRAM(s) Oral every 6 hours PRN Temp greater or equal to 38C (100.4F)  guaiFENesin    Syrup 100 milliGRAM(s) Oral every 6 hours PRN Cough    Allergies    No Known Allergies    Intolerances        VITAL SIGNS:  Vital Signs Last 24 Hrs  T(C): 36.4 (31 Jan 2019 07:57), Max: 37.1 (30 Jan 2019 18:48)  T(F): 97.6 (31 Jan 2019 07:57), Max: 98.7 (30 Jan 2019 18:48)  HR: 64 (31 Jan 2019 10:22) (62 - 85)  BP: 148/70 (31 Jan 2019 07:57) (134/90 - 166/65)  BP(mean): --  RR: 20 (31 Jan 2019 07:57) (17 - 20)  SpO2: 94% (31 Jan 2019 07:57) (93% - 98%)    PHYSICAL EXAMINATION:  General: obese, disheveled; in no acute distress.  Eyes: Conjunctiva and sclera clear.  Neck: Supple, nontender  Skin: Diffuse papular/vesicular rash with crusting all over body  Lung: audible wheezing, but NAD  Neurologic:  - Mental Status:  Alert, awake, oriented to person, place, and time (knew month, year, [facility]; but said Florentin is president); Flat affect; Speech is fluent with intact naming, repetition, comprehension, but cognition is slowed; ; crosses midline, no extinction or neglect noted; Poor Recall; remembered 1/3 words at 3 mins and 0/3 words at 6 mins  - Cranial Nerves II-XII:  VFF; hypometric saccades, No nystagmus or [place] pupil noted, EOMI, PERRLA, V1-V3 intact, no facial asymmetry, t/p midline, SCM/trap intact. Beefy tongue  - Motor:  Strength is 5/5 throughout.  There is no pronator drift.  Normal muscle bulk and tone throughout. There was sustained clonus in both LEs when actively dorsiflexed R>L. No Startle myoclonus  - Reflexes:  2+ and symmetric at the biceps, triceps, brachioradialis, knees, and ankles.  Plantar responses extensor b/l  - Sensory:  Intact to light touch and joint-position sense.  - Coordination:  Finger-nose-finger and heel-knee-shin w/ dysmetria bilaterally L>R.    - Gait:   deferred as pt wheezing in bed    LABS:                          11.8   4.77  )-----------( 88       ( 31 Jan 2019 04:40 )             35.9     01-31    137  |  102  |  12  ----------------------------<  91  3.7   |  25  |  1.16    Ca    8.2<L>      31 Jan 2019 04:40  Phos  2.7     01-31  Mg     1.8     01-31        RADIOLOGY & ADDITIONAL STUDIES:      < from: CT Head No Cont (01.30.19 @ 18:12) >  Chronic small infarcts are again noted involving the bilateral cerebellar   hemispheres, unchanged. Chronic lacunar infarcts involve the right   lentiform nucleus and left internal capsule, also unchanged.    There is no evidence for acute infarct, acute hemorrhage, mass effect,   calvarial fracture, or hydrocephalus.    Moderately extensive patchy hypodensity without mass effect is noted in   the periventricular white matter which most likely represents chronic   microvascular ischemic changes given the patient's age.    Cerebral volume loss is present with secondary proportional prominence of   the sulci and ventricles.    A right ethmoid air cell is opacified.    IMPRESSION:    No acute intracranial pathology is noted. If the patient has new and   persistent symptoms, consider short interval follow-up exam.      < end of copied text >

## 2019-01-31 NOTE — PROVIDER CONTACT NOTE (OTHER) - ASSESSMENT
pt is a little restless but has no complaints other than feeling anxious. vital signs are stable but BP is 166/65. BP meds administered as ordered

## 2019-01-31 NOTE — PROGRESS NOTE ADULT - SUBJECTIVE AND OBJECTIVE BOX
SUBJECTIVE / OVERNIGHT EVENTS: pt alert awake oriented today  denies cp    MEDICATIONS  (STANDING):  acyclovir IVPB      acyclovir IVPB 750 milliGRAM(s) IV Intermittent every 12 hours  ALBUTerol    90 MICROgram(s) HFA Inhaler 1 Puff(s) Inhalation every 4 hours  ALBUTerol/ipratropium for Nebulization 3 milliLiter(s) Nebulizer every 6 hours  amLODIPine   Tablet 10 milliGRAM(s) Oral daily  aspirin enteric coated 81 milliGRAM(s) Oral daily  atorvastatin 10 milliGRAM(s) Oral at bedtime  buDESOnide 160 MICROgram(s)/formoterol 4.5 MICROgram(s) Inhaler 2 Puff(s) Inhalation two times a day  carvedilol 12.5 milliGRAM(s) Oral every 12 hours  clopidogrel Tablet 75 milliGRAM(s) Oral daily  lactobacillus acidophilus 1 Tablet(s) Oral two times a day with meals  mupirocin 2% Ointment 1 Application(s) Topical three times a day  petrolatum white Ointment 1 Application(s) Topical two times a day  piperacillin/tazobactam IVPB. 3.375 Gram(s) IV Intermittent every 8 hours  vancomycin  IVPB 1000 milliGRAM(s) IV Intermittent every 12 hours    MEDICATIONS  (PRN):  acetaminophen   Tablet .. 650 milliGRAM(s) Oral every 6 hours PRN Temp greater or equal to 38C (100.4F)  guaiFENesin    Syrup 100 milliGRAM(s) Oral every 6 hours PRN Cough    Vital Signs Last 24 Hrs  T(C): 36.8 (2019 19:33), Max: 36.9 (2019 03:58)  T(F): 98.3 (2019 19:33), Max: 98.4 (2019 03:58)  HR: 62 (2019 19:33) (62 - 69)  BP: 153/65 (2019 19:33) (142/61 - 166/65)  BP(mean): --  RR: 24 (2019 19:33) (18 - 24)  SpO2: 95% (2019 19:33) (93% - 100%)    Constitutional: No fever, fatigue  Skin: No rash.  Eyes: No recent vision problems or eye pain.  ENT: No congestion, ear pain, or sore throat.  Cardiovascular: No chest pain or palpation.  Respiratory: No cough, shortness of breath, congestion, or wheezing.  Gastrointestinal: No abdominal pain, nausea, vomiting, or diarrhea.  Genitourinary: No dysuria.  Musculoskeletal: No joint swelling.  Neurologic: No headache.      PHYSICAL EXAM:  GENERAL: NAD  EYES: EOMI, PERRLA  NECK: Supple, No JVD  CHEST/LUNG: dec breath sounds at bases   HEART:  S1 , S2 +  ABDOMEN: soft , bs+  EXTREMITIES: trace edema   NEUROLOGY:alert awake oriented   skin - vesicular rash+    LABS:      137  |  102  |  12  ----------------------------<  91  3.7   |  25  |  1.16    Ca    8.2<L>      2019 04:40  Phos  2.7       Mg     1.8           Creatinine Trend: 1.16 <--, 1.09 <--, 0.99 <--, 0.97 <--, 1.08 <--                        11.8   4.77  )-----------( 88       ( 2019 04:40 )             35.9     Urine Studies:  Urinalysis Basic - ( 2019 17:00 )    Color: YELLOW / Appearance: CLEAR / S.035 / pH: 6.0  Gluc: NEGATIVE / Ketone: TRACE  / Bili: NEGATIVE / Urobili: TRACE   Blood: NEGATIVE / Protein: 100 / Nitrite: NEGATIVE   Leuk Esterase: NEGATIVE / RBC: 3-5 / WBC 0-2   Sq Epi: OCC / Non Sq Epi:  / Bacteria: NEGATIVE                      RADIOLOGY & ADDITIONAL TESTS:    Imaging Personally Reviewed:    Consultant(s) Notes Reviewed:      Care Discussed with Consultants/Other Providers:

## 2019-01-31 NOTE — CONSULT NOTE ADULT - SUBJECTIVE AND OBJECTIVE BOX
[facility] Ophthalmology Consult Note    HPI: 79 y/o male HX of SSS, S/P PPM, Dementia, obesity, HTN, CAD, Vertigo, hx of Syphilis (in 1950s); Multiple Falls, Lives w/ brother, EX Smoker (quit 2 months ago), Very poor Historian, Restless leg syndrome, Thrombocytopenia admitted s/p fall; found to have disseminated zoster infection. Ophthalmology consulted to rule out ocular involvement. Patient denies any eye pain, tearing or blurry vision. Patient states he had cataract sx in both eyes over 3 years ago. Patient states that he has amblyopia of the left eye, and that his left eye vision has been poor since childhood.       PMH: Dementia, obesity, HTN, CAD, Vertigo, hx of Syphilis  Meds: Home Medications:  amLODIPine 10 mg oral tablet: 1 tab(s) orally once a day (27 Jan 2019 12:18)  aspirin 81 mg oral delayed release tablet: 1 tab(s) orally once a day (27 Jan 2019 12:18)  atorvastatin 10 mg oral tablet: 1 tab(s) orally once a day (27 Jan 2019 12:18)  carvedilol 12.5 mg oral tablet: 1 tab(s) orally 2 times a day (27 Jan 2019 12:18)  clopidogrel 75 mg oral tablet: 1 tab(s) orally once a day (27 Jan 2019 12:18)  furosemide 40 mg oral tablet: 1 tab(s) orally once a day (27 Jan 2019 12:18)  Lasix 40 mg oral tablet: 1 tab(s) orally every other day (29 Jan 2019 18:26)  pramipexole 0.25 mg oral tablet: 1 tab(s) orally once a day (at bedtime) (27 Jan 2019 12:18)  predniSONE 20 mg oral tablet: 1 tab(s) orally 2 times a day (27 Jan 2019 12:18)  Ventolin HFA 90 mcg/inh inhalation aerosol: 2 puff(s) inhaled 3 times a day (27 Jan 2019 12:18)    POcHx (including surgeries/lasers/trauma):  CEIOL OU 3 years ago. Amblyopia of left eye since childhood. Reports patching therapy as a child.   Drops: None  FamHx: None  Social Hx: None  Allergies: NKDA    ROS:  General (neg), Vision (per HPI), Head and Neck (neg), Pulm (neg), CV (neg), GI (neg),  (neg), Musculoskeletal (neg), Skin/Integ (neg), Neuro (neg), Endocrine (neg), Heme (neg), All/Immuno (neg)    Mood and Affect Appropriate ( x ),  Oriented to Time, Place, and Person x 3 ( x )    Ophthalmology Exam    Visual acuity (cc): 20/30 OD 20/70 OS PHNI  Pupils: PERRL OU, no APD  Ttono: 16 OU  Extraocular movements (EOMs): Full OU, no pain, no diplopia   Confrontational Visual Field (CVF):  Full OU      Pen Light Exam (PLE)  External:  Scabbed over vesicular lesion on cheek and bridge of nose. Mcmanus sign negative   Lids/Lashes/Lacrimal Ducts: Flat OU    Sclera/Conjunctiva:  W+Q OU  Cornea: Cl OU No uptake of staining. No dendrites or pseudodendrites.   Anterior Chamber: D+F OU  Iris:  Flat OU  Lens:  PCIOL OU    Fundus Exam: dilated with 1% tropicamide and 2.5% phenylephrine  Approval obtained from primary team for dilation  Patient aware that pupils can remained dilated for at least 4-6 hours  Exam performed with 20D lens    Vitreous: wnl OU  Disc, cup/disc: sharp and pink, 0.5 OU. Peripapillary atrophy  Macula:  wnl OU  Vessels:  wnl OU  Periphery: wnl OU    A/P: 79 y/o m admitted now with disseminated zoster. Ophthalmology consulted for concern for eye involvement. Vision at baseline (poor OS due to amblyopia since childhood). No corneal or conjunctival staining, no pseudodendrites on exam. Dilated fundus exam normal.   -Management per primary team   -Systemic antiviral therapy per primary team  -Please re-consult Ophthalmology as needed        Follow-Up:  Patient should follow up his/her ophthalmologist or in the [facility] within 2-3 weeks of discharge.  [address]  [place] [address]  [phone]

## 2019-02-01 ENCOUNTER — TRANSCRIPTION ENCOUNTER (OUTPATIENT)
Age: 79
End: 2019-02-01

## 2019-02-01 LAB
AMMONIA BLD-MCNC: 20 UMOL/L — SIGNIFICANT CHANGE UP (ref 11–55)
ANION GAP SERPL CALC-SCNC: 12 MMO/L — SIGNIFICANT CHANGE UP (ref 7–14)
BUN SERPL-MCNC: 7 MG/DL — SIGNIFICANT CHANGE UP (ref 7–23)
CALCIUM SERPL-MCNC: 8.9 MG/DL — SIGNIFICANT CHANGE UP (ref 8.4–10.5)
CHLORIDE SERPL-SCNC: 100 MMOL/L — SIGNIFICANT CHANGE UP (ref 98–107)
CO2 SERPL-SCNC: 24 MMOL/L — SIGNIFICANT CHANGE UP (ref 22–31)
CREAT SERPL-MCNC: 1 MG/DL — SIGNIFICANT CHANGE UP (ref 0.5–1.3)
GLUCOSE SERPL-MCNC: 110 MG/DL — HIGH (ref 70–99)
HCT VFR BLD CALC: 36.1 % — LOW (ref 39–50)
HGB BLD-MCNC: 12.1 G/DL — LOW (ref 13–17)
HIV 1+2 AB+HIV1 P24 AG SERPL QL IA: SIGNIFICANT CHANGE UP
MCHC RBC-ENTMCNC: 31.4 PG — SIGNIFICANT CHANGE UP (ref 27–34)
MCHC RBC-ENTMCNC: 33.5 % — SIGNIFICANT CHANGE UP (ref 32–36)
MCV RBC AUTO: 93.8 FL — SIGNIFICANT CHANGE UP (ref 80–100)
NRBC # FLD: 0 K/UL — LOW (ref 25–125)
PLATELET # BLD AUTO: 95 K/UL — LOW (ref 150–400)
PMV BLD: 11.7 FL — SIGNIFICANT CHANGE UP (ref 7–13)
POTASSIUM SERPL-MCNC: 3.8 MMOL/L — SIGNIFICANT CHANGE UP (ref 3.5–5.3)
POTASSIUM SERPL-SCNC: 3.8 MMOL/L — SIGNIFICANT CHANGE UP (ref 3.5–5.3)
RBC # BLD: 3.85 M/UL — LOW (ref 4.2–5.8)
RBC # FLD: 13.2 % — SIGNIFICANT CHANGE UP (ref 10.3–14.5)
SODIUM SERPL-SCNC: 136 MMOL/L — SIGNIFICANT CHANGE UP (ref 135–145)
T4 AB SER-ACNC: 7.31 UG/DL — SIGNIFICANT CHANGE UP (ref 5.1–13)
TSH SERPL-MCNC: 1.97 UIU/ML — SIGNIFICANT CHANGE UP (ref 0.27–4.2)
VANCOMYCIN TROUGH SERPL-MCNC: 11.9 UG/ML — SIGNIFICANT CHANGE UP (ref 10–20)
VIT B12 SERPL-MCNC: 1684 PG/ML — HIGH (ref 200–900)
WBC # BLD: 4.43 K/UL — SIGNIFICANT CHANGE UP (ref 3.8–10.5)
WBC # FLD AUTO: 4.43 K/UL — SIGNIFICANT CHANGE UP (ref 3.8–10.5)

## 2019-02-01 PROCEDURE — 99232 SBSQ HOSP IP/OBS MODERATE 35: CPT

## 2019-02-01 PROCEDURE — 99232 SBSQ HOSP IP/OBS MODERATE 35: CPT | Mod: GC

## 2019-02-01 RX ORDER — ALBUTEROL 90 UG/1
2 AEROSOL, METERED ORAL EVERY 6 HOURS
Qty: 0 | Refills: 0 | Status: DISCONTINUED | OUTPATIENT
Start: 2019-02-01 | End: 2019-02-06

## 2019-02-01 RX ORDER — LISINOPRIL 2.5 MG/1
2.5 TABLET ORAL DAILY
Qty: 0 | Refills: 0 | Status: DISCONTINUED | OUTPATIENT
Start: 2019-02-01 | End: 2019-02-06

## 2019-02-01 RX ORDER — LISINOPRIL 2.5 MG/1
2.5 TABLET ORAL DAILY
Qty: 0 | Refills: 0 | Status: DISCONTINUED | OUTPATIENT
Start: 2019-02-01 | End: 2019-02-01

## 2019-02-01 RX ORDER — THIAMINE MONONITRATE (VIT B1) 100 MG
100 TABLET ORAL DAILY
Qty: 0 | Refills: 0 | Status: DISCONTINUED | OUTPATIENT
Start: 2019-02-01 | End: 2019-02-06

## 2019-02-01 RX ORDER — HEPARIN SODIUM 5000 [USP'U]/ML
5000 INJECTION INTRAVENOUS; SUBCUTANEOUS EVERY 8 HOURS
Qty: 0 | Refills: 0 | Status: DISCONTINUED | OUTPATIENT
Start: 2019-02-01 | End: 2019-02-06

## 2019-02-01 RX ADMIN — CARVEDILOL PHOSPHATE 12.5 MILLIGRAM(S): 80 CAPSULE, EXTENDED RELEASE ORAL at 18:07

## 2019-02-01 RX ADMIN — Medication 81 MILLIGRAM(S): at 12:52

## 2019-02-01 RX ADMIN — AMLODIPINE BESYLATE 10 MILLIGRAM(S): 2.5 TABLET ORAL at 05:49

## 2019-02-01 RX ADMIN — MUPIROCIN 1 APPLICATION(S): 20 OINTMENT TOPICAL at 13:01

## 2019-02-01 RX ADMIN — Medication 1 TABLET(S): at 18:07

## 2019-02-01 RX ADMIN — PIPERACILLIN AND TAZOBACTAM 25 GRAM(S): 4; .5 INJECTION, POWDER, LYOPHILIZED, FOR SOLUTION INTRAVENOUS at 00:02

## 2019-02-01 RX ADMIN — Medication 1 APPLICATION(S): at 07:20

## 2019-02-01 RX ADMIN — PIPERACILLIN AND TAZOBACTAM 25 GRAM(S): 4; .5 INJECTION, POWDER, LYOPHILIZED, FOR SOLUTION INTRAVENOUS at 15:19

## 2019-02-01 RX ADMIN — MUPIROCIN 1 APPLICATION(S): 20 OINTMENT TOPICAL at 07:20

## 2019-02-01 RX ADMIN — Medication 265 MILLIGRAM(S): at 05:49

## 2019-02-01 RX ADMIN — LISINOPRIL 2.5 MILLIGRAM(S): 2.5 TABLET ORAL at 12:50

## 2019-02-01 RX ADMIN — HEPARIN SODIUM 5000 UNIT(S): 5000 INJECTION INTRAVENOUS; SUBCUTANEOUS at 21:57

## 2019-02-01 RX ADMIN — Medication 250 MILLIGRAM(S): at 09:03

## 2019-02-01 RX ADMIN — Medication 100 MILLIGRAM(S): at 12:52

## 2019-02-01 RX ADMIN — BUDESONIDE AND FORMOTEROL FUMARATE DIHYDRATE 2 PUFF(S): 160; 4.5 AEROSOL RESPIRATORY (INHALATION) at 21:57

## 2019-02-01 RX ADMIN — ALBUTEROL 2 PUFF(S): 90 AEROSOL, METERED ORAL at 12:50

## 2019-02-01 RX ADMIN — HEPARIN SODIUM 5000 UNIT(S): 5000 INJECTION INTRAVENOUS; SUBCUTANEOUS at 13:01

## 2019-02-01 RX ADMIN — Medication 3 MILLILITER(S): at 05:35

## 2019-02-01 RX ADMIN — Medication 1 TABLET(S): at 09:03

## 2019-02-01 RX ADMIN — ALBUTEROL 2 PUFF(S): 90 AEROSOL, METERED ORAL at 18:03

## 2019-02-01 RX ADMIN — CLOPIDOGREL BISULFATE 75 MILLIGRAM(S): 75 TABLET, FILM COATED ORAL at 12:52

## 2019-02-01 RX ADMIN — Medication 250 MILLIGRAM(S): at 21:57

## 2019-02-01 RX ADMIN — BUDESONIDE AND FORMOTEROL FUMARATE DIHYDRATE 2 PUFF(S): 160; 4.5 AEROSOL RESPIRATORY (INHALATION) at 09:04

## 2019-02-01 RX ADMIN — ATORVASTATIN CALCIUM 10 MILLIGRAM(S): 80 TABLET, FILM COATED ORAL at 21:57

## 2019-02-01 RX ADMIN — CARVEDILOL PHOSPHATE 12.5 MILLIGRAM(S): 80 CAPSULE, EXTENDED RELEASE ORAL at 05:49

## 2019-02-01 RX ADMIN — Medication 1 APPLICATION(S): at 18:08

## 2019-02-01 RX ADMIN — Medication 265 MILLIGRAM(S): at 18:02

## 2019-02-01 RX ADMIN — PIPERACILLIN AND TAZOBACTAM 25 GRAM(S): 4; .5 INJECTION, POWDER, LYOPHILIZED, FOR SOLUTION INTRAVENOUS at 09:04

## 2019-02-01 NOTE — DISCHARGE NOTE ADULT - MEDICATION SUMMARY - MEDICATIONS TO TAKE
I will START or STAY ON the medications listed below when I get home from the hospital:    aspirin 81 mg oral delayed release tablet  -- 1 tab(s) by mouth once a day  -- Indication: For Prophylactic measure    lisinopril 2.5 mg oral tablet  -- 1 tab(s) by mouth once a day  -- Indication: For HTN    atorvastatin 10 mg oral tablet  -- 1 tab(s) by mouth once a day  -- Indication: For HLD    clopidogrel 75 mg oral tablet  -- 1 tab(s) by mouth once a day  -- Indication: For Prophylactic measure    carvedilol 12.5 mg oral tablet  -- 1 tab(s) by mouth 2 times a day  -- Indication: For HTN    Ventolin HFA 90 mcg/inh inhalation aerosol  -- 2 puff(s) inhaled 3 times a day  -- Indication: For Lung disease    amLODIPine 10 mg oral tablet  -- 1 tab(s) by mouth once a day  -- Indication: For HTN

## 2019-02-01 NOTE — DISCHARGE NOTE ADULT - NSCORESITESY/N_GEN_A_CORE_RD
----- Message from Chuyita Bui sent at 5/16/2017 12:02 PM CDT -----  Contact: [phone]/self  Pt would like to speak with you about scheduling her appointment.  Please advise   
Returned patients call.   Patient scheduled for ultrasound on 5-17-17 at 0800. Patient verbalized understanding.   
No

## 2019-02-01 NOTE — PROGRESS NOTE ADULT - SUBJECTIVE AND OBJECTIVE BOX
Patient is a 78y old  Male who presents with a chief complaint of fall, rash , Bandemia , (31 Jan 2019 19:07)        SUBJECTIVE / OVERNIGHT EVENTS: No acute overnight events. Pt is currently on a one-to-one, but I am unaware of any current issues that required this. The patient states that he feels overall well and denies any fevers, chills, cp, abdominal pain, N/V/D overnight. He endorses that his lesions started around 10 days ago. He endorses that he has had syphilis in the past and that it was treated and that he has had LPs before. He endorses smoking for over 60 years with at least 1 pack per day and that he quit 3 months ago.      MEDICATIONS  (STANDING):  acyclovir IVPB      acyclovir IVPB 750 milliGRAM(s) IV Intermittent every 12 hours  ALBUTerol    90 MICROgram(s) HFA Inhaler 1 Puff(s) Inhalation every 4 hours  ALBUTerol/ipratropium for Nebulization 3 milliLiter(s) Nebulizer every 6 hours  amLODIPine   Tablet 10 milliGRAM(s) Oral daily  aspirin enteric coated 81 milliGRAM(s) Oral daily  atorvastatin 10 milliGRAM(s) Oral at bedtime  buDESOnide 160 MICROgram(s)/formoterol 4.5 MICROgram(s) Inhaler 2 Puff(s) Inhalation two times a day  carvedilol 12.5 milliGRAM(s) Oral every 12 hours  clopidogrel Tablet 75 milliGRAM(s) Oral daily  lactobacillus acidophilus 1 Tablet(s) Oral two times a day with meals  mupirocin 2% Ointment 1 Application(s) Topical three times a day  petrolatum white Ointment 1 Application(s) Topical two times a day  piperacillin/tazobactam IVPB. 3.375 Gram(s) IV Intermittent every 8 hours  thiamine 100 milliGRAM(s) Oral daily  vancomycin  IVPB 1000 milliGRAM(s) IV Intermittent every 12 hours    MEDICATIONS  (PRN):  acetaminophen   Tablet .. 650 milliGRAM(s) Oral every 6 hours PRN Temp greater or equal to 38C (100.4F)  guaiFENesin    Syrup 100 milliGRAM(s) Oral every 6 hours PRN Cough        CAPILLARY BLOOD GLUCOSE        I&O's Summary    31 Jan 2019 07:01  -  01 Feb 2019 07:00  --------------------------------------------------------  IN: 0 mL / OUT: 3365 mL / NET: -3365 mL    01 Feb 2019 07:01  -  01 Feb 2019 08:20  --------------------------------------------------------  IN: 0 mL / OUT: 200 mL / NET: -200 mL        PHYSICAL EXAM  GENERAL: NAD, well-developed  HEAD:  Atraumatic, Normocephalic  EYES: EOMI, PERRLA, conjunctiva and sclera clear  NECK: Supple, No JVD  CHEST/LUNG: Expiratory wheezing heard throughout the lung fields. Difficult to hear heart sounds. Pulses regular rate and rhythym.  ABDOMEN: Soft, Nontender, Nondistended; Bowel sounds present  EXTREMITIES:  2+ Peripheral Pulses, No clubbing, cyanosis, or edema  PSYCH: AAOx3  SKIN: Disseminated crusted, erythematous rash throughout the arms, legs and trunk.    LABS:                        12.1   4.43  )-----------( 95       ( 01 Feb 2019 06:00 )             36.1     01-31    137  |  102  |  12  ----------------------------<  91  3.7   |  25  |  1.16    Ca    8.2<L>      31 Jan 2019 04:40  Phos  2.7     01-31  Mg     1.8     01-31

## 2019-02-01 NOTE — DISCHARGE NOTE ADULT - MEDICATION SUMMARY - MEDICATIONS TO STOP TAKING
I will STOP taking the medications listed below when I get home from the hospital:    pramipexole 0.25 mg oral tablet  -- 1 tab(s) by mouth once a day (at bedtime)    predniSONE 20 mg oral tablet  -- 1 tab(s) by mouth 2 times a day    furosemide 40 mg oral tablet  -- 1 tab(s) by mouth once a day    Lasix 40 mg oral tablet  -- 1 tab(s) by mouth every other day

## 2019-02-01 NOTE — DISCHARGE NOTE ADULT - PATIENT PORTAL LINK FT
You can access the [facility] Patient Portal, offered by [facility], by registering with the following website: [email or web address]

## 2019-02-01 NOTE — OCCUPATIONAL THERAPY INITIAL EVALUATION ADULT - GENERAL OBSERVATIONS, REHAB EVAL
Pt received in semisupine position.  + lesions over left shoulder/posterior neck. + enhanced supervision.

## 2019-02-01 NOTE — DISCHARGE NOTE ADULT - PLAN OF CARE
To treat your infection When you first came into the hospital you had a full body rash that was found to be caused by a virus (varicella zoster virus, the same as from chicken pox). You were treated with an antiviral and you should continue to take the oral medications for .... To ensure you had no complications You came into the hospital because of a fall. You had a CT scan completed which did not show any new pathology in the brain. You should follow up with infectious disease as an outpatient for a further workup to rule out any syphilis infection in the brain that may be making you more unsteady. To keep your symptoms under better control You have a history of restless legs syndrome that was possibly being treated with pramipexole. You were not receiving pramipexole within the hospital and you should continue to avoid it at this time until you see your own primary care doctor. When you first came into the hospital you had a full body rash that was found to be caused by a virus (varicella zoster virus, the same as from chicken pox). You were treated with an antiviral and with IV antibiotics. Your rashes improved with the treatment and you finished.

## 2019-02-01 NOTE — DISCHARGE NOTE ADULT - HOSPITAL COURSE
This is a 77 y/o M w/ a PMH significant for SSS s/p PPM, dementia, HTN, and restless leg syndrome who presented to the ED s/p fall. He states he has been very unsteady on his feet, and has had multiple falls over the past few weeks w/ the most recent being the day prior to admission. He denies any preceding prodrome prior to the falls, though he is unsure. He states he remembers the fall, and denies LOC. He denies CP, cough, dyspnea, n/v/d/c, fevers/chills, dysuria, lightheadedness/dizziness. Does endorse a rash that has been over his left shoulder and chest over the past few weeks that he never noticed before. He cannot recall medications that he is taking, but state he is on only one medication for blood pressure, and no other prescriptions.     Initially the pt had altered mental status for the first two days and was placed on a one to one because he kept trying to get out of bed and was a fall risk. The pt was found to be positive for Herpes Zoster infection and was thus placed on contact precautions and he had a skin lesion at the back of his neck and so was also started on Vanc/Zosyn. He was treated with acyclovir IV until 2/4/19 where his skin lesions were mostly crusted over and thus was transitioned to oral acyclovir TID. He had a CT scan of the head after his fall which was normal. Neurology was consulted regarding his AMS and stated that he may need an LP as an outpatient to rule out neurosyphilis (pt had syphilis in the past which was treated). This is a 77 y/o M w/ a PMH significant for SSS s/p PPM, dementia, HTN, and restless leg syndrome who presented to the ED s/p fall. He states he has been very unsteady on his feet, and has had multiple falls over the past few weeks w/ the most recent being the day prior to admission. He denies any preceding prodrome prior to the falls, though he is unsure. He states he remembers the fall, and denies LOC. He denies CP, cough, dyspnea, n/v/d/c, fevers/chills, dysuria, lightheadedness/dizziness. Does endorse a rash that has been over his left shoulder and chest over the past few weeks that he never noticed before. He cannot recall medications that he is taking, but state he is on only one medication for blood pressure, and no other prescriptions.     Initially the pt had altered mental status for the first two days and was placed on a one to one because he kept trying to get out of bed and was a fall risk. The pt was found to be positive for Herpes Zoster infection and was thus placed on contact precautions and he had a skin lesion at the back of his neck and so was also started on Vanc/Zosyn. He was treated with acyclovir IV until 2/4/19 where his skin lesions were mostly crusted over and thus was transitioned to oral acyclovir TID. He had a CT scan of the head after his fall which was normal. Neurology was consulted regarding his AMS and stated that he may need an LP as an outpatient to rule out neurosyphilis (pt had syphilis in the past which was treated). He was treated with Vanc/Zosyn until 2/5/19 when it was decided by ID that your lesions were properly healed and we stopped the treatments. He was seen by PT which determined that he would do well with rehab to work on balance.

## 2019-02-01 NOTE — PROGRESS NOTE ADULT - SUBJECTIVE AND OBJECTIVE BOX
Follow Up:  disseminated herpes zoster     Inverval History/ROS:  depressed.  no HA, chills, SOB.  no longer requiring nasal O2  Rest of ROS neg.    Allergies  No Known Allergies        acyclovir IVPB    acyclovir IVPB 750 every 12 hours  piperacillin/tazobactam IVPB. 3.375 every 8 hours  vancomycin  IVPB 1000 every 12 hours          OTHER MEDS:  acetaminophen   Tablet .. 650 milliGRAM(s) Oral every 6 hours PRN  amLODIPine   Tablet 10 milliGRAM(s) Oral daily  lactobacillus acidophilus 1 Tablet(s) Oral two times a day with meals  potassium acid phosphate/sodium acid phosphate tablet (K-PHOS No. 2) 1 Tablet(s) Oral four times a day with meals  sodium chloride 0.9%. 1000 milliLiter(s) IV Continuous <Continuous>    Vital Signs Last 24 Hrs  T(F): 98.3 (02-01-19 @ 12:36), Max: 98.3 (01-31-19 @ 15:08)  HR: 60 (02-01-19 @ 12:36)  BP: 157/76 (02-01-19 @ 12:36)  RR: 24 (02-01-19 @ 12:36)  SpO2: 96% (02-01-19 @ 12:36) (95% - 100%)    PHYSICAL EXAM:  General: [x ] non-toxic  HEAD/EYES: [x ] conj less injected  ENT:  [ ] normal [x ] supple [ ] thrush [ ] pharyngeal exudate  Cardiovascular:   [ ] murmur [x ] normal [ ] PPM/AICD  Respiratory:  [x ] BS bilat  GI:  [x ] soft, non-tender, normal bowel sounds  :  [ ] milner [ x] no CVA tenderness   Musculoskeletal:  [x ] no synovitis  Neurologic:  [x ] non-focal exam   Skin:  [x ] vesicles in dermatome left shoulder, upper back, neck mostly crusted .  scattered mac/pap/vesicles face, trunk  crusting, eschar post neck  Lymph: [ ] no lymphadenopathy  Psychiatric:  [x ] appropriate affect [x ] alert & oriented  Lines:  [x ] no phlebitis [ ] central line                                         12.1   4.43  )-----------( 95       ( 01 Feb 2019 06:00 )             36.1 02-01    136  |  100  |  7   ----------------------------<  110  3.8   |  24  |  1.00  Ca    8.9      01 Feb 2019 06:00Phos  2.7     01-31Mg     1.8     01-31        MICROBIOLOGY:  Culture - Blood in AM (01.29.19 @ 06:31)    Culture - Blood:   NO ORGANISMS ISOLATED  NO ORGANISMS ISOLATED AT 72 HRS.    Specimen Source: BLOOD VENOUS      Culture - Blood in AM (01.29.19 @ 06:31)    Culture - Blood:   NO ORGANISMS ISOLATED  NO ORGANISMS ISOLATED AT 72 HRS.    Specimen Source: BLOOD PERIPHERAL      URINE MIDSTREAM  01-26-19 --  --  --      BLOOD PERIPHERAL    blood culture x 1 staph hominis  blood culture x1 staph epi          RADIOLOGY:    < from: Xray Chest 1 View- PORTABLE-Urgent (01.26.19 @ 15:57) >      IMPRESSION:     Clear lungs.     < end of copied text >

## 2019-02-01 NOTE — PROGRESS NOTE ADULT - SUBJECTIVE AND OBJECTIVE BOX
CHIEF COMPLAINT:    Interval Events: No acute events overnight.     REVIEW OF SYSTEMS:  Constitutional: No fevers or chills. No weight loss. No fatigue or generalised malaise.  Eyes: No itching or discharge from the eyes  ENT: No ear pain. No ear discharge. No nasal congestion. No post nasal drip. No epistaxis. No throat pain. No sore throat. No difficulty swallowing.   CV: No chest pain. No palpitations. No lightheadedness or dizziness.   Resp: No dyspnea at rest. No dyspnea on exertion. No orthopnea. No wheezing. +cough. No stridor. +sputum production. No chest pain with respiration.  GI: No nausea. No vomiting. No diarrhea.  MSK: No joint pain or pain in any extremities  Integumentary:+skin lesions. No pedal edema.  Neurological: No gross motor weakness. No sensory changes.    OBJECTIVE:  ICU Vital Signs Last 24 Hrs  T(C): 36.8 (01 Feb 2019 12:36), Max: 36.8 (31 Jan 2019 19:33)  T(F): 98.3 (01 Feb 2019 12:36), Max: 98.3 (31 Jan 2019 19:33)  HR: 60 (01 Feb 2019 12:36) (60 - 70)  BP: 157/76 (01 Feb 2019 12:36) (153/65 - 170/74)  RR: 24 (01 Feb 2019 12:36) (20 - 24)  SpO2: 96% (01 Feb 2019 12:36) (95% - 96%)        PHYSICAL EXAM:  General: Awake, alert, oriented X 3.   HEENT: Atraumatic, normocephalic. No nasal congestion. No tonsillar or pharyngeal exudates.  Lymph Nodes: No palpable lymphadenopathy  Neck: No JVD. No carotid bruit.   Respiratory: no wheezing, poor inspiratory flow,   Cardiovascular: S1 S2 normal. No murmurs, rubs or gallops.   Abdomen: Soft, non-tender, non-distended. No organomegaly.  Extremities: Warm to touch. Peripheral pulse palpable. No pedal edema.   Skin: diffuse disseminated upper b/l trunk and back red erythematous weeping lesions,      HOSPITAL MEDICATIONS:  MEDICATIONS  (STANDING):  acyclovir IVPB      acyclovir IVPB 750 milliGRAM(s) IV Intermittent every 12 hours  ALBUTerol    90 MICROgram(s) HFA Inhaler 1 Puff(s) Inhalation every 4 hours  ALBUTerol    90 MICROgram(s) HFA Inhaler 2 Puff(s) Inhalation every 6 hours  amLODIPine   Tablet 10 milliGRAM(s) Oral daily  aspirin enteric coated 81 milliGRAM(s) Oral daily  atorvastatin 10 milliGRAM(s) Oral at bedtime  buDESOnide 160 MICROgram(s)/formoterol 4.5 MICROgram(s) Inhaler 2 Puff(s) Inhalation two times a day  carvedilol 12.5 milliGRAM(s) Oral every 12 hours  clopidogrel Tablet 75 milliGRAM(s) Oral daily  heparin  Injectable 5000 Unit(s) SubCutaneous every 8 hours  lactobacillus acidophilus 1 Tablet(s) Oral two times a day with meals  lisinopril 2.5 milliGRAM(s) Oral daily  mupirocin 2% Ointment 1 Application(s) Topical three times a day  petrolatum white Ointment 1 Application(s) Topical two times a day  piperacillin/tazobactam IVPB. 3.375 Gram(s) IV Intermittent every 8 hours  thiamine 100 milliGRAM(s) Oral daily  vancomycin  IVPB 1000 milliGRAM(s) IV Intermittent every 12 hours    MEDICATIONS  (PRN):  acetaminophen   Tablet .. 650 milliGRAM(s) Oral every 6 hours PRN Temp greater or equal to 38C (100.4F)  guaiFENesin    Syrup 100 milliGRAM(s) Oral every 6 hours PRN Cough        LABS:                                              12.1   4.43  )-----------( 95       ( 01 Feb 2019 06:00 )             36.1     02-01    136  |  100  |  7   ----------------------------<  110<H>  3.8   |  24  |  1.00    Ca    8.9      01 Feb 2019 06:00  Phos  2.7     01-31  Mg     1.8     01-31        CT CHEST - Patchy DANIEL consolidation/atelectasis. Otherwise wnl.

## 2019-02-01 NOTE — PROGRESS NOTE ADULT - ASSESSMENT
disseminated herpes zoster. no new lesion. many crusting. no visceral involvement.  superinfection of lesions post neck improving.  leucopenia resolving. thrombocytopenia improving.    bacteremia 2 species of coag neg staph suggests contaminants    Suggest: continue acyclovir IV q 12                 continue vanco 1 gm q 12 and zosyn q 8                 hydrate                  follow creat closely while on several potentially renal toxic meds                   may be able to switch to oral tx soon                  maintain airborne/ contact precautions      I will be away until 2/5  ID service will follow      Mariaa Rich MD  Pager: [phone]  After 5 PM or weekends please call fellow on call or office [phone]

## 2019-02-01 NOTE — DISCHARGE NOTE ADULT - CARE PROVIDER_API CALL
Demarcus Ramirez)  Internal Medicine  [address] [place] [address]  Phone: [phone]  Fax: [phone]  Follow Up Time:

## 2019-02-01 NOTE — PROGRESS NOTE ADULT - SUBJECTIVE AND OBJECTIVE BOX
INTERVAL HPI/OVERNIGHT EVENTS: 77 y/o M w/ a PMH significant for SSS s/p pacemaker placement, dementia, HTN with disseminated zoster     Patient notes that rash first started on the left upper arm and chest area about two and a half weeks ago consisting of small blisters grouped together. He started getting similar spots on the arms legs and trunk and the rash continued to spread. He denies any fevers or chills. Denies ever having a rash like this before. Denies any sick contacts, has recently been living with his brother. Brother notes patient has been falling more in the recent 2 years and dementia has been worsening. No known hx of HIV or any immunosuppressive condition. Remote hx of throat SCC s/p radiation. Patient is life long heavy smoker, quit two months ago. Remote hx of syphilis which was treated, workup initiated for neurosyphilis but unclear if completed in 2015 per patient's [employer] records.     Patient reports rash is improving since yesterday and that he is overall feeling better.  States that rash is not pruritic or painful.       MEDICATIONS  (STANDING):  acyclovir IVPB      acyclovir IVPB 750 milliGRAM(s) IV Intermittent every 12 hours  ALBUTerol    90 MICROgram(s) HFA Inhaler 1 Puff(s) Inhalation every 4 hours  ALBUTerol    90 MICROgram(s) HFA Inhaler 2 Puff(s) Inhalation every 6 hours  amLODIPine   Tablet 10 milliGRAM(s) Oral daily  aspirin enteric coated 81 milliGRAM(s) Oral daily  atorvastatin 10 milliGRAM(s) Oral at bedtime  buDESOnide 160 MICROgram(s)/formoterol 4.5 MICROgram(s) Inhaler 2 Puff(s) Inhalation two times a day  carvedilol 12.5 milliGRAM(s) Oral every 12 hours  clopidogrel Tablet 75 milliGRAM(s) Oral daily  heparin  Injectable 5000 Unit(s) SubCutaneous every 8 hours  lactobacillus acidophilus 1 Tablet(s) Oral two times a day with meals  lisinopril 2.5 milliGRAM(s) Oral daily  mupirocin 2% Ointment 1 Application(s) Topical three times a day  petrolatum white Ointment 1 Application(s) Topical two times a day  piperacillin/tazobactam IVPB. 3.375 Gram(s) IV Intermittent every 8 hours  thiamine 100 milliGRAM(s) Oral daily  vancomycin  IVPB 1000 milliGRAM(s) IV Intermittent every 12 hours    MEDICATIONS  (PRN):  acetaminophen   Tablet .. 650 milliGRAM(s) Oral every 6 hours PRN Temp greater or equal to 38C (100.4F)  guaiFENesin    Syrup 100 milliGRAM(s) Oral every 6 hours PRN Cough      Allergies    No Known Allergies    Intolerances        REVIEW OF SYSTEMS      General: no fevers/chills, no NS	    Skin: see HPI  	  Ophthalmologic: no eye pain or change in vision    Genitourinary: no dysuria or hematuria    Musculoskeletal: no joint pains or weakness	    Neurological:no weakness or tingling          Vital Signs Last 24 Hrs  T(C): 36.8 (01 Feb 2019 12:36), Max: 36.8 (31 Jan 2019 19:33)  T(F): 98.3 (01 Feb 2019 12:36), Max: 98.3 (31 Jan 2019 19:33)  HR: 60 (01 Feb 2019 12:36) (60 - 70)  BP: 157/76 (01 Feb 2019 12:36) (153/65 - 170/74)  BP(mean): --  RR: 24 (01 Feb 2019 12:36) (20 - 24)  SpO2: 96% (01 Feb 2019 12:36) (95% - 96%)    PHYSICAL EXAM:   The patient was alert and oriented X 3, well nourished, and in no  apparent distress.  There was no visible lymphadenopathy.  Conjunctiva were non injected  There was no clubbing or edema of extremities.    Of note on skin exam: left anterior chest extending to left back and nape of neck with ulcerations with overlying eschar, left arm with group vesicles on erythematous base    pink papules scattered diffusely on trunk and extremities       LABS:                        12.1   4.43  )-----------( 95       ( 01 Feb 2019 06:00 )             36.1     02-01    136  |  100  |  7   ----------------------------<  110<H>  3.8   |  24  |  1.00    Ca    8.9      01 Feb 2019 06:00  Phos  2.7     01-31  Mg     1.8     01-31    HIV-1/2 Antigen/Antibody Screen by [facility] (01.31.19 @ 14:25)    HIV-1/2 Combo Result: Nonreactive The HIV Ag/Ab Combo test performed screens for HIV-1 p24  antigen, antibodies to HIV-1 (group M and group O), and  antibodies to HIV-2. All specimens repeatedly reactive  will reflex to an HIV 1/2 antibody confirmation and  differentiation test. This assay detects p24 antigen which  may be present prior to the development of HIV antibodies,  therefore a reactive result with a negative HIV 1/2 AB  Confirmation should be followed up with HIV-1 RNA, HIV-2  RNA and repeat testing in 4-8 weeks. A nonreactive result  does not preclude previous exposure to or infection with  HIV-1 or HIV-2. [facility] prohibits disclosure of this  result to any unauthorized party.

## 2019-02-01 NOTE — PROGRESS NOTE ADULT - ASSESSMENT
·	78M hx restless legs syndrome, SSS s/p Pacemaker, HTN, CAD w/ x1 stent presented to the ER s/p fall. Found to have disseminated zoster and gram positive cocci bacteremia. With wheezing, significant component is upper airway but also has some generalized wheezing. Tachypnea likely multifactorial, in the setting of above.     Recommendations:     - Continue bronchodilators every 6 hours.  - CT scan noted. No acute findings.   - Would avoid steroids in the setting of a disseminated infection.   - Abx optimization and course per ID.    Please call with questions.     Enrique Pierre MD  Fellow (PGY 6),  Pulmonary & Critical Care Medicine.  Pager - [phone] ([facility])/ [phone] ([place])

## 2019-02-01 NOTE — PROGRESS NOTE ADULT - ASSESSMENT
79 yo M with disseminated zoster, improving on acyclovir 750mg Q12  -C/w acyclovir, defer to ID for appropriate renal dosing based on patient's age/GFR  -HIV 1,2 non-reactive   -C/w mupirocin 2% ointment to crusted areas TID   -C/w Vaseline ointment to eschar on back and posterior neck BID  -Dermatology will continue to follow     Thi Castrejon MD (PGY-3)   Dermatology Resident  [facility]  Pager: [phone]  Office [phone] 79 yo M with disseminated zoster, improving on acyclovir 750mg Q12  -C/w acyclovir, defer to ID for appropriate renal dosing based on patient's age/GFR  -HIV 1,2 non-reactive   -Ophtho c/s noted, recommendations appreciated  -C/w mupirocin 2% ointment to crusted areas TID   -C/w Vaseline ointment to eschar on back and posterior neck BID  -Dermatology will continue to follow     Thi Castrejon MD (PGY-3)   Dermatology Resident  [facility]  Pager: [phone]  Office [phone]

## 2019-02-01 NOTE — DISCHARGE NOTE ADULT - CARE PLAN
Principal Discharge DX:	Herpes zoster  Goal:	To treat your infection  Assessment and plan of treatment:	When you first came into the hospital you had a full body rash that was found to be caused by a virus (varicella zoster virus, the same as from chicken pox). You were treated with an antiviral and you should continue to take the oral medications for ....  Secondary Diagnosis:	Fall  Goal:	To ensure you had no complications  Assessment and plan of treatment:	You came into the hospital because of a fall. You had a CT scan completed which did not show any new pathology in the brain. You should follow up with infectious disease as an outpatient for a further workup to rule out any syphilis infection in the brain that may be making you more unsteady.  Secondary Diagnosis:	RLS (restless legs syndrome)  Goal:	To keep your symptoms under better control  Assessment and plan of treatment:	You have a history of restless legs syndrome that was possibly being treated with pramipexole. You were not receiving pramipexole within the hospital and you should continue to avoid it at this time until you see your own primary care doctor. Principal Discharge DX:	Herpes zoster  Goal:	To treat your infection  Assessment and plan of treatment:	When you first came into the hospital you had a full body rash that was found to be caused by a virus (varicella zoster virus, the same as from chicken pox). You were treated with an antiviral and with IV antibiotics. Your rashes improved with the treatment and you finished.  Secondary Diagnosis:	Fall  Goal:	To ensure you had no complications  Assessment and plan of treatment:	You came into the hospital because of a fall. You had a CT scan completed which did not show any new pathology in the brain. You should follow up with infectious disease as an outpatient for a further workup to rule out any syphilis infection in the brain that may be making you more unsteady.  Secondary Diagnosis:	RLS (restless legs syndrome)  Goal:	To keep your symptoms under better control  Assessment and plan of treatment:	You have a history of restless legs syndrome that was possibly being treated with pramipexole. You were not receiving pramipexole within the hospital and you should continue to avoid it at this time until you see your own primary care doctor.

## 2019-02-01 NOTE — PROGRESS NOTE ADULT - ASSESSMENT
77 y/o M w/ a PMH significant for SSS s/p PPM, dementia, HTN, and restless leg syndrome who presented to the ED s/p fall and found to have disseminated VZV.

## 2019-02-02 LAB
ALBUMIN SERPL ELPH-MCNC: 3.3 G/DL — SIGNIFICANT CHANGE UP (ref 3.3–5)
ALP SERPL-CCNC: 57 U/L — SIGNIFICANT CHANGE UP (ref 40–120)
ALT FLD-CCNC: 14 U/L — SIGNIFICANT CHANGE UP (ref 4–41)
ANION GAP SERPL CALC-SCNC: 10 MMO/L — SIGNIFICANT CHANGE UP (ref 7–14)
AST SERPL-CCNC: 23 U/L — SIGNIFICANT CHANGE UP (ref 4–40)
BASOPHILS # BLD AUTO: 0.04 K/UL — SIGNIFICANT CHANGE UP (ref 0–0.2)
BASOPHILS NFR BLD AUTO: 0.8 % — SIGNIFICANT CHANGE UP (ref 0–2)
BILIRUB SERPL-MCNC: 0.6 MG/DL — SIGNIFICANT CHANGE UP (ref 0.2–1.2)
BUN SERPL-MCNC: 7 MG/DL — SIGNIFICANT CHANGE UP (ref 7–23)
CALCIUM SERPL-MCNC: 8.5 MG/DL — SIGNIFICANT CHANGE UP (ref 8.4–10.5)
CHLORIDE SERPL-SCNC: 99 MMOL/L — SIGNIFICANT CHANGE UP (ref 98–107)
CO2 SERPL-SCNC: 26 MMOL/L — SIGNIFICANT CHANGE UP (ref 22–31)
CREAT SERPL-MCNC: 1 MG/DL — SIGNIFICANT CHANGE UP (ref 0.5–1.3)
EOSINOPHIL # BLD AUTO: 0.06 K/UL — SIGNIFICANT CHANGE UP (ref 0–0.5)
EOSINOPHIL NFR BLD AUTO: 1.2 % — SIGNIFICANT CHANGE UP (ref 0–6)
GLUCOSE SERPL-MCNC: 134 MG/DL — HIGH (ref 70–99)
HCT VFR BLD CALC: 36.7 % — LOW (ref 39–50)
HGB BLD-MCNC: 11.7 G/DL — LOW (ref 13–17)
IMM GRANULOCYTES NFR BLD AUTO: 0 % — SIGNIFICANT CHANGE UP (ref 0–1.5)
LYMPHOCYTES # BLD AUTO: 1.29 K/UL — SIGNIFICANT CHANGE UP (ref 1–3.3)
LYMPHOCYTES # BLD AUTO: 26.1 % — SIGNIFICANT CHANGE UP (ref 13–44)
MAGNESIUM SERPL-MCNC: 1.9 MG/DL — SIGNIFICANT CHANGE UP (ref 1.6–2.6)
MCHC RBC-ENTMCNC: 30.4 PG — SIGNIFICANT CHANGE UP (ref 27–34)
MCHC RBC-ENTMCNC: 31.9 % — LOW (ref 32–36)
MCV RBC AUTO: 95.3 FL — SIGNIFICANT CHANGE UP (ref 80–100)
MONOCYTES # BLD AUTO: 0.68 K/UL — SIGNIFICANT CHANGE UP (ref 0–0.9)
MONOCYTES NFR BLD AUTO: 13.8 % — SIGNIFICANT CHANGE UP (ref 2–14)
NEUTROPHILS # BLD AUTO: 2.87 K/UL — SIGNIFICANT CHANGE UP (ref 1.8–7.4)
NEUTROPHILS NFR BLD AUTO: 58.1 % — SIGNIFICANT CHANGE UP (ref 43–77)
NRBC # FLD: 0 K/UL — LOW (ref 25–125)
PHOSPHATE SERPL-MCNC: 2.2 MG/DL — LOW (ref 2.5–4.5)
PLATELET # BLD AUTO: 121 K/UL — LOW (ref 150–400)
PMV BLD: 11.2 FL — SIGNIFICANT CHANGE UP (ref 7–13)
POTASSIUM SERPL-MCNC: 4 MMOL/L — SIGNIFICANT CHANGE UP (ref 3.5–5.3)
POTASSIUM SERPL-SCNC: 4 MMOL/L — SIGNIFICANT CHANGE UP (ref 3.5–5.3)
PROT SERPL-MCNC: 6.2 G/DL — SIGNIFICANT CHANGE UP (ref 6–8.3)
RBC # BLD: 3.85 M/UL — LOW (ref 4.2–5.8)
RBC # FLD: 13.2 % — SIGNIFICANT CHANGE UP (ref 10.3–14.5)
SODIUM SERPL-SCNC: 135 MMOL/L — SIGNIFICANT CHANGE UP (ref 135–145)
VANCOMYCIN TROUGH SERPL-MCNC: 11.7 UG/ML — SIGNIFICANT CHANGE UP (ref 10–20)
WBC # BLD: 4.94 K/UL — SIGNIFICANT CHANGE UP (ref 3.8–10.5)
WBC # FLD AUTO: 4.94 K/UL — SIGNIFICANT CHANGE UP (ref 3.8–10.5)

## 2019-02-02 RX ORDER — SODIUM,POTASSIUM PHOSPHATES 278-250MG
1 POWDER IN PACKET (EA) ORAL THREE TIMES A DAY
Qty: 0 | Refills: 0 | Status: COMPLETED | OUTPATIENT
Start: 2019-02-02 | End: 2019-02-03

## 2019-02-02 RX ADMIN — MUPIROCIN 1 APPLICATION(S): 20 OINTMENT TOPICAL at 13:23

## 2019-02-02 RX ADMIN — ALBUTEROL 2 PUFF(S): 90 AEROSOL, METERED ORAL at 17:46

## 2019-02-02 RX ADMIN — PIPERACILLIN AND TAZOBACTAM 25 GRAM(S): 4; .5 INJECTION, POWDER, LYOPHILIZED, FOR SOLUTION INTRAVENOUS at 00:13

## 2019-02-02 RX ADMIN — MUPIROCIN 1 APPLICATION(S): 20 OINTMENT TOPICAL at 06:38

## 2019-02-02 RX ADMIN — Medication 100 MILLIGRAM(S): at 11:36

## 2019-02-02 RX ADMIN — HEPARIN SODIUM 5000 UNIT(S): 5000 INJECTION INTRAVENOUS; SUBCUTANEOUS at 13:22

## 2019-02-02 RX ADMIN — Medication 1 TABLET(S): at 17:46

## 2019-02-02 RX ADMIN — Medication 81 MILLIGRAM(S): at 11:36

## 2019-02-02 RX ADMIN — Medication 1 PACKET(S): at 13:23

## 2019-02-02 RX ADMIN — MUPIROCIN 1 APPLICATION(S): 20 OINTMENT TOPICAL at 22:06

## 2019-02-02 RX ADMIN — HEPARIN SODIUM 5000 UNIT(S): 5000 INJECTION INTRAVENOUS; SUBCUTANEOUS at 06:37

## 2019-02-02 RX ADMIN — Medication 250 MILLIGRAM(S): at 22:07

## 2019-02-02 RX ADMIN — Medication 1 TABLET(S): at 08:47

## 2019-02-02 RX ADMIN — ATORVASTATIN CALCIUM 10 MILLIGRAM(S): 80 TABLET, FILM COATED ORAL at 22:05

## 2019-02-02 RX ADMIN — Medication 1 PACKET(S): at 22:06

## 2019-02-02 RX ADMIN — Medication 250 MILLIGRAM(S): at 07:27

## 2019-02-02 RX ADMIN — CLOPIDOGREL BISULFATE 75 MILLIGRAM(S): 75 TABLET, FILM COATED ORAL at 11:36

## 2019-02-02 RX ADMIN — PIPERACILLIN AND TAZOBACTAM 25 GRAM(S): 4; .5 INJECTION, POWDER, LYOPHILIZED, FOR SOLUTION INTRAVENOUS at 08:48

## 2019-02-02 RX ADMIN — Medication 265 MILLIGRAM(S): at 06:37

## 2019-02-02 RX ADMIN — CARVEDILOL PHOSPHATE 12.5 MILLIGRAM(S): 80 CAPSULE, EXTENDED RELEASE ORAL at 17:46

## 2019-02-02 RX ADMIN — ALBUTEROL 2 PUFF(S): 90 AEROSOL, METERED ORAL at 00:12

## 2019-02-02 RX ADMIN — HEPARIN SODIUM 5000 UNIT(S): 5000 INJECTION INTRAVENOUS; SUBCUTANEOUS at 22:05

## 2019-02-02 RX ADMIN — BUDESONIDE AND FORMOTEROL FUMARATE DIHYDRATE 2 PUFF(S): 160; 4.5 AEROSOL RESPIRATORY (INHALATION) at 08:47

## 2019-02-02 RX ADMIN — CARVEDILOL PHOSPHATE 12.5 MILLIGRAM(S): 80 CAPSULE, EXTENDED RELEASE ORAL at 06:37

## 2019-02-02 RX ADMIN — ALBUTEROL 2 PUFF(S): 90 AEROSOL, METERED ORAL at 06:38

## 2019-02-02 RX ADMIN — Medication 265 MILLIGRAM(S): at 17:46

## 2019-02-02 RX ADMIN — LISINOPRIL 2.5 MILLIGRAM(S): 2.5 TABLET ORAL at 06:37

## 2019-02-02 RX ADMIN — BUDESONIDE AND FORMOTEROL FUMARATE DIHYDRATE 2 PUFF(S): 160; 4.5 AEROSOL RESPIRATORY (INHALATION) at 22:06

## 2019-02-02 RX ADMIN — PIPERACILLIN AND TAZOBACTAM 25 GRAM(S): 4; .5 INJECTION, POWDER, LYOPHILIZED, FOR SOLUTION INTRAVENOUS at 15:56

## 2019-02-02 RX ADMIN — ALBUTEROL 2 PUFF(S): 90 AEROSOL, METERED ORAL at 11:36

## 2019-02-02 RX ADMIN — AMLODIPINE BESYLATE 10 MILLIGRAM(S): 2.5 TABLET ORAL at 06:37

## 2019-02-02 NOTE — PROGRESS NOTE ADULT - ASSESSMENT
79 y/o M w/ a PMH significant for SSS s/p PPM, dementia, HTN, and restless leg syndrome who presented to the ED s/p fall and found to have disseminated VZV.

## 2019-02-03 LAB
ALBUMIN SERPL ELPH-MCNC: 3 G/DL — LOW (ref 3.3–5)
ALP SERPL-CCNC: 57 U/L — SIGNIFICANT CHANGE UP (ref 40–120)
ALT FLD-CCNC: 18 U/L — SIGNIFICANT CHANGE UP (ref 4–41)
ANION GAP SERPL CALC-SCNC: 11 MMO/L — SIGNIFICANT CHANGE UP (ref 7–14)
AST SERPL-CCNC: 24 U/L — SIGNIFICANT CHANGE UP (ref 4–40)
BACTERIA BLD CULT: SIGNIFICANT CHANGE UP
BACTERIA BLD CULT: SIGNIFICANT CHANGE UP
BASOPHILS # BLD AUTO: 0.03 K/UL — SIGNIFICANT CHANGE UP (ref 0–0.2)
BASOPHILS NFR BLD AUTO: 0.7 % — SIGNIFICANT CHANGE UP (ref 0–2)
BILIRUB SERPL-MCNC: 0.6 MG/DL — SIGNIFICANT CHANGE UP (ref 0.2–1.2)
BUN SERPL-MCNC: 7 MG/DL — SIGNIFICANT CHANGE UP (ref 7–23)
CALCIUM SERPL-MCNC: 8.8 MG/DL — SIGNIFICANT CHANGE UP (ref 8.4–10.5)
CHLORIDE SERPL-SCNC: 100 MMOL/L — SIGNIFICANT CHANGE UP (ref 98–107)
CO2 SERPL-SCNC: 24 MMOL/L — SIGNIFICANT CHANGE UP (ref 22–31)
CREAT SERPL-MCNC: 1.03 MG/DL — SIGNIFICANT CHANGE UP (ref 0.5–1.3)
EOSINOPHIL # BLD AUTO: 0.08 K/UL — SIGNIFICANT CHANGE UP (ref 0–0.5)
EOSINOPHIL NFR BLD AUTO: 1.7 % — SIGNIFICANT CHANGE UP (ref 0–6)
GLUCOSE SERPL-MCNC: 113 MG/DL — HIGH (ref 70–99)
HCT VFR BLD CALC: 34.2 % — LOW (ref 39–50)
HGB BLD-MCNC: 11.3 G/DL — LOW (ref 13–17)
IMM GRANULOCYTES NFR BLD AUTO: 0.4 % — SIGNIFICANT CHANGE UP (ref 0–1.5)
LYMPHOCYTES # BLD AUTO: 1.22 K/UL — SIGNIFICANT CHANGE UP (ref 1–3.3)
LYMPHOCYTES # BLD AUTO: 26.5 % — SIGNIFICANT CHANGE UP (ref 13–44)
MAGNESIUM SERPL-MCNC: 1.9 MG/DL — SIGNIFICANT CHANGE UP (ref 1.6–2.6)
MCHC RBC-ENTMCNC: 31.6 PG — SIGNIFICANT CHANGE UP (ref 27–34)
MCHC RBC-ENTMCNC: 33 % — SIGNIFICANT CHANGE UP (ref 32–36)
MCV RBC AUTO: 95.5 FL — SIGNIFICANT CHANGE UP (ref 80–100)
MONOCYTES # BLD AUTO: 0.75 K/UL — SIGNIFICANT CHANGE UP (ref 0–0.9)
MONOCYTES NFR BLD AUTO: 16.3 % — HIGH (ref 2–14)
NEUTROPHILS # BLD AUTO: 2.5 K/UL — SIGNIFICANT CHANGE UP (ref 1.8–7.4)
NEUTROPHILS NFR BLD AUTO: 54.4 % — SIGNIFICANT CHANGE UP (ref 43–77)
NRBC # FLD: 0 K/UL — LOW (ref 25–125)
PHOSPHATE SERPL-MCNC: 2.7 MG/DL — SIGNIFICANT CHANGE UP (ref 2.5–4.5)
PLATELET # BLD AUTO: 130 K/UL — LOW (ref 150–400)
PMV BLD: 11.1 FL — SIGNIFICANT CHANGE UP (ref 7–13)
POTASSIUM SERPL-MCNC: 3.6 MMOL/L — SIGNIFICANT CHANGE UP (ref 3.5–5.3)
POTASSIUM SERPL-SCNC: 3.6 MMOL/L — SIGNIFICANT CHANGE UP (ref 3.5–5.3)
PROT SERPL-MCNC: 6.1 G/DL — SIGNIFICANT CHANGE UP (ref 6–8.3)
RBC # BLD: 3.58 M/UL — LOW (ref 4.2–5.8)
RBC # FLD: 13.2 % — SIGNIFICANT CHANGE UP (ref 10.3–14.5)
SODIUM SERPL-SCNC: 135 MMOL/L — SIGNIFICANT CHANGE UP (ref 135–145)
WBC # BLD: 4.6 K/UL — SIGNIFICANT CHANGE UP (ref 3.8–10.5)
WBC # FLD AUTO: 4.6 K/UL — SIGNIFICANT CHANGE UP (ref 3.8–10.5)

## 2019-02-03 PROCEDURE — 99232 SBSQ HOSP IP/OBS MODERATE 35: CPT

## 2019-02-03 RX ORDER — IPRATROPIUM/ALBUTEROL SULFATE 18-103MCG
3 AEROSOL WITH ADAPTER (GRAM) INHALATION ONCE
Qty: 0 | Refills: 0 | Status: COMPLETED | OUTPATIENT
Start: 2019-02-03 | End: 2019-02-03

## 2019-02-03 RX ORDER — LANOLIN ALCOHOL/MO/W.PET/CERES
3 CREAM (GRAM) TOPICAL ONCE
Qty: 0 | Refills: 0 | Status: COMPLETED | OUTPATIENT
Start: 2019-02-03 | End: 2019-02-03

## 2019-02-03 RX ADMIN — HEPARIN SODIUM 5000 UNIT(S): 5000 INJECTION INTRAVENOUS; SUBCUTANEOUS at 21:30

## 2019-02-03 RX ADMIN — HEPARIN SODIUM 5000 UNIT(S): 5000 INJECTION INTRAVENOUS; SUBCUTANEOUS at 14:02

## 2019-02-03 RX ADMIN — Medication 3 MILLILITER(S): at 22:22

## 2019-02-03 RX ADMIN — Medication 1 APPLICATION(S): at 06:34

## 2019-02-03 RX ADMIN — Medication 265 MILLIGRAM(S): at 06:32

## 2019-02-03 RX ADMIN — Medication 1 PACKET(S): at 06:34

## 2019-02-03 RX ADMIN — CLOPIDOGREL BISULFATE 75 MILLIGRAM(S): 75 TABLET, FILM COATED ORAL at 11:25

## 2019-02-03 RX ADMIN — HEPARIN SODIUM 5000 UNIT(S): 5000 INJECTION INTRAVENOUS; SUBCUTANEOUS at 06:33

## 2019-02-03 RX ADMIN — CARVEDILOL PHOSPHATE 12.5 MILLIGRAM(S): 80 CAPSULE, EXTENDED RELEASE ORAL at 18:01

## 2019-02-03 RX ADMIN — ALBUTEROL 2 PUFF(S): 90 AEROSOL, METERED ORAL at 00:30

## 2019-02-03 RX ADMIN — PIPERACILLIN AND TAZOBACTAM 25 GRAM(S): 4; .5 INJECTION, POWDER, LYOPHILIZED, FOR SOLUTION INTRAVENOUS at 00:29

## 2019-02-03 RX ADMIN — Medication 265 MILLIGRAM(S): at 18:01

## 2019-02-03 RX ADMIN — Medication 250 MILLIGRAM(S): at 21:31

## 2019-02-03 RX ADMIN — ATORVASTATIN CALCIUM 10 MILLIGRAM(S): 80 TABLET, FILM COATED ORAL at 21:31

## 2019-02-03 RX ADMIN — MUPIROCIN 1 APPLICATION(S): 20 OINTMENT TOPICAL at 06:34

## 2019-02-03 RX ADMIN — AMLODIPINE BESYLATE 10 MILLIGRAM(S): 2.5 TABLET ORAL at 06:33

## 2019-02-03 RX ADMIN — BUDESONIDE AND FORMOTEROL FUMARATE DIHYDRATE 2 PUFF(S): 160; 4.5 AEROSOL RESPIRATORY (INHALATION) at 21:32

## 2019-02-03 RX ADMIN — Medication 1 APPLICATION(S): at 18:05

## 2019-02-03 RX ADMIN — PIPERACILLIN AND TAZOBACTAM 25 GRAM(S): 4; .5 INJECTION, POWDER, LYOPHILIZED, FOR SOLUTION INTRAVENOUS at 09:27

## 2019-02-03 RX ADMIN — ALBUTEROL 2 PUFF(S): 90 AEROSOL, METERED ORAL at 11:26

## 2019-02-03 RX ADMIN — Medication 81 MILLIGRAM(S): at 11:25

## 2019-02-03 RX ADMIN — Medication 250 MILLIGRAM(S): at 09:26

## 2019-02-03 RX ADMIN — BUDESONIDE AND FORMOTEROL FUMARATE DIHYDRATE 2 PUFF(S): 160; 4.5 AEROSOL RESPIRATORY (INHALATION) at 09:27

## 2019-02-03 RX ADMIN — Medication 100 MILLIGRAM(S): at 14:02

## 2019-02-03 RX ADMIN — PIPERACILLIN AND TAZOBACTAM 25 GRAM(S): 4; .5 INJECTION, POWDER, LYOPHILIZED, FOR SOLUTION INTRAVENOUS at 18:06

## 2019-02-03 RX ADMIN — Medication 1 TABLET(S): at 09:26

## 2019-02-03 RX ADMIN — LISINOPRIL 2.5 MILLIGRAM(S): 2.5 TABLET ORAL at 06:34

## 2019-02-03 RX ADMIN — MUPIROCIN 1 APPLICATION(S): 20 OINTMENT TOPICAL at 14:02

## 2019-02-03 RX ADMIN — ALBUTEROL 2 PUFF(S): 90 AEROSOL, METERED ORAL at 06:33

## 2019-02-03 RX ADMIN — MUPIROCIN 1 APPLICATION(S): 20 OINTMENT TOPICAL at 21:29

## 2019-02-03 RX ADMIN — ALBUTEROL 2 PUFF(S): 90 AEROSOL, METERED ORAL at 18:02

## 2019-02-03 RX ADMIN — Medication 1 TABLET(S): at 18:01

## 2019-02-03 RX ADMIN — CARVEDILOL PHOSPHATE 12.5 MILLIGRAM(S): 80 CAPSULE, EXTENDED RELEASE ORAL at 06:33

## 2019-02-03 NOTE — PROGRESS NOTE ADULT - SUBJECTIVE AND OBJECTIVE BOX
Patient is a 78y old  Male who presents with a chief complaint of fall, rash , Bandemia , (02 Feb 2019 08:49)      SUBJECTIVE / OVERNIGHT EVENTS:    MEDICATIONS  (STANDING):  acyclovir IVPB      acyclovir IVPB 750 milliGRAM(s) IV Intermittent every 12 hours  ALBUTerol    90 MICROgram(s) HFA Inhaler 1 Puff(s) Inhalation every 4 hours  ALBUTerol    90 MICROgram(s) HFA Inhaler 2 Puff(s) Inhalation every 6 hours  amLODIPine   Tablet 10 milliGRAM(s) Oral daily  aspirin enteric coated 81 milliGRAM(s) Oral daily  atorvastatin 10 milliGRAM(s) Oral at bedtime  buDESOnide 160 MICROgram(s)/formoterol 4.5 MICROgram(s) Inhaler 2 Puff(s) Inhalation two times a day  carvedilol 12.5 milliGRAM(s) Oral every 12 hours  clopidogrel Tablet 75 milliGRAM(s) Oral daily  heparin  Injectable 5000 Unit(s) SubCutaneous every 8 hours  lactobacillus acidophilus 1 Tablet(s) Oral two times a day with meals  lisinopril 2.5 milliGRAM(s) Oral daily  mupirocin 2% Ointment 1 Application(s) Topical three times a day  petrolatum white Ointment 1 Application(s) Topical two times a day  piperacillin/tazobactam IVPB. 3.375 Gram(s) IV Intermittent every 8 hours  potassium phosphate / sodium phosphate powder 1 Packet(s) Oral three times a day  thiamine 100 milliGRAM(s) Oral daily  vancomycin  IVPB 1000 milliGRAM(s) IV Intermittent every 12 hours    MEDICATIONS  (PRN):  acetaminophen   Tablet .. 650 milliGRAM(s) Oral every 6 hours PRN Temp greater or equal to 38C (100.4F)  guaiFENesin    Syrup 100 milliGRAM(s) Oral every 6 hours PRN Cough        CAPILLARY BLOOD GLUCOSE        I&O's Summary    01 Feb 2019 07:01  -  02 Feb 2019 07:00  --------------------------------------------------------  IN: 0 mL / OUT: 200 mL / NET: -200 mL    Vital Signs Last 24 Hrs  T(C): 36.9 (03 Feb 2019 05:18), Max: 36.9 (03 Feb 2019 05:18)  T(F): 98.5 (03 Feb 2019 05:18), Max: 98.5 (03 Feb 2019 05:18)  HR: 60 (03 Feb 2019 05:18) (60 - 63)  BP: 130/70 (03 Feb 2019 05:18) (130/70 - 172/96)  BP(mean): --  RR: 21 (03 Feb 2019 05:18) (21 - 22)  SpO2: 96% (03 Feb 2019 05:18) (95% - 100%)    PHYSICAL EXAM:  GENERAL: NAD, well-developed  HEAD:  Atraumatic, Normocephalic  EYES: EOMI, PERRLA, conjunctiva and sclera clear  NECK: Supple, No JVD  CHEST/LUNG: Clear to auscultation bilaterally; No wheeze  HEART: Regular rate and rhythm; No murmurs, rubs, or gallops  ABDOMEN: Soft, Nontender, Nondistended; Bowel sounds present  EXTREMITIES:  2+ Peripheral Pulses, No clubbing, cyanosis, or edema  PSYCH: AAOx3  NEUROLOGY: non-focal  SKIN: No rashes or lesions    LABS:                        11.7   4.94  )-----------( 121      ( 02 Feb 2019 10:11 )             36.7     02-02    135  |  99  |  7   ----------------------------<  134<H>  4.0   |  26  |  1.00    Ca    8.5      02 Feb 2019 10:11  Phos  2.2     02-02  Mg     1.9     02-02    TPro  6.2  /  Alb  3.3  /  TBili  0.6  /  DBili  x   /  AST  23  /  ALT  14  /  AlkPhos  57  02-02              RADIOLOGY & ADDITIONAL TESTS:    Imaging Personally Reviewed:    Consultant(s) Notes Reviewed:      Care Discussed with Consultants/Other Providers: Patient is a 78y old  Male who presents with a chief complaint of fall, rash , Bandemia , (02 Feb 2019 08:49)    SUBJECTIVE / OVERNIGHT EVENTS:  No acute events overnight. Patient seen and examined this AM. Patient reports that he feels overall improved this AM. He denies any chest pain, shortness of breath, vomiting, diarrhea, or abdominal pain. Patient was afebrile overnight.    MEDICATIONS  (STANDING):  acyclovir IVPB      acyclovir IVPB 750 milliGRAM(s) IV Intermittent every 12 hours  ALBUTerol    90 MICROgram(s) HFA Inhaler 1 Puff(s) Inhalation every 4 hours  ALBUTerol    90 MICROgram(s) HFA Inhaler 2 Puff(s) Inhalation every 6 hours  amLODIPine   Tablet 10 milliGRAM(s) Oral daily  aspirin enteric coated 81 milliGRAM(s) Oral daily  atorvastatin 10 milliGRAM(s) Oral at bedtime  buDESOnide 160 MICROgram(s)/formoterol 4.5 MICROgram(s) Inhaler 2 Puff(s) Inhalation two times a day  carvedilol 12.5 milliGRAM(s) Oral every 12 hours  clopidogrel Tablet 75 milliGRAM(s) Oral daily  heparin  Injectable 5000 Unit(s) SubCutaneous every 8 hours  lactobacillus acidophilus 1 Tablet(s) Oral two times a day with meals  lisinopril 2.5 milliGRAM(s) Oral daily  mupirocin 2% Ointment 1 Application(s) Topical three times a day  petrolatum white Ointment 1 Application(s) Topical two times a day  piperacillin/tazobactam IVPB. 3.375 Gram(s) IV Intermittent every 8 hours  potassium phosphate / sodium phosphate powder 1 Packet(s) Oral three times a day  thiamine 100 milliGRAM(s) Oral daily  vancomycin  IVPB 1000 milliGRAM(s) IV Intermittent every 12 hours    MEDICATIONS  (PRN):  acetaminophen   Tablet .. 650 milliGRAM(s) Oral every 6 hours PRN Temp greater or equal to 38C (100.4F)  guaiFENesin    Syrup 100 milliGRAM(s) Oral every 6 hours PRN Cough        CAPILLARY BLOOD GLUCOSE        I&O's Summary    01 Feb 2019 07:01  -  02 Feb 2019 07:00  --------------------------------------------------------  IN: 0 mL / OUT: 200 mL / NET: -200 mL    Vital Signs Last 24 Hrs  T(C): 36.9 (03 Feb 2019 05:18), Max: 36.9 (03 Feb 2019 05:18)  T(F): 98.5 (03 Feb 2019 05:18), Max: 98.5 (03 Feb 2019 05:18)  HR: 60 (03 Feb 2019 05:18) (60 - 63)  BP: 130/70 (03 Feb 2019 05:18) (130/70 - 172/96)  BP(mean): --  RR: 21 (03 Feb 2019 05:18) (21 - 22)  SpO2: 96% (03 Feb 2019 05:18) (95% - 100%)    PHYSICAL EXAM:  GENERAL: NAD  HEENT: NC/AT, Slightly dry mucous membranes  NECK: Supple, Large scabbed lesion on the back of the neck which is non-tender on palpation.  CHEST/LUNG: Slightly decreased breath sounds at the bases, No wheeze appreciated  HEART: RRR; +S1/S2  ABDOMEN: +BS, Soft, NT, No rigidity  GENITOURINARY: No Flood, No suprapubic TTP  EXTREMITIES: No LE edema  NEUROLOGY: A+Ox2-3 (had difficulty remembering the month/year), Answers questions and follows commands appropriately  SKIN: Disseminated crusted, erythematous rash throughout the arms, legs and trunk.  PSYCH: Calm    LABS:                        11.7   4.94  )-----------( 121      ( 02 Feb 2019 10:11 )             36.7     02-02    135  |  99  |  7   ----------------------------<  134<H>  4.0   |  26  |  1.00    Ca    8.5      02 Feb 2019 10:11  Phos  2.2     02-02  Mg     1.9     02-02    TPro  6.2  /  Alb  3.3  /  TBili  0.6  /  DBili  x   /  AST  23  /  ALT  14  /  AlkPhos  57  02-02              RADIOLOGY & ADDITIONAL TESTS:    Imaging Personally Reviewed:    Consultant(s) Notes Reviewed:      Care Discussed with Consultants/Other Providers:

## 2019-02-03 NOTE — PROGRESS NOTE ADULT - SUBJECTIVE AND OBJECTIVE BOX
Follow Up:  disseminated herpes zoster     Inverval History/ROS:  depressed.  no HA, chills, SOB.  Denies pain, fevers, chills and diarrhea.  Rest of ROS neg.    Allergies  No Known Allergies      acyclovir IVPB    acyclovir IVPB 750 every 12 hours  piperacillin/tazobactam IVPB. 3.375 every 8 hours  vancomycin  IVPB 1000 every 12 hours    MEDICATIONS  (STANDING):  acetaminophen   Tablet .. 650 every 6 hours PRN  ALBUTerol    90 MICROgram(s) HFA Inhaler 1 every 4 hours  ALBUTerol    90 MICROgram(s) HFA Inhaler 2 every 6 hours  amLODIPine   Tablet 10 daily  aspirin enteric coated 81 daily  atorvastatin 10 at bedtime  buDESOnide 160 MICROgram(s)/formoterol 4.5 MICROgram(s) Inhaler 2 two times a day  carvedilol 12.5 every 12 hours  clopidogrel Tablet 75 daily  guaiFENesin    Syrup 100 every 6 hours PRN  heparin  Injectable 5000 every 8 hours  lisinopril 2.5 daily      Vital Signs Last 24 Hrs  T(F): 98.5 (02-03-19 @ 05:18), Max: 98.5 (02-03-19 @ 05:18)  HR: 60 (02-03-19 @ 05:18)  BP: 130/70 (02-03-19 @ 05:18)  RR: 21 (02-03-19 @ 05:18)  SpO2: 96% (02-03-19 @ 05:18) (95% - 100%)  Wt(kg): --    PHYSICAL EXAM:  General: [x ] non-toxic  HEAD/EYES: [x ] conj less injected  ENT:  [ ] normal [x ] supple [ ] thrush [ ] pharyngeal exudate  Cardiovascular:   [ ] murmur [x ] normal [ ] PPM/AICD  Respiratory:  [x ] BS bilat  GI:  [x ] soft, non-tender, normal bowel sounds  :  [ ] milner [ x] no CVA tenderness   Musculoskeletal:  [x ] no synovitis  Neurologic:  [x ] non-focal exam   Skin:  [x ] vesicles in dermatome left shoulder, upper back, neck mostly crusted .  scattered mac/pap/vesicles face, trunk  crusting, eschar post neck. Erythema noted at zoster site c/w superinfection.  Lymph: [ ] no lymphadenopathy  Psychiatric:  [x ] appropriate affect [x ] alert & oriented  Lines:  [x ] no phlebitis [ ] central line                            11.3   4.60  )-----------( 130      ( 03 Feb 2019 07:09 )             34.2 02-03    135  |  100  |  7   ----------------------------<  113  3.6   |  24  |  1.03  Ca    8.8      03 Feb 2019 07:09Phos  2.7     02-03Mg     1.9     02-03  TPro  6.1  /  Alb  3.0  /  TBili  0.6  /  DBili  x   /  AST  24  /  ALT  18  /  AlkPhos  57  02-03                                   12.1   4.43  )-----------( 95       ( 01 Feb 2019 06:00 )             36.1 02-01    136  |  100  |  7   ----------------------------<  110  3.8   |  24  |  1.00  Ca    8.9      01 Feb 2019 06:00Phos  2.7     01-31Mg     1.8     01-31    Vancomycin Level, Trough -Pre 4th Dose, order if dosed q6/8/12h (02.02.19 @ 20:20)    Vancomycin Level, Trough: 11.7:       MICROBIOLOGY:    1HIV-1/2 Antigen/Antibody Screen by [facility] (02.01.19 @ 06:00)    HIV-1/2 Combo Result: Nonreactive     Herpes Simplex Virus 1/2 VZV Lesions, PCR (01.30.19 @ 17:32)    Herpes Simplex Virus 1/2  VZV PCR Source: left arm lesion    Herpes Simplex Virus 1/2  VZV PCR Result: VZV Detected    Culture - Blood in AM (01.29.19 @ 06:31)    Culture - Blood:   NO ORGANISMS ISOLATED    Specimen Source: BLOOD VENOUS    Culture - Blood in AM (01.29.19 @ 06:31)    Culture - Blood:   NO ORGANISMS ISOLATED    Specimen Source: BLOOD PERIPHERAL    Culture - Urine (01.26.19 @ 17:22)    Culture - Urine:   Culture grew 3 or more types of organisms which indicate  collection contamination; consider recollection only if  clinically indicated.    Specimen Source: URINE MIDSTREAM      Culture - Blood (01.26.19 @ 16:59)    -  Cefazolin: S <=4 IFRAH    -  Penicillin: R    -  Oxacillin: S <=0.25 IFRAH    -  Moxifloxacin(Aerobic): R 2 IFRAH    -  Levofloxacin: R >4 IFRAH    -  Gentamicin: S <=1 IFRAH    -  Erythromycin: R >4 IFRAH    -  Clindamycin: S    -  Ciprofloxacin: R >2 IFRAH    -  Vancomycin: S 1 IFRAH    -  Trimethoprim/Sulfamethoxazole: S <=0.5/9.5 IFRAH    -  Tetra/Doxy: S <=1 IFRAH    -  Rifampin: S <=1 IFRAH    Culture - Blood:   Single set isolate, possible contaminant.  Contact microbiology if susceptibility testing is clinically  indicated.    Culture - Blood:   ***Blood Panel PCR results on this specimen are available  approximately 3 hours after the Gram stain result***  Gram stain, PCR, and/or culture results may not always  correspond due to difference in methodologies  ------------------------------------------------------------  This PCR assay was performed using [employer].  The  following targets are tested for:  Enterococcus, vancomycin  resistant enterococci, Listeria monocytogenes,  coagulase  negative staphylococci, S. aureus, methicillin resistant S.  aureus, Streptococcus agalactiae (Group B), S. pneumoniae,  S. pyogenes (Group A), Acinetobacter baumannii, Enterobacter  cloacae, E. coli, Klebsiella oxytoca, K. pneumoniae, Proteus  sp., Serratia marcescens, Haemophilus influenzae, Neisseria  meningitidis, Pseudomonas aeruginosa, Candida albicans, C.  glabrata, C. krusei, C. parapsilosis, C. tropicalis and the  KPC resistance gene.  **NOTE: Due to technical problems, Proteus sp. will NOT be  reported as part of the BCID paneluntil further notice.    -  Coagulase negative Staphylococcus: + DETECT IFRAH    Specimen Source: BLOOD VENOUS    Organism: Staphyloccus hominis    Organism: BLOOD CULTURE PCR    Gram Stain Blood:   ***** CRITICAL RESULT *****  PERSON CALLED / READ-BACK: CINDY MELENDEZ/ELADIA  DATE / TIME CALLED: 01/27/19 0851  CALLED BY: URI DOMINGUEZ  GPCCL^Gram Pos Cocci In Clusters  AFTER: 15 HOURS INCUBATION  BOTTLE: AEROBIC BOTTLE    Organism Identification: BLOOD CULTURE PCR  Staphyloccus hominis    Method Type: PCR    Method Type: POSITIVE IFRAH 29    Culture - Blood (01.26.19 @ 16:59)    Culture - Blood:   CULTURE IN PROGRESS, FURTHER REPORT TO FOLLOW.    Culture - Blood:   STEP^Staphylococcus epidermidis    -  Rifampin: S <=1 IFRAH    -  Tetra/Doxy: S <=1 IFRAH    -  Trimethoprim/Sulfamethoxazole: S <=0.5/9.5 IFRAH    -  Vancomycin: S 2 IFRAH    -  Ciprofloxacin: S <=1 IFRAH    -  Clindamycin: S    -  Erythromycin: S    -  Gentamicin: S <=1 IFRAH    -  Levofloxacin: S <=0.5 IFRAH    -  Moxifloxacin(Aerobic): S <=0.5 IFRAH    -  Oxacillin: S <=0.25 IFRAH    -  Penicillin: R 8 IFRAH    -  Cefazolin: S <=4 IFRAH    Specimen Source: BLOOD PERIPHERAL    Organism: Staphylococcus epidermidis    Gram Stain Blood:   ***** CRITICAL RESULT *****  PERSON CALLED / READ-BACK: AMELIA GOINS RN./Y  DATE / TIME CALLED: 01/27/19 2019  CALLED BY: CHIQUITA NEGRON  GPCCL^Gram Pos Cocci In Clusters  AFTER: 26 HOURS INCUBATION  BOTTLE: ANAEROBIC BOTTLE    Organism Identification: Staphylococcus epidermidis    Method Type: POSITIVE IFRAH 29        RADIOLOGY:    < from: Xray Chest 1 View- PORTABLE-Urgent (01.26.19 @ 15:57) >      IMPRESSION:     Clear lungs.     < end of copied text >

## 2019-02-03 NOTE — PROGRESS NOTE ADULT - ASSESSMENT
disseminated herpes zoster. no new lesion. many crusting. no visceral involvement.  superinfection of lesions post neck improving.  leukopenia resolved. thrombocytopenia improving.    bacteremia 2 species of coag neg staph suggests contaminants    Suggest: continue acyclovir IV q 12                 continue vanco 1 gm q 12 and zosyn q 8. Vanco level therapeutic.                 hydrate                  follow creat closely while on several potentially renal toxic meds. Has been stable                   may be able to switch to oral tx soon                  maintain airborne/ contact precautions        Ellie Camarillo MD  [phone] (pager)  [phone] (office)     Mariaa Rich MD  Pager: [phone]  After 5 PM or weekends please call fellow on call or office [phone]

## 2019-02-03 NOTE — PROGRESS NOTE ADULT - ASSESSMENT
Patient is a 79 y/o M w/ a PMHx of significant for SSS s/p PPM, dementia, HTN, and restless leg syndrome who presented to the ED s/p fall, found to have disseminated herpes zoster, currently improving.

## 2019-02-04 LAB
ALBUMIN SERPL ELPH-MCNC: 2.9 G/DL — LOW (ref 3.3–5)
ALP SERPL-CCNC: 58 U/L — SIGNIFICANT CHANGE UP (ref 40–120)
ALT FLD-CCNC: 21 U/L — SIGNIFICANT CHANGE UP (ref 4–41)
ANION GAP SERPL CALC-SCNC: 11 MMO/L — SIGNIFICANT CHANGE UP (ref 7–14)
AST SERPL-CCNC: 26 U/L — SIGNIFICANT CHANGE UP (ref 4–40)
BILIRUB SERPL-MCNC: 0.5 MG/DL — SIGNIFICANT CHANGE UP (ref 0.2–1.2)
BUN SERPL-MCNC: 7 MG/DL — SIGNIFICANT CHANGE UP (ref 7–23)
CALCIUM SERPL-MCNC: 8.7 MG/DL — SIGNIFICANT CHANGE UP (ref 8.4–10.5)
CHLORIDE SERPL-SCNC: 101 MMOL/L — SIGNIFICANT CHANGE UP (ref 98–107)
CO2 SERPL-SCNC: 23 MMOL/L — SIGNIFICANT CHANGE UP (ref 22–31)
CREAT SERPL-MCNC: 1.09 MG/DL — SIGNIFICANT CHANGE UP (ref 0.5–1.3)
GLUCOSE SERPL-MCNC: 119 MG/DL — HIGH (ref 70–99)
HCT VFR BLD CALC: 33.9 % — LOW (ref 39–50)
HGB BLD-MCNC: 11.6 G/DL — LOW (ref 13–17)
MAGNESIUM SERPL-MCNC: 2 MG/DL — SIGNIFICANT CHANGE UP (ref 1.6–2.6)
MCHC RBC-ENTMCNC: 32 PG — SIGNIFICANT CHANGE UP (ref 27–34)
MCHC RBC-ENTMCNC: 34.2 % — SIGNIFICANT CHANGE UP (ref 32–36)
MCV RBC AUTO: 93.4 FL — SIGNIFICANT CHANGE UP (ref 80–100)
NRBC # FLD: 0 K/UL — LOW (ref 25–125)
PHOSPHATE SERPL-MCNC: 2.6 MG/DL — SIGNIFICANT CHANGE UP (ref 2.5–4.5)
PLATELET # BLD AUTO: 153 K/UL — SIGNIFICANT CHANGE UP (ref 150–400)
PMV BLD: 11.1 FL — SIGNIFICANT CHANGE UP (ref 7–13)
POTASSIUM SERPL-MCNC: 3.7 MMOL/L — SIGNIFICANT CHANGE UP (ref 3.5–5.3)
POTASSIUM SERPL-SCNC: 3.7 MMOL/L — SIGNIFICANT CHANGE UP (ref 3.5–5.3)
PROT SERPL-MCNC: 6.1 G/DL — SIGNIFICANT CHANGE UP (ref 6–8.3)
RBC # BLD: 3.63 M/UL — LOW (ref 4.2–5.8)
RBC # FLD: 13.4 % — SIGNIFICANT CHANGE UP (ref 10.3–14.5)
SODIUM SERPL-SCNC: 135 MMOL/L — SIGNIFICANT CHANGE UP (ref 135–145)
VANCOMYCIN TROUGH SERPL-MCNC: 8.2 UG/ML — LOW (ref 10–20)
WBC # BLD: 5.3 K/UL — SIGNIFICANT CHANGE UP (ref 3.8–10.5)
WBC # FLD AUTO: 5.3 K/UL — SIGNIFICANT CHANGE UP (ref 3.8–10.5)

## 2019-02-04 PROCEDURE — 99232 SBSQ HOSP IP/OBS MODERATE 35: CPT

## 2019-02-04 RX ORDER — VANCOMYCIN HCL 1 G
1250 VIAL (EA) INTRAVENOUS EVERY 12 HOURS
Qty: 0 | Refills: 0 | Status: DISCONTINUED | OUTPATIENT
Start: 2019-02-04 | End: 2019-02-05

## 2019-02-04 RX ORDER — VALACYCLOVIR 500 MG/1
1000 TABLET, FILM COATED ORAL EVERY 8 HOURS
Qty: 0 | Refills: 0 | Status: DISCONTINUED | OUTPATIENT
Start: 2019-02-04 | End: 2019-02-05

## 2019-02-04 RX ADMIN — AMLODIPINE BESYLATE 10 MILLIGRAM(S): 2.5 TABLET ORAL at 05:33

## 2019-02-04 RX ADMIN — MUPIROCIN 1 APPLICATION(S): 20 OINTMENT TOPICAL at 13:57

## 2019-02-04 RX ADMIN — Medication 250 MILLIGRAM(S): at 10:50

## 2019-02-04 RX ADMIN — PIPERACILLIN AND TAZOBACTAM 25 GRAM(S): 4; .5 INJECTION, POWDER, LYOPHILIZED, FOR SOLUTION INTRAVENOUS at 18:37

## 2019-02-04 RX ADMIN — HEPARIN SODIUM 5000 UNIT(S): 5000 INJECTION INTRAVENOUS; SUBCUTANEOUS at 13:57

## 2019-02-04 RX ADMIN — Medication 100 MILLIGRAM(S): at 12:28

## 2019-02-04 RX ADMIN — CARVEDILOL PHOSPHATE 12.5 MILLIGRAM(S): 80 CAPSULE, EXTENDED RELEASE ORAL at 18:37

## 2019-02-04 RX ADMIN — HEPARIN SODIUM 5000 UNIT(S): 5000 INJECTION INTRAVENOUS; SUBCUTANEOUS at 05:34

## 2019-02-04 RX ADMIN — PIPERACILLIN AND TAZOBACTAM 25 GRAM(S): 4; .5 INJECTION, POWDER, LYOPHILIZED, FOR SOLUTION INTRAVENOUS at 08:36

## 2019-02-04 RX ADMIN — VALACYCLOVIR 1000 MILLIGRAM(S): 500 TABLET, FILM COATED ORAL at 23:04

## 2019-02-04 RX ADMIN — Medication 265 MILLIGRAM(S): at 05:32

## 2019-02-04 RX ADMIN — BUDESONIDE AND FORMOTEROL FUMARATE DIHYDRATE 2 PUFF(S): 160; 4.5 AEROSOL RESPIRATORY (INHALATION) at 08:36

## 2019-02-04 RX ADMIN — ATORVASTATIN CALCIUM 10 MILLIGRAM(S): 80 TABLET, FILM COATED ORAL at 23:04

## 2019-02-04 RX ADMIN — MUPIROCIN 1 APPLICATION(S): 20 OINTMENT TOPICAL at 05:34

## 2019-02-04 RX ADMIN — ALBUTEROL 2 PUFF(S): 90 AEROSOL, METERED ORAL at 05:34

## 2019-02-04 RX ADMIN — ALBUTEROL 2 PUFF(S): 90 AEROSOL, METERED ORAL at 14:25

## 2019-02-04 RX ADMIN — Medication 3 MILLIGRAM(S): at 00:21

## 2019-02-04 RX ADMIN — ALBUTEROL 2 PUFF(S): 90 AEROSOL, METERED ORAL at 18:38

## 2019-02-04 RX ADMIN — CARVEDILOL PHOSPHATE 12.5 MILLIGRAM(S): 80 CAPSULE, EXTENDED RELEASE ORAL at 05:33

## 2019-02-04 RX ADMIN — Medication 1 APPLICATION(S): at 18:38

## 2019-02-04 RX ADMIN — CLOPIDOGREL BISULFATE 75 MILLIGRAM(S): 75 TABLET, FILM COATED ORAL at 12:28

## 2019-02-04 RX ADMIN — Medication 166.67 MILLIGRAM(S): at 23:05

## 2019-02-04 RX ADMIN — Medication 81 MILLIGRAM(S): at 12:28

## 2019-02-04 RX ADMIN — Medication 1 APPLICATION(S): at 05:34

## 2019-02-04 RX ADMIN — LISINOPRIL 2.5 MILLIGRAM(S): 2.5 TABLET ORAL at 05:33

## 2019-02-04 RX ADMIN — ALBUTEROL 2 PUFF(S): 90 AEROSOL, METERED ORAL at 00:21

## 2019-02-04 RX ADMIN — ALBUTEROL 2 PUFF(S): 90 AEROSOL, METERED ORAL at 23:06

## 2019-02-04 RX ADMIN — PIPERACILLIN AND TAZOBACTAM 25 GRAM(S): 4; .5 INJECTION, POWDER, LYOPHILIZED, FOR SOLUTION INTRAVENOUS at 00:21

## 2019-02-04 RX ADMIN — BUDESONIDE AND FORMOTEROL FUMARATE DIHYDRATE 2 PUFF(S): 160; 4.5 AEROSOL RESPIRATORY (INHALATION) at 23:04

## 2019-02-04 RX ADMIN — Medication 1 TABLET(S): at 08:36

## 2019-02-04 RX ADMIN — Medication 1 TABLET(S): at 18:37

## 2019-02-04 RX ADMIN — HEPARIN SODIUM 5000 UNIT(S): 5000 INJECTION INTRAVENOUS; SUBCUTANEOUS at 23:04

## 2019-02-04 RX ADMIN — VALACYCLOVIR 1000 MILLIGRAM(S): 500 TABLET, FILM COATED ORAL at 18:37

## 2019-02-04 RX ADMIN — MUPIROCIN 1 APPLICATION(S): 20 OINTMENT TOPICAL at 23:05

## 2019-02-04 NOTE — PROGRESS NOTE ADULT - SUBJECTIVE AND OBJECTIVE BOX
Jimbo Muhammad, PGY-2  Internal Medicine Team  Pager:  [phone]/ [phone]    Patient is a 78y old  Male who presents with a chief complaint of fall, rash , Bandemia , (03 Feb 2019 10:38)      SUBJECTIVE / OVERNIGHT EVENTS: CASEY overnight. Patient has no complaints and denies any CP, palpitations, SOB, abdominal pain, N/V.     MEDICATIONS  (STANDING):  acyclovir IVPB      acyclovir IVPB 750 milliGRAM(s) IV Intermittent every 12 hours  ALBUTerol    90 MICROgram(s) HFA Inhaler 1 Puff(s) Inhalation every 4 hours  ALBUTerol    90 MICROgram(s) HFA Inhaler 2 Puff(s) Inhalation every 6 hours  amLODIPine   Tablet 10 milliGRAM(s) Oral daily  aspirin enteric coated 81 milliGRAM(s) Oral daily  atorvastatin 10 milliGRAM(s) Oral at bedtime  buDESOnide 160 MICROgram(s)/formoterol 4.5 MICROgram(s) Inhaler 2 Puff(s) Inhalation two times a day  carvedilol 12.5 milliGRAM(s) Oral every 12 hours  clopidogrel Tablet 75 milliGRAM(s) Oral daily  heparin  Injectable 5000 Unit(s) SubCutaneous every 8 hours  lactobacillus acidophilus 1 Tablet(s) Oral two times a day with meals  lisinopril 2.5 milliGRAM(s) Oral daily  mupirocin 2% Ointment 1 Application(s) Topical three times a day  petrolatum white Ointment 1 Application(s) Topical two times a day  piperacillin/tazobactam IVPB. 3.375 Gram(s) IV Intermittent every 8 hours  thiamine 100 milliGRAM(s) Oral daily  vancomycin  IVPB 1000 milliGRAM(s) IV Intermittent every 12 hours    MEDICATIONS  (PRN):  acetaminophen   Tablet .. 650 milliGRAM(s) Oral every 6 hours PRN Temp greater or equal to 38C (100.4F)  guaiFENesin    Syrup 100 milliGRAM(s) Oral every 6 hours PRN Cough      OBJECTIVE:    Vital Signs Last 24 Hrs  T(C): 36.8 (04 Feb 2019 05:31), Max: 36.9 (03 Feb 2019 21:25)  T(F): 98.3 (04 Feb 2019 05:31), Max: 98.5 (03 Feb 2019 21:25)  HR: 61 (04 Feb 2019 05:31) (60 - 72)  BP: 134/56 (04 Feb 2019 05:31) (129/67 - 150/79)  BP(mean): --  RR: 18 (04 Feb 2019 05:31) (18 - 20)  SpO2: 98% (04 Feb 2019 05:31) (95% - 98%)    CAPILLARY BLOOD GLUCOSE        I&O's Summary      PHYSICAL EXAM:  GENERAL: NAD, well-developed  HEAD:  Atraumatic, Normocephalic  EYES: EOMI, PERRLA, conjunctiva and sclera clear  NECK: Supple, No JVD  CHEST/LUNG: mildly decreased breath sounds at the b/l bases   HEART: Regular rate and rhythm; No murmurs, rubs, or gallops  ABDOMEN: Soft, Nontender, Nondistended; Bowel sounds present  EXTREMITIES:  2+ Peripheral Pulses, No clubbing, cyanosis, or edema  PSYCH: AAOx3  NEUROLOGY: non-focal  SKIN: Large scabbed lesion on the back of the neck which is non-tender on palpation. Disseminated crusted, erythematous rash throughout the arms, legs and trunk.    LABS:                        11.6   5.30  )-----------( 153      ( 04 Feb 2019 07:30 )             33.9     Auto Eosinophil # x     / Auto Eosinophil % x     / Auto Neutrophil # x     / Auto Neutrophil % x     / BANDS % x                            11.3   4.60  )-----------( 130      ( 03 Feb 2019 07:09 )             34.2     Auto Eosinophil # 0.08  / Auto Eosinophil % 1.7   / Auto Neutrophil # 2.50  / Auto Neutrophil % 54.4  / BANDS % x        02-04    135  |  101  |  7   ----------------------------<  119<H>  3.7   |  23  |  1.09  02-03    135  |  100  |  7   ----------------------------<  113<H>  3.6   |  24  |  1.03    Ca    8.7      04 Feb 2019 07:30  Mg     2.0     02-04  Phos  2.6     02-04  TPro  6.1  /  Alb  2.9<L>  /  TBili  0.5  /  DBili  x   /  AST  26  /  ALT  21  /  AlkPhos  58  02-04  TPro  6.1  /  Alb  3.0<L>  /  TBili  0.6  /  DBili  x   /  AST  24  /  ALT  18  /  AlkPhos  57  02-03                RESPIRATORY  VENT:    ABG: ( 29 Jan 2019 14:30 ) pH: 7.42  /  pCO2: 44    /  pO2: 32    / HCO3: 27    / Base Excess: 4.2   /  SaO2: 59.2                VBG:     RADIOLOGY & ADDITIONAL TESTS:  (Imaging Personally Reviewed)    Consultant(s) Notes Reviewed:      Care Discussed with Consultants/Other Providers:

## 2019-02-04 NOTE — PROGRESS NOTE ADULT - ASSESSMENT
Patient is a 77 y/o M w/ a PMHx of significant for SSS s/p PPM, dementia, HTN, and restless leg syndrome who presented to the ED s/p fall, found to have disseminated herpes zoster, currently improving.

## 2019-02-04 NOTE — CHART NOTE - NSCHARTNOTEFT_GEN_A_CORE
77 yo M with disseminated zoster, improving on acyclovir 750mg Q12.  On exam left anterior chest, lower legs, and back with pink crusted papules, ulcerations with overlying eschar on left back extending to nape of neck.      -Plan to transition patient to valacyclovir once mostly crusted over, ID following  -C/w mupirocin 2% ointment to crusted areas TID   -C/w Vaseline ointment to eschar on back and posterior neck BID  -Dermatology will sign off  -Please page dermatology on call resident of patient develops new dermatologic concerns or worsening symptoms     Thi Castrejon MD (PGY-3)   Dermatology Resident  [facility]  Pager: [phone]  Office [phone]

## 2019-02-04 NOTE — PROGRESS NOTE ADULT - ASSESSMENT
disseminated herpes zoster. no new lesion. many crusting. no visceral involvement.  superinfection of lesions post neck improving.  leukopenia resolved. thrombocytopenia improving.    Blood cultures 2 species of coag neg staph suggests contaminants    Suggest:     Change acyclovir to valtrex 1 gram PO TID  Continue vanco 1 gm q 12 and zosyn q 8 for now. Vanco level therapeutic.   Maintain airborne/ contact precautions

## 2019-02-04 NOTE — PROGRESS NOTE ADULT - SUBJECTIVE AND OBJECTIVE BOX
CC: Patient is a 78y old  Male who presents with a chief complaint of fall, rash , Bandemia , (04 Feb 2019 11:00)    ID following disseminated zoster    Interval History/ROS: Patient has no new complaints. Most lesions have crusted. Denies fever, chills.    Rest of ROS negative.    Allergies  No Known Allergies    ANTIMICROBIALS:  acyclovir IVPB    acyclovir IVPB 750 every 12 hours  piperacillin/tazobactam IVPB. 3.375 every 8 hours  vancomycin  IVPB 1250 every 12 hours    PE:    Vital Signs Last 24 Hrs  T(C): 36.7 (04 Feb 2019 12:47), Max: 36.9 (03 Feb 2019 21:25)  T(F): 98.1 (04 Feb 2019 12:47), Max: 98.5 (03 Feb 2019 21:25)  HR: 61 (04 Feb 2019 12:47) (61 - 72)  BP: 143/78 (04 Feb 2019 12:47) (129/67 - 143/79)  BP(mean): --  RR: 18 (04 Feb 2019 12:47) (18 - 20)  SpO2: 100% (04 Feb 2019 12:47) (96% - 100%)    Gen: AOx3, NAD, non-toxic, pleasant  CV: S1+S2 normal, no murmurs  Resp: Clear bilat, no resp distress  Abd: Soft, nontender, +BS  Ext: No LE edema, no wounds  : No Flood  IV/Skin: disseminated crusted lesions, erythema to left chest, ulceration to back of neck  Neuro: no focal deficits    LABS:                          11.6   5.30  )-----------( 153      ( 04 Feb 2019 07:30 )             33.9       02-04    135  |  101  |  7   ----------------------------<  119<H>  3.7   |  23  |  1.09    Ca    8.7      04 Feb 2019 07:30  Phos  2.6     02-04  Mg     2.0     02-04    TPro  6.1  /  Alb  2.9<L>  /  TBili  0.5  /  DBili  x   /  AST  26  /  ALT  21  /  AlkPhos  58  02-04    MICROBIOLOGY:  Vancomycin Level, Trough: 8.2 ug/mL (02-04-19 @ 11:12)  v  BLOOD PERIPHERAL  01-29-19 --  --  --      URINE MIDSTREAM  01-26-19 --  --  --      BLOOD PERIPHERAL  01-26-19 --  --  Staphylococcus epidermidis    RADIOLOGY:    < from: CT Chest No Cont (01.30.19 @ 18:13) >  IMPRESSION:    Minimal patchy consolidation in the left upper lobe anteriorly which may   reflect a small focus of infection or atelectasis.    Soft tissue stranding anterior to the sternum of uncertain etiology. This   may reflect focal area ofcellulitis or edema. Correlate with trauma   history. No drainable abscess collection.    < end of copied text >

## 2019-02-05 LAB
ANION GAP SERPL CALC-SCNC: 11 MMO/L — SIGNIFICANT CHANGE UP (ref 7–14)
BUN SERPL-MCNC: 5 MG/DL — LOW (ref 7–23)
CALCIUM SERPL-MCNC: 8.7 MG/DL — SIGNIFICANT CHANGE UP (ref 8.4–10.5)
CHLORIDE SERPL-SCNC: 103 MMOL/L — SIGNIFICANT CHANGE UP (ref 98–107)
CO2 SERPL-SCNC: 25 MMOL/L — SIGNIFICANT CHANGE UP (ref 22–31)
CREAT SERPL-MCNC: 1.13 MG/DL — SIGNIFICANT CHANGE UP (ref 0.5–1.3)
GLUCOSE SERPL-MCNC: 107 MG/DL — HIGH (ref 70–99)
HCT VFR BLD CALC: 36 % — LOW (ref 39–50)
HGB BLD-MCNC: 12.2 G/DL — LOW (ref 13–17)
MAGNESIUM SERPL-MCNC: 2.1 MG/DL — SIGNIFICANT CHANGE UP (ref 1.6–2.6)
MCHC RBC-ENTMCNC: 31.5 PG — SIGNIFICANT CHANGE UP (ref 27–34)
MCHC RBC-ENTMCNC: 33.9 % — SIGNIFICANT CHANGE UP (ref 32–36)
MCV RBC AUTO: 93 FL — SIGNIFICANT CHANGE UP (ref 80–100)
NRBC # FLD: 0 K/UL — LOW (ref 25–125)
PHOSPHATE SERPL-MCNC: 2.6 MG/DL — SIGNIFICANT CHANGE UP (ref 2.5–4.5)
PLATELET # BLD AUTO: 180 K/UL — SIGNIFICANT CHANGE UP (ref 150–400)
PMV BLD: 11 FL — SIGNIFICANT CHANGE UP (ref 7–13)
POTASSIUM SERPL-MCNC: 3.8 MMOL/L — SIGNIFICANT CHANGE UP (ref 3.5–5.3)
POTASSIUM SERPL-SCNC: 3.8 MMOL/L — SIGNIFICANT CHANGE UP (ref 3.5–5.3)
RBC # BLD: 3.87 M/UL — LOW (ref 4.2–5.8)
RBC # FLD: 13.7 % — SIGNIFICANT CHANGE UP (ref 10.3–14.5)
SODIUM SERPL-SCNC: 139 MMOL/L — SIGNIFICANT CHANGE UP (ref 135–145)
VIT B1 SERPL-MCNC: 74.7 NMOL/L — SIGNIFICANT CHANGE UP (ref 66.5–200)
WBC # BLD: 4.78 K/UL — SIGNIFICANT CHANGE UP (ref 3.8–10.5)
WBC # FLD AUTO: 4.78 K/UL — SIGNIFICANT CHANGE UP (ref 3.8–10.5)

## 2019-02-05 PROCEDURE — 99232 SBSQ HOSP IP/OBS MODERATE 35: CPT

## 2019-02-05 RX ADMIN — Medication 1 APPLICATION(S): at 06:57

## 2019-02-05 RX ADMIN — HEPARIN SODIUM 5000 UNIT(S): 5000 INJECTION INTRAVENOUS; SUBCUTANEOUS at 22:15

## 2019-02-05 RX ADMIN — AMLODIPINE BESYLATE 10 MILLIGRAM(S): 2.5 TABLET ORAL at 06:55

## 2019-02-05 RX ADMIN — Medication 166.67 MILLIGRAM(S): at 11:15

## 2019-02-05 RX ADMIN — CARVEDILOL PHOSPHATE 12.5 MILLIGRAM(S): 80 CAPSULE, EXTENDED RELEASE ORAL at 06:54

## 2019-02-05 RX ADMIN — HEPARIN SODIUM 5000 UNIT(S): 5000 INJECTION INTRAVENOUS; SUBCUTANEOUS at 06:55

## 2019-02-05 RX ADMIN — PIPERACILLIN AND TAZOBACTAM 25 GRAM(S): 4; .5 INJECTION, POWDER, LYOPHILIZED, FOR SOLUTION INTRAVENOUS at 01:27

## 2019-02-05 RX ADMIN — ALBUTEROL 2 PUFF(S): 90 AEROSOL, METERED ORAL at 13:28

## 2019-02-05 RX ADMIN — BUDESONIDE AND FORMOTEROL FUMARATE DIHYDRATE 2 PUFF(S): 160; 4.5 AEROSOL RESPIRATORY (INHALATION) at 22:15

## 2019-02-05 RX ADMIN — VALACYCLOVIR 1000 MILLIGRAM(S): 500 TABLET, FILM COATED ORAL at 13:28

## 2019-02-05 RX ADMIN — Medication 81 MILLIGRAM(S): at 11:32

## 2019-02-05 RX ADMIN — PIPERACILLIN AND TAZOBACTAM 25 GRAM(S): 4; .5 INJECTION, POWDER, LYOPHILIZED, FOR SOLUTION INTRAVENOUS at 09:08

## 2019-02-05 RX ADMIN — VALACYCLOVIR 1000 MILLIGRAM(S): 500 TABLET, FILM COATED ORAL at 06:54

## 2019-02-05 RX ADMIN — ATORVASTATIN CALCIUM 10 MILLIGRAM(S): 80 TABLET, FILM COATED ORAL at 22:14

## 2019-02-05 RX ADMIN — Medication 1 TABLET(S): at 17:30

## 2019-02-05 RX ADMIN — ALBUTEROL 2 PUFF(S): 90 AEROSOL, METERED ORAL at 22:15

## 2019-02-05 RX ADMIN — MUPIROCIN 1 APPLICATION(S): 20 OINTMENT TOPICAL at 22:14

## 2019-02-05 RX ADMIN — CLOPIDOGREL BISULFATE 75 MILLIGRAM(S): 75 TABLET, FILM COATED ORAL at 11:31

## 2019-02-05 RX ADMIN — MUPIROCIN 1 APPLICATION(S): 20 OINTMENT TOPICAL at 06:56

## 2019-02-05 RX ADMIN — ALBUTEROL 2 PUFF(S): 90 AEROSOL, METERED ORAL at 17:30

## 2019-02-05 RX ADMIN — Medication 100 MILLIGRAM(S): at 11:31

## 2019-02-05 RX ADMIN — Medication 1 APPLICATION(S): at 17:31

## 2019-02-05 RX ADMIN — BUDESONIDE AND FORMOTEROL FUMARATE DIHYDRATE 2 PUFF(S): 160; 4.5 AEROSOL RESPIRATORY (INHALATION) at 08:25

## 2019-02-05 RX ADMIN — CARVEDILOL PHOSPHATE 12.5 MILLIGRAM(S): 80 CAPSULE, EXTENDED RELEASE ORAL at 17:30

## 2019-02-05 RX ADMIN — ALBUTEROL 2 PUFF(S): 90 AEROSOL, METERED ORAL at 06:56

## 2019-02-05 RX ADMIN — LISINOPRIL 2.5 MILLIGRAM(S): 2.5 TABLET ORAL at 06:55

## 2019-02-05 RX ADMIN — Medication 1 TABLET(S): at 08:25

## 2019-02-05 RX ADMIN — MUPIROCIN 1 APPLICATION(S): 20 OINTMENT TOPICAL at 13:29

## 2019-02-05 RX ADMIN — HEPARIN SODIUM 5000 UNIT(S): 5000 INJECTION INTRAVENOUS; SUBCUTANEOUS at 13:29

## 2019-02-05 NOTE — PROGRESS NOTE ADULT - ASSESSMENT
disseminated herpes zoster. no new lesion. crusted. no visceral involvement.  superinfection of lesions post neck crusted as well.  leukopenia resolved. thrombocytopenia resolved.  Blood cultures 2 species of coag neg staph suggests contaminants    Suggest:     d/c valtrex 1 gram PO TID  d/c vanco  and zosyn   d/c airborne/ contact precautions

## 2019-02-05 NOTE — DIETITIAN INITIAL EVALUATION ADULT. - OTHER INFO
Initial Dietitian Evaluation 2/2 to extended length of stay. Remains with adequate po intakes. No reported chewing, swallowing difficulties or any nausea, vomiting, diarrhea, constipation. Pt reports he may have lost weight but not sure of amount.

## 2019-02-05 NOTE — DIETITIAN INITIAL EVALUATION ADULT. - PROBLEM SELECTOR PLAN 3
Patient has bandemia to 20.9% w/o leukocytosis or any identifiable source of infection. He received a dose of vanc/zosyn. UA negative; CXR unremarkable.   - f/u blood cx  - c/w vanc/zosyn

## 2019-02-05 NOTE — DIETITIAN INITIAL EVALUATION ADULT. - PROBLEM SELECTOR PLAN 6
Patient intermittently hypertensive while in ED. He states he only takes one medication for HTN, and no others. Last filled prescriptions for 90 day supplies of amlodipine 10 mg, lasix 40 mg, and coreg 12.5 mg in 11/2018.   - will need to clarify w/ pharmacy in AM  - given elevated BPs, will continue 10 mg amlodipine

## 2019-02-05 NOTE — PROGRESS NOTE ADULT - NSHPATTENDINGPLANDISCUSS_GEN_ALL_CORE
pt and house staff
primary team
primary team
Medical Attending, [facility], and pt brother
team
team
house staff
pt AND HOUSE STAFF
pt and house staff

## 2019-02-05 NOTE — DIETITIAN INITIAL EVALUATION ADULT. - PROBLEM SELECTOR PLAN 8
DVT ppx: SCDs given thrombocytopenia    Prasanth Briones MD  PGY-2, Internal Medicine  [facility]: [phone]  [facility]: [id number]

## 2019-02-05 NOTE — PROVIDER CONTACT NOTE (OTHER) - ASSESSMENT
patient is alert and oriented x3,no acute distress noted. Bp 137/63,P 83,R18,02 sat 100%RA,T98.0 oral.

## 2019-02-05 NOTE — PROGRESS NOTE ADULT - SUBJECTIVE AND OBJECTIVE BOX
CC: Patient is a 78y old  Male who presents with a chief complaint of fall, rash , Bandemia , (04 Feb 2019 11:00)    ID following disseminated zoster    Interval History/ROS:  lesions have crusted. Denies fever, chills.    Rest of ROS negative.    Allergies  No Known Allergies    piperacillin/tazobactam IVPB. 3.375 every 8 hours  valACYclovir 1000 every 8 hours  vancomycin  IVPB 1250 every 12 hours    PE:  Vital Signs Last 24 Hrs  T(F): 98 (02-05-19 @ 13:47), Max: 98.7 (02-04-19 @ 21:17)  HR: 61 (02-05-19 @ 13:47)  BP: 139/72 (02-05-19 @ 13:47)  RR: 18 (02-05-19 @ 13:47)  SpO2: 98% (02-05-19 @ 13:47) (97% - 100%)    Gen: AOx3, NAD, non-toxic, pleasant  CV: S1+S2 normal, no murmurs  Resp: Clear bilat, no resp distress  Abd: Soft, nontender, +BS  Ext: No LE edema, no wounds  : No Flood  IV/Skin: disseminated crusted lesions, erythema to left chest, ulceration to back of neck with small eschar  Neuro: no focal deficits    LABS:                                         12.2   4.78  )-----------( 180      ( 05 Feb 2019 07:00 )             36.0 02-05    139  |  103  |  5   ----------------------------<  107  3.8   |  25  |  1.13  Ca    8.7      05 Feb 2019 07:00Phos  2.6     02-05Mg     2.1     02-05  TPro  6.1  /  Alb  2.9  /  TBili  0.5  /  DBili  x   /  AST  26  /  ALT  21  /  AlkPhos  58  02-04    MICROBIOLOGY:  Vancomycin Level, Trough: 8.2 ug/mL (02-04-19 @ 11:12)  v  BLOOD PERIPHERAL  01-29-19 --  --  --      URINE MIDSTREAM  01-26-19 --  --  --      BLOOD PERIPHERAL  01-26-19 --  --  Staphylococcus epidermidis    RADIOLOGY:    < from: CT Chest No Cont (01.30.19 @ 18:13) >  IMPRESSION:    Minimal patchy consolidation in the left upper lobe anteriorly which may   reflect a small focus of infection or atelectasis.    Soft tissue stranding anterior to the sternum of uncertain etiology. This   may reflect focal area ofcellulitis or edema. Correlate with trauma   history. No drainable abscess collection.    < end of copied text >

## 2019-02-05 NOTE — DIETITIAN INITIAL EVALUATION ADULT. - PROBLEM SELECTOR PLAN 2
Patient has a scaly, erythematous rash on his left shoulder and anterior chest. He states it has been there for the past few weeks, but he never noticed it prior. It is possible that this is a cellulitis, but the nature of the rash does not seem consistent.   - will c/w abx for possible cellulitis  - ID c/s in AM for rash and bandemia, consider Dermatology consult in AM as well

## 2019-02-05 NOTE — PROGRESS NOTE ADULT - ATTENDING COMMENTS
Mariaa Rich MD  Pager: [phone]  After 5 PM or weekends please call fellow on call or office [phone]
Rodriguez Dawn MD  Pager [phone]  After 5pm/weekends call [phone]
pt clear on exam today. intermittent wheezing, contributed likely by exertion and his body habitus. continue duonebs as above. respiratory status is comfortable.

## 2019-02-05 NOTE — CHART NOTE - NSCHARTNOTEFT_GEN_A_CORE
Pt had a localized reaction with the IV that was giving vancomycin. Pt had a localized 3cm wheal around the IV site roughly 10 minutes after starting vancomycin. I told the nurse that it was ok to hold the vancomycin for now.      Thom Morrison  EM/IM  PGY-1  Pager: [phone]

## 2019-02-05 NOTE — DIETITIAN INITIAL EVALUATION ADULT. - PROBLEM SELECTOR PLAN 5
Patient has chronic thrombocytopenia of unknown origin. It has ostensibly never been evaluated. I do not see it mentioned on notes from previous admissions, either.   - continue to monitor CBC

## 2019-02-06 VITALS
HEART RATE: 61 BPM | RESPIRATION RATE: 18 BRPM | TEMPERATURE: 97 F | DIASTOLIC BLOOD PRESSURE: 72 MMHG | SYSTOLIC BLOOD PRESSURE: 146 MMHG

## 2019-02-06 RX ORDER — PRAMIPEXOLE DIHYDROCHLORIDE 0.12 MG/1
1 TABLET ORAL
Qty: 0 | Refills: 0 | COMMUNITY

## 2019-02-06 RX ORDER — LANOLIN ALCOHOL/MO/W.PET/CERES
3 CREAM (GRAM) TOPICAL AT BEDTIME
Qty: 0 | Refills: 0 | Status: DISCONTINUED | OUTPATIENT
Start: 2019-02-06 | End: 2019-02-06

## 2019-02-06 RX ORDER — FUROSEMIDE 40 MG
1 TABLET ORAL
Qty: 0 | Refills: 0 | COMMUNITY

## 2019-02-06 RX ORDER — LISINOPRIL 2.5 MG/1
1 TABLET ORAL
Qty: 0 | Refills: 0 | DISCHARGE
Start: 2019-02-06

## 2019-02-06 RX ADMIN — Medication 1 APPLICATION(S): at 06:04

## 2019-02-06 RX ADMIN — ALBUTEROL 2 PUFF(S): 90 AEROSOL, METERED ORAL at 06:04

## 2019-02-06 RX ADMIN — Medication 1 TABLET(S): at 09:37

## 2019-02-06 RX ADMIN — ALBUTEROL 2 PUFF(S): 90 AEROSOL, METERED ORAL at 12:01

## 2019-02-06 RX ADMIN — Medication 3 MILLIGRAM(S): at 01:31

## 2019-02-06 RX ADMIN — CLOPIDOGREL BISULFATE 75 MILLIGRAM(S): 75 TABLET, FILM COATED ORAL at 12:01

## 2019-02-06 RX ADMIN — MUPIROCIN 1 APPLICATION(S): 20 OINTMENT TOPICAL at 06:04

## 2019-02-06 RX ADMIN — BUDESONIDE AND FORMOTEROL FUMARATE DIHYDRATE 2 PUFF(S): 160; 4.5 AEROSOL RESPIRATORY (INHALATION) at 09:35

## 2019-02-06 RX ADMIN — Medication 100 MILLIGRAM(S): at 12:01

## 2019-02-06 RX ADMIN — LISINOPRIL 2.5 MILLIGRAM(S): 2.5 TABLET ORAL at 06:04

## 2019-02-06 RX ADMIN — CARVEDILOL PHOSPHATE 12.5 MILLIGRAM(S): 80 CAPSULE, EXTENDED RELEASE ORAL at 06:04

## 2019-02-06 RX ADMIN — AMLODIPINE BESYLATE 10 MILLIGRAM(S): 2.5 TABLET ORAL at 06:04

## 2019-02-06 RX ADMIN — Medication 81 MILLIGRAM(S): at 12:01

## 2019-02-06 RX ADMIN — MUPIROCIN 1 APPLICATION(S): 20 OINTMENT TOPICAL at 14:17

## 2019-02-06 NOTE — PROGRESS NOTE ADULT - PROBLEM SELECTOR PLAN 3
bacteremia 2 species of coag neg staph suggests contaminants  disseminated herpes zoster. still with versicles. - cont acyclovir
- Likely secondary to VZV.
- Likely secondary to VZV.
- Likely secondary to disseminated herpes zoster  - Currently improved  - Monitor CBC
cont vanco  echo to r/op endocarditis
- Likely secondary to disseminated herpes zoster  - Currently improved  - Monitor CBC

## 2019-02-06 NOTE — PROGRESS NOTE ADULT - PROBLEM SELECTOR PLAN 4
- Patient has chronic thrombocytopenia of unknown origin, but platelet count is currently improving  - No overt signs of bleeding currently  - Monitor CBC
Patient has chronic thrombocytopenia of unknown origin.
Patient has chronic thrombocytopenia of unknown origin.  - Will continue to monitor.
Patient has chronic thrombocytopenia of unknown origin.  - Will continue to monitor.
Patient has chronic thrombocytopenia of unknown origin.

## 2019-02-06 NOTE — PROGRESS NOTE ADULT - PROBLEM SELECTOR PLAN 7
- DVT PPx: HSQ  - Diet: DASH/TLC  - Dispo: Pending resolution of acute medical issues
- DVT PPx: HSQ  - Diet: DASH/TLC  - Dispo: Pending resolution of acute medical issues
- DVT PPx: HSQ  - Diet: DASH/TLC  - Dispo: Rehab
- DVT PPx: HSQ  - Diet: DASH/TLC  - Dispo: Rehab once medical issues are completed.
DVT prophylaxis: Will start SQH given platelets over 20
DVT prophylaxis: Will start SQH given platelets over 20

## 2019-02-06 NOTE — PROGRESS NOTE ADULT - SUBJECTIVE AND OBJECTIVE BOX
Patient is a 78y old  Male who presents with a chief complaint of fall, rash , Bandemia , (05 Feb 2019 15:05)        SUBJECTIVE / OVERNIGHT EVENTS: No acute overnight events. Pt states that he is comfortable and without any complaints currently. He denies fevers, chills, chest pain, N/V/D overnight.      MEDICATIONS  (STANDING):  ALBUTerol    90 MICROgram(s) HFA Inhaler 1 Puff(s) Inhalation every 4 hours  ALBUTerol    90 MICROgram(s) HFA Inhaler 2 Puff(s) Inhalation every 6 hours  amLODIPine   Tablet 10 milliGRAM(s) Oral daily  aspirin enteric coated 81 milliGRAM(s) Oral daily  atorvastatin 10 milliGRAM(s) Oral at bedtime  buDESOnide 160 MICROgram(s)/formoterol 4.5 MICROgram(s) Inhaler 2 Puff(s) Inhalation two times a day  carvedilol 12.5 milliGRAM(s) Oral every 12 hours  clopidogrel Tablet 75 milliGRAM(s) Oral daily  heparin  Injectable 5000 Unit(s) SubCutaneous every 8 hours  lactobacillus acidophilus 1 Tablet(s) Oral two times a day with meals  lisinopril 2.5 milliGRAM(s) Oral daily  melatonin 3 milliGRAM(s) Oral at bedtime  mupirocin 2% Ointment 1 Application(s) Topical three times a day  petrolatum white Ointment 1 Application(s) Topical two times a day  thiamine 100 milliGRAM(s) Oral daily    MEDICATIONS  (PRN):  acetaminophen   Tablet .. 650 milliGRAM(s) Oral every 6 hours PRN Temp greater or equal to 38C (100.4F)  guaiFENesin    Syrup 100 milliGRAM(s) Oral every 6 hours PRN Cough        CAPILLARY BLOOD GLUCOSE        I&O's Summary      PHYSICAL EXAM  GENERAL: NAD, well-developed  HEAD:  Atraumatic, Normocephalic  EYES: EOMI, PERRLA, conjunctiva and sclera clear  NECK: Supple, No JVD. Large healed scabbed lesion on posterior neck. Dry.  CHEST/LUNG: Clear to auscultation bilaterally; No wheeze  HEART: Regular rate and rhythm; No murmurs, rubs, or gallops  ABDOMEN: Soft, Nontender, Nondistended; Bowel sounds present  EXTREMITIES:  2+ Peripheral Pulses, No clubbing, cyanosis, or edema  PSYCH: AAOx3  SKIN: Many crusted over lesions over the trunk and arms.    LABS:                        12.2   4.78  )-----------( 180      ( 05 Feb 2019 07:00 )             36.0     02-05    139  |  103  |  5<L>  ----------------------------<  107<H>  3.8   |  25  |  1.13    Ca    8.7      05 Feb 2019 07:00  Phos  2.6     02-05  Mg     2.1     02-05                RADIOLOGY & ADDITIONAL TESTS:    Imaging Personally Reviewed:  Consultant(s) Notes Reviewed:    Care Discussed with Consultants/Other Providers:

## 2019-02-06 NOTE — PROGRESS NOTE ADULT - PROBLEM SELECTOR PLAN 6
Patient filled rx for pramipexole in 10/2018 for 30 day supply. He denies taking it recently.   - continue to monitor for symptoms
- Patient filled prescription for Pramipexole in 10/2018 for 30 day supply. He denies taking it recently.   - Continue to monitor for symptoms and will hold medications at this time.
Patient filled rx for pramipexole in 10/2018 for 30 day supply. He denies taking it recently.   - continue to monitor for symptoms
Patient filled rx for pramipexole in 10/2018 for 30 day supply. He denies taking it recently.   - continue to monitor for symptoms and will hold medications at this time.
Patient filled rx for pramipexole in 10/2018 for 30 day supply. He denies taking it recently.   - continue to monitor for symptoms and will hold medications at this time.

## 2019-02-06 NOTE — PROGRESS NOTE ADULT - PROBLEM SELECTOR PLAN 1
ct head neg   pt was confused yesterday ? metabolic encephalopathy vs alcohol related vs neuro syphilis  neuro eval   ? lp
- CT head negative  - History of syphilis and so current tests still positive. May need an LP as an outpatient to determine if neurosyphilis is present, but clinically unlikely.  - Neuro recs seen. Labs ordered already. Will give thiamine supplements orally.
- CT head negative  - History of syphilis and so current tests still positive. May need an LP as an outpatient to determine if neurosyphilis is present, but clinically unlikely.  - Neuro recs seen. Will give thiamine supplements orally.
- Head CT (1/26, 1/30) showed no acute pathology  - History of syphilis and so current tests still positive. May need an LP as an outpatient to determine if neurosyphilis is present, but clinically unlikely.  - Neuro consulted, appreciate recs. C/w Thiamine supplements orally.
ct head neg   pt confused ? metabolic encephalopathy vs alcohol related   frequent neuro checks
ct head neg   pt was confused yesterday ? metabolic encephalopathy vs alcohol related
ct head neg   pt was confused yesterday ? metabolic encephalopathy vs alcohol related
- Head CT (1/26, 1/30) showed no acute pathology  - History of syphilis and so current tests still positive. May need an LP as an outpatient to determine if neurosyphilis is present, but clinically unlikely.  - Neuro consulted, appreciate recs. C/w Thiamine supplements orally.

## 2019-02-06 NOTE — PROGRESS NOTE ADULT - REASON FOR ADMISSION
fall, rash , Bandemia ,
fall, rash , bandemia
fall, rash, bandemia
fall, rash , Bandemia ,

## 2019-02-06 NOTE — PROGRESS NOTE ADULT - PROBLEM SELECTOR PLAN 2
cont acyclovir iv  derm / id f/u
- Patient w/ likely disseminated herpes zoster  - ID following, appreciate recs.  - Abx and acyclovir treatment completed. Lesions crusted over.
- Patient w/ likely disseminated herpes zoster  - ID following, appreciate recs. C/w IV Acyclovir until lesions crust over. Will eventually replace IV Acyclovir with PO Valacyclovir  - C/w IV Vancomycin/Zosyn for now as per ID. As per Dr. Rich, abx not for blood cx, but for posterior neck lesion which looks much healed right now.   - Patient afebrile and without leukocytosis. Monitor CBC and fever curve
- Patient w/ likely disseminated herpes zoster  - ID following, appreciate recs. C/w oral valacyclovir.  - C/w IV Vancomycin/Zosyn for now as per ID. As per Dr. Rich, abx not for blood cx, but for posterior neck lesion.  - Will discuss with ID regarding time course of treatment.
- Will continue acyclovir until lesions crust over.  - Will continue Vancomycin as per ID for now; however, previous cultures likely a contaminant and recent blood cultures negative at 72 hours.
- Will continue acyclovir until lesions crust over. Likely to be tomorrow.  - Will continue Vancomycin and Zosyn as per ID. As per Dr. Rich, abx not for blood cx, but for posterior neck lesion which looks much healed right now.  - Likely can D/C Vanc/Zosyn and Acyclovir tomorrow  - Can replace IV acyclovir with PO valacyclovir tomorrow or monday.
cont acyclovir iv
- Patient w/ likely disseminated herpes zoster  - ID following, appreciate recs. C/w IV Acyclovir until lesions crust over. Will eventually replace IV Acyclovir with PO Valacyclovir  - C/w IV Vancomycin/Zosyn for now as per ID. As per Dr. Rich, abx not for blood cx, but for posterior neck lesion which looks much healed right now.   - Patient afebrile and without leukocytosis. Monitor CBC and fever cruve

## 2019-02-06 NOTE — PROVIDER CONTACT NOTE (OTHER) - BACKGROUND
s/p fall, fever, disseminated rash, airbone/contact precautions
78 year old male patient admitted with fever.
78 year old male patient admitted with fever.
Patient admitted for fever. Has had a recent fall. Has been disoriented to place, time and situation. Currently oriented x 4 as of 1/29 1552.
patient is 78 year old male admit diagnosis fever
patient is 78 year old male admit diagnosis fever
pt is already confused at times. pt on constant observation

## 2019-02-06 NOTE — PROVIDER CONTACT NOTE (OTHER) - NAME OF MD/NP/PA/DO NOTIFIED:
[facility]  [address]  [place] [address]  Phone: [phone]  Fax: [phone]                  Peace Farmer   3/10/2017 2:20 PM   Office Visit    Description:  Female : 1948   Provider:  Rosa Garber MD   Department:  [facility]           Reason for Visit     Diabetes     Hypertension           Diagnoses this Visit        Comments    Routine medical exam    -  Primary     Type 2 diabetes, uncontrolled, with neuropathy         Type 2 diabetes, controlled, with neuropathy         Benign hypertension         Hyperlipidemia LDL goal <100         Insomnia, unspecified type         Mild vitamin D deficiency         Encounter for screening mammogram for breast cancer         Need for Streptococcus pneumoniae vaccination                To Do List           Goals (5 Years of Data)              3/7/17    9/9/16    6/14/16    HEMOGLOBIN A1C < 7.0   7.8  7.3  6.9    Related Problems    Type 2 diabetes, uncontrolled, with neuropathy      Follow-Up and Disposition     Return in about 6 months (around 9/10/2017), or if symptoms worsen or fail to improve, for follow up chronic medical conditions..       These Medications        Disp Refills Start End    rosuvastatin (CRESTOR) 5 MG tablet 90 tablet 1 3/10/2017     Take 1 tablet (5 mg total) by mouth every evening. - Oral    Pharmacy: [email or web address] [address] AT [facility] Ph #: [phone]       metformin (GLUCOPHAGE) 1000 MG tablet 180 tablet 1 3/10/2017     Take 1 tablet (1,000 mg total) by mouth 2 (two) times daily. - Oral    Pharmacy: [email or web address] [address] [place] [address] AT [facility] Ph #: [phone]       lancets (FREESTYLE LANCETS) 28 gauge Misc 100 each 1 3/10/2017     Apply 1 lancet topically once daily. - Topical (Top)    Pharmacy: [email or web address] [address] [place] [address] AT [facility] Ph #:  [phone]       glimepiride (AMARYL) 4 MG tablet 180 tablet 1 3/10/2017     TAKE 1 TABLET BY MOUTH TWO TIMES DAILY BEFORE MEALS....(HOLD FOR BLOOD SUGAR LESS THAN 100)    Pharmacy: [address] Ph #: [phone]       amlodipine (NORVASC) 5 MG tablet 90 tablet 1 3/10/2017 3/10/2018    Take 1 tablet (5 mg total) by mouth once daily. - Oral    Pharmacy: [address] Ph #: [phone]       cholecalciferol, vitamin D3, 2,000 unit Cap 90 capsule 1 3/10/2017     Take 1 capsule (2,000 Units total) by mouth once daily. - Oral    Pharmacy: [address] Ph #: [phone]       linagliptin (TRADJENTA) 5 mg Tab tablet 90 tablet 1 3/10/2017 3/10/2018    Take 1 tablet (5 mg total) by mouth once daily. - Oral    Pharmacy: [address] Ph #: [phone]       losartan-hydrochlorothiazide 100-25 mg (HYZAAR) 100-25 mg per tablet 90 tablet 1 3/10/2017 3/10/2018    Take 1 tablet by mouth once daily. - Oral    Pharmacy: [address] Ph #: [phone]       blood sugar diagnostic (FREESTYLE LITE STRIPS) Strp 100 strip 1 3/10/2017     Apply 1 strip topically once daily. - Topical (Top)    Pharmacy: [address] Ph #: [phone]       lancets (FREESTYLE LANCETS) 28 gauge Misc 100 each 1 3/10/2017     Apply 1 lancet topically once daily. - Topical (Top)    Pharmacy: [id number] - [place] [facility] [facility] [place] AT [facility] Ph #: [phone]         [facility] On Call     [facility] On Call Nurse Care Line - 24/7 Assistance  Registered nurses in the [facility] provide clinical  Marybel advisement, health education, appointment booking, and other advisory services.  Call for this free service at [phone].             Medications           Message regarding Medications     Verify the changes and/or additions to your medication regime listed below are the same as discussed with your clinician today.  If any of these changes or additions are incorrect, please notify your healthcare provider.        START taking these NEW medications        Refills    linagliptin (TRADJENTA) 5 mg Tab tablet 1    Sig: Take 1 tablet (5 mg total) by mouth once daily.    Class: Normal    Route: Oral    losartan-hydrochlorothiazide 100-25 mg (HYZAAR) 100-25 mg per tablet 1    Sig: Take 1 tablet by mouth once daily.    Class: Normal    Route: Oral      STOP taking these medications     hydrochlorothiazide (HYDRODIURIL) 25 MG tablet Take 1 tablet (25 mg total) by mouth once daily.    losartan (COZAAR) 100 MG tablet Take 1 tablet (100 mg total) by mouth once daily.    losartan (COZAAR) 100 MG tablet Take 1 tablet (100 mg total) by mouth once daily.    hydrochlorothiazide (HYDRODIURIL) 25 MG tablet Take 1 tablet (25 mg total) by mouth once daily.    losartan (COZAAR) 100 MG tablet Take 1 tablet (100 mg total) by mouth once daily.           Verify that the below list of medications is an accurate representation of the medications you are currently taking.  If none reported, the list may be blank. If incorrect, please contact your healthcare provider. Carry this list with you in case of emergency.           Current Medications     amlodipine (NORVASC) 5 MG tablet Take 1 tablet (5 mg total) by mouth once daily.    calcium-vitamin D 500-125 mg-unit tablet Take 1 tablet by mouth 2 (two) times daily.      cholecalciferol, vitamin D3, 2,000 unit Cap Take 1 capsule (2,000 Units total) by mouth once daily.    glimepiride (AMARYL) 4 MG tablet TAKE 1 TABLET BY MOUTH TWO TIMES DAILY BEFORE MEALS....(HOLD FOR BLOOD SUGAR LESS THAN  100)    lancets (FREESTYLE LANCETS) 28 gauge Misc Apply 1 lancet topically once daily.    lancets (FREESTYLE LANCETS) 28 gauge Misc Apply 1 lancet topically once daily.    loratadine (CLARITIN) 10 mg tablet Take 10 mg by mouth once daily.    metformin (GLUCOPHAGE) 1000 MG tablet Take 1 tablet (1,000 mg total) by mouth 2 (two) times daily.    rosuvastatin (CRESTOR) 5 MG tablet Take 1 tablet (5 mg total) by mouth every evening.    zolpidem (AMBIEN) 5 MG Tab Take 1 tablet (5 mg total) by mouth nightly as needed.    blood sugar diagnostic (FREESTYLE LITE STRIPS) Strp Apply 1 strip topically once daily.    blood-glucose meter kit Use as instructed    linagliptin (TRADJENTA) 5 mg Tab tablet Take 1 tablet (5 mg total) by mouth once daily.    losartan-hydrochlorothiazide 100-25 mg (HYZAAR) 100-25 mg per tablet Take 1 tablet by mouth once daily.           Clinical Reference Information           Your Vitals Were     BP Pulse Temp Height Weight SpO2    126/80 110 98.7 °F (37.1 °C) (Oral) 5' (1.524 m) 73.6 kg (162 lb 5.9 oz) 97%    BMI                31.71 kg/m2          Blood Pressure          Most Recent Value    BP  126/80      Allergies as of 3/10/2017     Pcn [Penicillins]      Immunizations Administered on Date of Encounter - 3/10/2017     Name Date Dose VIS Date Route    Pneumococcal Polysaccharide - 23 Valent  Incomplete 0.5 mL 4/24/2015 Intramuscular      Orders Placed During Today's Visit      Normal Orders This Visit    Ambulatory referral to Ophthalmology     Pneumococcal Polysaccharide Vaccine (23 Valent) (SQ/IM)     Future Labs/Procedures Expected by Expires    Mammo Digital Screening Bilat with CAD  3/10/2017 5/10/2018    Microalbumin/creatinine urine ratio  3/10/2017 3/10/2018      Language Assistance Services     ATTENTION: Language assistance services are available, free of charge. Please call [phone].      ATENCIÓN: Si habla español, tiene a briggs disposición servicios gratuitos de asistencia  lingüística. Shaina al [phone].     JOSUE Ý: N?u b?n nói Ti?ng Vi?t, có các d?ch v? h? tr? ngôn ng? mi?n phí dành cho b?n. G?i s? [phone].         [facility] complies with applicable Federal civil rights laws and does not discriminate on the basis of race, color, national origin, age, disability, or sex.

## 2019-02-06 NOTE — PROVIDER CONTACT NOTE (OTHER) - SITUATION
pt vanco trough 7.1
Patient developed rashes on his R forearm after starting IV vancomycin 1250mg at 167 cc/h.
Patient pO2 32.
blood pressure 171/85
patient blood pressure high
patient blood pressure high
pt states he is feeling anxious

## 2019-02-06 NOTE — PROGRESS NOTE ADULT - PROBLEM SELECTOR PROBLEM 6
RLS (restless legs syndrome)

## 2019-02-06 NOTE — PROGRESS NOTE ADULT - PROBLEM SELECTOR PROBLEM 3
Bandemia without diagnosis of specific infection

## 2019-02-06 NOTE — PROGRESS NOTE ADULT - PROVIDER SPECIALTY LIST ADULT
Cardiology
Dermatology
Infectious Disease
Internal Medicine
Pulmonology
Internal Medicine
Internal Medicine

## 2019-02-06 NOTE — PROGRESS NOTE ADULT - PROBLEM SELECTOR PROBLEM 4
Thrombocytopenia

## 2019-02-06 NOTE — PROGRESS NOTE ADULT - PROBLEM SELECTOR PLAN 5
- BP relatively acceptable  - C/w Carvedilol, Amlodipine, and Lisinopril  - Monitor BP
- BP relatively acceptable  - C/w Carvedilol, Amlodipine, and Lisinopril
- BP relatively acceptable  - C/w Carvedilol, Amlodipine, and Lisinopril
- BP relatively acceptable  - C/w Carvedilol, Amlodipine, and Lisinopril  - Monitor BP
- Will continue carvedilol and amlodipine.
- Will continue carvedilol and amlodipine.
cont current htn med regimen

## 2019-02-25 ENCOUNTER — OUTPATIENT (OUTPATIENT)
Dept: OUTPATIENT SERVICES | Facility: HOSPITAL | Age: 79
LOS: 1 days | End: 2019-02-25
Payer: MEDICARE

## 2019-02-25 ENCOUNTER — APPOINTMENT (OUTPATIENT)
Dept: RADIOLOGY | Facility: HOSPITAL | Age: 79
End: 2019-02-25

## 2019-02-25 DIAGNOSIS — Z90.49 ACQUIRED ABSENCE OF OTHER SPECIFIED PARTS OF DIGESTIVE TRACT: Chronic | ICD-10-CM

## 2019-02-25 DIAGNOSIS — Z87.438 PERSONAL HISTORY OF OTHER DISEASES OF MALE GENITAL ORGANS: Chronic | ICD-10-CM

## 2019-02-25 DIAGNOSIS — M53.3 SACROCOCCYGEAL DISORDERS, NOT ELSEWHERE CLASSIFIED: ICD-10-CM

## 2019-02-25 DIAGNOSIS — Z98.890 OTHER SPECIFIED POSTPROCEDURAL STATES: Chronic | ICD-10-CM

## 2019-02-25 DIAGNOSIS — Z95.0 PRESENCE OF CARDIAC PACEMAKER: Chronic | ICD-10-CM

## 2019-02-25 PROCEDURE — 72220 X-RAY EXAM SACRUM TAILBONE: CPT | Mod: 26

## 2019-02-25 PROCEDURE — 72100 X-RAY EXAM L-S SPINE 2/3 VWS: CPT | Mod: 26

## 2019-06-25 NOTE — ED ADULT NURSE NOTE - CCCP TRG CHIEF CMPLNT
I called pt to follow up and reminder about his lab work that has been ordered.  During the conversation the call became disconnected.  I attempted to call back, but no answer.  Received a refill request for pt's Atorvastatin 40 mg.     weakness

## 2019-07-29 NOTE — PATIENT PROFILE ADULT - FALL HARM RISK CONCLUSION
[facility]  ICU Multidisciplinary Bedside Rounds     UPDATE     Date: 7/29/2019      Plan of care reviewed with the following, Nurse, Charge Nurse, Physician, , Resp. Therapist, Infection Prevention and Dietician.       Needs/ Goals for the day: wean ventilator to extubate/wean sedation.  Wean levophed to keep MAP at 65 or better/ monitor urine output      Level of Care: Critical Care               Fall with Harm Risk

## 2019-08-13 NOTE — DISCHARGE NOTE ADULT - THE PATIENT HAS
Health Maintenance Due   Topic Date Due    TETANUS VACCINE  05/13/1989       Chart review completed 08/13/2019    
no difficulties

## 2019-08-22 NOTE — ED PROVIDER NOTE - PMH
Patient called back and I relayed Dr. Azul's message.    CAD (coronary artery disease)    Dementia    HTN (hypertension)    Pacemaker

## 2019-09-01 NOTE — OCCUPATIONAL THERAPY INITIAL EVALUATION ADULT - PERTINENT HX OF CURRENT PROBLEM, REHAB EVAL
79 y/o M w/ a PMH significant for SSS s/p PPM, dementia, HTN, and restless leg syndrome who presented to the ED s/p fall. Pt noted to have rash--> + Herpes Zoster. [id number] Exp Problem Focused - Mod. Complex

## 2019-10-13 ENCOUNTER — INPATIENT (INPATIENT)
Facility: HOSPITAL | Age: 79
LOS: 3 days | Discharge: HOME HEALTH | End: 2019-10-17
Attending: INTERNAL MEDICINE | Admitting: INTERNAL MEDICINE
Payer: MEDICARE

## 2019-10-13 VITALS
DIASTOLIC BLOOD PRESSURE: 67 MMHG | SYSTOLIC BLOOD PRESSURE: 99 MMHG | RESPIRATION RATE: 22 BRPM | TEMPERATURE: 97 F | OXYGEN SATURATION: 97 % | HEART RATE: 62 BPM

## 2019-10-13 DIAGNOSIS — Z87.438 PERSONAL HISTORY OF OTHER DISEASES OF MALE GENITAL ORGANS: Chronic | ICD-10-CM

## 2019-10-13 DIAGNOSIS — Z90.49 ACQUIRED ABSENCE OF OTHER SPECIFIED PARTS OF DIGESTIVE TRACT: Chronic | ICD-10-CM

## 2019-10-13 DIAGNOSIS — Z98.890 OTHER SPECIFIED POSTPROCEDURAL STATES: Chronic | ICD-10-CM

## 2019-10-13 DIAGNOSIS — Z95.0 PRESENCE OF CARDIAC PACEMAKER: Chronic | ICD-10-CM

## 2019-10-13 LAB
ALBUMIN SERPL ELPH-MCNC: 3.8 G/DL — SIGNIFICANT CHANGE UP (ref 3.3–5)
ALP SERPL-CCNC: 97 U/L — SIGNIFICANT CHANGE UP (ref 40–120)
ALT FLD-CCNC: 10 U/L — SIGNIFICANT CHANGE UP (ref 4–41)
ANION GAP SERPL CALC-SCNC: 10 MMO/L — SIGNIFICANT CHANGE UP (ref 7–14)
APTT BLD: 30.4 SEC — SIGNIFICANT CHANGE UP (ref 27.5–36.3)
AST SERPL-CCNC: 15 U/L — SIGNIFICANT CHANGE UP (ref 4–40)
BASE EXCESS BLDV CALC-SCNC: 5.5 MMOL/L — SIGNIFICANT CHANGE UP
BASOPHILS # BLD AUTO: 0.04 K/UL — SIGNIFICANT CHANGE UP (ref 0–0.2)
BASOPHILS NFR BLD AUTO: 0.4 % — SIGNIFICANT CHANGE UP (ref 0–2)
BILIRUB SERPL-MCNC: 0.6 MG/DL — SIGNIFICANT CHANGE UP (ref 0.2–1.2)
BLOOD GAS VENOUS - CREATININE: 1.08 MG/DL — SIGNIFICANT CHANGE UP (ref 0.5–1.3)
BLOOD GAS VENOUS - FIO2: 21 — SIGNIFICANT CHANGE UP
BUN SERPL-MCNC: 16 MG/DL — SIGNIFICANT CHANGE UP (ref 7–23)
CALCIUM SERPL-MCNC: 9.3 MG/DL — SIGNIFICANT CHANGE UP (ref 8.4–10.5)
CHLORIDE BLDV-SCNC: 103 MMOL/L — SIGNIFICANT CHANGE UP (ref 96–108)
CHLORIDE SERPL-SCNC: 98 MMOL/L — SIGNIFICANT CHANGE UP (ref 98–107)
CO2 SERPL-SCNC: 29 MMOL/L — SIGNIFICANT CHANGE UP (ref 22–31)
CREAT SERPL-MCNC: 1.14 MG/DL — SIGNIFICANT CHANGE UP (ref 0.5–1.3)
EOSINOPHIL # BLD AUTO: 0.26 K/UL — SIGNIFICANT CHANGE UP (ref 0–0.5)
EOSINOPHIL NFR BLD AUTO: 2.4 % — SIGNIFICANT CHANGE UP (ref 0–6)
FLU A RESULT: NOT DETECTED — SIGNIFICANT CHANGE UP
FLU A RESULT: NOT DETECTED — SIGNIFICANT CHANGE UP
FLUAV AG NPH QL: NOT DETECTED — SIGNIFICANT CHANGE UP
FLUBV AG NPH QL: NOT DETECTED — SIGNIFICANT CHANGE UP
GAS PNL BLDV: 134 MMOL/L — LOW (ref 136–146)
GLUCOSE BLDV-MCNC: 121 MG/DL — HIGH (ref 70–99)
GLUCOSE SERPL-MCNC: 126 MG/DL — HIGH (ref 70–99)
HCO3 BLDV-SCNC: 27 MMOL/L — SIGNIFICANT CHANGE UP (ref 20–27)
HCT VFR BLD CALC: 36.9 % — LOW (ref 39–50)
HCT VFR BLDV CALC: 38.2 % — LOW (ref 39–51)
HGB BLD-MCNC: 12 G/DL — LOW (ref 13–17)
HGB BLDV-MCNC: 12.4 G/DL — LOW (ref 13–17)
IMM GRANULOCYTES NFR BLD AUTO: 0.4 % — SIGNIFICANT CHANGE UP (ref 0–1.5)
INR BLD: 1.1 — SIGNIFICANT CHANGE UP (ref 0.88–1.17)
LACTATE BLDV-MCNC: 0.7 MMOL/L — SIGNIFICANT CHANGE UP (ref 0.5–2)
LYMPHOCYTES # BLD AUTO: 1.35 K/UL — SIGNIFICANT CHANGE UP (ref 1–3.3)
LYMPHOCYTES # BLD AUTO: 12.5 % — LOW (ref 13–44)
MCHC RBC-ENTMCNC: 32 PG — SIGNIFICANT CHANGE UP (ref 27–34)
MCHC RBC-ENTMCNC: 32.5 % — SIGNIFICANT CHANGE UP (ref 32–36)
MCV RBC AUTO: 98.4 FL — SIGNIFICANT CHANGE UP (ref 80–100)
MONOCYTES # BLD AUTO: 1.15 K/UL — HIGH (ref 0–0.9)
MONOCYTES NFR BLD AUTO: 10.6 % — SIGNIFICANT CHANGE UP (ref 2–14)
NEUTROPHILS # BLD AUTO: 7.98 K/UL — HIGH (ref 1.8–7.4)
NEUTROPHILS NFR BLD AUTO: 73.7 % — SIGNIFICANT CHANGE UP (ref 43–77)
NRBC # FLD: 0 K/UL — SIGNIFICANT CHANGE UP (ref 0–0)
NT-PROBNP SERPL-SCNC: 518.5 PG/ML — SIGNIFICANT CHANGE UP
PCO2 BLDV: 60 MMHG — HIGH (ref 41–51)
PH BLDV: 7.34 PH — SIGNIFICANT CHANGE UP (ref 7.32–7.43)
PLATELET # BLD AUTO: 109 K/UL — LOW (ref 150–400)
PMV BLD: 10.9 FL — SIGNIFICANT CHANGE UP (ref 7–13)
PO2 BLDV: 31 MMHG — LOW (ref 35–40)
POTASSIUM BLDV-SCNC: 4.2 MMOL/L — SIGNIFICANT CHANGE UP (ref 3.4–4.5)
POTASSIUM SERPL-MCNC: 4.4 MMOL/L — SIGNIFICANT CHANGE UP (ref 3.5–5.3)
POTASSIUM SERPL-SCNC: 4.4 MMOL/L — SIGNIFICANT CHANGE UP (ref 3.5–5.3)
PROT SERPL-MCNC: 7.3 G/DL — SIGNIFICANT CHANGE UP (ref 6–8.3)
PROTHROM AB SERPL-ACNC: 12.3 SEC — SIGNIFICANT CHANGE UP (ref 9.8–13.1)
RBC # BLD: 3.75 M/UL — LOW (ref 4.2–5.8)
RBC # FLD: 14.3 % — SIGNIFICANT CHANGE UP (ref 10.3–14.5)
RSV RESULT: SIGNIFICANT CHANGE UP
RSV RNA RESP QL NAA+PROBE: SIGNIFICANT CHANGE UP
SAO2 % BLDV: 52.8 % — LOW (ref 60–85)
SODIUM SERPL-SCNC: 137 MMOL/L — SIGNIFICANT CHANGE UP (ref 135–145)
TROPONIN T, HIGH SENSITIVITY: 16 NG/L — SIGNIFICANT CHANGE UP (ref ?–14)
WBC # BLD: 10.82 K/UL — HIGH (ref 3.8–10.5)
WBC # FLD AUTO: 10.82 K/UL — HIGH (ref 3.8–10.5)

## 2019-10-13 PROCEDURE — 71045 X-RAY EXAM CHEST 1 VIEW: CPT | Mod: 26

## 2019-10-13 RX ORDER — ASPIRIN/CALCIUM CARB/MAGNESIUM 324 MG
162 TABLET ORAL ONCE
Refills: 0 | Status: COMPLETED | OUTPATIENT
Start: 2019-10-13 | End: 2019-10-13

## 2019-10-13 RX ORDER — AZITHROMYCIN 500 MG/1
500 TABLET, FILM COATED ORAL ONCE
Refills: 0 | Status: COMPLETED | OUTPATIENT
Start: 2019-10-13 | End: 2019-10-13

## 2019-10-13 RX ORDER — SODIUM CHLORIDE 9 MG/ML
1500 INJECTION INTRAMUSCULAR; INTRAVENOUS; SUBCUTANEOUS ONCE
Refills: 0 | Status: DISCONTINUED | OUTPATIENT
Start: 2019-10-13 | End: 2019-10-13

## 2019-10-13 RX ORDER — IPRATROPIUM/ALBUTEROL SULFATE 18-103MCG
3 AEROSOL WITH ADAPTER (GRAM) INHALATION
Refills: 0 | Status: COMPLETED | OUTPATIENT
Start: 2019-10-13 | End: 2019-10-13

## 2019-10-13 RX ORDER — SODIUM CHLORIDE 9 MG/ML
1000 INJECTION INTRAMUSCULAR; INTRAVENOUS; SUBCUTANEOUS ONCE
Refills: 0 | Status: DISCONTINUED | OUTPATIENT
Start: 2019-10-13 | End: 2019-10-13

## 2019-10-13 RX ORDER — CEFTRIAXONE 500 MG/1
1000 INJECTION, POWDER, FOR SOLUTION INTRAMUSCULAR; INTRAVENOUS ONCE
Refills: 0 | Status: COMPLETED | OUTPATIENT
Start: 2019-10-13 | End: 2019-10-13

## 2019-10-13 RX ORDER — KETOROLAC TROMETHAMINE 30 MG/ML
15 SYRINGE (ML) INJECTION ONCE
Refills: 0 | Status: DISCONTINUED | OUTPATIENT
Start: 2019-10-13 | End: 2019-10-13

## 2019-10-13 RX ORDER — ACETAMINOPHEN 500 MG
975 TABLET ORAL ONCE
Refills: 0 | Status: COMPLETED | OUTPATIENT
Start: 2019-10-13 | End: 2019-10-13

## 2019-10-13 RX ORDER — SODIUM CHLORIDE 9 MG/ML
500 INJECTION INTRAMUSCULAR; INTRAVENOUS; SUBCUTANEOUS ONCE
Refills: 0 | Status: COMPLETED | OUTPATIENT
Start: 2019-10-13 | End: 2019-10-13

## 2019-10-13 RX ADMIN — Medication 3 MILLILITER(S): at 22:01

## 2019-10-13 RX ADMIN — CEFTRIAXONE 100 MILLIGRAM(S): 500 INJECTION, POWDER, FOR SOLUTION INTRAMUSCULAR; INTRAVENOUS at 22:13

## 2019-10-13 RX ADMIN — Medication 162 MILLIGRAM(S): at 22:02

## 2019-10-13 RX ADMIN — SODIUM CHLORIDE 1000 MILLILITER(S): 9 INJECTION INTRAMUSCULAR; INTRAVENOUS; SUBCUTANEOUS at 22:01

## 2019-10-13 RX ADMIN — Medication 975 MILLIGRAM(S): at 22:31

## 2019-10-13 RX ADMIN — Medication 15 MILLIGRAM(S): at 23:30

## 2019-10-13 RX ADMIN — AZITHROMYCIN 255 MILLIGRAM(S): 500 TABLET, FILM COATED ORAL at 23:01

## 2019-10-13 RX ADMIN — Medication 125 MILLIGRAM(S): at 22:02

## 2019-10-13 NOTE — ED PROVIDER NOTE - CLINICAL SUMMARY MEDICAL DECISION MAKING FREE TEXT BOX
Haverty PGY2- poor historian, demented, per chart, htn, PM, CAD, former smoker, sob 2 weeks, CP rad L shoulder today, no fall/trauma, no fever/cough  98% on 2LNC, wheezing diffusely, abd soft oriented x2, no BLE edema, no rash  copd v viral syndrome/pna, eval for ACS/HF  cxr, ekg cardiac enzymes steroids, duonebs, flu pcr

## 2019-10-13 NOTE — ED PROVIDER NOTE - ATTENDING CONTRIBUTION TO CARE
80 yo M presents with SOB and CP. history is unreliable as patient has a h/o dementia and is AAOx2, and on repeat questioning history changes. however, patient reports sob for the past 2 weeks, denies any sob now or at rest, reports that its mainly exertional. also reports L sided chest and L shoulder pressure pain, rated moderate, and present all day he states.   denies f/ch, uri symptoms, cough, abd pain, N/V/D, leg pain or swelling, orthopnea/PND.   PE well appearing. NAD. lungs with wheezing bl lung fields. no abdominal TTP. no leg swelling    pt given aspirin, duonebs and steroids in the er. 78 yo M presents with SOB and CP. history is unreliable as patient has a h/o dementia and is AAOx2, and on repeat questioning history changes. however, patient reports sob for the past 2 weeks, denies any sob now or at rest, reports that its mainly exertional. also reports L sided chest and L shoulder pressure pain, rated moderate, and present all day he states.   denies f/ch, uri symptoms, cough, abd pain, N/V/D, leg pain or swelling, orthopnea/PND.   PE well appearing. NAD. lungs with wheezing bl lung fields. no abdominal TTP. no leg swelling    pt given aspirin, duonebs and steroids in the er. lungs CTA after meds, improved oxygen sats. admitted in stable condition    I reviewed all lab and imaging results from this ED visit, and discussed ALL results with the patient and/or family, including all abnormal results and incidental findings. I addressed all of patient's/family's questions and concerns, and I recommended appropriate follow up for all incidental findings. Based on patient's HPI as well as the results of today's workup, I felt that patient warranted admission to the hospital for further workup/evaluation and continued management. The patient was agreeable with admission. Patient was signed out to admitting team. Admitting team accepted the patient for admission and subsequently took over the patient's care in its entirety.

## 2019-10-13 NOTE — ED ADULT NURSE REASSESSMENT NOTE - NS ED NURSE REASSESS COMMENT FT1
Pt repositioned, boosted up in bed. Pillows and blankets provided. Pt denies dyspnea, chest pain, and all other symptoms at this time. Endorses he is resting comfortably. Call bell within reach

## 2019-10-13 NOTE — ED PROVIDER NOTE - OBJECTIVE STATEMENT
79 yoM, PMHx of HTN, denies any other issues, (from chart, PM/CAD) long time smoker > 40+ pack years, quit last year presents to ED with 2 weeeks SOB worsening and with L chest pain radiating to shoulder for last 24 hours. Never had this pain before. Denies fever, cough, back/abd pain. No fall or trauma. No leg swelling or calf pain. No hx of VTE. Very poor historian. Knows [facility], not year.

## 2019-10-13 NOTE — ED PROVIDER NOTE - PROGRESS NOTE DETAILS
Narcisa PGY- d/w Johnson brother, pt sees Dr. ZAKIA Nowak on [facility], pt coughing for a few days, fever at home noticed by niece today, did quit smoking 1 year ago, no mention of CP to family, no home O2, here ion ED rpt BP with systolic 130s after 500 NS bolus, given cardiac hx will not continue IVF resuscitation given cardiac history Narcisa PGY2- vitals stable, reassuring, delta 3 on rpt trop, called PCP Dr. Nowak who requested Dr. Hernandes be contacted but unable to reach him and did leave a voicemail, admitted to Dr. Ramirez who has seen pt before

## 2019-10-13 NOTE — ED PROVIDER NOTE - PHYSICAL EXAMINATION
PHYSICAL EXAM:  GENERAL: looks chronically ill, conversant, but poor historian, vitals with mild hypotension 99/67, satting 98% on 2LNC  HEAD:  Atraumatic, Normocephalic  EYES: EOMI, PERRLA, conjunctiva and sclera clear  ENMT: No tonsillar erythema, exudates, or enlargement; Moist mucous membranes  NECK: Supple, No JVD  HEART: Regular rate and rhythm; No murmurs, rubs, or gallops  RESPIRATORY: wheezing diffusely, no resp distress, speaking in short sentences, 98% on 2LNC  ABDOMEN: Soft, Nontender, Nondistended  BACK: no cva or midline tenderness, normal ROM  NEUROLOGY: A&Ox2, knows [facility], not year, PERRL, moving all extremities, normal speech, no facial droop  EXTREMITIES:  2+ Peripheral Pulses, No clubbing, cyanosis, or edema  SKIN: warm, dry, normal color, no rash

## 2019-10-13 NOTE — ED ADULT NURSE NOTE - OBJECTIVE STATEMENT
Pt received to room 26. Pt comes to ED c/o of shortness of breath and left arm pain. Pt endorses shortness of breath X2 weeks and left sided chest pain radiating to left shoulder/arm for 24 hours. Pt reports never having this shoulder pain before. Pt is long time smoker, smoked for 40 years, quit last year. Productive cough noted, states he's seen "brown: sputum for a few days. Audible wheezing noted, pt placed on 2L O2 via NC, denies dyspnea at this time. Respirations are even & unlabored, pt speaking in short sentences. Heart sounds normal, abd is soft, non-distneded, blanchable redness noticed to inner buttocks. Pt was cleaned of urine and changed into a new diaper. Endorses walking with both a cane and walker at home. Denies recent falls. Denies N/V/D, chills, fever, weakness, dizziness, headache, dysuria. Pt is A&Ox3, oriented to self, place, and who the president is, cannot recall the month or year. 20 G IV placed to right AC

## 2019-10-13 NOTE — ED ADULT NURSE NOTE - NSIMPLEMENTINTERV_GEN_ALL_ED
Implemented All Fall with Harm Risk Interventions:  [place] to call system. Call bell, personal items and telephone within reach. Instruct patient to call for assistance. Room bathroom lighting operational. Non-slip footwear when patient is off stretcher. Physically safe environment: no spills, clutter or unnecessary equipment. Stretcher in lowest position, wheels locked, appropriate side rails in place. Provide visual cue, wrist band, yellow gown, etc. Monitor gait and stability. Monitor for mental status changes and reorient to person, place, and time. Review medications for side effects contributing to fall risk. Reinforce activity limits and safety measures with patient and family. Provide visual clues: red socks.

## 2019-10-13 NOTE — ED ADULT TRIAGE NOTE - CHIEF COMPLAINT QUOTE
Pt. PARMJIT LATIF from Home. c/o Shortness of Breath with productive cough, brown sputum for few days,have been using his Nebulizer w/o rel;ief.  Fever at Home Tmax 101F.PMHx COPD Pacemaker, HTN  Deneis MARSH dizziness palpitation N V

## 2019-10-14 DIAGNOSIS — J18.9 PNEUMONIA, UNSPECIFIED ORGANISM: ICD-10-CM

## 2019-10-14 DIAGNOSIS — D64.9 ANEMIA, UNSPECIFIED: ICD-10-CM

## 2019-10-14 DIAGNOSIS — J44.1 CHRONIC OBSTRUCTIVE PULMONARY DISEASE WITH (ACUTE) EXACERBATION: ICD-10-CM

## 2019-10-14 DIAGNOSIS — A41.9 SEPSIS, UNSPECIFIED ORGANISM: ICD-10-CM

## 2019-10-14 DIAGNOSIS — Z29.9 ENCOUNTER FOR PROPHYLACTIC MEASURES, UNSPECIFIED: ICD-10-CM

## 2019-10-14 DIAGNOSIS — I10 ESSENTIAL (PRIMARY) HYPERTENSION: ICD-10-CM

## 2019-10-14 DIAGNOSIS — I25.10 ATHEROSCLEROTIC HEART DISEASE OF NATIVE CORONARY ARTERY WITHOUT ANGINA PECTORIS: ICD-10-CM

## 2019-10-14 DIAGNOSIS — R07.89 OTHER CHEST PAIN: ICD-10-CM

## 2019-10-14 DIAGNOSIS — R06.89 OTHER ABNORMALITIES OF BREATHING: ICD-10-CM

## 2019-10-14 LAB
ANION GAP SERPL CALC-SCNC: 13 MMO/L — SIGNIFICANT CHANGE UP (ref 7–14)
B PERT DNA SPEC QL NAA+PROBE: NOT DETECTED — SIGNIFICANT CHANGE UP
BASE EXCESS BLDV CALC-SCNC: 2.5 MMOL/L — SIGNIFICANT CHANGE UP
BASOPHILS # BLD AUTO: 0.01 K/UL — SIGNIFICANT CHANGE UP (ref 0–0.2)
BASOPHILS NFR BLD AUTO: 0.1 % — SIGNIFICANT CHANGE UP (ref 0–2)
BLOOD GAS VENOUS - CREATININE: 1 MG/DL — SIGNIFICANT CHANGE UP (ref 0.5–1.3)
BLOOD GAS VENOUS - FIO2: 25 — SIGNIFICANT CHANGE UP
BUN SERPL-MCNC: 18 MG/DL — SIGNIFICANT CHANGE UP (ref 7–23)
C PNEUM DNA SPEC QL NAA+PROBE: NOT DETECTED — SIGNIFICANT CHANGE UP
CALCIUM SERPL-MCNC: 9.1 MG/DL — SIGNIFICANT CHANGE UP (ref 8.4–10.5)
CHLORIDE BLDV-SCNC: 104 MMOL/L — SIGNIFICANT CHANGE UP (ref 96–108)
CHLORIDE SERPL-SCNC: 99 MMOL/L — SIGNIFICANT CHANGE UP (ref 98–107)
CO2 SERPL-SCNC: 22 MMOL/L — SIGNIFICANT CHANGE UP (ref 22–31)
CREAT SERPL-MCNC: 0.94 MG/DL — SIGNIFICANT CHANGE UP (ref 0.5–1.3)
EOSINOPHIL # BLD AUTO: 0 K/UL — SIGNIFICANT CHANGE UP (ref 0–0.5)
EOSINOPHIL NFR BLD AUTO: 0 % — SIGNIFICANT CHANGE UP (ref 0–6)
FLUAV H1 2009 PAND RNA SPEC QL NAA+PROBE: NOT DETECTED — SIGNIFICANT CHANGE UP
FLUAV H1 RNA SPEC QL NAA+PROBE: NOT DETECTED — SIGNIFICANT CHANGE UP
FLUAV H3 RNA SPEC QL NAA+PROBE: NOT DETECTED — SIGNIFICANT CHANGE UP
FLUAV SUBTYP SPEC NAA+PROBE: NOT DETECTED — SIGNIFICANT CHANGE UP
FLUBV RNA SPEC QL NAA+PROBE: NOT DETECTED — SIGNIFICANT CHANGE UP
GAS PNL BLDV: 131 MMOL/L — LOW (ref 136–146)
GLUCOSE BLDV-MCNC: 161 MG/DL — HIGH (ref 70–99)
GLUCOSE SERPL-MCNC: 163 MG/DL — HIGH (ref 70–99)
HADV DNA SPEC QL NAA+PROBE: NOT DETECTED — SIGNIFICANT CHANGE UP
HCO3 BLDV-SCNC: 27 MMOL/L — SIGNIFICANT CHANGE UP (ref 20–27)
HCOV PNL SPEC NAA+PROBE: SIGNIFICANT CHANGE UP
HCT VFR BLD CALC: 37.9 % — LOW (ref 39–50)
HCT VFR BLDV CALC: 37.1 % — LOW (ref 39–51)
HGB BLD-MCNC: 12 G/DL — LOW (ref 13–17)
HGB BLDV-MCNC: 12.1 G/DL — LOW (ref 13–17)
HMPV RNA SPEC QL NAA+PROBE: NOT DETECTED — SIGNIFICANT CHANGE UP
HPIV1 RNA SPEC QL NAA+PROBE: NOT DETECTED — SIGNIFICANT CHANGE UP
HPIV2 RNA SPEC QL NAA+PROBE: NOT DETECTED — SIGNIFICANT CHANGE UP
HPIV3 RNA SPEC QL NAA+PROBE: NOT DETECTED — SIGNIFICANT CHANGE UP
HPIV4 RNA SPEC QL NAA+PROBE: NOT DETECTED — SIGNIFICANT CHANGE UP
IMM GRANULOCYTES NFR BLD AUTO: 0.9 % — SIGNIFICANT CHANGE UP (ref 0–1.5)
LACTATE BLDV-MCNC: 1.4 MMOL/L — SIGNIFICANT CHANGE UP (ref 0.5–2)
LYMPHOCYTES # BLD AUTO: 0.92 K/UL — LOW (ref 1–3.3)
LYMPHOCYTES # BLD AUTO: 9.8 % — LOW (ref 13–44)
MAGNESIUM SERPL-MCNC: 2.1 MG/DL — SIGNIFICANT CHANGE UP (ref 1.6–2.6)
MCHC RBC-ENTMCNC: 31.5 PG — SIGNIFICANT CHANGE UP (ref 27–34)
MCHC RBC-ENTMCNC: 31.7 % — LOW (ref 32–36)
MCV RBC AUTO: 99.5 FL — SIGNIFICANT CHANGE UP (ref 80–100)
MONOCYTES # BLD AUTO: 0.19 K/UL — SIGNIFICANT CHANGE UP (ref 0–0.9)
MONOCYTES NFR BLD AUTO: 2 % — SIGNIFICANT CHANGE UP (ref 2–14)
NEUTROPHILS # BLD AUTO: 8.21 K/UL — HIGH (ref 1.8–7.4)
NEUTROPHILS NFR BLD AUTO: 87.2 % — HIGH (ref 43–77)
NRBC # FLD: 0 K/UL — SIGNIFICANT CHANGE UP (ref 0–0)
PCO2 BLDV: 43 MMHG — SIGNIFICANT CHANGE UP (ref 41–51)
PH BLDV: 7.41 PH — SIGNIFICANT CHANGE UP (ref 7.32–7.43)
PHOSPHATE SERPL-MCNC: 2.8 MG/DL — SIGNIFICANT CHANGE UP (ref 2.5–4.5)
PLATELET # BLD AUTO: 108 K/UL — LOW (ref 150–400)
PMV BLD: 11.1 FL — SIGNIFICANT CHANGE UP (ref 7–13)
PO2 BLDV: 108 MMHG — HIGH (ref 35–40)
POTASSIUM BLDV-SCNC: 4.3 MMOL/L — SIGNIFICANT CHANGE UP (ref 3.4–4.5)
POTASSIUM SERPL-MCNC: 4.6 MMOL/L — SIGNIFICANT CHANGE UP (ref 3.5–5.3)
POTASSIUM SERPL-SCNC: 4.6 MMOL/L — SIGNIFICANT CHANGE UP (ref 3.5–5.3)
RBC # BLD: 3.81 M/UL — LOW (ref 4.2–5.8)
RBC # FLD: 14.2 % — SIGNIFICANT CHANGE UP (ref 10.3–14.5)
RSV RNA SPEC QL NAA+PROBE: NOT DETECTED — SIGNIFICANT CHANGE UP
RV+EV RNA SPEC QL NAA+PROBE: NOT DETECTED — SIGNIFICANT CHANGE UP
SAO2 % BLDV: 98.3 % — HIGH (ref 60–85)
SODIUM SERPL-SCNC: 134 MMOL/L — LOW (ref 135–145)
SPECIMEN SOURCE: SIGNIFICANT CHANGE UP
SPECIMEN SOURCE: SIGNIFICANT CHANGE UP
TROPONIN T, HIGH SENSITIVITY: 13 NG/L — SIGNIFICANT CHANGE UP (ref ?–14)
WBC # BLD: 9.41 K/UL — SIGNIFICANT CHANGE UP (ref 3.8–10.5)
WBC # FLD AUTO: 9.41 K/UL — SIGNIFICANT CHANGE UP (ref 3.8–10.5)

## 2019-10-14 PROCEDURE — 99223 1ST HOSP IP/OBS HIGH 75: CPT

## 2019-10-14 PROCEDURE — 71250 CT THORAX DX C-: CPT | Mod: 26

## 2019-10-14 RX ORDER — ATORVASTATIN CALCIUM 80 MG/1
10 TABLET, FILM COATED ORAL AT BEDTIME
Refills: 0 | Status: DISCONTINUED | OUTPATIENT
Start: 2019-10-14 | End: 2019-10-17

## 2019-10-14 RX ORDER — CARVEDILOL PHOSPHATE 80 MG/1
3.12 CAPSULE, EXTENDED RELEASE ORAL EVERY 12 HOURS
Refills: 0 | Status: DISCONTINUED | OUTPATIENT
Start: 2019-10-14 | End: 2019-10-17

## 2019-10-14 RX ORDER — ENOXAPARIN SODIUM 100 MG/ML
40 INJECTION SUBCUTANEOUS DAILY
Refills: 0 | Status: DISCONTINUED | OUTPATIENT
Start: 2019-10-14 | End: 2019-10-17

## 2019-10-14 RX ORDER — IPRATROPIUM/ALBUTEROL SULFATE 18-103MCG
3 AEROSOL WITH ADAPTER (GRAM) INHALATION EVERY 6 HOURS
Refills: 0 | Status: DISCONTINUED | OUTPATIENT
Start: 2019-10-14 | End: 2019-10-17

## 2019-10-14 RX ORDER — CLOPIDOGREL BISULFATE 75 MG/1
75 TABLET, FILM COATED ORAL DAILY
Refills: 0 | Status: DISCONTINUED | OUTPATIENT
Start: 2019-10-14 | End: 2019-10-17

## 2019-10-14 RX ORDER — AZITHROMYCIN 500 MG/1
500 TABLET, FILM COATED ORAL EVERY 24 HOURS
Refills: 0 | Status: DISCONTINUED | OUTPATIENT
Start: 2019-10-14 | End: 2019-10-17

## 2019-10-14 RX ORDER — CEFTRIAXONE 500 MG/1
1000 INJECTION, POWDER, FOR SOLUTION INTRAMUSCULAR; INTRAVENOUS EVERY 24 HOURS
Refills: 0 | Status: DISCONTINUED | OUTPATIENT
Start: 2019-10-14 | End: 2019-10-16

## 2019-10-14 RX ORDER — BUDESONIDE AND FORMOTEROL FUMARATE DIHYDRATE 160; 4.5 UG/1; UG/1
2 AEROSOL RESPIRATORY (INHALATION)
Refills: 0 | Status: DISCONTINUED | OUTPATIENT
Start: 2019-10-14 | End: 2019-10-17

## 2019-10-14 RX ORDER — ASPIRIN/CALCIUM CARB/MAGNESIUM 324 MG
81 TABLET ORAL DAILY
Refills: 0 | Status: DISCONTINUED | OUTPATIENT
Start: 2019-10-14 | End: 2019-10-17

## 2019-10-14 RX ORDER — INFLUENZA VIRUS VACCINE 15; 15; 15; 15 UG/.5ML; UG/.5ML; UG/.5ML; UG/.5ML
0.5 SUSPENSION INTRAMUSCULAR ONCE
Refills: 0 | Status: DISCONTINUED | OUTPATIENT
Start: 2019-10-14 | End: 2019-10-17

## 2019-10-14 RX ADMIN — CARVEDILOL PHOSPHATE 3.12 MILLIGRAM(S): 80 CAPSULE, EXTENDED RELEASE ORAL at 18:56

## 2019-10-14 RX ADMIN — CLOPIDOGREL BISULFATE 75 MILLIGRAM(S): 75 TABLET, FILM COATED ORAL at 16:10

## 2019-10-14 RX ADMIN — BUDESONIDE AND FORMOTEROL FUMARATE DIHYDRATE 2 PUFF(S): 160; 4.5 AEROSOL RESPIRATORY (INHALATION) at 21:56

## 2019-10-14 RX ADMIN — Medication 81 MILLIGRAM(S): at 16:10

## 2019-10-14 RX ADMIN — ATORVASTATIN CALCIUM 10 MILLIGRAM(S): 80 TABLET, FILM COATED ORAL at 21:55

## 2019-10-14 RX ADMIN — ENOXAPARIN SODIUM 40 MILLIGRAM(S): 100 INJECTION SUBCUTANEOUS at 16:11

## 2019-10-14 RX ADMIN — Medication 3 MILLILITER(S): at 16:16

## 2019-10-14 NOTE — H&P ADULT - PROBLEM SELECTOR PLAN 1
Tachypnea 22, Fever Tmax 100.6; WBC 10.8K;  Likely due to COPD vs PNA  Rapid Flu A/B RSV PCR negative  CXR - no consolidations - my reading   f/u BCx  VS q6h

## 2019-10-14 NOTE — H&P ADULT - NSICDXPASTMEDICALHX_GEN_ALL_CORE_FT
PAST MEDICAL HISTORY:  CAD (coronary artery disease)     Dementia     HTN (hypertension)     Pacemaker     RLS (restless legs syndrome)

## 2019-10-14 NOTE — H&P ADULT - PROBLEM SELECTOR PLAN 2
Hx of 60 pack-year smoking; No formal diagnosis of COPD however the symptoms suggestive of COPD exacerbation given productive cough with wheezing and Hx of hypercarbia in 50s-60s in January 2019, per EMR review;    -Duoneb ATC  -Short Prednisone taper   -Empiric coverage for CAP as below   -Will defer Pulmonology c/s to Dr. Friedman, the primary attending;

## 2019-10-14 NOTE — H&P ADULT - ASSESSMENT
80 yo Male, former smoker, with MHx of HTN, SSS - s/p PPM, dementia, HTN, restless leg syndrome, CAD with h/o NSTEMI - s/p ALLYSON x1, admitted for disseminated herpes zoster in 2/2019 a/w sepsis due to likely COPD exacerbation vs CAP c/b hypercarbia and atypical CP;

## 2019-10-14 NOTE — H&P ADULT - PROBLEM SELECTOR PLAN 6
Holding Lisinopril and Amlodipine due to soft SBP in 90-100s mmHg  VS q6 hrs  Carvedilol 12.5mg --> 3.125mg BID with hold parameters while BP soft

## 2019-10-14 NOTE — H&P ADULT - HISTORY OF PRESENT ILLNESS
80 yo Male, former smoker, with MHx of HTN, SSS - s/p PPM, dementia, HTN, restless leg syndrome, CAD with h/o NSTEMI - s/p ALLYSON x1, admitted for disseminated herpes zoster in 2/2019 ; On this hospitalization the pt c/o shortness of breath with productive cough, brown sputum for few days with associated fever at home, Tmax 101F. Also reports persisting Lt chest pain radiating to shoulder for last 24 hours. Otherwise reports no fall, trauma, leg swelling or calf pain. 78 yo Male, former smoker, with MHx of HTN, SSS - s/p PPM, dementia, HTN, restless leg syndrome, CAD with h/o NSTEMI - s/p ALLYSON x1, admitted for disseminated herpes zoster in 2/2019; On this hospitalization the pt c/o shortness of breath with wheezing and productive cough, brown sputum for few days with associated fever at home, Tmax 101F. Also reports persisting Lt chest pain radiating to shoulder for last 24 hours. Otherwise reports no fall, trauma, leg swelling or calf pain. 78 yo Male, former smoker, with MHx of HTN, SSS - s/p PPM, dementia, HTN, restless leg syndrome, CAD with h/o NSTEMI - s/p ALLYSON x1, admitted for disseminated herpes zoster in 2/2019; On this hospitalization the pt c/o shortness of breath with wheezing and productive cough, brown sputum for few days with associated fever at home, Tmax 101F. Also reports persisting Lt chest pain, worse with deep inspiration, radiating to shoulder for last 24 hours. Otherwise reports no fall, trauma, leg swelling or calf pain.

## 2019-10-14 NOTE — H&P ADULT - NSHPLABSRESULTS_GEN_ALL_CORE
EKG, Atrially paced 60bpm, LAFB, RBBB, no acute Tw or ST changes, unchanged compared to 7/29/2018 - my reading       TTE, 7/1/2017  Ejection Fraction Visual Estimate: 60 %  OBSERVATIONS:  Mitral Valve: Mitral annular calcification. Mild mitral  regurgitation.  Aortic Root: Normal aortic root.  Aortic Valve: Normal trileaflet aortic valve.  Left Atrium: Normal left atrium.  Left Ventricle: Normal Left Ventricular Systolic Function,  (EF = 55 to 60%) Normal left ventricular internal  dimensions and wall thicknesses. Grade I diastolic  dysfunction  Right Heart: Normal right atrium. Normal right ventricular  size and function. A device lead is visualized in the right  heart. There is mild tricuspid regurgitation.  Pericardium/PleuraNormal pericardium with no pericardial  effusion.  Hemodynamic: RA Pressure is 10 mm Hg.  CONCLUSIONS:  1. Mitral annular calcification. Mild mitral regurgitation.  2. Normal trileaflet aortic valve.  3. Normal aortic root.  4. Normal left atrium.  5. Normal left ventricular internal dimensions and wall  thicknesses.  6. Normal Left Ventricular Systolic Function,  (EF = 55 to  60%)  7. Grade I diastolic dysfunction  8. Normal right atrium.  9. Normal right ventricular size and function. A device  lead is visualized in the right heart.  10. RA Pressure is 10 mm Hg.  11. There is mild tricuspid regurgitation.  12. Normal pericardium with no pericardial effusion.

## 2019-10-14 NOTE — ED ADULT NURSE REASSESSMENT NOTE - NS ED NURSE REASSESS COMMENT FT1
Report given to NORA Katz in [facility]. Pt is currently sleeping at this time, respirations are even & unlabored. Call bell within reach. Pt to be transferred to [facility] 1.

## 2019-10-14 NOTE — H&P ADULT - NSICDXFAMILYHX_GEN_ALL_CORE_FT
FAMILY HISTORY:  No pertinent family history in first degree relatives FAMILY HISTORY:  Family history of pacemaker, brother

## 2019-10-14 NOTE — CONSULT NOTE ADULT - SUBJECTIVE AND OBJECTIVE BOX
Patient is a 79y old  Male who presents with a chief complaint of Shortness of breath with productive cough (14 Oct 2019 04:05)      HPI:  80 yo Male, former smoker, with MHx of HTN, SSS - s/p PPM, dementia, HTN, restless leg syndrome, CAD with h/o NSTEMI - s/p ALLYSON x1, admitted for disseminated herpes zoster in 2/2019; On this hospitalization the pt c/o shortness of breath with wheezing and productive cough, brown sputum for few days with associated fever at home, Tmax 101F. Also reports persisting Lt chest pain, worse with deep inspiration, radiating to shoulder for last 24 hours. Otherwise reports no fall, trauma, leg swelling or calf pain. (14 Oct 2019 04:05)    Hx reviewed: above noted: pt says he is mildly SOB and has some cough: he dneis having copd though :   ?FOLLOWING PRESENT  [x ] Hx of PE/DVT, [y ] Hx COPD, [x ] Hx of Asthma, [y ] Hx of Hospitalization, x[ ]  Hx of BiPAP/CPAP use, [x ] Hx of ELIANE    Allergies    vancomycin (Hives)    Intolerances        PAST MEDICAL & SURGICAL HISTORY:  RLS (restless legs syndrome)  Pacemaker  Dementia  HTN (hypertension)  CAD (coronary artery disease)  Artificial pacemaker: [employer]  H/O hydrocele  S/P skin biopsy: Behind L ear  History of appendectomy      FAMILY HISTORY:  Family history of pacemaker: brother      Social History: [ 60 pk years: quit a year ago ] TOBACCO                  [  x] ETOH                                 [  x] IVDA/DRUGS    REVIEW OF SYSTEMS      General:	x    Skin/Breast:x  	  Ophthalmologic:x  	  ENMT:	x    Respiratory and Thorax: SOB, MCGRAW   	  Cardiovascular:	x    Gastrointestinal:	x    Genitourinary:	x    Musculoskeletal:	x    Neurological:	x    Psychiatric:	x    Hematology/Lymphatics:	x    Endocrine:	x    Allergic/Immunologic:	x    MEDICATIONS  (STANDING):  ALBUTerol/ipratropium for Nebulization 3 milliLiter(s) Nebulizer every 6 hours  aspirin enteric coated 81 milliGRAM(s) Oral daily  atorvastatin 10 milliGRAM(s) Oral at bedtime  azithromycin  IVPB 500 milliGRAM(s) IV Intermittent every 24 hours  carvedilol 3.125 milliGRAM(s) Oral every 12 hours  cefTRIAXone   IVPB 1000 milliGRAM(s) IV Intermittent every 24 hours  clopidogrel Tablet 75 milliGRAM(s) Oral daily  enoxaparin Injectable 40 milliGRAM(s) SubCutaneous daily  predniSONE   Tablet 40 milliGRAM(s) Oral every 24 hours    MEDICATIONS  (PRN):       Vital Signs Last 24 Hrs  T(C): 36.4 (14 Oct 2019 09:00), Max: 38.1 (13 Oct 2019 21:30)  T(F): 97.6 (14 Oct 2019 09:00), Max: 100.6 (13 Oct 2019 21:30)  HR: 66 (14 Oct 2019 09:00) (60 - 66)  BP: 128/67 (14 Oct 2019 09:00) (99/67 - 128/67)  BP(mean): --  RR: 18 (14 Oct 2019 09:00) (14 - 22)  SpO2: 98% (14 Oct 2019 09:00) (97% - 100%)        I&O's Summary      Physical Exam:   GENERAL: NAD, well-groomed, well-developed  HEENT: MAYKEL/   Atraumatic, Normocephalic  ENMT: No tonsillar erythema, exudates, or enlargement; Moist mucous membranes, Good dentition, No lesions  NECK: Supple, No JVD, Normal thyroid  CHEST/LUNG:Mild wheeing  CVS: Regular rate and rhythm; No murmurs, rubs, or gallops  GI: : Soft, Nontender, Nondistended; Bowel sounds present  NERVOUS SYSTEM:  Alert & Oriented X3  EXTREMITIES:  - edema  LYMPH: No lymphadenopathy noted  SKIN: No rashes or lesions  ENDOCRINOLOGY: No Thyromegaly  PSYCH: dementia    Labs:  2.5<43<4>>108<<7.415>>2.5<<3><<4><<5<<1089>>, 5.5<60<4>>31<<7.345>>5.5<<3><<4><<5<<319>>                            12.0   9.41  )-----------( 108      ( 14 Oct 2019 06:04 )             37.9                         12.0   10.82 )-----------( 109      ( 13 Oct 2019 21:50 )             36.9     10-14    134<L>  |  99  |  18  ----------------------------<  163<H>  4.6   |  22  |  0.94  10-13    137  |  98  |  16  ----------------------------<  126<H>  4.4   |  29  |  1.14    Ca    9.1      14 Oct 2019 06:04  Ca    9.3      13 Oct 2019 21:50  Phos  2.8     10-14  Mg     2.1     10-14    TPro  7.3  /  Alb  3.8  /  TBili  0.6  /  DBili  x   /  AST  15  /  ALT  10  /  AlkPhos  97  10-13    CAPILLARY BLOOD GLUCOSE        LIVER FUNCTIONS - ( 13 Oct 2019 21:50 )  Alb: 3.8 g/dL / Pro: 7.3 g/dL / ALK PHOS: 97 u/L / ALT: 10 u/L / AST: 15 u/L / GGT: x           PT/INR - ( 13 Oct 2019 21:50 )   PT: 12.3 SEC;   INR: 1.10          PTT - ( 13 Oct 2019 21:50 )  PTT:30.4 SEC    D DImer  Serum Pro-Brain Natriuretic Peptide: 518.5 pg/mL (10-13 @ 21:50)      Studies  Chest X-RAY  CT SCAN Chest   CT Abdomen  Venous Dopplers: LE:   Others    < from: Xray Chest 1 View- PORTABLE-Routine (10.13.19 @ 21:46) >    EXAM:  XR CHEST PORTABLE ROUTINE 1V        PROCEDURE DATE:  Oct 13 2019         INTERPRETATION:  CLINICAL INFORMATION: Shortness of breath, chest pain    TECHNIQUE: AP view of the chest.    COMPARISON: Chest radiograph from 1/26/2019.    FINDINGS:    No focal pulmonary consolidation. No pleural effusion or pneumothorax.   Mediastinal silhouette is normal. Bones and soft tissues are   unremarkable. Left chest wall pacemaker.    IMPRESSION:     Clear lungs.        < from: CT Chest No Cont (01.30.19 @ 18:13) >  node on the left major fissure on image 63 measures 4 mm.    Visualized abdomen: Gallstones noted within the gallbladder. Otherwise   unremarkable appearance of the unenhanced upper abdomen.  Unremarkable   noncontrast appearance of the upper abdomen    Bones and soft tissues: Degenerative changes noted throughout the spine.   There is soft tissue stranding anterior to the sternum of uncertain   etiology. This may reflect focal area of cellulitis. No drainable abscess   collection.    IMPRESSION:    Minimal patchy consolidation in the left upper lobe anteriorly which may   reflect a small focus of infection or atelectasis.    Soft tissue stranding anterior to the sternum of uncertain etiology. This   may reflect focal area ofcellulitis or edema. Correlate with trauma   history. No drainable abscess collection.                  ALEJANDRO WILKINS M.D., ATTENDING RADIOLOGIST  This document has been electronically signed. Jan 31 2019  9:40AM        < end of copied text >        JORGE DAWKINS M.D., RADIOLOGY RESIDENT  This document has been electronically signed.  SUNNY FAM M.D., ATTENDING RADIOLOGIST  This document has been electronically signed. Oct 14 2019  6:28AM    < end of copied text >

## 2019-10-14 NOTE — H&P ADULT - NSHPSOCIALHISTORY_GEN_ALL_CORE
Patient is a former smoker, smoked 1 ppd x60 years  Denies EtOH or illicit drug use.  Lives at home alone, independent of ADLs.

## 2019-10-14 NOTE — H&P ADULT - PROBLEM SELECTOR PLAN 5
CXR - no consolidation however concerned for PNA given pleuritic chest pain also given 60 pack-year h/o smoking r/o malignancy   -Empiric coverage for CAP: Azithromycin and Ceftriaxone  -f/u CT chest w/o con

## 2019-10-14 NOTE — CONSULT NOTE ADULT - ASSESSMENT
# Fever  # Pneumonia    would recommend:    1. Follow up Blood cultures  2. Monitor Temp. and c/w supportive care  3. Obtain procalcitonin level  4. Continue Ceftriaxone and azithromycin      will follow the patient with you and make further recommendation based on the clinical course and Lab results  Thank you for the opportunity to participate in Mr. CACERES's care Patient is a 79y old  Male who is a former smoker, with  HTN, SSS - s/p PPM, dementia, HTN, restless leg syndrome, CAD with h/o NSTEMI - s/p ALLYSON x1, admitted for disseminated herpes zoster in 2/2019; On this hospitalization the pt c/o shortness of breath with wheezing and productive cough, brown sputum for few days with associated fever at home, Tmax 101F. Also reports persisting Lt chest pain, worse with deep inspiration, radiating to shoulder for last 24 hours. ON admission, he found to have fever and tachypnea but Negative CXR and CT chest for pneumonia. He has started on Ceftriaxone and Azithromycin, and the ID consult requested to assist with further evaluation and antibiotic management.      #  Sepsis ( Fever + Tachypnea)  # Clinical Pneumonia    would recommend:    1. Follow up Blood cultures  2. Monitor Temp. and c/w supportive care  3. Obtain procalcitonin level AND  Legionella urine AG  4. Continue Ceftriaxone and azithromycin    5. Supplemental oxygenation and bronchodilator as needed    d/w Nursing staff    will follow the patient with you and make further recommendation based on the clinical course and Lab results  Thank you for the opportunity to participate in Mr. CACERES's care

## 2019-10-14 NOTE — H&P ADULT - LYMPHATIC
supraclavicular L/anterior cervical R/supraclavicular R/posterior cervical R/anterior cervical L/posterior cervical L

## 2019-10-14 NOTE — H&P ADULT - PROBLEM SELECTOR PLAN 4
Constant CP with pleuritic component likely due to pulmonary infectious process vs pulm malignancy; Low suspicion of ACS;   -hsTrop elevated to 16 just like in 2018, repeat 13; ProBNP wnl for age;  -EKG not c/w acute ischemia   -Monitor on Tele for now given hx of CAD with ALLYSON in the past   -f/u CT chest w/o  -Tylenol PRN

## 2019-10-14 NOTE — H&P ADULT - PROBLEM SELECTOR PLAN 3
pCO2=67 in the setting of likely COPD exacerbation   -Treat COPD as above  -Monitor VBG, Lactate and pCO2  -BiPAP on hold for now as no respiratory distress and pt comfortable

## 2019-10-14 NOTE — CONSULT NOTE ADULT - PROBLEM SELECTOR RECOMMENDATION 9
on empiric antibiotics: rvp is negative: ct chest pending: ? has underlying copd: on steroids:" add symbicort: pT HAS extensive hx of smoking though! DVT prophalxis

## 2019-10-14 NOTE — CONSULT NOTE ADULT - SUBJECTIVE AND OBJECTIVE BOX
Patient is a 79y old  Male who presents with a chief complaint of Shortness of breath with productive cough (14 Oct 2019 13:24)      REVIEW OF SYSTEMS: Total of twelve systems have been reviewed with patient and found to be negative unless mentioned in HPI        PAST MEDICAL & SURGICAL HISTORY:  RLS (restless legs syndrome)  Pacemaker  Dementia  HTN (hypertension)  CAD (coronary artery disease)  Artificial pacemaker: [employer]  H/O hydrocele  S/P skin biopsy: Behind L ear  History of appendectomy        SOCIAL HISTORY  Alcohol: Does not drink  Tobacco: Does not smoke  Illicit substance use: None      FAMILY HISTORY: Non contributory to the present illness        ALLERGIES: vancomycin (Hives)        Vital Signs Last 24 Hrs  T(C): 36.5 (14 Oct 2019 18:54), Max: 38.1 (13 Oct 2019 21:30)  T(F): 97.7 (14 Oct 2019 18:54), Max: 100.6 (13 Oct 2019 21:30)  HR: 84 (14 Oct 2019 18:54) (60 - 87)  BP: 123/62 (14 Oct 2019 18:54) (99/67 - 128/67)  BP(mean): --  RR: 16 (14 Oct 2019 18:54) (14 - 22)  SpO2: 95% (14 Oct 2019 18:54) (95% - 100%)        PHYSICAL EXAM:  GENERAL: Not in distress   CHEST/LUNG:  Aire ntry bilaterally  HEART: s1 and s2 present  ABDOMEN:  Nontender and  Nondistended  EXTREMITIES: No pedal  edema  CNS: Awake and Alert      LABS:                        12.0   9.41  )-----------( 108      ( 14 Oct 2019 06:04 )             37.9     10-14    134<L>  |  99  |  18  ----------------------------<  163<H>  4.6   |  22  |  0.94    Ca    9.1      14 Oct 2019 06:04  Phos  2.8     10-14  Mg     2.1     10-14    TPro  7.3  /  Alb  3.8  /  TBili  0.6  /  DBili  x   /  AST  15  /  ALT  10  /  AlkPhos  97  10-13    PT/INR - ( 13 Oct 2019 21:50 )   PT: 12.3 SEC;   INR: 1.10          PTT - ( 13 Oct 2019 21:50 )  PTT:30.4 SEC      MEDICATIONS  (STANDING):  ALBUTerol/ipratropium for Nebulization 3 milliLiter(s) Nebulizer every 6 hours  aspirin enteric coated 81 milliGRAM(s) Oral daily  atorvastatin 10 milliGRAM(s) Oral at bedtime  azithromycin  IVPB 500 milliGRAM(s) IV Intermittent every 24 hours  buDESOnide 160 MICROgram(s)/formoterol 4.5 MICROgram(s) Inhaler 2 Puff(s) Inhalation two times a day  carvedilol 3.125 milliGRAM(s) Oral every 12 hours  cefTRIAXone   IVPB 1000 milliGRAM(s) IV Intermittent every 24 hours  clopidogrel Tablet 75 milliGRAM(s) Oral daily  enoxaparin Injectable 40 milliGRAM(s) SubCutaneous daily  predniSONE   Tablet 40 milliGRAM(s) Oral every 24 hours    MEDICATIONS  (PRN):            RADIOLOGY & ADDITIONAL TESTS:    < from: CT Chest No Cont (10.14.19 @ 13:37) >    No pneumonia or suspicious pulmonary nodule.    Severe compression fracture of the L1 vertebral body, new since January 30, 2019.      < from: Xray Chest 1 View- PORTABLE-Routine (10.13.19 @ 21:46) >    IMPRESSION:     Clear lungs.    < end of copied text >        MICROBIOLOGY DATA:      FLU A B RSV Detection by PCR (10.13.19 @ 21:50)    Flu A Result: Not Detected    Flu B Result: Not Detected    RSV Result: Not Detected The Flu A B RSV assay is a Real-Time PCR test for the  qualitative detection and differentiation of Influenza A,  Influenza B, and Respiratory Syncytial Virus on  nasopharyngeal swabs.The results should be interpreted in  the context of all clinical and laboratory findings. Patient is a 79y old  Male who is a former smoker, with  HTN, SSS - s/p PPM, dementia, HTN, restless leg syndrome, CAD with h/o NSTEMI - s/p ALLYSON x1, admitted for disseminated herpes zoster in 2/2019; On this hospitalization the pt c/o shortness of breath with wheezing and productive cough, brown sputum for few days with associated fever at home, Tmax 101F. Also reports persisting Lt chest pain, worse with deep inspiration, radiating to shoulder for last 24 hours. ON admission, he found to have fever and tachypnea but Negative CXR and CT chest for pneumonia. He has started on Ceftriaxone and Azithromycin, and the ID consult requested to assist with further evaluation and antibiotic management.          REVIEW OF SYSTEMS: Total of twelve systems have been reviewed with patient and found to be negative unless mentioned in HPI        PAST MEDICAL & SURGICAL HISTORY:  RLS (restless legs syndrome)  Pacemaker  Dementia  HTN (hypertension)  CAD (coronary artery disease)  Artificial pacemaker: [employer]  H/O hydrocele  S/P skin biopsy: Behind L ear  History of appendectomy        SOCIAL HISTORY  Alcohol: Does not drink  Tobacco: Does not smoke  Illicit substance use: None      FAMILY HISTORY: Non contributory to the present illness        ALLERGIES: vancomycin (Hives)        Vital Signs Last 24 Hrs  T(C): 36.5 (14 Oct 2019 18:54), Max: 38.1 (13 Oct 2019 21:30)  T(F): 97.7 (14 Oct 2019 18:54), Max: 100.6 (13 Oct 2019 21:30)  HR: 84 (14 Oct 2019 18:54) (60 - 87)  BP: 123/62 (14 Oct 2019 18:54) (99/67 - 128/67)  BP(mean): --  RR: 16 (14 Oct 2019 18:54) (14 - 22)  SpO2: 95% (14 Oct 2019 18:54) (95% - 100%)        PHYSICAL EXAM:  GENERAL: Not in distress   CHEST/LUNG:  Air entry bilaterally  HEART: s1 and s2 present  ABDOMEN:  Nontender and  Nondistended  EXTREMITIES: No pedal  edema  CNS: Awake and Alert, but not oriented to place          LABS:                        12.0   9.41  )-----------( 108      ( 14 Oct 2019 06:04 )             37.9     10-14    134<L>  |  99  |  18  ----------------------------<  163<H>  4.6   |  22  |  0.94    Ca    9.1      14 Oct 2019 06:04  Phos  2.8     10-14  Mg     2.1     10-14    TPro  7.3  /  Alb  3.8  /  TBili  0.6  /  DBili  x   /  AST  15  /  ALT  10  /  AlkPhos  97  10-13    PT/INR - ( 13 Oct 2019 21:50 )   PT: 12.3 SEC;   INR: 1.10          PTT - ( 13 Oct 2019 21:50 )  PTT:30.4 SEC      MEDICATIONS  (STANDING):  ALBUTerol/ipratropium for Nebulization 3 milliLiter(s) Nebulizer every 6 hours  aspirin enteric coated 81 milliGRAM(s) Oral daily  atorvastatin 10 milliGRAM(s) Oral at bedtime  azithromycin  IVPB 500 milliGRAM(s) IV Intermittent every 24 hours  buDESOnide 160 MICROgram(s)/formoterol 4.5 MICROgram(s) Inhaler 2 Puff(s) Inhalation two times a day  carvedilol 3.125 milliGRAM(s) Oral every 12 hours  cefTRIAXone   IVPB 1000 milliGRAM(s) IV Intermittent every 24 hours  clopidogrel Tablet 75 milliGRAM(s) Oral daily  enoxaparin Injectable 40 milliGRAM(s) SubCutaneous daily  predniSONE   Tablet 40 milliGRAM(s) Oral every 24 hours    MEDICATIONS  (PRN):            RADIOLOGY & ADDITIONAL TESTS:    < from: CT Chest No Cont (10.14.19 @ 13:37) >    No pneumonia or suspicious pulmonary nodule.    Severe compression fracture of the L1 vertebral body, new since January 30, 2019.      < from: Xray Chest 1 View- PORTABLE-Routine (10.13.19 @ 21:46) >    IMPRESSION:     Clear lungs.    < end of copied text >        MICROBIOLOGY DATA:      FLU A B RSV Detection by PCR (10.13.19 @ 21:50)    Flu A Result: Not Detected    Flu B Result: Not Detected    RSV Result: Not Detected The Flu A B RSV assay is a Real-Time PCR test for the  qualitative detection and differentiation of Influenza A,  Influenza B, and Respiratory Syncytial Virus on  nasopharyngeal swabs.The results should be interpreted in  the context of all clinical and laboratory findings.

## 2019-10-14 NOTE — H&P ADULT - NSICDXPASTSURGICALHX_GEN_ALL_CORE_FT
PAST SURGICAL HISTORY:  Artificial pacemaker [employer]    H/O hydrocele     History of appendectomy     S/P skin biopsy Behind L ear

## 2019-10-15 DIAGNOSIS — J18.9 PNEUMONIA, UNSPECIFIED ORGANISM: ICD-10-CM

## 2019-10-15 DIAGNOSIS — J20.9 ACUTE BRONCHITIS, UNSPECIFIED: ICD-10-CM

## 2019-10-15 LAB
ANION GAP SERPL CALC-SCNC: 14 MMO/L — SIGNIFICANT CHANGE UP (ref 7–14)
BUN SERPL-MCNC: 26 MG/DL — HIGH (ref 7–23)
CALCIUM SERPL-MCNC: 9.6 MG/DL — SIGNIFICANT CHANGE UP (ref 8.4–10.5)
CHLORIDE SERPL-SCNC: 98 MMOL/L — SIGNIFICANT CHANGE UP (ref 98–107)
CO2 SERPL-SCNC: 24 MMOL/L — SIGNIFICANT CHANGE UP (ref 22–31)
CREAT SERPL-MCNC: 0.94 MG/DL — SIGNIFICANT CHANGE UP (ref 0.5–1.3)
GLUCOSE SERPL-MCNC: 118 MG/DL — HIGH (ref 70–99)
HCT VFR BLD CALC: 37 % — LOW (ref 39–50)
HGB BLD-MCNC: 12.1 G/DL — LOW (ref 13–17)
MAGNESIUM SERPL-MCNC: 2.2 MG/DL — SIGNIFICANT CHANGE UP (ref 1.6–2.6)
MCHC RBC-ENTMCNC: 31.6 PG — SIGNIFICANT CHANGE UP (ref 27–34)
MCHC RBC-ENTMCNC: 32.7 % — SIGNIFICANT CHANGE UP (ref 32–36)
MCV RBC AUTO: 96.6 FL — SIGNIFICANT CHANGE UP (ref 80–100)
NRBC # FLD: 0 K/UL — SIGNIFICANT CHANGE UP (ref 0–0)
PHOSPHATE SERPL-MCNC: 2.9 MG/DL — SIGNIFICANT CHANGE UP (ref 2.5–4.5)
PLATELET # BLD AUTO: 124 K/UL — LOW (ref 150–400)
PMV BLD: 11.3 FL — SIGNIFICANT CHANGE UP (ref 7–13)
POTASSIUM SERPL-MCNC: 4.4 MMOL/L — SIGNIFICANT CHANGE UP (ref 3.5–5.3)
POTASSIUM SERPL-SCNC: 4.4 MMOL/L — SIGNIFICANT CHANGE UP (ref 3.5–5.3)
PROCALCITONIN SERPL-MCNC: 0.04 NG/ML — SIGNIFICANT CHANGE UP (ref 0.02–0.1)
PROCALCITONIN SERPL-MCNC: 0.07 NG/ML — SIGNIFICANT CHANGE UP (ref 0.02–0.1)
RBC # BLD: 3.83 M/UL — LOW (ref 4.2–5.8)
RBC # FLD: 14.4 % — SIGNIFICANT CHANGE UP (ref 10.3–14.5)
SODIUM SERPL-SCNC: 136 MMOL/L — SIGNIFICANT CHANGE UP (ref 135–145)
WBC # BLD: 15.5 K/UL — HIGH (ref 3.8–10.5)
WBC # FLD AUTO: 15.5 K/UL — HIGH (ref 3.8–10.5)

## 2019-10-15 RX ORDER — TRAMADOL HYDROCHLORIDE 50 MG/1
25 TABLET ORAL EVERY 8 HOURS
Refills: 0 | Status: DISCONTINUED | OUTPATIENT
Start: 2019-10-15 | End: 2019-10-17

## 2019-10-15 RX ORDER — ACETAMINOPHEN 500 MG
650 TABLET ORAL EVERY 6 HOURS
Refills: 0 | Status: DISCONTINUED | OUTPATIENT
Start: 2019-10-15 | End: 2019-10-17

## 2019-10-15 RX ADMIN — Medication 3 MILLILITER(S): at 15:26

## 2019-10-15 RX ADMIN — BUDESONIDE AND FORMOTEROL FUMARATE DIHYDRATE 2 PUFF(S): 160; 4.5 AEROSOL RESPIRATORY (INHALATION) at 08:26

## 2019-10-15 RX ADMIN — CARVEDILOL PHOSPHATE 3.12 MILLIGRAM(S): 80 CAPSULE, EXTENDED RELEASE ORAL at 17:56

## 2019-10-15 RX ADMIN — CEFTRIAXONE 100 MILLIGRAM(S): 500 INJECTION, POWDER, FOR SOLUTION INTRAMUSCULAR; INTRAVENOUS at 00:18

## 2019-10-15 RX ADMIN — BUDESONIDE AND FORMOTEROL FUMARATE DIHYDRATE 2 PUFF(S): 160; 4.5 AEROSOL RESPIRATORY (INHALATION) at 22:08

## 2019-10-15 RX ADMIN — Medication 40 MILLIGRAM(S): at 08:26

## 2019-10-15 RX ADMIN — AZITHROMYCIN 255 MILLIGRAM(S): 500 TABLET, FILM COATED ORAL at 00:55

## 2019-10-15 RX ADMIN — Medication 3 MILLILITER(S): at 00:45

## 2019-10-15 RX ADMIN — CARVEDILOL PHOSPHATE 3.12 MILLIGRAM(S): 80 CAPSULE, EXTENDED RELEASE ORAL at 06:14

## 2019-10-15 RX ADMIN — ENOXAPARIN SODIUM 40 MILLIGRAM(S): 100 INJECTION SUBCUTANEOUS at 11:56

## 2019-10-15 RX ADMIN — CLOPIDOGREL BISULFATE 75 MILLIGRAM(S): 75 TABLET, FILM COATED ORAL at 11:56

## 2019-10-15 RX ADMIN — Medication 3 MILLILITER(S): at 10:20

## 2019-10-15 RX ADMIN — ATORVASTATIN CALCIUM 10 MILLIGRAM(S): 80 TABLET, FILM COATED ORAL at 22:08

## 2019-10-15 RX ADMIN — Medication 3 MILLILITER(S): at 20:25

## 2019-10-15 RX ADMIN — Medication 81 MILLIGRAM(S): at 11:56

## 2019-10-15 NOTE — PROGRESS NOTE ADULT - SUBJECTIVE AND OBJECTIVE BOX
Patient is seen and examined at the bed side, is afebrile.      REVIEW OF SYSTEMS: All other review systems are negative      ALLERGIES: vancomycin (Hives)        Vital Signs Last 24 Hrs  T(C): 36.1 (15 Oct 2019 17:55), Max: 36.8 (15 Oct 2019 03:19)  T(F): 97 (15 Oct 2019 17:55), Max: 98.2 (15 Oct 2019 03:19)  HR: 81 (15 Oct 2019 20:26) (62 - 83)  BP: 125/54 (15 Oct 2019 17:55) (111/57 - 125/57)  BP(mean): --  RR: 18 (15 Oct 2019 17:55) (17 - 18)  SpO2: 97% (15 Oct 2019 20:26) (95% - 100%)        PHYSICAL EXAM:  GENERAL: Not in distress   CHEST/LUNG:  Air entry bilaterally  HEART: s1 and s2 present  ABDOMEN:  Nontender and  Nondistended  EXTREMITIES: No pedal  edema  CNS: Awake and Alert, but not oriented to place          LABS:                        12.1   15.50 )-----------( 124      ( 15 Oct 2019 06:43 )             37.0                           12.0   9.41  )-----------( 108      ( 14 Oct 2019 06:04 )             37.9         10-15    136  |  98  |  26<H>  ----------------------------<  118<H>  4.4   |  24  |  0.94    Ca    9.6      15 Oct 2019 06:43  Phos  2.9     10-15  Mg     2.2     10-15    TPro  7.3  /  Alb  3.8  /  TBili  0.6  /  DBili  x   /  AST  15  /  ALT  10  /  AlkPhos  97  10-13    10-14    134<L>  |  99  |  18  ----------------------------<  163<H>  4.6   |  22  |  0.94    Ca    9.1      14 Oct 2019 06:04  Phos  2.8     10-14  Mg     2.1     10-14    TPro  7.3  /  Alb  3.8  /  TBili  0.6  /  DBili  x   /  AST  15  /  ALT  10  /  AlkPhos  97  10-13    PT/INR - ( 13 Oct 2019 21:50 )   PT: 12.3 SEC;   INR: 1.10       PTT - ( 13 Oct 2019 21:50 )  PTT:30.4 SEC      MEDICATIONS  (STANDING):  ALBUTerol/ipratropium for Nebulization 3 milliLiter(s) Nebulizer every 6 hours  aspirin enteric coated 81 milliGRAM(s) Oral daily  atorvastatin 10 milliGRAM(s) Oral at bedtime  azithromycin  IVPB 500 milliGRAM(s) IV Intermittent every 24 hours  buDESOnide 160 MICROgram(s)/formoterol 4.5 MICROgram(s) Inhaler 2 Puff(s) Inhalation two times a day  carvedilol 3.125 milliGRAM(s) Oral every 12 hours  cefTRIAXone   IVPB 1000 milliGRAM(s) IV Intermittent every 24 hours  clopidogrel Tablet 75 milliGRAM(s) Oral daily  enoxaparin Injectable 40 milliGRAM(s) SubCutaneous daily  influenza   Vaccine 0.5 milliLiter(s) IntraMuscular once  predniSONE   Tablet 40 milliGRAM(s) Oral every 24 hours    MEDICATIONS  (PRN):  acetaminophen   Tablet .. 650 milliGRAM(s) Oral every 6 hours PRN Temp greater or equal to 38C (100.4F), Mild Pain (1 - 3)  traMADol 25 milliGRAM(s) Oral every 8 hours PRN Moderate Pain (4 - 6)        RADIOLOGY & ADDITIONAL TESTS:    < from: CT Chest No Cont (10.14.19 @ 13:37) >    No pneumonia or suspicious pulmonary nodule.    Severe compression fracture of the L1 vertebral body, new since January 30, 2019.      < from: Xray Chest 1 View- PORTABLE-Routine (10.13.19 @ 21:46) >    IMPRESSION:     Clear lungs.    < end of copied text >        MICROBIOLOGY DATA:    Culture - Blood (10.13.19 @ 22:28)    Culture - Blood:   NO ORGANISMS ISOLATED  NO ORGANISMS ISOLATED AT 24 HOURS    Specimen Source: BLOOD VENOUS      Culture - Blood (10.13.19 @ 22:28)    Culture - Blood:   NO ORGANISMS ISOLATED  NO ORGANISMS ISOLATED AT 24 HOURS    Specimen Source: BLOOD PERIPHERAL        FLU A B RSV Detection by PCR (10.13.19 @ 21:50)    Flu A Result: Not Detected    Flu B Result: Not Detected    RSV Result: Not Detected The Flu A B RSV assay is a Real-Time PCR test for the  qualitative detection and differentiation of Influenza A,  Influenza B, and Respiratory Syncytial Virus on  nasopharyngeal swabs.The results should be interpreted in  the context of all clinical and laboratory findings.        Procalcitonin, Serum: 0.07: Procalcitonin (PCT) Interpretation (ng/mL) - Diagnosis of  systemic bacterial infection/sepsis: Patient is seen and examined at the bed side, is afebrile. He is doing fine, no complaints. The Procalcitonin level is low and RVP is negative. The WBC count is trending up, most likely due to steroid. The Blood cultures have no growth to date.        REVIEW OF SYSTEMS: All other review systems are negative        ALLERGIES: vancomycin (Hives)        Vital Signs Last 24 Hrs  T(C): 36.1 (15 Oct 2019 17:55), Max: 36.8 (15 Oct 2019 03:19)  T(F): 97 (15 Oct 2019 17:55), Max: 98.2 (15 Oct 2019 03:19)  HR: 81 (15 Oct 2019 20:26) (62 - 83)  BP: 125/54 (15 Oct 2019 17:55) (111/57 - 125/57)  BP(mean): --  RR: 18 (15 Oct 2019 17:55) (17 - 18)  SpO2: 97% (15 Oct 2019 20:26) (95% - 100%)        PHYSICAL EXAM:  GENERAL: Not in distress , reading a newspaper  CHEST/LUNG:  Air entry bilaterally  HEART: s1 and s2 present  ABDOMEN:  Nontender and  Nondistended  EXTREMITIES: No pedal  edema  CNS: Awake and Alert          LABS:                        12.1   15.50 )-----------( 124      ( 15 Oct 2019 06:43 )             37.0                           12.0   9.41  )-----------( 108      ( 14 Oct 2019 06:04 )             37.9         10-15    136  |  98  |  26<H>  ----------------------------<  118<H>  4.4   |  24  |  0.94    Ca    9.6      15 Oct 2019 06:43  Phos  2.9     10-15  Mg     2.2     10-15    TPro  7.3  /  Alb  3.8  /  TBili  0.6  /  DBili  x   /  AST  15  /  ALT  10  /  AlkPhos  97  10-13    10-14    134<L>  |  99  |  18  ----------------------------<  163<H>  4.6   |  22  |  0.94    Ca    9.1      14 Oct 2019 06:04  Phos  2.8     10-14  Mg     2.1     10-14    TPro  7.3  /  Alb  3.8  /  TBili  0.6  /  DBili  x   /  AST  15  /  ALT  10  /  AlkPhos  97  10-13    PT/INR - ( 13 Oct 2019 21:50 )   PT: 12.3 SEC;   INR: 1.10       PTT - ( 13 Oct 2019 21:50 )  PTT:30.4 SEC      MEDICATIONS  (STANDING):  ALBUTerol/ipratropium for Nebulization 3 milliLiter(s) Nebulizer every 6 hours  aspirin enteric coated 81 milliGRAM(s) Oral daily  atorvastatin 10 milliGRAM(s) Oral at bedtime  azithromycin  IVPB 500 milliGRAM(s) IV Intermittent every 24 hours  buDESOnide 160 MICROgram(s)/formoterol 4.5 MICROgram(s) Inhaler 2 Puff(s) Inhalation two times a day  carvedilol 3.125 milliGRAM(s) Oral every 12 hours  cefTRIAXone   IVPB 1000 milliGRAM(s) IV Intermittent every 24 hours  clopidogrel Tablet 75 milliGRAM(s) Oral daily  enoxaparin Injectable 40 milliGRAM(s) SubCutaneous daily  influenza   Vaccine 0.5 milliLiter(s) IntraMuscular once  predniSONE   Tablet 40 milliGRAM(s) Oral every 24 hours    MEDICATIONS  (PRN):  acetaminophen   Tablet .. 650 milliGRAM(s) Oral every 6 hours PRN Temp greater or equal to 38C (100.4F), Mild Pain (1 - 3)  traMADol 25 milliGRAM(s) Oral every 8 hours PRN Moderate Pain (4 - 6)        RADIOLOGY & ADDITIONAL TESTS:    10/14/19 : CT Chest No Cont (10.14.19 @ 13:37) No pneumonia or suspicious pulmonary nodule. Severe compression fracture of the L1 vertebral body, new since January 30, 2019.      10/13/19 : Xray Chest 1 View- PORTABLE-Routine (10.13.19 @ 21:46) Clear lungs.          MICROBIOLOGY DATA:    Culture - Blood (10.13.19 @ 22:28)    Culture - Blood:   NO ORGANISMS ISOLATED  NO ORGANISMS ISOLATED AT 24 HOURS    Specimen Source: BLOOD VENOUS      Culture - Blood (10.13.19 @ 22:28)    Culture - Blood:   NO ORGANISMS ISOLATED  NO ORGANISMS ISOLATED AT 24 HOURS    Specimen Source: BLOOD PERIPHERAL        FLU A B RSV Detection by PCR (10.13.19 @ 21:50)    Flu A Result: Not Detected    Flu B Result: Not Detected    RSV Result: Not Detected The Flu A B RSV assay is a Real-Time PCR test for the  qualitative detection and differentiation of Influenza A,  Influenza B, and Respiratory Syncytial Virus on  nasopharyngeal swabs.The results should be interpreted in  the context of all clinical and laboratory findings.        Procalcitonin, Serum: 0.07: Procalcitonin (PCT) Interpretation (ng/mL) - Diagnosis of  systemic bacterial infection/sepsis:      Rapid Respiratory Viral Panel (10.14.19 @ 01:15)    Adenovirus (RapRVP): Not Detected    Influenza A (RapRVP): Not Detected    Influenza AH1 2009 (RapRVP): Not Detected    Influenza AH1 (RapRVP): Not Detected    Influenza AH3 (RapRVP): Not Detected    Influenza B (RapRVP): Not Detected    Parainfluenza 1 (RapRVP): Not Detected    Parainfluenza 2 (RapRVP): Not Detected    Parainfluenza 3 (RapRVP): Not Detected    Parainfluenza 4 (RapRVP): Not Detected    Resp Syncytial Virus (RapRVP): Not Detected    Chlamydia pneumoniae (RapRVP): Not Detected    Mycoplasma pneumoniae (RapRVP): Not Detected    Entero/Rhinovirus (RapRVP): Not Detected    hMPV (RapRVP): Not Detected    Coronavirus (229E,HKU1,NL63,OC43): Not Detected This Respiratory Panel uses polymerase chain reaction (PCR)  to detect for adenovirus; coronavirus (HKU1, NL63, 229E,  OC43); human metapneumovirus (hMPV); human  enterovirus/rhinovirus (Entero/RV); influenza A; influenza  A/H1: influenza A/H3; influenza A/H1-2009; influenza B;  parainfluenza viruses 1,2,3,4; respiratory syncytial virus;  Mycoplasma pneumoniae; and Chlamydophila pneumoniae.

## 2019-10-15 NOTE — PROGRESS NOTE ADULT - ASSESSMENT
78 yo Male, former smoker, with MHx of HTN, SSS - s/p PPM, dementia, HTN, restless leg syndrome, CAD with h/o NSTEMI - s/p ALLYSON x1, admitted for disseminated herpes zoster in 2/2019 a/w sepsis due to likely COPD exacerbation vs CAP c/b hypercarbia and atypical CP;

## 2019-10-15 NOTE — CONSULT NOTE ADULT - SUBJECTIVE AND OBJECTIVE BOX
[facility]), [facility]        Patient is a 79y old  Male who presents with a chief complaint of Shortness of breath with productive cough (15 Oct 2019 11:41)    Excerpt from H&P,"  HPI:  80 yo Male, former smoker, with MHx of HTN, SSS - s/p PPM, dementia, HTN, restless leg syndrome, CAD with h/o NSTEMI - s/p ALLYSON x1, admitted for disseminated herpes zoster in 2019; On this hospitalization the pt c/o shortness of breath with wheezing and productive cough, brown sputum for few days with associated fever at home, Tmax 101F. Also reports persisting Lt chest pain, worse with deep inspiration, radiating to shoulder for last 24 hours. Otherwise reports no fall, trauma, leg swelling or calf pain.    pt denies any back pain.   Unclear if he is reliable.        *****PAST MEDICAL / Surgical  HISTORY:  PAST MEDICAL & SURGICAL HISTORY:  RLS (restless legs syndrome)  Pacemaker  Dementia  HTN (hypertension)  CAD (coronary artery disease)  Artificial pacemaker: [employer]  H/O hydrocele  S/P skin biopsy: Behind L ear  History of appendectomy           *****FAMILY HISTORY:  FAMILY HISTORY:  Family history of pacemaker: brother           *****SOCIAL HISTORY:  Alcohol: None  Smoking: None         *****ALLERGIES:   Allergies    vancomycin (Hives)    Intolerances             *****MEDICATIONS: current medication reviewed and documented.   MEDICATIONS  (STANDING):  ALBUTerol/ipratropium for Nebulization 3 milliLiter(s) Nebulizer every 6 hours  aspirin enteric coated 81 milliGRAM(s) Oral daily  atorvastatin 10 milliGRAM(s) Oral at bedtime  azithromycin  IVPB 500 milliGRAM(s) IV Intermittent every 24 hours  buDESOnide 160 MICROgram(s)/formoterol 4.5 MICROgram(s) Inhaler 2 Puff(s) Inhalation two times a day  carvedilol 3.125 milliGRAM(s) Oral every 12 hours  cefTRIAXone   IVPB 1000 milliGRAM(s) IV Intermittent every 24 hours  clopidogrel Tablet 75 milliGRAM(s) Oral daily  enoxaparin Injectable 40 milliGRAM(s) SubCutaneous daily  influenza   Vaccine 0.5 milliLiter(s) IntraMuscular once  predniSONE   Tablet 40 milliGRAM(s) Oral every 24 hours    MEDICATIONS  (PRN):  acetaminophen   Tablet .. 650 milliGRAM(s) Oral every 6 hours PRN Temp greater or equal to 38C (100.4F), Mild Pain (1 - 3)  traMADol 25 milliGRAM(s) Oral every 8 hours PRN Moderate Pain (4 - 6)           *****REVIEW OF SYSTEM:  GEN: no fever, no chills, no pain  RESP: no SOB, no cough, no sputum  CVS: no chest pain, no palpitations, no edema  GI: no abdominal pain, no nausea, no vomiting, no constipation, no diarrhea  : no dysurea, no frequency, no hematurea  Neuro: no headache, no dizziness  PSYCH: no anxiety, no depression  Derm : no itching, no rash         *****VITAL SIGNS:  T(C): 36.7 (10-15-19 @ 12:00), Max: 36.8 (10-15-19 @ 03:19)  HR: 62 (10-15-19 @ 12:00) (62 - 88)  BP: 116/55 (10-15-19 @ 12:00) (111/57 - 159/70)  RR: 18 (10-15-19 @ 12:00) (16 - 18)  SpO2: 96% (10-15-19 @ 12:00) (95% - 98%)  Wt(kg): --           *****PHYSICAL EXAM:   Alert oriented x 3   Attention comprehension are fair. Able to name, repeat, read without any difficulty.   Able to follow 3 step commands.     EOMI fundi not visualized,  VFF to confrontration  No facial asymmetry   Tongue is midline   Palate elevates symmetrically   Moving all 4 ext symmetrically no pronator drift   Reflexes are symmetric throughout   sensation is grossly symmetric  Gait :  short stride, stooped posture.  B/L down going toes               *****LAB AND IMAGIN.1   15.50 )-----------( 124      ( 15 Oct 2019 06:43 )             37.0               10-15    136  |  98  |  26<H>  ----------------------------<  118<H>  4.4   |  24  |  0.94    Ca    9.6      15 Oct 2019 06:43  Phos  2.9     10-15  Mg     2.2     10-15    TPro  7.3  /  Alb  3.8  /  TBili  0.6  /  DBili  x   /  AST  15  /  ALT  10  /  AlkPhos  97  10-13    PT/INR - ( 13 Oct 2019 21:50 )   PT: 12.3 SEC;   INR: 1.10          PTT - ( 13 Oct 2019 21:50 )  PTT:30.4 SEC            < from: CT Chest No Cont (10.14.19 @ 13:37) >  No pneumonia or suspicious pulmonary nodule.     < from: CT Chest No Cont (10.14.19 @ 13:37) >   Chronic compression deformity of the T12 vertebral body. Severe   compression fracture of the L1 vertebral body which appears new since   2019. Additional degenerative changes      < end of copied text >                     [All pertinent recent Imaging reports reviewed]         *****A S S E S S M E N T   A N D   P L A N :   80 yo Male, former smoker, with MHx of HTN, SSS - s/p PPM, dementia, HTN, restless leg syndrome, CAD with h/o NSTEMI - s/p ALLYSON x1, admitted for disseminated herpes zoster in 2019; On this hospitalization the pt c/o shortness of breath with wheezing and productive cough, brown sputum for few days with associated fever at home, Tmax 101F. Also reports persisting Lt chest pain, worse with deep inspiration, radiating to shoulder for last 24 hours. Otherwise reports no fall, trauma, leg swelling or calf pain.       Problem/Recommendations 1:compression fracture of the l1 which appears to be new since prior imaging incidental. Pt denies any pain of his back or recent trauma   pt denies any pain or recent trauma  ? related to osteoporosis as there is another compression deformity of t12   pt denies any fall   prednisone 40 mg currently ? if we can lower the dose, as steroids maybe exacerbating bone loss  consider dexa scan, will ck vit d levels.   consider adding vitamin D and calcium       Problem/Recommendations 2: Vascular dementia   continue current management.   pt reportedly lives alone, independent of all his adls, does his own bills, eats out.  his brother helps him with shopping   social work to see if there is any needs.   ___________________________  Will follow with you.  Thank you,  Luisa Kohli MD  Diplomate of the American Board of Neurology and Psychiatry.  Diplomate of the American Board of Vascular Neurology.   [facility] ([facility]), [facility]   Ph: [phone]    Differential diagnosis and plan of care discussed with patient after the evaluation.   Advanced care planning options discussed.   Pain assessed and judicious use of narcotics when appropriate was discussed.  Importance of Fall prevention discussed.  Counseling on Smoking and Alcohol cessation was offered when appropriate.  Counseling on Diet, exercise, and medication compliance was done.     123 minutes spent on the total encounter;  more than 50 % of the visit was spent on counseling  and or coordinating care by the attending physician.    Thank you for allowing me to participate in the care of this zahra patient. Please do not hesitate to call me if you have any questions.     This and subsequent notes were partially created using voice recognition software and will  inherently be subject to errors including those of syntax and sound alike substitutions which may escape proofreading. In such instances original meaning may be extrapolated by contextual derivation.

## 2019-10-15 NOTE — PROGRESS NOTE ADULT - SUBJECTIVE AND OBJECTIVE BOX
Patient is a 79y old  Male who presents with a chief complaint of Shortness of breath with productive cough (15 Oct 2019 08:55)      Any change in ROS: He feels poncho same: no apparent SOB    MEDICATIONS  (STANDING):  ALBUTerol/ipratropium for Nebulization 3 milliLiter(s) Nebulizer every 6 hours  aspirin enteric coated 81 milliGRAM(s) Oral daily  atorvastatin 10 milliGRAM(s) Oral at bedtime  azithromycin  IVPB 500 milliGRAM(s) IV Intermittent every 24 hours  buDESOnide 160 MICROgram(s)/formoterol 4.5 MICROgram(s) Inhaler 2 Puff(s) Inhalation two times a day  carvedilol 3.125 milliGRAM(s) Oral every 12 hours  cefTRIAXone   IVPB 1000 milliGRAM(s) IV Intermittent every 24 hours  clopidogrel Tablet 75 milliGRAM(s) Oral daily  enoxaparin Injectable 40 milliGRAM(s) SubCutaneous daily  influenza   Vaccine 0.5 milliLiter(s) IntraMuscular once  predniSONE   Tablet 40 milliGRAM(s) Oral every 24 hours    MEDICATIONS  (PRN):  acetaminophen   Tablet .. 650 milliGRAM(s) Oral every 6 hours PRN Temp greater or equal to 38C (100.4F), Mild Pain (1 - 3)  traMADol 25 milliGRAM(s) Oral every 8 hours PRN Moderate Pain (4 - 6)    Vital Signs Last 24 Hrs  T(C): 36.8 (15 Oct 2019 03:19), Max: 36.8 (15 Oct 2019 03:19)  T(F): 98.2 (15 Oct 2019 03:19), Max: 98.2 (15 Oct 2019 03:19)  HR: 82 (15 Oct 2019 10:20) (65 - 88)  BP: 125/57 (15 Oct 2019 06:12) (111/57 - 159/70)  BP(mean): --  RR: 17 (15 Oct 2019 06:12) (16 - 18)  SpO2: 95% (15 Oct 2019 06:12) (95% - 98%)    I&O's Summary        Physical Exam:   GENERAL: NAD, well-groomed, well-developed  HEENT: MAYKEL/   Atraumatic, Normocephalic  ENMT: No tonsillar erythema, exudates, or enlargement; Moist mucous membranes, Good dentition, No lesions  NECK: Supple, No JVD, Normal thyroid  CHEST/LUNG: Clear to auscultaion  CVS: Regular rate and rhythm; No murmurs, rubs, or gallops  GI: : Soft, Nontender, Nondistended; Bowel sounds present  NERVOUS SYSTEM:  Alert & Oriented X3  EXTREMITIES:  2+ Peripheral Pulses, No clubbing, cyanosis, or edema  LYMPH: No lymphadenopathy noted  SKIN: some old crusted lesions  ENDOCRINOLOGY: No Thyromegaly  PSYCH: Appropriate    Labs:  27, 27                            12.1   15.50 )-----------( 124      ( 15 Oct 2019 06:43 )             37.0                         12.0   9.41  )-----------( 108      ( 14 Oct 2019 06:04 )             37.9                         12.0   10.82 )-----------( 109      ( 13 Oct 2019 21:50 )             36.9     10-15    136  |  98  |  26<H>  ----------------------------<  118<H>  4.4   |  24  |  0.94  10-14    134<L>  |  99  |  18  ----------------------------<  163<H>  4.6   |  22  |  0.94  10-13    137  |  98  |  16  ----------------------------<  126<H>  4.4   |  29  |  1.14    Ca    9.6      15 Oct 2019 06:43  Ca    9.1      14 Oct 2019 06:04  Ca    9.3      13 Oct 2019 21:50  Phos  2.9     10-15  Phos  2.8     10-14  Mg     2.2     10-15  Mg     2.1     10-14    TPro  7.3  /  Alb  3.8  /  TBili  0.6  /  DBili  x   /  AST  15  /  ALT  10  /  AlkPhos  97  10-13    CAPILLARY BLOOD GLUCOSE          LIVER FUNCTIONS - ( 13 Oct 2019 21:50 )  Alb: 3.8 g/dL / Pro: 7.3 g/dL / ALK PHOS: 97 u/L / ALT: 10 u/L / AST: 15 u/L / GGT: x           PT/INR - ( 13 Oct 2019 21:50 )   PT: 12.3 SEC;   INR: 1.10          PTT - ( 13 Oct 2019 21:50 )  PTT:30.4 SEC    Procalcitonin, Serum: 0.07 ng/mL (10-15 @ 06:43)  Procalcitonin, Serum: 0.04 ng/mL (10-15 @ 00:11)  Serum Pro-Brain Natriuretic Peptide: 518.5 pg/mL (10-13 @ 21:50)        RECENT CULTURES:  10-13 @ 22:28 BLOOD PERIPHERAL            < from: CT Chest No Cont (10.14.19 @ 13:37) >  lobe. 2 mm triangular subpleural nodule within the right middle lobe   (series 2 image 108) likely benign pulmonary lymph node. No suspicious   pulmonary nodule.    PLEURA: No pleural effusion or pneumothorax.    MEDIASTINUM AND SILKE: No lymphadenopathy.    VESSELS: Coronary artery calcifications. The main pulmonary artery is   normal in caliber. The aorta is normal in contour and course with   atherosclerotic calcifications.    HEART: Heart size is normal. No pericardial effusion. Pacemaker leads   within the right atrium and right ventricle.    CHEST WALL AND LOWER NECK: Left chest wall pacemaker.    VISUALIZED UPPER ABDOMEN: Scattered calcified granulomas within the   liver. Cholelithiasis. Right interpolar renal cyst.    BONES: Chronic compression deformity of the T12 vertebral body. Severe   compression fracture of the L1 vertebral body which appears new since   1/30/2019. Additional degenerative changes    IMPRESSION:     No pneumonia or suspicious pulmonary nodule.    Severe compression fracture of the L1 vertebral body, new since January 30, 2019.                CHICO DOYLE M.D., RADIOLOGIST RESIDENT  This document has been electronically signed.  VIVIAN ESTRADA M.D., ATTENDING RADIOLOGIST  This document has been electronically signed. Oct 14 2019  4:44PM        < end of copied text >  < from: CT Chest No Cont (10.14.19 @ 13:37) >  atent central airways. Mild retention secretions   within the upper trachea. Minimal bibasilar atelectasis.    < end of copied text >        NO ORGANISMS ISOLATED  NO ORGANISMS ISOLATED AT 24 HOURS        RESPIRATORY CULTURES:          Studies  Chest X-RAY  CT SCAN Chest   Venous Dopplers: LE:   CT Abdomen  Others    < from: Xray Chest 1 View- PORTABLE-Routine (10.13.19 @ 21:46) >  EXAM:  XR CHEST PORTABLE ROUTINE 1V        PROCEDURE DATE:  Oct 13 2019         INTERPRETATION:  CLINICAL INFORMATION: Shortness of breath, chest pain    TECHNIQUE: AP view of the chest.    COMPARISON: Chest radiograph from 1/26/2019.    FINDINGS:    No focal pulmonary consolidation. No pleural effusion or pneumothorax.   Mediastinal silhouette is normal. Bones and soft tissues are   unremarkable. Left chest wall pacemaker.    IMPRESSION:     Clear lungs.    < end of copied text >

## 2019-10-15 NOTE — PROGRESS NOTE ADULT - ASSESSMENT
Patient is a 79y old  Male who is a former smoker, with  HTN, SSS - s/p PPM, dementia, HTN, restless leg syndrome, CAD with h/o NSTEMI - s/p ALLYSON x1, admitted for disseminated herpes zoster in 2/2019; On this hospitalization the pt c/o shortness of breath with wheezing and productive cough, brown sputum for few days with associated fever at home, Tmax 101F. Also reports persisting Lt chest pain, worse with deep inspiration, radiating to shoulder for last 24 hours. ON admission, he found to have fever and tachypnea but Negative CXR and CT chest for pneumonia. He has started on Ceftriaxone and Azithromycin, and the ID consult requested to assist with further evaluation and antibiotic management.      #  Sepsis ( Fever + Tachypnea)  # Clinical Pneumonia    would recommend:    1. Follow up Blood cultures  2. Monitor Temp. and c/w supportive care  3. Obtain procalcitonin level AND  Legionella urine AG  4. Continue Ceftriaxone and azithromycin    5. Supplemental oxygenation and bronchodilator as needed    d/w Nursing staff    will follow the patient with you Patient is a 79y old  Male who is a former smoker, with  HTN, SSS - s/p PPM, dementia, HTN, restless leg syndrome, CAD with h/o NSTEMI - s/p ALLYSON x1, admitted for disseminated herpes zoster in 2/2019; On this hospitalization the pt c/o shortness of breath with wheezing and productive cough, brown sputum for few days with associated fever at home, Tmax 101F. Also reports persisting Lt chest pain, worse with deep inspiration, radiating to shoulder for last 24 hours. ON admission, he found to have fever and tachypnea but Negative CXR and CT chest for pneumonia. He has started on Ceftriaxone and Azithromycin, and the ID consult requested to assist with further evaluation and antibiotic management.      #  SIRS ( Fever + Tachypnea)  # Clinical Pneumonia - RVP is negative and Procalcitonin level is low    would recommend:    1. Discontinue All antibiotics since Procalcitonin level is low, RVP  and blood cultures are negative  2. Supplemental oxygenation and bronchodilator as needed  3. Monitor Temp. and c/w supportive care    d/w patient     will follow the patient with you

## 2019-10-15 NOTE — PROGRESS NOTE ADULT - SUBJECTIVE AND OBJECTIVE BOX
***PCP IS SAE OSULLIVAN who admits patients to my service (NOT  which is incorrectly stated in H&P)**     Patient seen and examined at bedside  No acute events noted overnight  Case discussed with medical team    HPI:  80 yo Male, former smoker, with MHx of HTN, SSS - s/p PPM, dementia, HTN, restless leg syndrome, CAD with h/o NSTEMI - s/p ALLYSON x1, admitted for disseminated herpes zoster in 2/2019; On this hospitalization the pt c/o shortness of breath with wheezing and productive cough, brown sputum for few days with associated fever at home, Tmax 101F. Also reports persisting Lt chest pain, worse with deep inspiration, radiating to shoulder for last 24 hours. Otherwise reports no fall, trauma, leg swelling or calf pain. (14 Oct 2019 04:05)      PAST MEDICAL & SURGICAL HISTORY:  RLS (restless legs syndrome)  Pacemaker  Dementia  HTN (hypertension)  CAD (coronary artery disease)  Artificial pacemaker: [employer]  H/O hydrocele  S/P skin biopsy: Behind L ear  History of appendectomy      vancomycin (Hives)       MEDICATIONS  (STANDING):  ALBUTerol/ipratropium for Nebulization 3 milliLiter(s) Nebulizer every 6 hours  aspirin enteric coated 81 milliGRAM(s) Oral daily  atorvastatin 10 milliGRAM(s) Oral at bedtime  azithromycin  IVPB 500 milliGRAM(s) IV Intermittent every 24 hours  buDESOnide 160 MICROgram(s)/formoterol 4.5 MICROgram(s) Inhaler 2 Puff(s) Inhalation two times a day  carvedilol 3.125 milliGRAM(s) Oral every 12 hours  cefTRIAXone   IVPB 1000 milliGRAM(s) IV Intermittent every 24 hours  clopidogrel Tablet 75 milliGRAM(s) Oral daily  enoxaparin Injectable 40 milliGRAM(s) SubCutaneous daily  influenza   Vaccine 0.5 milliLiter(s) IntraMuscular once  predniSONE   Tablet 40 milliGRAM(s) Oral every 24 hours    MEDICATIONS  (PRN):  traMADol 25 milliGRAM(s) Oral every 8 hours PRN Moderate Pain (4 - 6)      REVIEW OF SYSTEMS:  CONSTITUTIONAL: (+) malaise.   EYES: No acute change in vision   ENT:  No tinnitus  NECK: No stiffness  RESPIRATORY: smiht. sob. No hemoptysis  CARDIOVASCULAR: No chest pain, palpitations, syncope  GASTROINTESTINAL: No hematemesis, diarrhea, melena, or hematochezia.  GENITOURINARY: No hematuria  NEUROLOGICAL: No headaches  LYMPH Nodes: No enlarged glands  ENDOCRINE: No heat or cold intolerance	    T(C): 36.8 (10-15-19 @ 03:19), Max: 36.8 (10-15-19 @ 03:19)  HR: 68 (10-15-19 @ 06:12) (65 - 88)  BP: 125/57 (10-15-19 @ 06:12) (111/57 - 159/70)  RR: 17 (10-15-19 @ 06:12) (16 - 18)  SpO2: 95% (10-15-19 @ 06:12) (95% - 98%)    PHYSICAL EXAMINATION:   Constitutional: WD, NAD  HEENT: NC, AT  Neck:  Supple  Respiratory:  Adequate airflow b/l. Not using accessory muscles of respiration.  Cardiovascular:  S1 & S2 intact, no R/G, 2+ radial pulses b/l  Gastrointestinal: Soft, NT, ND, normoactive b.s., no organomegaly/RT/rigidity  Extremities: WWP  Neurological:  Alert and awake.  No acute focal motor deficits. Crude sensation intact.     Labs and imaging reviewed    LABS:                        12.1   15.50 )-----------( 124      ( 15 Oct 2019 06:43 )             37.0     10-15    136  |  98  |  26<H>  ----------------------------<  118<H>  4.4   |  24  |  0.94    Ca    9.6      15 Oct 2019 06:43  Phos  2.9     10-15  Mg     2.2     10-15    TPro  7.3  /  Alb  3.8  /  TBili  0.6  /  DBili  x   /  AST  15  /  ALT  10  /  AlkPhos  97  10-13        PT/INR - ( 13 Oct 2019 21:50 )   PT: 12.3 SEC;   INR: 1.10          PTT - ( 13 Oct 2019 21:50 )  PTT:30.4 SEC    CAPILLARY BLOOD GLUCOSE            LIVER FUNCTIONS - ( 13 Oct 2019 21:50 )  Alb: 3.8 g/dL / Pro: 7.3 g/dL / ALK PHOS: 97 u/L / ALT: 10 u/L / AST: 15 u/L / GGT: x               RADIOLOGY & ADDITIONAL STUDIES:

## 2019-10-16 LAB
ANION GAP SERPL CALC-SCNC: 15 MMO/L — HIGH (ref 7–14)
BASOPHILS # BLD AUTO: 0.01 K/UL — SIGNIFICANT CHANGE UP (ref 0–0.2)
BASOPHILS NFR BLD AUTO: 0.1 % — SIGNIFICANT CHANGE UP (ref 0–2)
BUN SERPL-MCNC: 26 MG/DL — HIGH (ref 7–23)
CALCIUM SERPL-MCNC: 9 MG/DL — SIGNIFICANT CHANGE UP (ref 8.4–10.5)
CHLORIDE SERPL-SCNC: 105 MMOL/L — SIGNIFICANT CHANGE UP (ref 98–107)
CO2 SERPL-SCNC: 19 MMOL/L — LOW (ref 22–31)
CREAT SERPL-MCNC: 0.76 MG/DL — SIGNIFICANT CHANGE UP (ref 0.5–1.3)
EOSINOPHIL # BLD AUTO: 0 K/UL — SIGNIFICANT CHANGE UP (ref 0–0.5)
EOSINOPHIL NFR BLD AUTO: 0 % — SIGNIFICANT CHANGE UP (ref 0–6)
GLUCOSE SERPL-MCNC: 86 MG/DL — SIGNIFICANT CHANGE UP (ref 70–99)
HCT VFR BLD CALC: 36.2 % — LOW (ref 39–50)
HGB BLD-MCNC: 11.6 G/DL — LOW (ref 13–17)
IMM GRANULOCYTES NFR BLD AUTO: 0.4 % — SIGNIFICANT CHANGE UP (ref 0–1.5)
L PNEUMO AG UR QL: NEGATIVE — SIGNIFICANT CHANGE UP
LYMPHOCYTES # BLD AUTO: 1.59 K/UL — SIGNIFICANT CHANGE UP (ref 1–3.3)
LYMPHOCYTES # BLD AUTO: 13.3 % — SIGNIFICANT CHANGE UP (ref 13–44)
MAGNESIUM SERPL-MCNC: 2.1 MG/DL — SIGNIFICANT CHANGE UP (ref 1.6–2.6)
MCHC RBC-ENTMCNC: 31.4 PG — SIGNIFICANT CHANGE UP (ref 27–34)
MCHC RBC-ENTMCNC: 32 % — SIGNIFICANT CHANGE UP (ref 32–36)
MCV RBC AUTO: 97.8 FL — SIGNIFICANT CHANGE UP (ref 80–100)
MONOCYTES # BLD AUTO: 1.13 K/UL — HIGH (ref 0–0.9)
MONOCYTES NFR BLD AUTO: 9.5 % — SIGNIFICANT CHANGE UP (ref 2–14)
NEUTROPHILS # BLD AUTO: 9.14 K/UL — HIGH (ref 1.8–7.4)
NEUTROPHILS NFR BLD AUTO: 76.7 % — SIGNIFICANT CHANGE UP (ref 43–77)
NRBC # FLD: 0 K/UL — SIGNIFICANT CHANGE UP (ref 0–0)
PHOSPHATE SERPL-MCNC: 2.7 MG/DL — SIGNIFICANT CHANGE UP (ref 2.5–4.5)
PLATELET # BLD AUTO: 121 K/UL — LOW (ref 150–400)
PMV BLD: 11 FL — SIGNIFICANT CHANGE UP (ref 7–13)
POTASSIUM SERPL-MCNC: 4.2 MMOL/L — SIGNIFICANT CHANGE UP (ref 3.5–5.3)
POTASSIUM SERPL-SCNC: 4.2 MMOL/L — SIGNIFICANT CHANGE UP (ref 3.5–5.3)
RBC # BLD: 3.7 M/UL — LOW (ref 4.2–5.8)
RBC # FLD: 14.6 % — HIGH (ref 10.3–14.5)
SODIUM SERPL-SCNC: 139 MMOL/L — SIGNIFICANT CHANGE UP (ref 135–145)
WBC # BLD: 11.92 K/UL — HIGH (ref 3.8–10.5)
WBC # FLD AUTO: 11.92 K/UL — HIGH (ref 3.8–10.5)

## 2019-10-16 RX ORDER — SODIUM CHLORIDE 9 MG/ML
1000 INJECTION INTRAMUSCULAR; INTRAVENOUS; SUBCUTANEOUS
Refills: 0 | Status: COMPLETED | OUTPATIENT
Start: 2019-10-16 | End: 2019-10-16

## 2019-10-16 RX ADMIN — Medication 40 MILLIGRAM(S): at 08:07

## 2019-10-16 RX ADMIN — BUDESONIDE AND FORMOTEROL FUMARATE DIHYDRATE 2 PUFF(S): 160; 4.5 AEROSOL RESPIRATORY (INHALATION) at 08:07

## 2019-10-16 RX ADMIN — Medication 3 MILLILITER(S): at 09:55

## 2019-10-16 RX ADMIN — ENOXAPARIN SODIUM 40 MILLIGRAM(S): 100 INJECTION SUBCUTANEOUS at 11:48

## 2019-10-16 RX ADMIN — BUDESONIDE AND FORMOTEROL FUMARATE DIHYDRATE 2 PUFF(S): 160; 4.5 AEROSOL RESPIRATORY (INHALATION) at 21:41

## 2019-10-16 RX ADMIN — Medication 1 TABLET(S): at 11:48

## 2019-10-16 RX ADMIN — TRAMADOL HYDROCHLORIDE 25 MILLIGRAM(S): 50 TABLET ORAL at 05:00

## 2019-10-16 RX ADMIN — CLOPIDOGREL BISULFATE 75 MILLIGRAM(S): 75 TABLET, FILM COATED ORAL at 11:47

## 2019-10-16 RX ADMIN — AZITHROMYCIN 255 MILLIGRAM(S): 500 TABLET, FILM COATED ORAL at 00:49

## 2019-10-16 RX ADMIN — Medication 81 MILLIGRAM(S): at 11:48

## 2019-10-16 RX ADMIN — ATORVASTATIN CALCIUM 10 MILLIGRAM(S): 80 TABLET, FILM COATED ORAL at 21:41

## 2019-10-16 RX ADMIN — CARVEDILOL PHOSPHATE 3.12 MILLIGRAM(S): 80 CAPSULE, EXTENDED RELEASE ORAL at 05:01

## 2019-10-16 RX ADMIN — CEFTRIAXONE 100 MILLIGRAM(S): 500 INJECTION, POWDER, FOR SOLUTION INTRAMUSCULAR; INTRAVENOUS at 00:11

## 2019-10-16 RX ADMIN — TRAMADOL HYDROCHLORIDE 25 MILLIGRAM(S): 50 TABLET ORAL at 05:45

## 2019-10-16 RX ADMIN — SODIUM CHLORIDE 70 MILLILITER(S): 9 INJECTION INTRAMUSCULAR; INTRAVENOUS; SUBCUTANEOUS at 10:17

## 2019-10-16 RX ADMIN — Medication 40 MILLIGRAM(S): at 21:41

## 2019-10-16 RX ADMIN — CARVEDILOL PHOSPHATE 3.12 MILLIGRAM(S): 80 CAPSULE, EXTENDED RELEASE ORAL at 18:27

## 2019-10-16 NOTE — PROGRESS NOTE ADULT - SUBJECTIVE AND OBJECTIVE BOX
Patient is seen and examined at the bed side, is afebrile. He is doing fine, no complaints. The Procalcitonin level is low and RVP is negative. The WBC count is trending up, most likely due to steroid. The Blood cultures have no growth to date.        REVIEW OF SYSTEMS: All other review systems are negative        ALLERGIES: vancomycin (Hives)      Vital Signs Last 24 Hrs  T(C): 36.4 (16 Oct 2019 18:26), Max: 36.9 (16 Oct 2019 10:11)  T(F): 97.5 (16 Oct 2019 18:26), Max: 98.5 (16 Oct 2019 10:11)  HR: 60 (16 Oct 2019 18:26) (60 - 84)  BP: 151/61 (16 Oct 2019 18:26) (122/47 - 151/61)  BP(mean): --  RR: 17 (16 Oct 2019 18:26) (16 - 18)  SpO2: 95% (16 Oct 2019 18:26) (94% - 99%)        PHYSICAL EXAM:  GENERAL: Not in distress , reading a newspaper  CHEST/LUNG:  Air entry bilaterally  HEART: s1 and s2 present  ABDOMEN:  Nontender and  Nondistended  EXTREMITIES: No pedal  edema  CNS: Awake and Alert          LABS:                        11.6   11.92 )-----------( 121      ( 16 Oct 2019 06:30 )             36.2                           12.1   15.50 )-----------( 124      ( 15 Oct 2019 06:43 )             37.0         10-16    139  |  105  |  26<H>  ----------------------------<  86  4.2   |  19<L>  |  0.76    Ca    9.0      16 Oct 2019 06:30  Phos  2.7     10-16  Mg     2.1     10-16      10-15    136  |  98  |  26<H>  ----------------------------<  118<H>  4.4   |  24  |  0.94    Ca    9.6      15 Oct 2019 06:43  Phos  2.9     10-15  Mg     2.2     10-15    TPro  7.3  /  Alb  3.8  /  TBili  0.6  /  DBili  x   /  AST  15  /  ALT  10  /  AlkPhos  97  10-13    PTT - ( 13 Oct 2019 21:50 )  PTT:30.4 SEC      MEDICATIONS  (STANDING):  ALBUTerol/ipratropium for Nebulization 3 milliLiter(s) Nebulizer every 6 hours  aspirin enteric coated 81 milliGRAM(s) Oral daily  atorvastatin 10 milliGRAM(s) Oral at bedtime  azithromycin  IVPB 500 milliGRAM(s) IV Intermittent every 24 hours  buDESOnide 160 MICROgram(s)/formoterol 4.5 MICROgram(s) Inhaler 2 Puff(s) Inhalation two times a day  calcium carbonate 1250 mG  + Vitamin D (OsCal 500 + D) 1 Tablet(s) Oral daily  carvedilol 3.125 milliGRAM(s) Oral every 12 hours  clopidogrel Tablet 75 milliGRAM(s) Oral daily  enoxaparin Injectable 40 milliGRAM(s) SubCutaneous daily  influenza   Vaccine 0.5 milliLiter(s) IntraMuscular once  predniSONE   Tablet 40 milliGRAM(s) Oral daily    MEDICATIONS  (PRN):  acetaminophen   Tablet .. 650 milliGRAM(s) Oral every 6 hours PRN Temp greater or equal to 38C (100.4F), Mild Pain (1 - 3)  traMADol 25 milliGRAM(s) Oral every 8 hours PRN Moderate Pain (4 - 6)        RADIOLOGY & ADDITIONAL TESTS:    10/14/19 : CT Chest No Cont (10.14.19 @ 13:37) No pneumonia or suspicious pulmonary nodule. Severe compression fracture of the L1 vertebral body, new since January 30, 2019.      10/13/19 : Xray Chest 1 View- PORTABLE-Routine (10.13.19 @ 21:46) Clear lungs.          MICROBIOLOGY DATA:    Culture - Blood (10.13.19 @ 22:28)    Culture - Blood:   NO ORGANISMS ISOLATED  NO ORGANISMS ISOLATED AT 24 HOURS    Specimen Source: BLOOD VENOUS      Culture - Blood (10.13.19 @ 22:28)    Culture - Blood:   NO ORGANISMS ISOLATED  NO ORGANISMS ISOLATED AT 24 HOURS    Specimen Source: BLOOD PERIPHERAL        FLU A B RSV Detection by PCR (10.13.19 @ 21:50)    Flu A Result: Not Detected    Flu B Result: Not Detected    RSV Result: Not Detected The Flu A B RSV assay is a Real-Time PCR test for the  qualitative detection and differentiation of Influenza A,  Influenza B, and Respiratory Syncytial Virus on  nasopharyngeal swabs.The results should be interpreted in  the context of all clinical and laboratory findings.        Procalcitonin, Serum: 0.07: Procalcitonin (PCT) Interpretation (ng/mL) - Diagnosis of  systemic bacterial infection/sepsis:      Rapid Respiratory Viral Panel (10.14.19 @ 01:15)    Adenovirus (RapRVP): Not Detected    Influenza A (RapRVP): Not Detected    Influenza AH1 2009 (RapRVP): Not Detected    Influenza AH1 (RapRVP): Not Detected    Influenza AH3 (RapRVP): Not Detected    Influenza B (RapRVP): Not Detected    Parainfluenza 1 (RapRVP): Not Detected    Parainfluenza 2 (RapRVP): Not Detected    Parainfluenza 3 (RapRVP): Not Detected    Parainfluenza 4 (RapRVP): Not Detected    Resp Syncytial Virus (RapRVP): Not Detected    Chlamydia pneumoniae (RapRVP): Not Detected    Mycoplasma pneumoniae (RapRVP): Not Detected    Entero/Rhinovirus (RapRVP): Not Detected    hMPV (RapRVP): Not Detected    Coronavirus (229E,HKU1,NL63,OC43): Not Detected This Respiratory Panel uses polymerase chain reaction (PCR)  to detect for adenovirus; coronavirus (HKU1, NL63, 229E,  OC43); human metapneumovirus (hMPV); human  enterovirus/rhinovirus (Entero/RV); influenza A; influenza  A/H1: influenza A/H3; influenza A/H1-2009; influenza B;  parainfluenza viruses 1,2,3,4; respiratory syncytial virus;  Mycoplasma pneumoniae; and Chlamydophila pneumoniae. Patient is seen and examined at the bed side, is afebrile. He has no new complaints. . The WBC count is trending down.         REVIEW OF SYSTEMS: All other review systems are negative        ALLERGIES: vancomycin (Hives)      Vital Signs Last 24 Hrs  T(C): 36.4 (16 Oct 2019 18:26), Max: 36.9 (16 Oct 2019 10:11)  T(F): 97.5 (16 Oct 2019 18:26), Max: 98.5 (16 Oct 2019 10:11)  HR: 60 (16 Oct 2019 18:26) (60 - 84)  BP: 151/61 (16 Oct 2019 18:26) (122/47 - 151/61)  BP(mean): --  RR: 17 (16 Oct 2019 18:26) (16 - 18)  SpO2: 95% (16 Oct 2019 18:26) (94% - 99%)        PHYSICAL EXAM:  GENERAL: Not in distress , reading a newspaper  CHEST/LUNG:  Air entry bilaterally  HEART: s1 and s2 present  ABDOMEN:  Nontender and  Nondistended  EXTREMITIES: No pedal  edema  CNS: Awake and Alert          LABS:                        11.6   11.92 )-----------( 121      ( 16 Oct 2019 06:30 )             36.2                           12.1   15.50 )-----------( 124      ( 15 Oct 2019 06:43 )             37.0         10-16    139  |  105  |  26<H>  ----------------------------<  86  4.2   |  19<L>  |  0.76    Ca    9.0      16 Oct 2019 06:30  Phos  2.7     10-16  Mg     2.1     10-16      10-15    136  |  98  |  26<H>  ----------------------------<  118<H>  4.4   |  24  |  0.94    Ca    9.6      15 Oct 2019 06:43  Phos  2.9     10-15  Mg     2.2     10-15    TPro  7.3  /  Alb  3.8  /  TBili  0.6  /  DBili  x   /  AST  15  /  ALT  10  /  AlkPhos  97  10-13    PTT - ( 13 Oct 2019 21:50 )  PTT:30.4 SEC      MEDICATIONS  (STANDING):  ALBUTerol/ipratropium for Nebulization 3 milliLiter(s) Nebulizer every 6 hours  aspirin enteric coated 81 milliGRAM(s) Oral daily  atorvastatin 10 milliGRAM(s) Oral at bedtime  azithromycin  IVPB 500 milliGRAM(s) IV Intermittent every 24 hours  buDESOnide 160 MICROgram(s)/formoterol 4.5 MICROgram(s) Inhaler 2 Puff(s) Inhalation two times a day  calcium carbonate 1250 mG  + Vitamin D (OsCal 500 + D) 1 Tablet(s) Oral daily  carvedilol 3.125 milliGRAM(s) Oral every 12 hours  clopidogrel Tablet 75 milliGRAM(s) Oral daily  enoxaparin Injectable 40 milliGRAM(s) SubCutaneous daily  influenza   Vaccine 0.5 milliLiter(s) IntraMuscular once  predniSONE   Tablet 40 milliGRAM(s) Oral daily    MEDICATIONS  (PRN):  acetaminophen   Tablet .. 650 milliGRAM(s) Oral every 6 hours PRN Temp greater or equal to 38C (100.4F), Mild Pain (1 - 3)  traMADol 25 milliGRAM(s) Oral every 8 hours PRN Moderate Pain (4 - 6)        RADIOLOGY & ADDITIONAL TESTS:    10/14/19 : CT Chest No Cont (10.14.19 @ 13:37) No pneumonia or suspicious pulmonary nodule. Severe compression fracture of the L1 vertebral body, new since January 30, 2019.      10/13/19 : Xray Chest 1 View- PORTABLE-Routine (10.13.19 @ 21:46) Clear lungs.          MICROBIOLOGY DATA:    Culture - Blood (10.13.19 @ 22:28)    Culture - Blood:   NO ORGANISMS ISOLATED  NO ORGANISMS ISOLATED AT 24 HOURS    Specimen Source: BLOOD VENOUS      Culture - Blood (10.13.19 @ 22:28)    Culture - Blood:   NO ORGANISMS ISOLATED  NO ORGANISMS ISOLATED AT 24 HOURS    Specimen Source: BLOOD PERIPHERAL        FLU A B RSV Detection by PCR (10.13.19 @ 21:50)    Flu A Result: Not Detected    Flu B Result: Not Detected    RSV Result: Not Detected The Flu A B RSV assay is a Real-Time PCR test for the  qualitative detection and differentiation of Influenza A,  Influenza B, and Respiratory Syncytial Virus on  nasopharyngeal swabs.The results should be interpreted in  the context of all clinical and laboratory findings.        Procalcitonin, Serum: 0.07: Procalcitonin (PCT) Interpretation (ng/mL) - Diagnosis of  systemic bacterial infection/sepsis:      Rapid Respiratory Viral Panel (10.14.19 @ 01:15)    Adenovirus (RapRVP): Not Detected    Influenza A (RapRVP): Not Detected    Influenza AH1 2009 (RapRVP): Not Detected    Influenza AH1 (RapRVP): Not Detected    Influenza AH3 (RapRVP): Not Detected    Influenza B (RapRVP): Not Detected    Parainfluenza 1 (RapRVP): Not Detected    Parainfluenza 2 (RapRVP): Not Detected    Parainfluenza 3 (RapRVP): Not Detected    Parainfluenza 4 (RapRVP): Not Detected    Resp Syncytial Virus (RapRVP): Not Detected    Chlamydia pneumoniae (RapRVP): Not Detected    Mycoplasma pneumoniae (RapRVP): Not Detected    Entero/Rhinovirus (RapRVP): Not Detected    hMPV (RapRVP): Not Detected    Coronavirus (229E,HKU1,NL63,OC43): Not Detected This Respiratory Panel uses polymerase chain reaction (PCR)  to detect for adenovirus; coronavirus (HKU1, NL63, 229E,  OC43); human metapneumovirus (hMPV); human  enterovirus/rhinovirus (Entero/RV); influenza A; influenza  A/H1: influenza A/H3; influenza A/H1-2009; influenza B;  parainfluenza viruses 1,2,3,4; respiratory syncytial virus;  Mycoplasma pneumoniae; and Chlamydophila pneumoniae.

## 2019-10-16 NOTE — PROGRESS NOTE ADULT - SUBJECTIVE AND OBJECTIVE BOX
Patient is a 79y old  Male who presents with a chief complaint of Shortness of breath with productive cough (16 Oct 2019 09:12)      Any change in ROS: He says his breathing is OK :     MEDICATIONS  (STANDING):  ALBUTerol/ipratropium for Nebulization 3 milliLiter(s) Nebulizer every 6 hours  aspirin enteric coated 81 milliGRAM(s) Oral daily  atorvastatin 10 milliGRAM(s) Oral at bedtime  azithromycin  IVPB 500 milliGRAM(s) IV Intermittent every 24 hours  buDESOnide 160 MICROgram(s)/formoterol 4.5 MICROgram(s) Inhaler 2 Puff(s) Inhalation two times a day  calcium carbonate 1250 mG  + Vitamin D (OsCal 500 + D) 1 Tablet(s) Oral daily  carvedilol 3.125 milliGRAM(s) Oral every 12 hours  clopidogrel Tablet 75 milliGRAM(s) Oral daily  enoxaparin Injectable 40 milliGRAM(s) SubCutaneous daily  influenza   Vaccine 0.5 milliLiter(s) IntraMuscular once  predniSONE   Tablet 40 milliGRAM(s) Oral every 24 hours    MEDICATIONS  (PRN):  acetaminophen   Tablet .. 650 milliGRAM(s) Oral every 6 hours PRN Temp greater or equal to 38C (100.4F), Mild Pain (1 - 3)  traMADol 25 milliGRAM(s) Oral every 8 hours PRN Moderate Pain (4 - 6)    Vital Signs Last 24 Hrs  T(C): 36.9 (16 Oct 2019 10:11), Max: 36.9 (16 Oct 2019 10:11)  T(F): 98.5 (16 Oct 2019 10:11), Max: 98.5 (16 Oct 2019 10:11)  HR: 66 (16 Oct 2019 10:11) (62 - 83)  BP: 122/47 (16 Oct 2019 10:11) (116/55 - 132/94)  BP(mean): --  RR: 17 (16 Oct 2019 10:11) (17 - 18)  SpO2: 99% (16 Oct 2019 10:11) (96% - 100%)    I&O's Summary        Physical Exam:   GENERAL: NAD, well-groomed, well-developed  HEENT: MAYKEL/   Atraumatic, Normocephalic  ENMT: No tonsillar erythema, exudates, or enlargement; Moist mucous membranes, Good dentition, No lesions  NECK: Supple, No JVD, Normal thyroid  CHEST/LUNG: Clear to auscultaion  CVS: Regular rate and rhythm; No murmurs, rubs, or gallops  GI: : Soft, Nontender, Nondistended; Bowel sounds present  NERVOUS SYSTEM:  Alert & Oriented 3  EXTREMITIES:  2+ Peripheral Pulses, No clubbing, cyanosis, or edema  LYMPH: No lymphadenopathy noted  SKIN: No rashes or lesions  ENDOCRINOLOGY: No Thyromegaly  PSYCH: Appropriate    Labs:  27, 27                            11.6   11.92 )-----------( 121      ( 16 Oct 2019 06:30 )             36.2                         12.1   15.50 )-----------( 124      ( 15 Oct 2019 06:43 )             37.0                         12.0   9.41  )-----------( 108      ( 14 Oct 2019 06:04 )             37.9                         12.0   10.82 )-----------( 109      ( 13 Oct 2019 21:50 )             36.9     10-16    139  |  105  |  26<H>  ----------------------------<  86  4.2   |  19<L>  |  0.76  10-15    136  |  98  |  26<H>  ----------------------------<  118<H>  4.4   |  24  |  0.94  10-14    134<L>  |  99  |  18  ----------------------------<  163<H>  4.6   |  22  |  0.94  10-13    137  |  98  |  16  ----------------------------<  126<H>  4.4   |  29  |  1.14    Ca    9.0      16 Oct 2019 06:30  Ca    9.6      15 Oct 2019 06:43  Phos  2.7     10-16  Phos  2.9     10-15  Mg     2.1     10-16  Mg     2.2     10-15    TPro  7.3  /  Alb  3.8  /  TBili  0.6  /  DBili  x   /  AST  15  /  ALT  10  /  AlkPhos  97  10-13    CAPILLARY BLOOD GLUCOSE                Procalcitonin, Serum: 0.07 ng/mL (10-15 @ 06:43)  Procalcitonin, Serum: 0.04 ng/mL (10-15 @ 00:11)  Serum Pro-Brain Natriuretic Peptide: 518.5 pg/mL (10-13 @ 21:50)        RECENT CULTURES:  10-13 @ 22:28 BLOOD PERIPHERAL       < from: CT Chest No Cont (10.14.19 @ 13:37) >  COMPARISON: 1/30/2019.    PROCEDURE:   CT of the Chest was performed without intravenous contrast.  Sagittal and coronal reformats were performed.      FINDINGS:    LUNGS AND AIRWAYS: Patent central airways. Mild retention secretions   within the upper trachea. Minimal bibasilar atelectasis.    Interval resolution of nodular opacity within the anterior left upper   lobe. 2 mm triangular subpleural nodule within the right middle lobe   (series 2 image 108) likely benign pulmonary lymph node. No suspicious   pulmonary nodule.    PLEURA: No pleural effusion or pneumothorax.    MEDIASTINUM AND SILKE: No lymphadenopathy.    VESSELS: Coronary artery calcifications. The main pulmonary artery is   normal in caliber. The aorta is normal in contour and course with   atherosclerotic calcifications.    HEART: Heart size is normal. No pericardial effusion. Pacemaker leads   within the right atrium and right ventricle.    CHEST WALL AND LOWER NECK: Left chest wall pacemaker.    VISUALIZED UPPER ABDOMEN: Scattered calcified granulomas within the   liver. Cholelithiasis. Right interpolar renal cyst.    BONES: Chronic compression deformity of the T12 vertebral body. Severe   compression fracture of the L1 vertebral body which appears new since   1/30/2019. Additional degenerative changes    IMPRESSION:     No pneumonia or suspicious pulmonary nodule.    Severe compression fracture of the L1 vertebral body, new since January 30, 2019.          < end of copied text >             NO ORGANISMS ISOLATED  NO ORGANISMS ISOLATED AT 48 HRS.        RESPIRATORY CULTURES:          Studies  Chest X-RAY  CT SCAN Chest   Venous Dopplers: LE:   CT Abdomen  Others

## 2019-10-16 NOTE — PROGRESS NOTE ADULT - ASSESSMENT
Patient is a 79y old  Male who is a former smoker, with  HTN, SSS - s/p PPM, dementia, HTN, restless leg syndrome, CAD with h/o NSTEMI - s/p ALLYSON x1, admitted for disseminated herpes zoster in 2/2019; On this hospitalization the pt c/o shortness of breath with wheezing and productive cough, brown sputum for few days with associated fever at home, Tmax 101F. Also reports persisting Lt chest pain, worse with deep inspiration, radiating to shoulder for last 24 hours. ON admission, he found to have fever and tachypnea but Negative CXR and CT chest for pneumonia. He has started on Ceftriaxone and Azithromycin, and the ID consult requested to assist with further evaluation and antibiotic management.      #  SIRS ( Fever + Tachypnea)  # Clinical Pneumonia - RVP is negative and Procalcitonin level is low    would recommend:    1. Monitor OFF abx  2. Supplemental oxygenation and bronchodilator as needed  3. Monitor Temp. and c/w supportive care    d/w patient     will follow the patient with you Patient is a 79y old  Male who is a former smoker, with  HTN, SSS - s/p PPM, dementia, HTN, restless leg syndrome, CAD with h/o NSTEMI - s/p ALLYSON x1, admitted for disseminated herpes zoster in 2/2019; On this hospitalization the pt c/o shortness of breath with wheezing and productive cough, brown sputum for few days with associated fever at home, Tmax 101F. Also reports persisting Lt chest pain, worse with deep inspiration, radiating to shoulder for last 24 hours. ON admission, he found to have fever and tachypnea but Negative CXR and CT chest for pneumonia. He has started on Ceftriaxone and Azithromycin, and the ID consult requested to assist with further evaluation and antibiotic management.      #  SIRS ( Fever + Tachypnea)  # Clinical Pneumonia - RVP is negative and Procalcitonin level is low    would recommend:    1. Monitor WBC count, is trending down  2. Supplemental oxygenation and bronchodilator as needed  3. Monitor OFF ABx    d/w patient     will follow the patient with you

## 2019-10-16 NOTE — PHYSICAL THERAPY INITIAL EVALUATION ADULT - PERTINENT HX OF CURRENT PROBLEM, REHAB EVAL
This is an 79y M with PMHx Restless Leg Syndrome admitted with  Sepsis due to likely COPD exacerbation vs CAP c/b hypercarbia and atypical CP.

## 2019-10-16 NOTE — PROGRESS NOTE ADULT - SUBJECTIVE AND OBJECTIVE BOX
Patient seen and examined at bedside  c/o smith, otherwise No acute events noted overnight  Case discussed with medical team    HPI:  78 yo Male, former smoker, with MHx of HTN, SSS - s/p PPM, dementia, HTN, restless leg syndrome, CAD with h/o NSTEMI - s/p ALLYSON x1, admitted for disseminated herpes zoster in 2/2019; On this hospitalization the pt c/o shortness of breath with wheezing and productive cough, brown sputum for few days with associated fever at home, Tmax 101F. Also reports persisting Lt chest pain, worse with deep inspiration, radiating to shoulder for last 24 hours. Otherwise reports no fall, trauma, leg swelling or calf pain. (14 Oct 2019 04:05)      PAST MEDICAL & SURGICAL HISTORY:  RLS (restless legs syndrome)  Pacemaker  Dementia  HTN (hypertension)  CAD (coronary artery disease)  Artificial pacemaker: [employer]  H/O hydrocele  S/P skin biopsy: Behind L ear  History of appendectomy      vancomycin (Hives)       MEDICATIONS  (STANDING):  ALBUTerol/ipratropium for Nebulization 3 milliLiter(s) Nebulizer every 6 hours  aspirin enteric coated 81 milliGRAM(s) Oral daily  atorvastatin 10 milliGRAM(s) Oral at bedtime  azithromycin  IVPB 500 milliGRAM(s) IV Intermittent every 24 hours  buDESOnide 160 MICROgram(s)/formoterol 4.5 MICROgram(s) Inhaler 2 Puff(s) Inhalation two times a day  calcium carbonate 1250 mG  + Vitamin D (OsCal 500 + D) 1 Tablet(s) Oral daily  carvedilol 3.125 milliGRAM(s) Oral every 12 hours  clopidogrel Tablet 75 milliGRAM(s) Oral daily  enoxaparin Injectable 40 milliGRAM(s) SubCutaneous daily  influenza   Vaccine 0.5 milliLiter(s) IntraMuscular once  predniSONE   Tablet 40 milliGRAM(s) Oral every 24 hours  sodium chloride 0.9%. 1000 milliLiter(s) (70 mL/Hr) IV Continuous <Continuous>    MEDICATIONS  (PRN):  acetaminophen   Tablet .. 650 milliGRAM(s) Oral every 6 hours PRN Temp greater or equal to 38C (100.4F), Mild Pain (1 - 3)  traMADol 25 milliGRAM(s) Oral every 8 hours PRN Moderate Pain (4 - 6)      REVIEW OF SYSTEMS:  CONSTITUTIONAL: (+) malaise.   EYES: No acute change in vision   ENT:  No tinnitus  NECK: No stiffness  RESPIRATORY: smith. sob. cough. No hemoptysis  CARDIOVASCULAR: No chest pain, palpitations, syncope  GASTROINTESTINAL: No hematemesis, diarrhea, melena, or hematochezia.  GENITOURINARY: No hematuria  NEUROLOGICAL: No headaches  LYMPH Nodes: No enlarged glands  ENDOCRINE: No heat or cold intolerance	    T(C): 36.6 (10-16-19 @ 04:05), Max: 36.7 (10-15-19 @ 12:00)  HR: 68 (10-16-19 @ 04:05) (62 - 83)  BP: 132/94 (10-16-19 @ 04:05) (116/55 - 132/94)  RR: 18 (10-16-19 @ 04:05) (18 - 18)  SpO2: 98% (10-16-19 @ 04:05) (96% - 100%)    PHYSICAL EXAMINATION:   Constitutional: WD, NAD  HEENT: NC, AT  Neck:  Supple  Respiratory: mild tachypnea. Adequate airflow b/l. Not using accessory muscles of respiration.  Cardiovascular:  S1 & S2 intact, no R/G, 2+ radial pulses b/l  Gastrointestinal: Soft, NT, ND, normoactive b.s., no organomegaly/RT/rigidity  Extremities: WWP  Neurological:  Alert and awake.  No acute focal motor deficits. Crude sensation intact.     Labs and imaging reviewed    LABS:                        11.6   11.92 )-----------( 121      ( 16 Oct 2019 06:30 )             36.2     10-16    139  |  105  |  26<H>  ----------------------------<  86  4.2   |  19<L>  |  0.76    Ca    9.0      16 Oct 2019 06:30  Phos  2.7     10-16  Mg     2.1     10-16              CAPILLARY BLOOD GLUCOSE                  RADIOLOGY & ADDITIONAL STUDIES:

## 2019-10-17 ENCOUNTER — TRANSCRIPTION ENCOUNTER (OUTPATIENT)
Age: 79
End: 2019-10-17

## 2019-10-17 VITALS — OXYGEN SATURATION: 99 %

## 2019-10-17 LAB
ANION GAP SERPL CALC-SCNC: 12 MMO/L — SIGNIFICANT CHANGE UP (ref 7–14)
BUN SERPL-MCNC: 17 MG/DL — SIGNIFICANT CHANGE UP (ref 7–23)
CALCIUM SERPL-MCNC: 9.3 MG/DL — SIGNIFICANT CHANGE UP (ref 8.4–10.5)
CHLORIDE SERPL-SCNC: 104 MMOL/L — SIGNIFICANT CHANGE UP (ref 98–107)
CO2 SERPL-SCNC: 23 MMOL/L — SIGNIFICANT CHANGE UP (ref 22–31)
CREAT SERPL-MCNC: 0.73 MG/DL — SIGNIFICANT CHANGE UP (ref 0.5–1.3)
GLUCOSE SERPL-MCNC: 107 MG/DL — HIGH (ref 70–99)
HCT VFR BLD CALC: 37.9 % — LOW (ref 39–50)
HGB BLD-MCNC: 12.2 G/DL — LOW (ref 13–17)
MAGNESIUM SERPL-MCNC: 2.1 MG/DL — SIGNIFICANT CHANGE UP (ref 1.6–2.6)
MCHC RBC-ENTMCNC: 31.7 PG — SIGNIFICANT CHANGE UP (ref 27–34)
MCHC RBC-ENTMCNC: 32.2 % — SIGNIFICANT CHANGE UP (ref 32–36)
MCV RBC AUTO: 98.4 FL — SIGNIFICANT CHANGE UP (ref 80–100)
NRBC # FLD: 0 K/UL — SIGNIFICANT CHANGE UP (ref 0–0)
PLATELET # BLD AUTO: 134 K/UL — LOW (ref 150–400)
PMV BLD: 10.8 FL — SIGNIFICANT CHANGE UP (ref 7–13)
POTASSIUM SERPL-MCNC: 4.2 MMOL/L — SIGNIFICANT CHANGE UP (ref 3.5–5.3)
POTASSIUM SERPL-SCNC: 4.2 MMOL/L — SIGNIFICANT CHANGE UP (ref 3.5–5.3)
RBC # BLD: 3.85 M/UL — LOW (ref 4.2–5.8)
RBC # FLD: 14.3 % — SIGNIFICANT CHANGE UP (ref 10.3–14.5)
SODIUM SERPL-SCNC: 139 MMOL/L — SIGNIFICANT CHANGE UP (ref 135–145)
WBC # BLD: 6.59 K/UL — SIGNIFICANT CHANGE UP (ref 3.8–10.5)
WBC # FLD AUTO: 6.59 K/UL — SIGNIFICANT CHANGE UP (ref 3.8–10.5)

## 2019-10-17 RX ORDER — CARVEDILOL PHOSPHATE 80 MG/1
1 CAPSULE, EXTENDED RELEASE ORAL
Qty: 60 | Refills: 0
Start: 2019-10-17 | End: 2019-11-15

## 2019-10-17 RX ORDER — BUDESONIDE AND FORMOTEROL FUMARATE DIHYDRATE 160; 4.5 UG/1; UG/1
2 AEROSOL RESPIRATORY (INHALATION)
Qty: 1 | Refills: 0
Start: 2019-10-17 | End: 2019-10-30

## 2019-10-17 RX ORDER — CARVEDILOL PHOSPHATE 80 MG/1
1 CAPSULE, EXTENDED RELEASE ORAL
Qty: 0 | Refills: 0 | DISCHARGE

## 2019-10-17 RX ADMIN — Medication 3 MILLILITER(S): at 04:51

## 2019-10-17 RX ADMIN — Medication 40 MILLIGRAM(S): at 06:27

## 2019-10-17 RX ADMIN — AZITHROMYCIN 255 MILLIGRAM(S): 500 TABLET, FILM COATED ORAL at 01:15

## 2019-10-17 RX ADMIN — CARVEDILOL PHOSPHATE 3.12 MILLIGRAM(S): 80 CAPSULE, EXTENDED RELEASE ORAL at 06:27

## 2019-10-17 RX ADMIN — BUDESONIDE AND FORMOTEROL FUMARATE DIHYDRATE 2 PUFF(S): 160; 4.5 AEROSOL RESPIRATORY (INHALATION) at 10:59

## 2019-10-17 RX ADMIN — Medication 3 MILLILITER(S): at 08:45

## 2019-10-17 NOTE — DISCHARGE NOTE NURSING/CASE MANAGEMENT/SOCIAL WORK - PATIENT PORTAL LINK FT
You can access the FollowMyHealth Patient Portal offered by [facility] by registering at the following website: [email or web address]. By joining [employer]’s FollowMyHealth portal, you will also be able to view your health information using other applications (apps) compatible with our system.

## 2019-10-17 NOTE — DISCHARGE NOTE PROVIDER - NSDCCPCAREPLAN_GEN_ALL_CORE_FT
PRINCIPAL DISCHARGE DIAGNOSIS  Diagnosis: Pneumonia  Assessment and Plan of Treatment: you had pneumonia for which you were treated with antibiotics in the hospital. If you have shortness of breath, chest pain, or other symptoms please contact your doctor. Your blood cultures are negative. You will be recieving home PT      SECONDARY DISCHARGE DIAGNOSES  Diagnosis: Acute bronchitis  Assessment and Plan of Treatment: Continue current medications as prescribed. Follow up with your routine physician's appointments. Continue to use two inhalers. One is your ventolin and the other was sent to your pharmacy [facility]    Diagnosis: Normocytic anemia  Assessment and Plan of Treatment: Continue to take current medications as prescribed.  Follow up your routine physician's appointments.  If you notice any bleeding, please notify your doctor immediately or go to the nearest emergency room. You should make an appointment with Dr Zamudio next week to follow up blood results for anemia    Diagnosis: Sepsis, due to unspecified organism, unspecified whether acute organ dysfunction present  Assessment and Plan of Treatment: You were treated in the hospital with antibiotics for sepsis which has resolved. Please notify your physician if you notice any increase in cough, shortness of breath, fever, or other symptoms    Diagnosis: Coronary artery disease involving native coronary artery of native heart, angina presence unspecified  Assessment and Plan of Treatment: Continue aspirin and Plavix, do not stop unless instructed by your physician.  Continue low salt, fat, cholesterol and carbohydrate diet. Follow up with cardiologist and primary care physician's routine appointment.  NOTICE: your dose of carvedilol was decreased. The new prescription was sent to your pharmacy. Also Stop taking Lisinopril and Amlodipine as these have been stopped due to harm to your kidneys. Stop taking these now

## 2019-10-17 NOTE — DISCHARGE NOTE PROVIDER - HOSPITAL COURSE
78 yo Male, former smoker, with MHx of HTN, SSS - s/p PPM, dementia, HTN, restless leg syndrome, CAD with h/o NSTEMI - s/p ALLYSON x1, admitted for disseminated herpes zoster in 2/2019 a/w sepsis due to likely COPD exacerbation vs CAP c/b hypercarbia and atypical CP;             Sepsis, due to unspecified organism, unspecified whether acute organ dysfunction present: 2/2 acute bronchitis, improved pt is medically cleared for safe discharge with outpt pcp f/u within 7 days    all team members management appreciated.             COPD exacerbation: improved.         Acute bronchitis: improving accordingly as above.             Hypercarbia: as above              Atypical chest pain.  Plan: improved.             Essential hypertension:  Holding Lisinopril and Amlodipine.            Coronary artery disease involving native coronary artery of native heart, angina presence unspecified.  Plan: c/w ASA, Plavix, Statins, c/w reduced dose Carvedilol for now     outpt f/u.         Normocytic anemia: Review of blood tests since Jan 2019 show normal hgb Jan 2019 then decline to 11/12 gm/dl ever since Pt c/o fatigue Etiology multifactorial (infection, inflammation, nutritional, neoplastic - MDS_ -Comprehensive testing ordered -If patient is discharge before blood tests taken, he agrees to come to my office for evaluation 78 yo Male, former smoker, with MHx of HTN, SSS - s/p PPM, dementia, HTN, restless leg syndrome, CAD with h/o NSTEMI - s/p ALLYSON x1, admitted for disseminated herpes zoster in 2/2019 a/w sepsis due to likely COPD exacerbation vs CAP c/b hypercarbia and atypical CP;             Sepsis, due to unspecified organism, unspecified whether acute organ dysfunction present: 2/2 acute bronchitis, improved pt is medically cleared for safe discharge with outpt pcp f/u within 7 days    all team members management appreciated.             COPD exacerbation: improved.         Acute bronchitis: improving accordingly as above. CT chest: No pneumonia or suspicious pulmonary nodule.            Hypercarbia: as above              Atypical chest pain.  Plan: improved.             Essential hypertension:  Holding Lisinopril and Amlodipine.            Coronary artery disease involving native coronary artery of native heart, angina presence unspecified.  Plan: c/w ASA, Plavix, Statins, c/w reduced dose Carvedilol for now     outpt f/u.         Normocytic anemia: Review of blood tests since Jan 2019 show normal hgb Jan 2019 then decline to 11/12 gm/dl ever since Pt c/o fatigue Etiology multifactorial (infection, inflammation, nutritional, neoplastic - MDS_ -Comprehensive testing ordered -If patient is discharge before blood tests taken, he agrees to come to my office for evaluation        Pt has a L1 compression fracture which is treated with pain control and PT at home         Pt is alert and feeling well. As per Dr Hernandes pt is stable and cleared for discharge to home. Pt refused rehab. Pt will follow up with private attending and ALLIE Zamudio for further workup of anemia

## 2019-10-17 NOTE — DISCHARGE NOTE PROVIDER - CONDITIONS AT DISCHARGE
As per attending pt is stable for discharge and will follow up with Dr Hernandes and Dr Zamudio next week

## 2019-10-17 NOTE — DISCHARGE NOTE PROVIDER - CARE PROVIDER_API CALL
Dustin Hernandes)  Medicine  [address] [place] [address]  Phone: [phone]  Fax: [phone]  Follow Up Time: 1 week    Morteza Zamudio)  Hematology; Internal Medicine; Medical Oncology  [address] [facility] [address], [address]  [place] [address]  Phone: [phone]  Fax: [phone]  Follow Up Time: 1 week

## 2019-10-17 NOTE — PROGRESS NOTE ADULT - SUBJECTIVE AND OBJECTIVE BOX
Patient is a 79y old  Male who presents with a chief complaint of Shortness of breath with productive cough (17 Oct 2019 09:46)      Any change in ROS: Doing well: for dc today : no SOB     MEDICATIONS  (STANDING):  ALBUTerol/ipratropium for Nebulization 3 milliLiter(s) Nebulizer every 6 hours  aspirin enteric coated 81 milliGRAM(s) Oral daily  atorvastatin 10 milliGRAM(s) Oral at bedtime  buDESOnide 160 MICROgram(s)/formoterol 4.5 MICROgram(s) Inhaler 2 Puff(s) Inhalation two times a day  calcium carbonate 1250 mG  + Vitamin D (OsCal 500 + D) 1 Tablet(s) Oral daily  carvedilol 3.125 milliGRAM(s) Oral every 12 hours  clopidogrel Tablet 75 milliGRAM(s) Oral daily  enoxaparin Injectable 40 milliGRAM(s) SubCutaneous daily  influenza   Vaccine 0.5 milliLiter(s) IntraMuscular once  predniSONE   Tablet 40 milliGRAM(s) Oral daily    MEDICATIONS  (PRN):  acetaminophen   Tablet .. 650 milliGRAM(s) Oral every 6 hours PRN Temp greater or equal to 38C (100.4F), Mild Pain (1 - 3)  traMADol 25 milliGRAM(s) Oral every 8 hours PRN Moderate Pain (4 - 6)    Vital Signs Last 24 Hrs  T(C): 36.2 (17 Oct 2019 06:23), Max: 36.7 (16 Oct 2019 16:15)  T(F): 97.1 (17 Oct 2019 06:23), Max: 98.1 (16 Oct 2019 16:15)  HR: 78 (17 Oct 2019 08:45) (59 - 80)  BP: 134/58 (17 Oct 2019 06:23) (134/58 - 151/61)  BP(mean): --  RR: 17 (17 Oct 2019 06:23) (16 - 18)  SpO2: 99% (17 Oct 2019 08:45) (94% - 100%)    I&O's Summary        Physical Exam:   GENERAL: NAD, well-groomed, well-developed  HEENT: MAYKEL/   Atraumatic, Normocephalic  ENMT: No tonsillar erythema, exudates, or enlargement; Moist mucous membranes, Good dentition, No lesions  NECK: Supple, No JVD, Normal thyroid  CHEST/LUNG: Clear to auscultaion  CVS: Regular rate and rhythm; No murmurs, rubs, or gallops  GI: : Soft, Nontender, Nondistended; Bowel sounds present  NERVOUS SYSTEM:  Alert & Oriented X3  EXTREMITIES:  2+ Peripheral Pulses, No clubbing, cyanosis, or edema  LYMPH: No lymphadenopathy noted  SKIN: No rashes or lesions  ENDOCRINOLOGY: No Thyromegaly  PSYCH: Appropriate    Labs:  27, 27                            12.2   6.59  )-----------( 134      ( 17 Oct 2019 06:00 )             37.9                         11.6   11.92 )-----------( 121      ( 16 Oct 2019 06:30 )             36.2                         12.1   15.50 )-----------( 124      ( 15 Oct 2019 06:43 )             37.0                         12.0   9.41  )-----------( 108      ( 14 Oct 2019 06:04 )             37.9                         12.0   10.82 )-----------( 109      ( 13 Oct 2019 21:50 )             36.9     10-17    139  |  104  |  17  ----------------------------<  107<H>  4.2   |  23  |  0.73  10-16    139  |  105  |  26<H>  ----------------------------<  86  4.2   |  19<L>  |  0.76  10-15    136  |  98  |  26<H>  ----------------------------<  118<H>  4.4   |  24  |  0.94  10-14    134<L>  |  99  |  18  ----------------------------<  163<H>  4.6   |  22  |  0.94  10-13    137  |  98  |  16  ----------------------------<  126<H>  4.4   |  29  |  1.14    Ca    9.3      17 Oct 2019 06:00  Ca    9.0      16 Oct 2019 06:30  Phos  2.7     10-16  Mg     2.1     10-17  Mg     2.1     10-16    TPro  7.3  /  Alb  3.8  /  TBili  0.6  /  DBili  x   /  AST  15  /  ALT  10  /  AlkPhos  97  10-13    CAPILLARY BLOOD GLUCOSE                Procalcitonin, Serum: 0.07 ng/mL (10-15 @ 06:43)  Procalcitonin, Serum: 0.04 ng/mL (10-15 @ 00:11)        RECENT CULTURES:  10-13 @ 22:28 BLOOD PERIPHERAL       < from: CT Chest No Cont (10.14.19 @ 13:37) >    VISUALIZED UPPER ABDOMEN: Scattered calcified granulomas within the   liver. Cholelithiasis. Right interpolar renal cyst.    BONES: Chronic compression deformity of the T12 vertebral body. Severe   compression fracture of the L1 vertebral body which appears new since   1/30/2019. Additional degenerative changes    IMPRESSION:     No pneumonia or suspicious pulmonary nodule.    Severe compression fracture of the L1 vertebral body, new since January 30, 2019.                CHICO DOYLE M.D., RADIOLOGIST RESIDENT  This document has been electronically signed.  VIVIAN ETSRADA M.D., ATTENDING RADIOLOGIST  This document has been electronically signed. Oct 14 2019  4:44PM    < end of copied text >             NO ORGANISMS ISOLATED  NO ORGANISMS ISOLATED AT 72 HRS.        RESPIRATORY CULTURES:          Studies  Chest X-RAY  CT SCAN Chest   Venous Dopplers: LE:   CT Abdomen  Others

## 2019-10-17 NOTE — PROGRESS NOTE ADULT - PROBLEM SELECTOR PROBLEM 5
Essential hypertension
Essential hypertension
Atypical chest pain
Essential hypertension

## 2019-10-17 NOTE — PROGRESS NOTE ADULT - PROBLEM SELECTOR PROBLEM 8
Need for prophylactic measure
Normocytic anemia

## 2019-10-17 NOTE — PROGRESS NOTE ADULT - PROBLEM SELECTOR PLAN 5
Controlled!  10/16: Controlled!  10/17: Controlled!
Controlled!
improved
improved  likely pleuritic and msk in nature  supportive care prn
improved  likely pleuritic and msk in nature  supportive care prn
Controlled!  10/16: Controlled!

## 2019-10-17 NOTE — PROGRESS NOTE ADULT - PROBLEM SELECTOR PLAN 2
resolved: he is alert and awake  10/16: no SOB and remains alert and awake  10/17: resolved
rfesolved: he is alert and awake
improved
improving, c/w steroids, nebs  smoking cessation
steroids, nebs  smoking cessation
resolved: he is alert and awake  10/16: no SOB and remains alert and awake

## 2019-10-17 NOTE — CONSULT NOTE ADULT - ASSESSMENT
78 yo Male, former smoker, with MHx of HTN, SSS - s/p PPM, dementia, HTN, restless leg syndrome, CAD with h/o NSTEMI - s/p ALLYSON x1, admitted for disseminated herpes zoster in 2/2019; On this hospitalization the pt c/o shortness of breath with wheezing and productive cough, brown sputum for few days with associated fever at home, Tmax 101F. Also reports persisting Lt chest pain, worse with deep inspiration, radiating to shoulder for last 24 hours. Otherwise reports no fall, trauma, leg swelling or calf pain. (14 Oct 2019 04:05)  Hematology consulted for evaluation of anemia / thrombocytopenia -      Problem # 1 Anemia  -Review of blood tests since Jan 2019 show normal hgb Jan 2019 then decline to 11/12 gm/dl ever since  -Pt c/o fatigue   -Etiology multifactorial (infection, inflammation, nutritional, neoplastic - MDS_  -Comprehensive testing ordered   -If patient is discharge before blood tests taken, he agrees to come to my office for evaluation    Problem # 2 Thrombocytopenia  -Review of blood tests since Jan 2019 show persistent mild / moderate thrombocytopenia 80 -110 range  -Comprehensive testing ordered as above   -Etiologies are multifactorial as above   -Pt agrees to come to the office for full evaluation.    Thank you   Discussed with Dr. Hernandes

## 2019-10-17 NOTE — PROGRESS NOTE ADULT - PROBLEM SELECTOR PROBLEM 6
Coronary artery disease involving native coronary artery of native heart, angina presence unspecified
Coronary artery disease involving native coronary artery of native heart, angina presence unspecified
Essential hypertension
Coronary artery disease involving native coronary artery of native heart, angina presence unspecified

## 2019-10-17 NOTE — CONSULT NOTE ADULT - SUBJECTIVE AND OBJECTIVE BOX
Patient is a 79y old  Male who presents with a chief complaint of Shortness of breath with productive cough (17 Oct 2019 09:39)      HPI:  78 yo Male, former smoker, with MHx of HTN, SSS - s/p PPM, dementia, HTN, restless leg syndrome, CAD with h/o NSTEMI - s/p ALLYSON x1, admitted for disseminated herpes zoster in 2/2019; On this hospitalization the pt c/o shortness of breath with wheezing and productive cough, brown sputum for few days with associated fever at home, Tmax 101F. Also reports persisting Lt chest pain, worse with deep inspiration, radiating to shoulder for last 24 hours. Otherwise reports no fall, trauma, leg swelling or calf pain. (14 Oct 2019 04:05)  Hematology consulted for evaluation of anemia / thrombocytopenia -      ROS:  Negative except for:    PAST MEDICAL & SURGICAL HISTORY:  RLS (restless legs syndrome)  Pacemaker  Dementia  HTN (hypertension)  CAD (coronary artery disease)  Artificial pacemaker: [employer]  H/O hydrocele  S/P skin biopsy: Behind L ear  History of appendectomy      SOCIAL HISTORY:    FAMILY HISTORY:  Family history of pacemaker: brother      MEDICATIONS  (STANDING):  ALBUTerol/ipratropium for Nebulization 3 milliLiter(s) Nebulizer every 6 hours  aspirin enteric coated 81 milliGRAM(s) Oral daily  atorvastatin 10 milliGRAM(s) Oral at bedtime  buDESOnide 160 MICROgram(s)/formoterol 4.5 MICROgram(s) Inhaler 2 Puff(s) Inhalation two times a day  calcium carbonate 1250 mG  + Vitamin D (OsCal 500 + D) 1 Tablet(s) Oral daily  carvedilol 3.125 milliGRAM(s) Oral every 12 hours  clopidogrel Tablet 75 milliGRAM(s) Oral daily  enoxaparin Injectable 40 milliGRAM(s) SubCutaneous daily  influenza   Vaccine 0.5 milliLiter(s) IntraMuscular once  predniSONE   Tablet 40 milliGRAM(s) Oral daily    MEDICATIONS  (PRN):  acetaminophen   Tablet .. 650 milliGRAM(s) Oral every 6 hours PRN Temp greater or equal to 38C (100.4F), Mild Pain (1 - 3)  traMADol 25 milliGRAM(s) Oral every 8 hours PRN Moderate Pain (4 - 6)      Allergies    vancomycin (Hives)    Intolerances        Vital Signs Last 24 Hrs  T(C): 36.2 (17 Oct 2019 06:23), Max: 36.9 (16 Oct 2019 10:11)  T(F): 97.1 (17 Oct 2019 06:23), Max: 98.5 (16 Oct 2019 10:11)  HR: 78 (17 Oct 2019 08:45) (59 - 84)  BP: 134/58 (17 Oct 2019 06:23) (122/47 - 151/61)  BP(mean): --  RR: 17 (17 Oct 2019 06:23) (16 - 18)  SpO2: 99% (17 Oct 2019 08:45) (94% - 100%)    PHYSICAL EXAM  General: adult in NAD  HEENT: clear oropharynx, anicteric sclera, pink conjunctiva  Neck: supple  CV: normal S1/S2 with no murmur rubs or gallops  Lungs: positive air movement b/l ant lungs,clear to auscultation, no wheezes, no rales  Abdomen: soft non-tender non-distended, no hepatosplenomegaly  Ext: no clubbing cyanosis or edema  Skin: no rashes and no petechiae  Neuro: alert and oriented X 4, no focal deficits      LABS:                          12.2   6.59  )-----------( 134      ( 17 Oct 2019 06:00 )             37.9         Mean Cell Volume : 98.4 fL  Mean Cell Hemoglobin : 31.7 pg  Mean Cell Hemoglobin Concentration : 32.2 %  Auto Neutrophil # : x  Auto Lymphocyte # : x  Auto Monocyte # : x  Auto Eosinophil # : x  Auto Basophil # : x  Auto Neutrophil % : x  Auto Lymphocyte % : x  Auto Monocyte % : x  Auto Eosinophil % : x  Auto Basophil % : x      Serial CBC's  10-17 @ 06:00  Hct-37.9 / Hgb-12.2 / Plat-134 / RBC-3.85 / WBC-6.59  Serial CBC's  10-16 @ 06:30  Hct-36.2 / Hgb-11.6 / Plat-121 / RBC-3.70 / WBC-11.92  Serial CBC's  10-15 @ 06:43  Hct-37.0 / Hgb-12.1 / Plat-124 / RBC-3.83 / WBC-15.50  Serial CBC's  10-14 @ 06:04  Hct-37.9 / Hgb-12.0 / Plat-108 / RBC-3.81 / WBC-9.41  Serial CBC's  10-13 @ 21:50  Hct-36.9 / Hgb-12.0 / Plat-109 / RBC-3.75 / WBC-10.82      10-17    139  |  104  |  17  ----------------------------<  107<H>  4.2   |  23  |  0.73    Ca    9.3      17 Oct 2019 06:00  Phos  2.7     10-16  Mg     2.1     10-17                        BLOOD SMEAR INTERPRETATION:       RADIOLOGY & ADDITIONAL STUDIES:

## 2019-10-17 NOTE — PROGRESS NOTE ADULT - PROBLEM SELECTOR PLAN 1
ct chst noted: no pneumonia: no effusions: ? dc ceftriaxone??  10/16: Abiotics have been dced: limit steroids for 5 days only:  10/17: doing pretty good: no SOB : finish steroids soon
ct chst noted: no pneumonia: no effusions: ? dc ceftriaxone??
2/2 acute bronchitis  d/c rocephin, complete azithro course, nebs, prednisone with taper for copd exacerbation, PT, fluid hydration for dehydration, d/c planning 10/17/19     pain control and PT for L1 compression fracture  adjust management per consultants  all team members management and pt care greatly appreciated   smoking cessation education provided, nicotine patch  eventual outpt pcp f/u with Parag Young within 5-7 days of discharge
2/2 acute bronchitis  improved  pt is medically cleared for safe discharge with outpt pcp f/u within 7 days  all team members management appreciated
2/2 acute bronchitis, less likely pneumonia, ct chest results appreciated  increase in leukocytosis today 2/2 steroids  on abx, adjust per consultants  steroids for copd exacebration   pain control and PT for L1 compression fracture  adjust management per consultants  all team members management and pt care greatly appreciated   smoking cessation education provided, nicotine patch  eventual outpt pcp f/u with Parag Young within 5-7 days of discharge
ct chst noted: no pneumonia: no effusions: ? dc ceftriaxone??  10/16: Abiotics have been dced: limit steroids for 5 days only:

## 2019-10-17 NOTE — PROGRESS NOTE ADULT - PROVIDER SPECIALTY LIST ADULT
Infectious Disease
Internal Medicine
Pulmonology
Infectious Disease
Pulmonology
Pulmonology

## 2019-10-17 NOTE — PROGRESS NOTE ADULT - PROBLEM SELECTOR PROBLEM 1
COPD exacerbation
Sepsis, due to unspecified organism, unspecified whether acute organ dysfunction present
COPD exacerbation
COPD exacerbation

## 2019-10-17 NOTE — PROGRESS NOTE ADULT - PROBLEM SELECTOR PLAN 4
no pneumonia or nodules on ct chest !  10/17: stable off antibtiocs
no pneumonia or nodules on ct chest !
-Treat COPD as above  -
c/w tx as above
c/w tx as above
no pneumonia or nodules on ct chest !

## 2019-10-17 NOTE — PROGRESS NOTE ADULT - PROBLEM SELECTOR PROBLEM 7
Normocytic anemia
Normocytic anemia
Coronary artery disease involving native coronary artery of native heart, angina presence unspecified
Normocytic anemia

## 2019-10-17 NOTE — PROGRESS NOTE ADULT - PROBLEM SELECTOR PLAN 6
Cont home meds
Cont home meds
Holding Lisinopril and Amlodipine
Holding Lisinopril and Amlodipine   adjust rx prn
Holding Lisinopril and Amlodipine   adjust rx prn
Cont home meds

## 2019-10-17 NOTE — CONSULT NOTE ADULT - REASON FOR ADMISSION
Shortness of breath with productive cough

## 2019-10-17 NOTE — PROGRESS NOTE ADULT - REASON FOR ADMISSION
Shortness of breath with productive cough

## 2019-10-17 NOTE — PROGRESS NOTE ADULT - SUBJECTIVE AND OBJECTIVE BOX
Patient seen and examined at bedside  No acute events noted overnight  Case discussed with medical team    HPI:  80 yo Male, former smoker, with MHx of HTN, SSS - s/p PPM, dementia, HTN, restless leg syndrome, CAD with h/o NSTEMI - s/p ALLYSON x1, admitted for disseminated herpes zoster in 2/2019; On this hospitalization the pt c/o shortness of breath with wheezing and productive cough, brown sputum for few days with associated fever at home, Tmax 101F. Also reports persisting Lt chest pain, worse with deep inspiration, radiating to shoulder for last 24 hours. Otherwise reports no fall, trauma, leg swelling or calf pain. (14 Oct 2019 04:05)      PAST MEDICAL & SURGICAL HISTORY:  RLS (restless legs syndrome)  Pacemaker  Dementia  HTN (hypertension)  CAD (coronary artery disease)  Artificial pacemaker: [place]  H/O hydrocele  S/P skin biopsy: Behind L ear  History of appendectomy      vancomycin (Hives)       MEDICATIONS  (STANDING):  ALBUTerol/ipratropium for Nebulization 3 milliLiter(s) Nebulizer every 6 hours  aspirin enteric coated 81 milliGRAM(s) Oral daily  atorvastatin 10 milliGRAM(s) Oral at bedtime  buDESOnide 160 MICROgram(s)/formoterol 4.5 MICROgram(s) Inhaler 2 Puff(s) Inhalation two times a day  calcium carbonate 1250 mG  + Vitamin D (OsCal 500 + D) 1 Tablet(s) Oral daily  carvedilol 3.125 milliGRAM(s) Oral every 12 hours  clopidogrel Tablet 75 milliGRAM(s) Oral daily  enoxaparin Injectable 40 milliGRAM(s) SubCutaneous daily  influenza   Vaccine 0.5 milliLiter(s) IntraMuscular once  predniSONE   Tablet 40 milliGRAM(s) Oral daily    MEDICATIONS  (PRN):  acetaminophen   Tablet .. 650 milliGRAM(s) Oral every 6 hours PRN Temp greater or equal to 38C (100.4F), Mild Pain (1 - 3)  traMADol 25 milliGRAM(s) Oral every 8 hours PRN Moderate Pain (4 - 6)      REVIEW OF SYSTEMS:  CONSTITUTIONAL: (+) malaise.   EYES: No acute change in vision   ENT:  No tinnitus  NECK: No stiffness  RESPIRATORY: No hemoptysis  CARDIOVASCULAR: No chest pain, palpitations, syncope  GASTROINTESTINAL: No hematemesis, diarrhea, melena, or hematochezia.  GENITOURINARY: No hematuria  NEUROLOGICAL: No headaches  LYMPH Nodes: No enlarged glands  ENDOCRINE: No heat or cold intolerance	    T(C): 36.2 (10-17-19 @ 06:23), Max: 36.9 (10-16-19 @ 10:11)  HR: 78 (10-17-19 @ 08:45) (59 - 84)  BP: 134/58 (10-17-19 @ 06:23) (122/47 - 151/61)  RR: 17 (10-17-19 @ 06:23) (16 - 18)  SpO2: 99% (10-17-19 @ 08:45) (94% - 100%)    PHYSICAL EXAMINATION:   Constitutional: WD, NAD  HEENT: NC, AT  Neck:  Supple  Respiratory:  Adequate airflow b/l. Not using accessory muscles of respiration.  Cardiovascular:  S1 & S2 intact, no R/G, 2+ radial pulses b/l  Gastrointestinal: Soft, NT, ND, normoactive b.s., no organomegaly/RT/rigidity  Extremities: WWP  Neurological:  Alert and awake.  No acute focal motor deficits. Crude sensation intact.     Labs and imaging reviewed    LABS:                        12.2   6.59  )-----------( 134      ( 17 Oct 2019 06:00 )             37.9     10-17    139  |  104  |  17  ----------------------------<  107<H>  4.2   |  23  |  0.73    Ca    9.3      17 Oct 2019 06:00  Phos  2.7     10-16  Mg     2.1     10-17              CAPILLARY BLOOD GLUCOSE                  RADIOLOGY & ADDITIONAL STUDIES:

## 2019-10-18 LAB
BACTERIA BLD CULT: SIGNIFICANT CHANGE UP
BACTERIA BLD CULT: SIGNIFICANT CHANGE UP

## 2020-01-01 NOTE — H&P ADULT - GIT ABD PE PAL DETAILS PC
Spoke to mom this morning about the hospital policy requiring a CMV saliva swab in the event that a  is not able to pass the hearing screen in both ears. Mother refuses to have test done on the infant. , Corby, was consulted and referred me to the ADM due to the legal nature of the situation. Spoke to ADM who referred me to the hospital's legal team. Spoke to hospital  (Farzana Matos) who advised me to get ethics team involved. Spoke to mom this morning about the hospital policy requiring a CMV saliva swab in the event that a  is not able to pass the hearing screen in both ears. Mother refuses to have test done on the infant. , Corby, was consulted and referred me to the ADM due to the legal nature of the situation. Spoke to ADM who referred me to the hospital's legal team. Spoke to hospital  (Farzana Matos) who advised me to get ethics team involved.      Attending addendum: Mother refusing state mandated testing. Repeat hearing screen. Refused NBS, until in dept counseling decided to do screen. As note above states will consult social work, medical legal team for further clarification if patient can be proceed and be discharged. Infant is otherwise well and healthy.     I saw and examined pt, mother counseled at bedside. Infant is feeding and behaving normally.    Physical Exam:    Infant appears active, with normal color, normal  cry.    Skin is intact, no lesions. No jaundice.    Scalp is normal with open, soft, flat fontanels, normal sutures, no edema or hematoma.    Eyes with nl light reflex b/l, sclera clear, Ears symmetric, cartilage well formed, no pits or tags, Nares patent b/l, palate intact, lips and tongue normal.    Normal spontaneous respirations with no retractions, clear to auscultation b/l.    Strong, regular heart beat with no murmur, PMI normal, 2+ b/l femoral pulses. Thorax appears symmetric.    Abdomen soft, normal bowel sounds, no masses palpated, no spleen palpated, umbilicus nl with 2 art 1 vein.    Spine normal with no midline defects, anus patent.    Hips normal b/l, neg ortalani,  neg lennon    Ext normal x 4, 10 fingers 10 toes b/l. No clavicular crepitus or tenderness.    Good tone, no lethargy, normal cry, suck, grasp, satish, gag, swallow.    Genitalia normal male, testes descended b/l    A/P: Well . Physical Exam within normal limits. Feeding ad henry. Routine care. Parents aware of plan of care. consult social work, medical legal team for further clarification Spoke to mom this morning about the hospital policy requiring a CMV saliva swab in the event that a  is not able to pass the hearing screen in both ears. Mother refuses to have test done on the infant. , Corby, was consulted and referred me to the ADM due to the legal nature of the situation. Spoke to ADM who referred me to the hospital's legal team. Spoke to hospital  (Farzana Matos) who advised me to get ethics team involved.      Attending addendum: Mother refusing state mandated testing. Repeat hearing screen. Refused NBS, until in dept counseling decided to do screen. As note above states will consult social work, medical legal team for further clarification if patient can be proceed and be discharged. Infant is otherwise well.    I saw and examined pt, mother counseled at bedside. Infant is feeding and behaving normally.    Physical Exam:    Infant appears active, with normal color, normal  cry.    Skin is intact, no lesions. No jaundice.    Scalp is normal with open, soft, flat fontanels, normal sutures, no edema or hematoma.    Eyes with nl light reflex b/l, sclera clear, Ears symmetric, cartilage well formed, no pits or tags, Nares patent b/l, palate intact, lips and tongue normal.    Normal spontaneous respirations with no retractions, clear to auscultation b/l.    Strong, regular heart beat with no murmur, PMI normal, 2+ b/l femoral pulses. Thorax appears symmetric.    Abdomen soft, normal bowel sounds, no masses palpated, no spleen palpated, umbilicus nl with 2 art 1 vein.    Spine normal with no midline defects, anus patent.    Hips normal b/l, neg ortalani,  neg lennon    Ext normal x 4, 10 fingers 10 toes b/l. No clavicular crepitus or tenderness.    Good tone, no lethargy, normal cry, suck, grasp, satish, gag, swallow.    Genitalia normal male, testes descended b/l    A/P: Well . Physical Exam within normal limits. Feeding ad henry. Routine care. Parents aware of plan of care. consult social work, medical legal team for further clarification distended

## 2020-01-28 NOTE — ED ADULT NURSE NOTE - TEMPLATE
[>50% of Time Spent on Counseling for ____] : Greater than 50% of the encounter time was spent on counseling for [unfilled] [Time Spent: ___ minutes] : I have spent [unfilled] minutes of face to face time with the patient Respiratory

## 2020-04-17 NOTE — H&P ADULT - OPHTHALMOLOGIC
Adderall Pending    Additonal information required from provider.  Prescription Drug Insurance: [place] Medicaid    Notes: We faxed the paperwork to the office. Please sign, date and fax to patient's pharmacy   details…

## 2020-08-22 ENCOUNTER — INPATIENT (INPATIENT)
Facility: HOSPITAL | Age: 80
LOS: 2 days | Discharge: NURSING, REHAB OR LONG-TERM CARE | End: 2020-08-25
Attending: HOSPITALIST | Admitting: HOSPITALIST
Payer: MEDICARE

## 2020-08-22 VITALS
HEART RATE: 65 BPM | RESPIRATION RATE: 20 BRPM | SYSTOLIC BLOOD PRESSURE: 127 MMHG | OXYGEN SATURATION: 98 % | TEMPERATURE: 98 F | DIASTOLIC BLOOD PRESSURE: 66 MMHG

## 2020-08-22 DIAGNOSIS — Z98.890 OTHER SPECIFIED POSTPROCEDURAL STATES: Chronic | ICD-10-CM

## 2020-08-22 DIAGNOSIS — Z95.0 PRESENCE OF CARDIAC PACEMAKER: Chronic | ICD-10-CM

## 2020-08-22 DIAGNOSIS — Z90.49 ACQUIRED ABSENCE OF OTHER SPECIFIED PARTS OF DIGESTIVE TRACT: Chronic | ICD-10-CM

## 2020-08-22 DIAGNOSIS — Z87.438 PERSONAL HISTORY OF OTHER DISEASES OF MALE GENITAL ORGANS: Chronic | ICD-10-CM

## 2020-08-22 LAB
ALBUMIN SERPL ELPH-MCNC: 2.8 G/DL — LOW (ref 3.3–5)
ALP SERPL-CCNC: 64 U/L — SIGNIFICANT CHANGE UP (ref 40–120)
ALT FLD-CCNC: 18 U/L — SIGNIFICANT CHANGE UP (ref 4–41)
ANION GAP SERPL CALC-SCNC: 11 MMO/L — SIGNIFICANT CHANGE UP (ref 7–14)
ANISOCYTOSIS BLD QL: SLIGHT — SIGNIFICANT CHANGE UP
APTT BLD: 27 SEC — SIGNIFICANT CHANGE UP (ref 27–36.3)
AST SERPL-CCNC: 35 U/L — SIGNIFICANT CHANGE UP (ref 4–40)
BASOPHILS # BLD AUTO: 0.01 K/UL — SIGNIFICANT CHANGE UP (ref 0–0.2)
BASOPHILS NFR BLD AUTO: 0.3 % — SIGNIFICANT CHANGE UP (ref 0–2)
BASOPHILS NFR SPEC: 0.9 % — SIGNIFICANT CHANGE UP (ref 0–2)
BILIRUB SERPL-MCNC: 1 MG/DL — SIGNIFICANT CHANGE UP (ref 0.2–1.2)
BLASTS # FLD: 0 % — SIGNIFICANT CHANGE UP (ref 0–0)
BUN SERPL-MCNC: 24 MG/DL — HIGH (ref 7–23)
CALCIUM SERPL-MCNC: 9 MG/DL — SIGNIFICANT CHANGE UP (ref 8.4–10.5)
CHLORIDE SERPL-SCNC: 105 MMOL/L — SIGNIFICANT CHANGE UP (ref 98–107)
CO2 SERPL-SCNC: 22 MMOL/L — SIGNIFICANT CHANGE UP (ref 22–31)
CREAT SERPL-MCNC: 1.05 MG/DL — SIGNIFICANT CHANGE UP (ref 0.5–1.3)
EOSINOPHIL # BLD AUTO: 0.07 K/UL — SIGNIFICANT CHANGE UP (ref 0–0.5)
EOSINOPHIL NFR BLD AUTO: 2.1 % — SIGNIFICANT CHANGE UP (ref 0–6)
EOSINOPHIL NFR FLD: 2.7 % — SIGNIFICANT CHANGE UP (ref 0–6)
GIANT PLATELETS BLD QL SMEAR: PRESENT — SIGNIFICANT CHANGE UP
GLUCOSE SERPL-MCNC: 101 MG/DL — HIGH (ref 70–99)
HCT VFR BLD CALC: 23.6 % — LOW (ref 39–50)
HGB BLD-MCNC: 7.5 G/DL — LOW (ref 13–17)
IMM GRANULOCYTES NFR BLD AUTO: 0.3 % — SIGNIFICANT CHANGE UP (ref 0–1.5)
INR BLD: 1.39 — HIGH (ref 0.88–1.16)
LYMPHOCYTES # BLD AUTO: 0.87 K/UL — LOW (ref 1–3.3)
LYMPHOCYTES # BLD AUTO: 25.8 % — SIGNIFICANT CHANGE UP (ref 13–44)
LYMPHOCYTES NFR SPEC AUTO: 9.2 % — LOW (ref 13–44)
MACROCYTES BLD QL: SLIGHT — SIGNIFICANT CHANGE UP
MCHC RBC-ENTMCNC: 31.8 % — LOW (ref 32–36)
MCHC RBC-ENTMCNC: 33.3 PG — SIGNIFICANT CHANGE UP (ref 27–34)
MCV RBC AUTO: 104.9 FL — HIGH (ref 80–100)
METAMYELOCYTES # FLD: 0 % — SIGNIFICANT CHANGE UP (ref 0–1)
MONOCYTES # BLD AUTO: 0.67 K/UL — SIGNIFICANT CHANGE UP (ref 0–0.9)
MONOCYTES NFR BLD AUTO: 19.9 % — HIGH (ref 2–14)
MONOCYTES NFR BLD: 8.3 % — SIGNIFICANT CHANGE UP (ref 2–9)
MYELOCYTES NFR BLD: 0 % — SIGNIFICANT CHANGE UP (ref 0–0)
NEUTROPHIL AB SER-ACNC: 75.2 % — SIGNIFICANT CHANGE UP (ref 43–77)
NEUTROPHILS # BLD AUTO: 1.74 K/UL — LOW (ref 1.8–7.4)
NEUTROPHILS NFR BLD AUTO: 51.6 % — SIGNIFICANT CHANGE UP (ref 43–77)
NEUTS BAND # BLD: 0.9 % — SIGNIFICANT CHANGE UP (ref 0–6)
NRBC # BLD: 1 /100WBC — SIGNIFICANT CHANGE UP
NRBC # FLD: 0 K/UL — SIGNIFICANT CHANGE UP (ref 0–0)
OB PNL STL: NEGATIVE — SIGNIFICANT CHANGE UP
OTHER - HEMATOLOGY %: 0 — SIGNIFICANT CHANGE UP
PLATELET # BLD AUTO: 26 K/UL — LOW (ref 150–400)
PLATELET COUNT - ESTIMATE: SIGNIFICANT CHANGE UP
PMV BLD: 13.2 FL — HIGH (ref 7–13)
POLYCHROMASIA BLD QL SMEAR: SLIGHT — SIGNIFICANT CHANGE UP
POTASSIUM SERPL-MCNC: 4.4 MMOL/L — SIGNIFICANT CHANGE UP (ref 3.5–5.3)
POTASSIUM SERPL-SCNC: 4.4 MMOL/L — SIGNIFICANT CHANGE UP (ref 3.5–5.3)
PROMYELOCYTES # FLD: 0 % — SIGNIFICANT CHANGE UP (ref 0–0)
PROT SERPL-MCNC: 5.1 G/DL — LOW (ref 6–8.3)
PROTHROM AB SERPL-ACNC: 15.6 SEC — HIGH (ref 10.6–13.6)
RBC # BLD: 2.25 M/UL — LOW (ref 4.2–5.8)
RBC # FLD: 19.8 % — HIGH (ref 10.3–14.5)
REVIEW TO FOLLOW: YES — SIGNIFICANT CHANGE UP
SMUDGE CELLS # BLD: PRESENT — SIGNIFICANT CHANGE UP
SODIUM SERPL-SCNC: 138 MMOL/L — SIGNIFICANT CHANGE UP (ref 135–145)
VARIANT LYMPHS # BLD: 2.8 % — SIGNIFICANT CHANGE UP
WBC # BLD: 3.37 K/UL — LOW (ref 3.8–10.5)
WBC # FLD AUTO: 3.37 K/UL — LOW (ref 3.8–10.5)

## 2020-08-22 RX ORDER — ACETAMINOPHEN 500 MG
975 TABLET ORAL ONCE
Refills: 0 | Status: COMPLETED | OUTPATIENT
Start: 2020-08-22 | End: 2020-08-22

## 2020-08-22 RX ADMIN — Medication 975 MILLIGRAM(S): at 23:44

## 2020-08-22 NOTE — ED ADULT NURSE NOTE - CHIEF COMPLAINT QUOTE
as per EMS patient niece noticed a right facial droop and slurred speech at 4PM and noticed he also had a foul smelling urine. Patient has hx of baseline dementia. Currently patient is ALOCx2. Patient has facial symmetry equal strength on upper arms bilaterally, and bilateral weakness to b/l lower extremities. evaluated by MD noriega. no stroke code at this time    Niece # [phone]

## 2020-08-22 NOTE — ED PROVIDER NOTE - NS ED MD DISPO DIVISION
ERON old midline surg scar; RUQ biliary drains in place, +BS; no abd distention, no focal ttp; benign abd exam; suprapubic fullness, relieved s/p jiménez placement;

## 2020-08-22 NOTE — ED ADULT NURSE NOTE - OBJECTIVE STATEMENT
Received Pt in trauma A. Pt is a 80 Y old man. pt brought in by ambulance from home. As per triage note niece noticed a right facial droop and slurred speech at 4PM and noticed he also had a foul smelling urine. Patient has hx of baseline dementia. Pt currently A&OX1 (name) at this time. Patient has facial symmetry equal strength on upper arms bilaterally, and bilateral weakness to b/l lower extremities. Pt appears stable and in no apparent distress at this time. vitals stable as noted. Respirations are equal & nonlabored, no respiratory distress noted. Pt denies any other medical complaints at this time. denies pain, sob, fever, cough, headache. pt arrived with 20 gauge iv to the right hand placed by EMS flushing well. labs sent. covid sent. awaiting further plan Received Pt in trauma A. Pt is a 80 Y old man. pt brought in by ambulance from home. As per triage note niece noticed a right facial droop and slurred speech at 4PM and noticed he also had a foul smelling urine. Patient has hx of baseline dementia. Pt currently A&OX1 (name) at this time. Patient has facial symmetry equal strength on upper arms bilaterally, and bilateral weakness to b/l lower extremities. Scaly rash noted to b/l arms and legs. Scab like rash noted to back. Pt appears stable and in no apparent distress at this time. vitals stable as noted. Respirations are equal & nonlabored, no respiratory distress noted. Pt denies any other medical complaints at this time. denies pain, sob, fever, cough, headache. pt arrived with 20 gauge iv to the right hand placed by EMS flushing well. labs sent. covid sent. awaiting further plan

## 2020-08-22 NOTE — ED ADULT TRIAGE NOTE - CHIEF COMPLAINT QUOTE
as per EMS patient niece noticed a right facial droop and slurred speech at 4PM and noticed he also had a foul smelling urine. Patient has hx of baseline dementia. Currently patient is ALOCx2. Patient has facial symmetry equal strength on upper arms bilaterally, and bilateral weakness to b/l lower extremities.     Niece # [phone] as per EMS patient niece noticed a right facial droop and slurred speech at 4PM and noticed he also had a foul smelling urine. Patient has hx of baseline dementia. Currently patient is ALOCx2. Patient has facial symmetry equal strength on upper arms bilaterally, and bilateral weakness to b/l lower extremities. evaluated by MD noriega. no stroke code at this time    Niece # [phone]

## 2020-08-22 NOTE — ED PROVIDER NOTE - CLINICAL SUMMARY MEDICAL DECISION MAKING FREE TEXT BOX
79y male with confusion, slurred speech. Concern for stroke, ams 2/2 infection will get ct head, labs, ekg, cxr, UA. Reassess.

## 2020-08-22 NOTE — ED ADULT NURSE REASSESSMENT NOTE - NS ED NURSE REASSESS COMMENT FT1
pt aox1 in no apparent distress VSS denies complaints, labs sent awaiting further orders breaths equal and unlabored will continue to monitor

## 2020-08-22 NOTE — ED PROVIDER NOTE - OBJECTIVE STATEMENT
79M with pmh HTN, SSS with PPM, dementia, HTN, CAD with stent on DAPT presenting with AMS. Patient reported to be more confused today, possible slurred speech and noted to slump down from bed today, witnessed by family. Last known normal unclear. No trauma or LOC. Per family patient seemed more confused, hallucinating intermittently. No report of recent illness. No report of fever, vomiting, diarrhea, pain. 79M with pmh HTN, SSS with PPM, dementia, HTN, CAD with stent on DAPT presenting with AMS. Patient reported to be more confused today, possible slurred speech and noted to slump down from bed today, witnessed by family. Last known normal unclear. No trauma or LOC. Per family patient seemed more confused, hallucinating intermittently. No report of recent illness. No report of fever, vomiting, diarrhea, pain.  Second Chart MR# [id number]

## 2020-08-22 NOTE — ED ADULT NURSE NOTE - INTERVENTIONS DEFINITIONS
Physically safe environment: no spills, clutter or unnecessary equipment/Monitor for mental status changes and reorient to person, place, and time/Monitor gait and stability

## 2020-08-22 NOTE — ED PROVIDER NOTE - PHYSICAL EXAMINATION
GEN: no apparent distress, AOx2  HEENT: NCAT, MMM, normal conjunctiva, perrl  CHEST/LUNGS: Non-tachypneic, CTAB, bilateral breath sounds  CARDIAC: Non-tachycardic, s1s2, normal perfusion, no peripheral edema  ABDOMEN: Soft, NTND, No rebound/guarding  MSK: No joint tenderness, no gross deformity of extremities  SKIN: No rashes, no petechiae, no vesicles  NEURO: CN grossly intact, normal coordination, no focal motor or sensory deficits GEN: no apparent distress, AOx2  HEENT: NCAT, MMM, normal conjunctiva, perrl,   CHEST/LUNGS: Non-tachypneic, CTAB, bilateral breath sounds  CARDIAC: Non-tachycardic, s1s2, normal perfusion, no peripheral edema  ABDOMEN: Soft, NTND, No rebound/guarding  MSK: No joint tenderness, no gross deformity of extremities  SKIN: No rashes, no petechiae, no vesicles  NEURO: CN grossly intact, normal coordination, no focal motor or sensory deficits, edentulous with no appreciable significant slurred speech

## 2020-08-23 DIAGNOSIS — G93.40 ENCEPHALOPATHY, UNSPECIFIED: ICD-10-CM

## 2020-08-23 DIAGNOSIS — D61.818 OTHER PANCYTOPENIA: ICD-10-CM

## 2020-08-23 DIAGNOSIS — Z95.5 PRESENCE OF CORONARY ANGIOPLASTY IMPLANT AND GRAFT: Chronic | ICD-10-CM

## 2020-08-23 DIAGNOSIS — I10 ESSENTIAL (PRIMARY) HYPERTENSION: ICD-10-CM

## 2020-08-23 DIAGNOSIS — F32.9 MAJOR DEPRESSIVE DISORDER, SINGLE EPISODE, UNSPECIFIED: ICD-10-CM

## 2020-08-23 DIAGNOSIS — I25.10 ATHEROSCLEROTIC HEART DISEASE OF NATIVE CORONARY ARTERY WITHOUT ANGINA PECTORIS: ICD-10-CM

## 2020-08-23 DIAGNOSIS — N43.3 HYDROCELE, UNSPECIFIED: Chronic | ICD-10-CM

## 2020-08-23 DIAGNOSIS — I49.5 SICK SINUS SYNDROME: ICD-10-CM

## 2020-08-23 DIAGNOSIS — Z79.899 OTHER LONG TERM (CURRENT) DRUG THERAPY: ICD-10-CM

## 2020-08-23 DIAGNOSIS — Z29.9 ENCOUNTER FOR PROPHYLACTIC MEASURES, UNSPECIFIED: ICD-10-CM

## 2020-08-23 DIAGNOSIS — Z90.49 ACQUIRED ABSENCE OF OTHER SPECIFIED PARTS OF DIGESTIVE TRACT: Chronic | ICD-10-CM

## 2020-08-23 DIAGNOSIS — D64.9 ANEMIA, UNSPECIFIED: ICD-10-CM

## 2020-08-23 DIAGNOSIS — F03.90 UNSPECIFIED DEMENTIA WITHOUT BEHAVIORAL DISTURBANCE: ICD-10-CM

## 2020-08-23 LAB
ALBUMIN SERPL ELPH-MCNC: 3 G/DL — LOW (ref 3.3–5)
ALP SERPL-CCNC: 71 U/L — SIGNIFICANT CHANGE UP (ref 40–120)
ALT FLD-CCNC: 16 U/L — SIGNIFICANT CHANGE UP (ref 4–41)
ANION GAP SERPL CALC-SCNC: 12 MMO/L — SIGNIFICANT CHANGE UP (ref 7–14)
APPEARANCE UR: CLEAR — SIGNIFICANT CHANGE UP
AST SERPL-CCNC: 23 U/L — SIGNIFICANT CHANGE UP (ref 4–40)
BACTERIA # UR AUTO: HIGH
BASOPHILS # BLD AUTO: 0.03 K/UL — SIGNIFICANT CHANGE UP (ref 0–0.2)
BASOPHILS NFR BLD AUTO: 0.8 % — SIGNIFICANT CHANGE UP (ref 0–2)
BILIRUB SERPL-MCNC: 1.1 MG/DL — SIGNIFICANT CHANGE UP (ref 0.2–1.2)
BILIRUB UR-MCNC: NEGATIVE — SIGNIFICANT CHANGE UP
BLD GP AB SCN SERPL QL: NEGATIVE — SIGNIFICANT CHANGE UP
BLOOD UR QL VISUAL: NEGATIVE — SIGNIFICANT CHANGE UP
BUN SERPL-MCNC: 21 MG/DL — SIGNIFICANT CHANGE UP (ref 7–23)
CALCIUM SERPL-MCNC: 8.8 MG/DL — SIGNIFICANT CHANGE UP (ref 8.4–10.5)
CHLORIDE SERPL-SCNC: 106 MMOL/L — SIGNIFICANT CHANGE UP (ref 98–107)
CO2 SERPL-SCNC: 24 MMOL/L — SIGNIFICANT CHANGE UP (ref 22–31)
COLOR SPEC: YELLOW — SIGNIFICANT CHANGE UP
CREAT SERPL-MCNC: 1.01 MG/DL — SIGNIFICANT CHANGE UP (ref 0.5–1.3)
D DIMER BLD IA.RAPID-MCNC: 332 NG/ML — SIGNIFICANT CHANGE UP
DAT C3-SP REAG RBC QL: NEGATIVE — SIGNIFICANT CHANGE UP
DAT POLY-SP REAG RBC QL: NEGATIVE — SIGNIFICANT CHANGE UP
DIRECT COOMBS IGG: NEGATIVE — SIGNIFICANT CHANGE UP
EOSINOPHIL # BLD AUTO: 0.09 K/UL — SIGNIFICANT CHANGE UP (ref 0–0.5)
EOSINOPHIL NFR BLD AUTO: 2.3 % — SIGNIFICANT CHANGE UP (ref 0–6)
FERRITIN SERPL-MCNC: 653.1 NG/ML — HIGH (ref 30–400)
FIBRINOGEN PPP-MCNC: 572 MG/DL — HIGH (ref 290–520)
FOLATE SERPL-MCNC: 18.6 NG/ML — SIGNIFICANT CHANGE UP (ref 4.7–20)
GLUCOSE SERPL-MCNC: 98 MG/DL — SIGNIFICANT CHANGE UP (ref 70–99)
GLUCOSE UR-MCNC: NEGATIVE — SIGNIFICANT CHANGE UP
HAPTOGLOB SERPL-MCNC: 171 MG/DL — SIGNIFICANT CHANGE UP (ref 34–200)
HCT VFR BLD CALC: 28.4 % — LOW (ref 39–50)
HCT VFR BLD CALC: 28.8 % — LOW (ref 39–50)
HGB BLD-MCNC: 8.6 G/DL — LOW (ref 13–17)
HGB BLD-MCNC: 9 G/DL — LOW (ref 13–17)
HYALINE CASTS # UR AUTO: NEGATIVE — SIGNIFICANT CHANGE UP
IMM GRANULOCYTES NFR BLD AUTO: 0.8 % — SIGNIFICANT CHANGE UP (ref 0–1.5)
INR BLD: 1.32 — HIGH (ref 0.88–1.16)
IRON SATN MFR SERPL: 257 UG/DL — SIGNIFICANT CHANGE UP (ref 155–535)
IRON SATN MFR SERPL: 57 UG/DL — SIGNIFICANT CHANGE UP (ref 45–165)
KETONES UR-MCNC: NEGATIVE — SIGNIFICANT CHANGE UP
LDH SERPL L TO P-CCNC: 355 U/L — HIGH (ref 135–225)
LEUKOCYTE ESTERASE UR-ACNC: SIGNIFICANT CHANGE UP
LYMPHOCYTES # BLD AUTO: 0.92 K/UL — LOW (ref 1–3.3)
LYMPHOCYTES # BLD AUTO: 23.4 % — SIGNIFICANT CHANGE UP (ref 13–44)
MAGNESIUM SERPL-MCNC: 1.8 MG/DL — SIGNIFICANT CHANGE UP (ref 1.6–2.6)
MANUAL SMEAR VERIFICATION: SIGNIFICANT CHANGE UP
MCHC RBC-ENTMCNC: 29.9 % — LOW (ref 32–36)
MCHC RBC-ENTMCNC: 31.6 PG — SIGNIFICANT CHANGE UP (ref 27–34)
MCHC RBC-ENTMCNC: 31.7 % — LOW (ref 32–36)
MCHC RBC-ENTMCNC: 33 PG — SIGNIFICANT CHANGE UP (ref 27–34)
MCV RBC AUTO: 104 FL — HIGH (ref 80–100)
MCV RBC AUTO: 105.9 FL — HIGH (ref 80–100)
MONOCYTES # BLD AUTO: 0.77 K/UL — SIGNIFICANT CHANGE UP (ref 0–0.9)
MONOCYTES NFR BLD AUTO: 19.6 % — HIGH (ref 2–14)
NEUTROPHILS # BLD AUTO: 2.09 K/UL — SIGNIFICANT CHANGE UP (ref 1.8–7.4)
NEUTROPHILS NFR BLD AUTO: 53.1 % — SIGNIFICANT CHANGE UP (ref 43–77)
NITRITE UR-MCNC: NEGATIVE — SIGNIFICANT CHANGE UP
NRBC # FLD: 0 K/UL — SIGNIFICANT CHANGE UP (ref 0–0)
NRBC # FLD: 0 K/UL — SIGNIFICANT CHANGE UP (ref 0–0)
PH UR: 6 — SIGNIFICANT CHANGE UP (ref 5–8)
PHOSPHATE SERPL-MCNC: 4 MG/DL — SIGNIFICANT CHANGE UP (ref 2.5–4.5)
PLATELET # BLD AUTO: 21 K/UL — LOW (ref 150–400)
PLATELET # BLD AUTO: 24 K/UL — LOW (ref 150–400)
PMV BLD: 12.3 FL — SIGNIFICANT CHANGE UP (ref 7–13)
PMV BLD: 13.5 FL — HIGH (ref 7–13)
POTASSIUM SERPL-MCNC: 4.2 MMOL/L — SIGNIFICANT CHANGE UP (ref 3.5–5.3)
POTASSIUM SERPL-SCNC: 4.2 MMOL/L — SIGNIFICANT CHANGE UP (ref 3.5–5.3)
PROT SERPL-MCNC: 5.2 G/DL — LOW (ref 6–8.3)
PROT UR-MCNC: 20 — SIGNIFICANT CHANGE UP
PROTHROM AB SERPL-ACNC: 14.9 SEC — HIGH (ref 10.6–13.6)
RBC # BLD: 2.72 M/UL — LOW (ref 4.2–5.8)
RBC # BLD: 2.73 M/UL — LOW (ref 4.2–5.8)
RBC # FLD: 20.8 % — HIGH (ref 10.3–14.5)
RBC # FLD: 21.4 % — HIGH (ref 10.3–14.5)
RBC CASTS # UR COMP ASSIST: SIGNIFICANT CHANGE UP (ref 0–?)
RETICS #: 125 K/UL — SIGNIFICANT CHANGE UP (ref 25–125)
RETICS #: 137 K/UL — HIGH (ref 25–125)
RETICS/RBC NFR: 5.3 % — HIGH (ref 0.5–2.5)
RETICS/RBC NFR: 5.4 % — HIGH (ref 0.5–2.5)
RH IG SCN BLD-IMP: POSITIVE — SIGNIFICANT CHANGE UP
RH IG SCN BLD-IMP: POSITIVE — SIGNIFICANT CHANGE UP
SARS-COV-2 RNA SPEC QL NAA+PROBE: SIGNIFICANT CHANGE UP
SODIUM SERPL-SCNC: 142 MMOL/L — SIGNIFICANT CHANGE UP (ref 135–145)
SP GR SPEC: 1.03 — SIGNIFICANT CHANGE UP (ref 1–1.04)
SQUAMOUS # UR AUTO: SIGNIFICANT CHANGE UP
TSH SERPL-MCNC: 2.43 UIU/ML — SIGNIFICANT CHANGE UP (ref 0.27–4.2)
UIBC SERPL-MCNC: 200.3 UG/DL — SIGNIFICANT CHANGE UP (ref 110–370)
UROBILINOGEN FLD QL: SIGNIFICANT CHANGE UP
VIT B12 SERPL-MCNC: 968 PG/ML — HIGH (ref 200–900)
WBC # BLD: 3.93 K/UL — SIGNIFICANT CHANGE UP (ref 3.8–10.5)
WBC # BLD: 4.16 K/UL — SIGNIFICANT CHANGE UP (ref 3.8–10.5)
WBC # FLD AUTO: 3.93 K/UL — SIGNIFICANT CHANGE UP (ref 3.8–10.5)
WBC # FLD AUTO: 4.16 K/UL — SIGNIFICANT CHANGE UP (ref 3.8–10.5)
WBC UR QL: HIGH (ref 0–?)

## 2020-08-23 PROCEDURE — 99223 1ST HOSP IP/OBS HIGH 75: CPT | Mod: GC

## 2020-08-23 PROCEDURE — 74177 CT ABD & PELVIS W/CONTRAST: CPT | Mod: 26

## 2020-08-23 PROCEDURE — 71260 CT THORAX DX C+: CPT | Mod: 26

## 2020-08-23 RX ORDER — CEFTRIAXONE 500 MG/1
INJECTION, POWDER, FOR SOLUTION INTRAMUSCULAR; INTRAVENOUS
Refills: 0 | Status: DISCONTINUED | OUTPATIENT
Start: 2020-08-23 | End: 2020-08-25

## 2020-08-23 RX ORDER — CEFTRIAXONE 500 MG/1
1000 INJECTION, POWDER, FOR SOLUTION INTRAMUSCULAR; INTRAVENOUS ONCE
Refills: 0 | Status: COMPLETED | OUTPATIENT
Start: 2020-08-23 | End: 2020-08-23

## 2020-08-23 RX ORDER — ACETAMINOPHEN 500 MG
650 TABLET ORAL ONCE
Refills: 0 | Status: COMPLETED | OUTPATIENT
Start: 2020-08-23 | End: 2020-08-23

## 2020-08-23 RX ORDER — CEFTRIAXONE 500 MG/1
1000 INJECTION, POWDER, FOR SOLUTION INTRAMUSCULAR; INTRAVENOUS EVERY 24 HOURS
Refills: 0 | Status: DISCONTINUED | OUTPATIENT
Start: 2020-08-24 | End: 2020-08-25

## 2020-08-23 RX ADMIN — Medication 650 MILLIGRAM(S): at 13:17

## 2020-08-23 RX ADMIN — Medication 2 MILLIGRAM(S): at 04:06

## 2020-08-23 RX ADMIN — Medication 650 MILLIGRAM(S): at 12:40

## 2020-08-23 RX ADMIN — CEFTRIAXONE 100 MILLIGRAM(S): 500 INJECTION, POWDER, FOR SOLUTION INTRAMUSCULAR; INTRAVENOUS at 05:42

## 2020-08-23 NOTE — H&P ADULT - NSHPPHYSICALEXAM_GEN_ALL_CORE
Vital Signs Last 24 Hrs  T(C): 36.7 (22 Aug 2020 23:10), Max: 36.7 (22 Aug 2020 23:10)  T(F): 98 (22 Aug 2020 23:10), Max: 98 (22 Aug 2020 23:10)  HR: 65 (22 Aug 2020 23:10) (65 - 68)  BP: 123/46 (22 Aug 2020 23:10) (123/46 - 132/48)  BP(mean): --  RR: 22 (22 Aug 2020 23:10) (19 - 22)  SpO2: 97% (22 Aug 2020 23:10) (97% - 99%)    PHYSICAL EXAM:  General: Awake and alert.  No acute distress.  HEENT: Normocephalic, atraumatic.  PERRL.  EOMI.  No scleral icterus.  Moist MM.  No oropharyngeal exudates.    Neck: Supple.  Full range of motion.  No JVD.  No carotid bruits.  No thyromegaly.  Trachea midline.  No lymphadenopathy.   Heart: RRR.  Normal S1 and S2.  No murmurs, rubs, or gallops.  No LE edema b/l.   Lungs: Good inspiratory effort.  Nonlabored breathing.  CTAB.  No wheezes, crackles, or rhonchi.    Abdomen: BS+, soft, NT/ND.  No organomegaly.  Skin: Warm and dry.  No rashes.  Extremities: No clubbing or cyanosis.  2+ peripheral pulses b/l.  Musculoskeletal: No joint deformities.  No spinal or paraspinal tenderness.  Neuro: A&Ox3.  CN II-XII intact.  5/5 motor strength in UE and LE b/l.  Tactile sensation intact in UE and LE b/l.  Cerebellar function intact as assessed by finger-to-nose test. Vital Signs Last 24 Hrs  T(C): 36.7 (22 Aug 2020 23:10), Max: 36.7 (22 Aug 2020 23:10)  T(F): 98 (22 Aug 2020 23:10), Max: 98 (22 Aug 2020 23:10)  HR: 65 (22 Aug 2020 23:10) (65 - 68)  BP: 123/46 (22 Aug 2020 23:10) (123/46 - 132/48)  BP(mean): --  RR: 22 (22 Aug 2020 23:10) (19 - 22)  SpO2: 97% (22 Aug 2020 23:10) (97% - 99%)    PHYSICAL EXAM:  General: Awake and alert.  No acute distress.  Head: Normocephalic, atraumatic.    Eyes: Glasses present.  PERRL.  EOMI.  No scleral icterus.  Conjunctival pallor, L > R.  Mouth: Moist MM.  No oropharyngeal exudates.    Neck: Supple.  Full range of motion.  No JVD.  Trachea midline.  No lymphadenopathy.   Heart: RRR.  Normal S1 and S2.  No murmurs, rubs, or gallops.  No LE edema b/l.   Lungs: Nonlabored breathing.  Good inspiratory effort.  CTAB but with diminished breath sounds throughout.   Abdomen: BS+, soft, nontender, nondistended.  Skin: Warm and dry.  No rashes.  Extremities: No cyanosis.  2+ peripheral pulses b/l.  Musculoskeletal: Left shoulder with abduction limited to <90 degrees 2/2 chronic left shoulder pain.  No spinal or paraspinal tenderness.  Neuro: A&Ox3.  CN II-XII intact.  5/5 motor strength in UE and LE b/l.  Tactile sensation intact in UE and LE b/l.  Cerebellar function intact as assessed by finger-to-nose test. Vital Signs Last 24 Hrs  T(C): 36.7 (22 Aug 2020 23:10), Max: 36.7 (22 Aug 2020 23:10)  T(F): 98 (22 Aug 2020 23:10), Max: 98 (22 Aug 2020 23:10)  HR: 65 (22 Aug 2020 23:10) (65 - 68)  BP: 123/46 (22 Aug 2020 23:10) (123/46 - 132/48)  BP(mean): --  RR: 22 (22 Aug 2020 23:10) (19 - 22)    < end of copied text >    Mouth: Moist MM.  No oropharyngeal exudates.    Neck: Supple.  Full range of motion.  No JVD.  Trachea midline.  No lymphadenopathy.   Heart: RRR.  Normal S1 and S2.  No murmurs, rubs, or gallops.  No LE edema b/l.   Lungs: Nonlabored breathing.  Good inspiratory effort.  CTAB but with diminished breath sounds throughout.   Abdomen: BS+, soft, nontender, nondistended.  Skin: Warm and dry.  No rashes.  Extremities: No cyanosis.  2+ peripheral pulses b/l.  Musculoskeletal: Left shoulder with abduction limited to <90 degrees 2/2 chronic left shoulder pain.  No spinal or paraspinal tenderness.  Neuro: A&Ox2 (knew full name, hospital name, and day of the week, stated year is 2780).  CN II-XII grossly intact, no facial asymmetry or slurred speech.  5/5 motor strength in UE and LE b/l.  Tactile sensation intact in UE and LE b/l.  No pronator drift. Vital Signs Last 24 Hrs  T(C): 36.7 (22 Aug 2020 23:10), Max: 36.7 (22 Aug 2020 23:10)  T(F): 98 (22 Aug 2020 23:10), Max: 98 (22 Aug 2020 23:10)  HR: 65 (22 Aug 2020 23:10) (65 - 68)  BP: 123/46 (22 Aug 2020 23:10) (123/46 - 132/48)  BP(mean): --  RR: 22 (22 Aug 2020 23:10) (19 - 22)    Constitutional: Awake and alert, NAD  Head: Normocephalic, atraumatic  Eyes: Glasses present.  PERRL, EOMI, no scleral icterus, mild conjunctival pallor.  Mouth: Moist MM.  No oropharyngeal exudates.    Neck: Supple.  Full range of motion.  No JVD.  Trachea midline.  No lymphadenopathy.   Heart: RRR.  Normal S1 and S2.  No murmurs, rubs, or gallops.  No LE edema b/l.   Lungs: Nonlabored breathing.  Good inspiratory effort.  CTAB but with diminished breath sounds throughout.   Abdomen: BS+, soft, nontender, nondistended.  Skin: Warm and dry.  No rashes.  Extremities: No cyanosis.  2+ peripheral pulses b/l.  Musculoskeletal: Left shoulder with abduction limited to <90 degrees 2/2 chronic left shoulder pain.  No spinal or paraspinal tenderness.  Neuro: A&Ox2 (knew full name, hospital name, and day of the week, stated year is 2780).  CN II-XII grossly intact, no facial asymmetry or slurred speech.  5/5 motor strength in UE and LE b/l.  Tactile sensation intact in UE and LE b/l.  No pronator drift.

## 2020-08-23 NOTE — PROGRESS NOTE ADULT - PROBLEM SELECTOR PLAN 2
Pt with leukopenia (WBC 3.37), anemia (Hgb 7.5 vs. 11-12 baseline), and thrombocytopenia (plt count 26).  - Monitor CBC with diff daily  - Hematology consult to be placed in AM  - Leukopenia: Pt with possible UTI. Will empirically treat for UTI with ceftriaxone 1 g q24hrs.  - Anemia: FOBT negative. No BMs or rectal bleeding while in ED, no other S/S of active bleed. S/p 1u pRBCs 7.5.   - Iron studies grossly negative; Hemolysis labs equivocal- mildly hemolyzed    Stevo test Negative; f/u folate, and vitamin B12. Check TSH given macrocytosis.   - Thrombocytopenia: Last plt count in 10/2019 >100. Unclear etiology. CTH negative for ICH. Will check DIC labs. Currently, low suspicion for TTP, as pt without fevers and renal failure. Will check blood smear. Transfuse for plt count < 10 if afebrile, <20 if febrile. Hold ASA and Plavix for now.

## 2020-08-23 NOTE — H&P ADULT - PROBLEM SELECTOR PLAN 4
BPs currently in acceptable range.  - Will need to perform Med Rec to determine what BP meds pt is taking at home

## 2020-08-23 NOTE — OCCUPATIONAL THERAPY INITIAL EVALUATION ADULT - PLANNED THERAPY INTERVENTIONS, OT EVAL
balance training/motor coordination training/neuromuscular re-education/transfer training/parent/caregiver training.../cognitive, visual perceptual/ADL retraining/bed mobility training/fine motor coordination training/strengthening

## 2020-08-23 NOTE — CONSULT NOTE ADULT - SUBJECTIVE AND OBJECTIVE BOX
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 HPI unable to be obtained from pt, as pt appeared to be confused, stating that his PCP gave him 3 large pieces of wood, including a tree branch, to treat his L shoulder pain and that a "Mr. Cornejo" was his home health aide 2 days a week.     Attempts to call pt's family (brother and niece) at listed number, [phone], unsuccessful overnight, with calls going to voice mail.     Per ED Provider Note:  79M with pmh HTN, SSS with PPM, dementia, CAD with stent on DAPT presenting with AMS. Patient reported to be more confused today, possible slurred speech and noted to slump down from bed today, witnessed by family. Last known normal unclear. No trauma or LOC. Per family, patient seemed more confused, hallucinating intermittently. No report of recent illness. No report of fever, vomiting, diarrhea, pain.  Second Chart MR# [id number]    In the ED,  T 97.8-98, HR 65-68, -132/46-66, RR 19-22, O2 sats 97-99% RA. Labs revealing pancytopenia with WBC 3.37, Hgb 7.5, plt count 26. FOBT negative. Transfused 1u pRBCs in ED. (23 Aug 2020 01:15)    Hematology was consulted for evaluation of new bicytopenia, plt this morning down to 21. Patient denies any bleeding (mucocutaneous, epistaxis, hematochezia, hematuria, bruising) but did endorse black stools this morning; occult blood on admission was negative. He continues to endorse that he was treated with "tree branches" to the shoulder, reports that he was admitted for evaluation of his left shoulder. Denies any fevers, chills, night sweats, dysuria, diarrhea, constipation, nausea, vomiting, shortness of breath; has a cough, but nonproductive; denies shortness of breath.     PAST MEDICAL & SURGICAL HISTORY:  Lumbar compression fracture  Depression  Latent syphilis  Restless leg syndrome  History of disseminated herpes zoster  Dementia  Sick sinus syndrome  CAD (coronary artery disease)  HTN (hypertension)  Right hydrocele  History of appendectomy  S/P coronary artery stent placement      FAMILY HISTORY:  Family history of kidney stones      MEDICATIONS  (STANDING):  cefTRIAXone   IVPB        MEDICATIONS  (PRN):      Allergies    No Known Allergies    Intolerances        SOCIAL HISTORY: No EtOH, no tobacco    REVIEW OF SYSTEMS:    CONSTITUTIONAL: No weakness, fevers or chills  EYES/ENT:  No vertigo or throat pain; reports worsening difficulty reading due to vision problem, but denies diplopia or blurred vision  NECK: No pain or stiffness; reports left shoulder pain, but ROM not limited  RESPIRATORY: No cough, wheezing, hemoptysis; No shortness of breath  CARDIOVASCULAR: No chest pain or palpitations; hx of sick sinus syndrome, s/p PPM; endorses some lightheadedness  GASTROINTESTINAL: No abdominal or epigastric pain. No nausea, vomiting, or hematemesis; No diarrhea or constipation. No melena or hematochezia, but reports that stool is "now black."  GENITOURINARY: No dysuria, frequency or hematuria  NEUROLOGICAL: No numbness or weakness  SKIN: No itching, burning, rashes, or lesions; denies bruising, but left arm and shin show bruises  All other review of systems is negative unless indicated above.    Height (cm): 185.4 (08-23 @ 05:48)  Weight (kg): 107.8 (08-23 @ 05:48)  BMI (kg/m2): 31.4 (08-23 @ 05:48)  BSA (m2): 2.32 (08-23 @ 05:48)    T(F): 97.8 (08-23-20 @ 05:48), Max: 98.1 (08-23-20 @ 03:05)  HR: 63 (08-23-20 @ 05:48)  BP: 131/60 (08-23-20 @ 05:48)  RR: 18 (08-23-20 @ 05:48)  SpO2: 97% (08-23-20 @ 05:48)  Wt(kg): --    GENERAL: NAD, well-developed; obese  HEAD:  Atraumatic, Normocephalic  EYES: EOMI, PERRLA, conjunctiva and sclera clear; wears glasses  NECK: Supple, No JVD; left shoulder tenderness to palpation  CHEST/LUNG: Clear to auscultation bilaterally; No wheeze; left midaxillary tenderness, but no hematoma visible  HEART: Regular rate and rhythm; No murmurs, rubs, or gallops  ABDOMEN: Soft, Nontender, Nondistended; Bowel sounds present; unable to palpate spleen or liver due to body habitus  EXTREMITIES:  2+ Peripheral Pulses, No clubbing, cyanosis, or edema  NEUROLOGY: non-focal  SKIN: Right ante-cubital fossa bruise, no petichiae, scattered small echymoses on shins and arms                          8.6    3.93  )-----------( 21       ( 23 Aug 2020 10:20 )             28.8       08-23    142  |  106  |  21  ----------------------------<  98  4.2   |  24  |  1.01    Ca    8.8      23 Aug 2020 10:20  Phos  4.0     08-23  Mg     1.8     08-23    TPro  5.2<L>  /  Alb  3.0<L>  /  TBili  1.1  /  DBili  x   /  AST  23  /  ALT  16  /  AlkPhos  71  08-23      Magnesium, Serum: 1.8 mg/dL (08-23 @ 10:20)  Phosphorus Level, Serum: 4.0 mg/dL (08-23 @ 10:20)  Haptoglobin, Serum: 171 mg/dL (08-23 @ 10:20)  Lactate Dehydrogenase, Serum: 355 U/L (08-22 @ 21:50)      Folate, Serum: 18.6 ng/mL (08-23-20 @ 10:20)  Vitamin B12, Serum: 968 pg/mL (08-23-20 @ 10:20)      < from: Xray Shoulder 2 Views, Left (08.22.20 @ 22:36) >    EXAM:  RAD SHOULDER LT      PROCEDURE DATE:  Aug 22 2020     INTERPRETATION:  CLINICAL INFORMATION: Left shoulder pain.    TECHNIQUE: 3 views of the left shoulder.    COMPARISON: None available.    FINDINGS/  IMPRESSION:  No acute fracture ordislocation. Mild AC joint arthropathy.    No abnormal soft tissue swelling or mineralization. Imaged portions of the lung are clear. Partially imaged left chest wall cardiac device.    Generalized mild osteopenia otherwise no discrete lytic or blastic lesions.    ASHWIN CHAVEZ M.D., RESIDENT RADIOLOGIST  This document has been electronically signed.  RYAN ERICKSON M.D., ATTENDING RADIOLOGIST  This document has been electronically signed. Aug 23 2020 10:26AM    < end of copied text >      < from: CT Head No Cont (08.22.20 @ 22:18) >    EXAM:  CT BRAIN      PROCEDURE DATE:  Aug 22 2020     INTERPRETATION:  CLINICAL INFORMATION: Facial droop and slurred speech.    TECHNIQUE: Serial axial images were obtained from the skull base to the vertex without intravenous contrast.  Coronal and sagittal reformations were obtained.    COMPARISON: No similar prior studies available for comparison.    FINDINGS:    There is no acute intra-axial or extra-axial hemorrhage. There is no mass effect or shift of the midline. The basal cisterns are not effaced. There is cerebral and cerebellar volume loss with prominence of the ventricles, sulci, and cerebellar folia. There are moderate to severe chronic small vessel ischemic changes in the frontoparietal white matter. There are indeterminate age bilateral basal ganglionic lacunar infarcts and chronic appearing bilateral cerebellar infarcts. There are atherosclerotic calcifications of the intracranial carotid arteries and intradural vertebral arteries.    The regional soft tissues and osseous structures are unremarkable apart from evidence of bilateral lens surgery. The visualized paranasal sinuses and tympanic/mastoid cavities are clear apart from focal opacification of the left anterior ethmoid air cell and mild mucosal thickening of the remaining ethmoid air cells.    IMPRESSION:    No acute intracranial hemorrhage or mass effect. Moderate to severe chronic small vessel ischemic changes, indeterminate age bilateral basal ganglionic lacunar infarcts, and chronic appearing bilateral cerebellar infarcts. In view of the extensive chronic ischemic changes, if there is concern for acute infarction, MRI should be considered.    ASHWIN CHAVEZ M.D., RESIDENT RADIOLOGIST  This document has been electronically signed.  CONENR RICHARDS ATTENDING RADIOLOGIST  This document has been electronically signed. 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 HPI unable to be obtained from pt, as pt appeared to be confused, stating that his PCP gave him 3 large pieces of wood, including a tree branch, to treat his L shoulder pain and that a "Mr. Cornejo" was his home health aide 2 days a week.     Attempts to call pt's family (brother and niece) at listed number, [phone], unsuccessful overnight, with calls going to voice mail.     Per ED Provider Note:  79M with pmh HTN, SSS with PPM, dementia, CAD with stent on DAPT presenting with AMS. Patient reported to be more confused today, possible slurred speech and noted to slump down from bed today, witnessed by family. Last known normal unclear. No trauma or LOC. Per family, patient seemed more confused, hallucinating intermittently. No report of recent illness. No report of fever, vomiting, diarrhea, pain.  Second Chart MR# [id number]    In the ED,  T 97.8-98, HR 65-68, -132/46-66, RR 19-22, O2 sats 97-99% RA. Labs revealing pancytopenia with WBC 3.37, Hgb 7.5, plt count 26. FOBT negative. Transfused 1u pRBCs in ED. (23 Aug 2020 01:15)    Hematology was consulted for evaluation of new bicytopenia, plt this morning down to 21. Patient denies any bleeding (mucocutaneous, epistaxis, hematochezia, hematuria, bruising) but did endorse black stools this morning; occult blood on admission was negative. He continues to endorse that he was treated with "tree branches" to the shoulder, reports that he was admitted for evaluation of his left shoulder. Denies any fevers, chills, night sweats, dysuria, diarrhea, constipation, nausea, vomiting, shortness of breath; has a cough, but nonproductive; denies shortness of breath.     PAST MEDICAL & SURGICAL HISTORY:  Lumbar compression fracture  Depression  Latent syphilis  Restless leg syndrome  History of disseminated herpes zoster  Dementia  Sick sinus syndrome  CAD (coronary artery disease)  HTN (hypertension)  Right hydrocele  History of appendectomy  S/P coronary artery stent placement      FAMILY HISTORY:  Family history of kidney stones      MEDICATIONS  (STANDING):  cefTRIAXone   IVPB        MEDICATIONS  (PRN):      Allergies    No Known Allergies    Intolerances        SOCIAL HISTORY: No EtOH, no tobacco    REVIEW OF SYSTEMS:    CONSTITUTIONAL: No weakness, fevers or chills  EYES/ENT:  No vertigo or throat pain; reports worsening difficulty reading due to vision problem, but denies diplopia or blurred vision  NECK: No pain or stiffness; reports left shoulder pain, but ROM not limited  RESPIRATORY: No cough, wheezing, hemoptysis; No shortness of breath  CARDIOVASCULAR: No chest pain or palpitations; hx of sick sinus syndrome, s/p PPM; endorses some lightheadedness  GASTROINTESTINAL: No abdominal or epigastric pain. No nausea, vomiting, or hematemesis; No diarrhea or constipation. No melena or hematochezia, but reports that stool is "now black."  GENITOURINARY: No dysuria, frequency or hematuria  NEUROLOGICAL: No numbness or weakness  SKIN: No itching, burning, rashes, or lesions; denies bruising, but left arm and shin show bruises  All other review of systems is negative unless indicated above.    Height (cm): 185.4 (08-23 @ 05:48)  Weight (kg): 107.8 (08-23 @ 05:48)  BMI (kg/m2): 31.4 (08-23 @ 05:48)  BSA (m2): 2.32 (08-23 @ 05:48)    T(F): 97.8 (08-23-20 @ 05:48), Max: 98.1 (08-23-20 @ 03:05)  HR: 63 (08-23-20 @ 05:48)  BP: 131/60 (08-23-20 @ 05:48)  RR: 18 (08-23-20 @ 05:48)  SpO2: 97% (08-23-20 @ 05:48)  Wt(kg): --    GENERAL: NAD, well-developed; obese  HEAD:  Atraumatic, Normocephalic  EYES: EOMI, PERRLA, conjunctiva and sclera clear; wears glasses  NECK: Supple, No JVD; left shoulder tenderness to palpation  CHEST/LUNG: Clear to auscultation bilaterally; No wheeze; left midaxillary tenderness, but no hematoma visible  HEART: Regular rate and rhythm; No murmurs, rubs, or gallops  ABDOMEN: Soft, Nontender, Nondistended; Bowel sounds present; unable to palpate spleen or liver due to body habitus  EXTREMITIES:  2+ Peripheral Pulses, No clubbing, cyanosis, or edema  NEUROLOGY: non-focal  SKIN: Right ante-cubital fossa bruise, no petichiae, scattered small echymoses on shins and arms                          8.6    3.93  )-----------( 21       ( 23 Aug 2020 10:20 )             28.8       08-23    142  |  106  |  21  ----------------------------<  98  4.2   |  24  |  1.01    Ca    8.8      23 Aug 2020 10:20  Phos  4.0     08-23  Mg     1.8     08-23    TPro  5.2<L>  /  Alb  3.0<L>  /  TBili  1.1  /  DBili  x   /  AST  23  /  ALT  16  /  AlkPhos  71  08-23      Magnesium, Serum: 1.8 mg/dL (08-23 @ 10:20)  Phosphorus Level, Serum: 4.0 mg/dL (08-23 @ 10:20)  Haptoglobin, Serum: 171 mg/dL (08-23 @ 10:20)  Lactate Dehydrogenase, Serum: 355 U/L (08-22 @ 21:50)      Folate, Serum: 18.6 ng/mL (08-23-20 @ 10:20)  Vitamin B12, Serum: 968 pg/mL (08-23-20 @ 10:20)    < from: Xray Chest 1 View- PORTABLE-Urgent (08.22.20 @ 22:35) >    EXAM:  XR CHEST PORTABLE URGENT 1V        PROCEDURE DATE:  Aug 22 2020         INTERPRETATION:  CLINICAL INFORMATION: Fall.    TECHNIQUE: Portable AP radiograph of the chest    COMPARISON: No comparison available.    Projection lordotic.  Rotated right.    IMPRESSION:  Clear lungs. No pleural effusions or pneumothorax.    Left chest wall dual-lead pacemaker present with intact leads overlying right atrial and ventricular regions. Prominent appearing cardiac and mediastinal silhouettes however inaccurately assessed on the projection.    Trachea midline.    Unremarkable osseous structures.    ASHWIN CHAVEZ M.D., RESIDENT RADIOLOGIST  This document has been electronically signed.  RYAN ERICKSON M.D., ATTENDING RADIOLOGIST  This document hasbeen electronically signed. Aug 23 2020 10:28AM      < end of copied text >        < from: Xray Shoulder 2 Views, Left (08.22.20 @ 22:36) >    EXAM:  RAD SHOULDER LT      PROCEDURE DATE:  Aug 22 2020     INTERPRETATION:  CLINICAL INFORMATION: Left shoulder pain.    TECHNIQUE: 3 views of the left shoulder.    COMPARISON: None available.    FINDINGS/  IMPRESSION:  No acute fracture ordislocation. Mild AC joint arthropathy.    No abnormal soft tissue swelling or mineralization. Imaged portions of the lung are clear. Partially imaged left chest wall cardiac device.    Generalized mild osteopenia otherwise no discrete lytic or blastic lesions.    ASHWIN CHAVEZ M.D., RESIDENT RADIOLOGIST  This document has been electronically signed.  RYAN ERICKSON M.D., ATTENDING RADIOLOGIST  This document has been electronically signed. Aug 23 2020 10:26AM    < end of copied text >      < from: CT Head No Cont (08.22.20 @ 22:18) >    EXAM:  CT BRAIN      PROCEDURE DATE:  Aug 22 2020     INTERPRETATION:  CLINICAL INFORMATION: Facial droop and slurred speech.    TECHNIQUE: Serial axial images were obtained from the skull base to the vertex without intravenous contrast.  Coronal and sagittal reformations were obtained.    COMPARISON: No similar prior studies available for comparison.    FINDINGS:    There is no acute intra-axial or extra-axial hemorrhage. There is no mass effect or shift of the midline. The basal cisterns are not effaced. There is cerebral and cerebellar volume loss with prominence of the ventricles, sulci, and cerebellar folia. There are moderate to severe chronic small vessel ischemic changes in the frontoparietal white matter. There are indeterminate age bilateral basal ganglionic lacunar infarcts and chronic appearing bilateral cerebellar infarcts. There are atherosclerotic calcifications of the intracranial carotid arteries and intradural vertebral arteries.    The regional soft tissues and osseous structures are unremarkable apart from evidence of bilateral lens surgery. The visualized paranasal sinuses and tympanic/mastoid cavities are clear apart from focal opacification of the left anterior ethmoid air cell and mild mucosal thickening of the remaining ethmoid air cells.    IMPRESSION:    No acute intracranial hemorrhage or mass effect. Moderate to severe chronic small vessel ischemic changes, indeterminate age bilateral basal ganglionic lacunar infarcts, and chronic appearing bilateral cerebellar infarcts. In view of the extensive chronic ischemic changes, if there is concern for acute infarction, MRI should be considered.    ASHWIN CHAVEZ M.D., RESIDENT RADIOLOGIST  This document has been electronically signed.  CONNER RICHARDS ATTENDING RADIOLOGIST  This document has been electronically signed. Aug 22 2020 11:26PM    < end of copied text >

## 2020-08-23 NOTE — H&P ADULT - NSICDXPASTSURGICALHX_GEN_ALL_CORE_FT
PAST SURGICAL HISTORY:  S/P coronary artery stent placement PAST SURGICAL HISTORY:  History of appendectomy     Right hydrocele     S/P coronary artery stent placement

## 2020-08-23 NOTE — CONSULT NOTE ADULT - ASSESSMENT
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    #Macrocytic anemia  #Thrombocytopenia  -labs notable for polychromasia, but reticulocyte index is still <2.0 (1.52) suggesting component of hypoproliferation; B12 and folate are normal, iron studies show normal iron sat but elevated ferritin. Haptoglobin and bilirubin are normal, making hemolytic process unlikely despite elevated LDH, which is nonspecific. AMS raises suspicion for TTP, but normal haptoglobin makes it much less likely.  -no signs of SIRS, making DIC unlikely  -Requested peripheral blood smear, will review; ddx still includes ITP vs MDS vs lymphoproliferative disorder  -transfuse for Hgb <7.0 or if he has symptomatic anemia or over bleeding  -transfuse for plts <10k or if bleeding  -given left sided chest wall pain, would recommend checking CT C/A/P with contrast to r/o occult bleeding or lymphoproliferative disorder, which can also be associated with ITP  -monitor for clinical signs of bleeding; given patient reporting black stool, would check occult blood and monitor stool count; if confirmed to be having GI bleeding source, would consult GI  -check HIV, Hep C, Hep B    Will continue to follow. Please call with any questions.    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    #Macrocytic anemia  #Thrombocytopenia  -labs notable for polychromasia, but reticulocyte index is still <2.0 (1.52) suggesting component of hypoproliferation; B12 and folate are normal, iron studies show normal iron sat but elevated ferritin. Haptoglobin and bilirubin are normal, making hemolytic process unlikely despite elevated LDH, which is nonspecific. AMS raises suspicion for TTP, but normal haptoglobin makes it much less likely.  -no signs of SIRS and fibrinogen normal, making DIC unlikely  -Requested peripheral blood smear, will review; ddx still includes ITP vs MDS vs lymphoproliferative disorder  -transfuse for Hgb <7.0 or if he has symptomatic anemia or over bleeding  -transfuse for plts <10k or if bleeding  -given left sided chest wall pain, would recommend checking CT C/A/P with contrast to r/o occult bleeding or lymphoproliferative disorder, which can also be associated with ITP and hematologic findings seen (lymphopenia, macrocytic anemia, thrombocytopenia)  -CTH negative for acute pathology, MRI with contrast contraindicated given PPM  -monitor for clinical signs of bleeding; given patient reporting black stool, would check occult blood and monitor stool count; if confirmed to be having GI bleeding source, would consult GI  -check HIV, Hep C, Hep B    Will continue to follow. Please call with any questions.    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    #Macrocytic anemia  #Thrombocytopenia  -labs notable for polychromasia, but reticulocyte index is still <2.0 (1.52) suggesting component of hypoproliferation; B12 and folate are normal, iron studies show normal iron sat but elevated ferritin. Haptoglobin and bilirubin are normal, making hemolytic process unlikely despite elevated LDH, which is nonspecific. AMS raises suspicion for TTP, but normal haptoglobin makes it much less likely.  -no signs of SIRS and fibrinogen normal, making DIC unlikely  -Requested peripheral blood smear, will review; ddx still includes ITP vs MDS vs lymphoproliferative disorder  -transfuse for Hgb <7.0 or if he has symptomatic anemia or over bleeding  -transfuse for plts <10k or if bleeding  -given left sided chest wall pain, would recommend checking CT C/A/P with contrast to r/o occult bleeding or lymphoproliferative disorder, which can also be associated with ITP and hematologic findings seen (lymphopenia, macrocytic anemia, thrombocytopenia)  -CTH negative for acute pathology, MRI with contrast contraindicated given PPM  -monitor for clinical signs of bleeding; given patient reporting black stool, would check occult blood and monitor stool count; if confirmed to be having GI bleeding source, would consult GI  -check HIV, Hep C, Hep B    Will continue to follow. Please call with any questions.    Eulogio Vizcarra MD, MPH  Hematology / Oncology Fellow, PGY6  p     Addendum: reviewed peripheral blood smear; RBC morphology notable for polychromasia, but no schistocytes or cell fragments, no tear drop cells, no nucleated RBCs; WBC morphology negative for blasts, rare pseudo-Pelger Heuet cells noted, raising suspicion for possible MDS. Would recommend obtaining home medication list from patient's family or from his PMD, as patient unable to recall what medications he is on. Recommend CT C/A/P with contrast to r/o lymphoproliferative disorder / lymphoma, which can be associated with ITP, as presence would preclude the administration of steroids prior to bx. Discussed recommendations with primary team resident. 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    #Macrocytic anemia  #Thrombocytopenia  -labs notable for polychromasia, but reticulocyte index is still <2.0 (1.52) suggesting component of hypoproliferation; B12 and folate are normal, iron studies show normal iron sat but elevated ferritin. Haptoglobin and bilirubin are normal, making hemolytic process unlikely despite elevated LDH, which is nonspecific. AMS raises suspicion for TTP, but normal haptoglobin makes it much less likely.  -no signs of SIRS and fibrinogen normal, making DIC unlikely  -Requested peripheral blood smear, will review; ddx still includes ITP vs MDS vs lymphoproliferative disorder  -transfuse for Hgb <7.0 or if he has symptomatic anemia or over bleeding  -transfuse for plts <10k or if bleeding  -given left sided chest wall pain, would recommend checking CT C/A/P with contrast to r/o occult bleeding or lymphoproliferative disorder, which can also be associated with ITP and hematologic findings seen (lymphopenia, macrocytic anemia, thrombocytopenia)  -CTH negative for acute pathology, MRI with contrast contraindicated given PPM  -monitor for clinical signs of bleeding; given patient reporting black stool, would check occult blood and monitor stool count; if confirmed to be having GI bleeding source, would consult GI  -check HIV, Hep C, Hep B    Will continue to follow. Please call with any questions.    Eulogio Vizcarra MD, MPH  Hematology / Oncology Fellow, PGY6  p     Addendum: reviewed peripheral blood smear; RBC morphology notable for polychromasia, but no schistocytes or cell fragments, no tear drop cells, no nucleated RBCs; WBC morphology negative for blasts, rare pseudo-Pelger Heuet cells noted, raising suspicion for possible MDS. Would recommend obtaining home medication list from patient's family or from his PMD, as patient unable to recall what medications he is on. Recommend CT C/A/P with contrast to r/o lymphoproliferative disorder / lymphoma, which can be associated with ITP, as presence would preclude the administration of steroids prior to bx or precipitate tumor lysis syndrome. Discussed recommendations with primary team resident.

## 2020-08-23 NOTE — H&P ADULT - HISTORY OF PRESENT ILLNESS
80 yo man with history of CAD/NSTEMI s/p stent (possibly on ASA and Plavix), sick sinus syndrome s/p PPM, HTN, restless leg syndrome, disseminated herpes zoster, and dementia presents with AMS. HPI unable to be obtained from pt, as pt appeared to be confused, stating that his PCP gave him 3 large pieces of wood, including a tree branch, to treat his L shoulder pain and that a "Mr. Cornejo" was his home health aide 2 days a week.     Attempts to call pt's family (brother and niece) at listed number, [phone], unsuccessful overnight, with calls going to voice mail.     Per ED Provider Note:  79M with pmh HTN, SSS with PPM, dementia, HTN, CAD with stent on DAPT presenting with AMS. Patient reported to be more confused today, possible slurred speech and noted to slump down from bed today, witnessed by family. Last known normal unclear. No trauma or LOC. Per family patient seemed more confused, hallucinating intermittently. No report of recent illness. No report of fever, vomiting, diarrhea, pain.  Second Chart MR# [id number]    In the ED,  T 97.8-98, HR 65-68, -132/46-66, RR 19-22, O2 sats 97-99% RA. Labs revealing pancytopenia with WBC 3.37, Hgb 7.5, plt count 26. FOBT negative. Transfused 1u pRBCs in ED. 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 HPI unable to be obtained from pt, as pt appeared to be confused, stating that his PCP gave him 3 large pieces of wood, including a tree branch, to treat his L shoulder pain and that a "Mr. Cornejo" was his home health aide 2 days a week.     Attempts to call pt's family (brother and niece) at listed number, [phone], unsuccessful overnight, with calls going to voice mail.     Per ED Provider Note:  79M with pmh HTN, SSS with PPM, dementia, CAD with stent on DAPT presenting with AMS. Patient reported to be more confused today, possible slurred speech and noted to slump down from bed today, witnessed by family. Last known normal unclear. No trauma or LOC. Per family, patient seemed more confused, hallucinating intermittently. No report of recent illness. No report of fever, vomiting, diarrhea, pain.  Second Chart MR# [id number]    In the ED,  T 97.8-98, HR 65-68, -132/46-66, RR 19-22, O2 sats 97-99% RA. Labs revealing pancytopenia with WBC 3.37, Hgb 7.5, plt count 26. FOBT negative. Transfused 1u pRBCs in ED. 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 HPI unable to be obtained from pt, as pt appeared to be confused, stating that his PCP gave him 3 large pieces of wood, including a tree branch, to treat his L shoulder pain and that a "Mr. Cornejo" was his home health aide 2 days a week.     Attempts to call pt's family (brother and niece) at listed number, [phone], unsuccessful overnight, with calls going to voice mail.     Per ED Provider Note:  79M with pmh HTN, SSS with PPM, dementia, CAD with stent on DAPT presenting with AMS. Patient reported to be more confused today, possible slurred speech and noted to slump down from bed today, witnessed by family. Last known normal unclear. No trauma or LOC. Per family, patient seemed more confused, hallucinating intermittently. No report of recent illness. No report of fever, vomiting, diarrhea, pain.  Second Chart MR# [id number]    In the ED,  T 97.8-98, HR 65-68, -132/46-66, RR 19-22, O2 sats 97-99% RA. Labs revealing pancytopenia with WBC 3.37, Hgb 7.5, plt count 26. FOBT negative. Transfused 1u pRBCs in ED.

## 2020-08-23 NOTE — H&P ADULT - PROBLEM SELECTOR PLAN 7
- Will need to perform Med Rec to determine if pt is taking antidepressant at stef - Will need to perform Med Rec to determine if pt is taking an antidepressant at home

## 2020-08-23 NOTE — CONSULT NOTE ADULT - ATTENDING COMMENTS
The patient was seen and examined today with the fellow, Dr. Vizcarra, and I agree with the History, Physical Exam and Plan in the record. Labs and imaging reviewed by me. The patient was seen and examined today with the fellow, Dr. Vizcarra, and I agree with the History, Physical Exam and Plan in the record. Labs and imaging reviewed by me. Patient doing well, somewhat confused, anemia and thrombocytopenia - workup as above, no bleeding apparent currently.

## 2020-08-23 NOTE — PHYSICAL THERAPY INITIAL EVALUATION ADULT - PERTINENT HX OF CURRENT PROBLEM, REHAB EVAL
79 y.o. man, former longtime smoker (~60 pack-years) with history of CAD/NSTEMI s/p stent (possibly on ASA and Plavix), sick sinus syndrome s/p PPM, HTN, dementia, depression (requiring admission at 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

## 2020-08-23 NOTE — PROGRESS NOTE ADULT - ASSESSMENT
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

## 2020-08-23 NOTE — H&P ADULT - PROBLEM SELECTOR PLAN 6
With behavioral disturbance while in ED.  - QTc prolonged at 505 ms. Will given IV ativan or Zyprexa PRN for agitation. With behavioral disturbance while in ED.  - QTc prolonged at 505 ms. Will give ativan or Zyprexa PRN for agitation. Avoid Haldol for now.

## 2020-08-23 NOTE — PROGRESS NOTE ADULT - ATTENDING COMMENTS
79M smoker, CAD/MI s/p stent, SSS s/p PPM, HTN, CVA - suspect vascular dementia, depression latent syphilis, restless leg syndrome p/w pancytopenia, UTI with acute metabolic encephalopathy. UTI - Continue CTX, f/u UCx/Sens. Heme - transfuse 2u PRBC for symptomatic anemia. Guaiac neg.  Heme consult for evaluation for pancytopenia - likely due to acute infection.

## 2020-08-23 NOTE — ED ADULT NURSE REASSESSMENT NOTE - NS ED NURSE REASSESS COMMENT FT1
pt straight cath for residual urine 2nd RN bedside, 200 ml drain marshal color urine, samples sent will continue to monitor

## 2020-08-23 NOTE — H&P ADULT - PROBLEM SELECTOR PLAN 8
Pt did not know any of his home meds, and pt's family unreachable overnight.  - Will need to perform Med Rec during the day

## 2020-08-23 NOTE — OCCUPATIONAL THERAPY INITIAL EVALUATION ADULT - PERTINENT HX OF CURRENT PROBLEM, REHAB EVAL
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

## 2020-08-23 NOTE — H&P ADULT - NSHPLABSRESULTS_GEN_ALL_CORE
EKG personally reviewed.    Imaging personally reviewed. EKG personally reviewed.    Imaging personally reviewed.  CT Head No Cont (08.22.20 @ 22:18)  FINDINGS:    There is no acute intra-axial or extra-axial hemorrhage. There is no mass effect or shift of the midline. The basal cisterns are not effaced. There is cerebral and cerebellar volume loss with prominence of the ventricles, sulci, and cerebellar folia. There are moderate to severe chronic small vessel ischemic changes in the frontoparietal white matter. There are indeterminate age bilateral basal ganglionic lacunar infarcts and chronic appearing bilateral cerebellar infarcts. There are atherosclerotic calcifications of the intracranial carotid arteries and intradural vertebral arteries.    The regional soft tissues and osseous structures are unremarkable apart from evidence of bilateral lens surgery. The visualized paranasal sinuses and tympanic/mastoid cavities are clear apart from focal opacification of the left anterior ethmoid air cell and mild mucosal thickening of the remaining ethmoid air cells.    IMPRESSION:    No acute intracranial hemorrhage or mass effect. Moderate to severe chronic small vessel ischemic changes, indeterminate age bilateral basal ganglionic lacunar infarcts, and chronic appearing bilateral cerebellar infarcts. In view of the extensive chronic ischemic changes, if there is concern for acute infarction, MRI should be considered.

## 2020-08-23 NOTE — H&P ADULT - NSICDXPASTMEDICALHX_GEN_ALL_CORE_FT
PAST MEDICAL HISTORY:  CAD (coronary artery disease)     Dementia     History of disseminated herpes zoster     HTN (hypertension)     Restless leg syndrome     Sick sinus syndrome PAST MEDICAL HISTORY:  CAD (coronary artery disease)     Dementia     Depression     History of disseminated herpes zoster     HTN (hypertension)     Latent syphilis     Restless leg syndrome     Sick sinus syndrome PAST MEDICAL HISTORY:  CAD (coronary artery disease)     Dementia     Depression     History of disseminated herpes zoster     HTN (hypertension)     Latent syphilis     Lumbar compression fracture     Restless leg syndrome     Sick sinus syndrome

## 2020-08-23 NOTE — H&P ADULT - PROBLEM SELECTOR PLAN 3
S/p stent. Per chart review, pt had previously been on DAPT with ASA and Plavix.   - Hold antiplatelet agents for now given thrombocytopenia to 26

## 2020-08-23 NOTE — H&P ADULT - NSHPREVIEWOFSYSTEMS_GEN_ALL_CORE
Constitutional: No weakness, fevers, chills, or weight loss  Eyes: No visual changes, double vision, or eye pain  Ears, Nose, Mouth, Throat: No runny nose, sinus pain, ear pain, tinnitus, sore throat, dysphagia, or odynophagia  Cardiovascular: No chest pain, palpitations, or LE edema  Respiratory: No cough, wheezing, hemoptysis, or shortness of breath  Gastrointestinal: No abdominal pain, dysphagia, anorexia, nausea/vomiting, diarrhea/constipation, hematemesis, BRBPR, or melena  Genitourinary: No dysuria, frequency, urgency, or hematuria  Musculoskeletal: No joint pain, joint swelling, or decreased ROM  Skin: No pruritus, rashes, lesions, or wounds  Neurologic:  No seizures, headache, paresthesias, numbness, or limb weakness  Psychiatric: No depression, anxiety, difficulty concentrating, anhedonia, or lack of energy  Endocrine: No heat/cold intolerance, mood swings, sweats, polydipsia, or polyuria  Hematologic/lymphatic: No purpura, petechia, or prolonged or excessive bleeding after dental extraction / injury  Allergic/Immunologic: No anaphylaxis or allergic response to materials, foods, animals    Positives and pertinent negatives noted and all other systems negative. Unable to obtain Review of Systems 2/2 pt's dementia and AMS.

## 2020-08-23 NOTE — PROGRESS NOTE ADULT - PROBLEM SELECTOR PLAN 6
With behavioral disturbance while in ED.  - QTc prolonged at 505 ms. Will give ativan or Zyprexa PRN for agitation. Avoid Haldol for now.

## 2020-08-23 NOTE — PROGRESS NOTE ADULT - PROBLEM SELECTOR PLAN 1
Per pt's family, pt appeared more confused today. Unclear baseline mental status, as pt's family unreachable overnight for collateral.   - CTH with moderate to severe chronic small vessel ischemic changes, indeterminate age bilateral basal ganglionic lacunar infarcts, and chronic appearing bilateral cerebellar infarcts. Similar to CTH in 1/2019. Unlikely with acute CVA, as pt without facial asymmetry, slurred speech, focal/unilateral weakness, or other focal neuro findings on exam.   - UA with small LE, 11-25 WBC, and many bacteria. Pt remarks that he has been having pain with urination for a year, but pt unreliable historian. Will empirically treat for UTI with ceftriaxone 1 g q24hrs in setting of possible acute encephalopathy; sent UCx  - Hgb 7.5 on admission vs. 11-12 at baseline. Will transfuse at least 1u pRBCs overnight, as symptomatic anemia might be reason for possible acute encephalopathy.   - Pt with h/o latent syphilis, last treated with PCN IM x3 weekly for positive RPR. Will check RPR titers, and if positive, possible workup to r/o neurosyphilis.   - Neurology and Behavioral Health consults to be placed in AM for possible worsening dementia with superimposed delirium and behavioral disturbances  - Neuro checks per routine  - Aspiration precautions, fall risk protocol  - PT/OT consults Per pt's family, pt appeared more confused today. Unclear baseline mental status, as pt's family unreachable overnight for collateral.   - CTH with moderate to severe chronic small vessel ischemic changes, indeterminate age bilateral basal ganglionic lacunar infarcts, and chronic appearing bilateral cerebellar infarcts. Similar to CTH in 1/2019. Unlikely with acute CVA, as pt without facial asymmetry, slurred speech, focal/unilateral weakness, or other focal neuro findings on exam.   - UA with small LE, 11-25 WBC, and many bacteria. Pt remarks that he has been having pain with urination for a year, but pt unreliable historian. Will empirically treat for UTI with ceftriaxone 1 g q24hrs in setting of possible acute encephalopathy; sent UCx  - Hgb 7.5 on admission vs. 11-12 at baseline. Will transfuse at least 1u pRBCs overnight, as symptomatic anemia might be reason for possible acute encephalopathy.   - Pt with h/o latent syphilis, last treated with PCN IM x3 weekly for positive RPR. Will check RPR titers, and if positive, possible workup to r/o neurosyphilis.   - Neuro checks per routine  - Aspiration precautions, fall risk protocol  - PT/OT consults

## 2020-08-23 NOTE — H&P ADULT - PROBLEM SELECTOR PLAN 1
Per pt's family, pt appeared more confused today. Unclear baseline mental status, as pt's family unreachable overnight for collateral. Encephalopathy possibly in setting of Per pt's family, pt appeared more confused today. Unclear baseline mental status, as pt's family unreachable overnight for collateral.   - CTH with moderate to severe chronic small vessel ischemic changes, indeterminate age bilateral basal ganglionic lacunar infarcts, and chronic appearing bilateral cerebellar infarcts. Unlikely with acute CVA, as pt without facial asymmetry, slurred speech, focal/unilateral weakness, or other focal neuro findings on exam.   - UA with small LE, 11-25 WBC, and many bacteria. Pt remarks that he has been having pain with urination for a year, but pt unreliable historian. Will empirically treat for UTI with ceftriaxone 1 g q24hrs in setting of possible acute encephalopathy.  - Hgb 7.5 on admission vs. 11-12 at baseline. Will transfuse at least 1u pRBCs overnight, as symptomatic anemia might be reason for possible acute encephalopathy.   - Pt with h/o latent syphilis, last treated with PCN IM x3 weekly for positive RPR. Will check RPR titers, and if positive, possible workup to r/o neurosyphilis.   - Neurology and Behavioral Health consults to be placed in AM for possible worsening dementia with superimposed delirium and behavioral disturbances  - Neuro checks per routine  - Aspiration precautions, fall risk protocol  - PT/OT consults Per pt's family, pt appeared more confused today. Unclear baseline mental status, as pt's family unreachable overnight for collateral.   - CTH with moderate to severe chronic small vessel ischemic changes, indeterminate age bilateral basal ganglionic lacunar infarcts, and chronic appearing bilateral cerebellar infarcts. Similar to CTH in 1/2019. Unlikely with acute CVA, as pt without facial asymmetry, slurred speech, focal/unilateral weakness, or other focal neuro findings on exam.   - UA with small LE, 11-25 WBC, and many bacteria. Pt remarks that he has been having pain with urination for a year, but pt unreliable historian. Will empirically treat for UTI with ceftriaxone 1 g q24hrs in setting of possible acute encephalopathy.  - Hgb 7.5 on admission vs. 11-12 at baseline. Will transfuse at least 1u pRBCs overnight, as symptomatic anemia might be reason for possible acute encephalopathy.   - Pt with h/o latent syphilis, last treated with PCN IM x3 weekly for positive RPR. Will check RPR titers, and if positive, possible workup to r/o neurosyphilis.   - Neurology and Behavioral Health consults to be placed in AM for possible worsening dementia with superimposed delirium and behavioral disturbances  - Neuro checks per routine  - Aspiration precautions, fall risk protocol  - PT/OT consults

## 2020-08-23 NOTE — PHYSICAL THERAPY INITIAL EVALUATION ADULT - ADDITIONAL COMMENTS
Pt lives in apartment alone. pt ambulates with rolling walker. Pt was independent in functional activities prior to admission. pt's brother stops by to assist on occasion.     Pt left semi-quinteros in bed, NAD. +call bell. +bed alarm. RN aware of session, present at bedside.

## 2020-08-23 NOTE — H&P ADULT - PROBLEM SELECTOR PLAN 2
Pt with leukopenia (WBC 3.37), anemia (Hgb 7.5 vs. 11-12 baseline), and thrombocytopenia (plt count 26).  - Monitor CBC with diff daily  - Hematology consult to be placed in AM  - Leukopenia: Pt with possible UTI. Will empirically treat for UTI with ceftriaxone 1 g q24hrs.  - Anemia: FOBT negative. No BMs or rectal bleeding while in ED, no other S/S of active bleed. Will transfuse at least 1u pRBCs overnight and f/u post-transfusion CBC. Check iron studies, hemolysis labs, folate, and vitamin B12. Check TSH given macrocytosis.   - Thrombocytopenia: Last plt count in 10/2019 >100. Unclear Pt with leukopenia (WBC 3.37), anemia (Hgb 7.5 vs. 11-12 baseline), and thrombocytopenia (plt count 26).  - Monitor CBC with diff daily  - Hematology consult to be placed in AM  - Leukopenia: Pt with possible UTI. Will empirically treat for UTI with ceftriaxone 1 g q24hrs.  - Anemia: FOBT negative. No BMs or rectal bleeding while in ED, no other S/S of active bleed. Will transfuse at least 1u pRBCs overnight and f/u post-transfusion CBC. Check iron studies, hemolysis labs, folate, and vitamin B12. Check TSH given macrocytosis.   - Thrombocytopenia: Last plt count in 10/2019 >100. Unclear etiology. Pt with leukopenia (WBC 3.37), anemia (Hgb 7.5 vs. 11-12 baseline), and thrombocytopenia (plt count 26).  - Monitor CBC with diff daily  - Hematology consult to be placed in AM  - Leukopenia: Pt with possible UTI. Will empirically treat for UTI with ceftriaxone 1 g q24hrs.  - Anemia: FOBT negative. No BMs or rectal bleeding while in ED, no other S/S of active bleed. Will transfuse at least 1u pRBCs overnight and f/u post-transfusion CBC. Check iron studies, hemolysis labs, folate, and vitamin B12. Check TSH given macrocytosis.   - Thrombocytopenia: Last plt count in 10/2019 >100. Unclear etiology. Will check DIC labs. Currently, low suspicion for TTP Pt with leukopenia (WBC 3.37), anemia (Hgb 7.5 vs. 11-12 baseline), and thrombocytopenia (plt count 26).  - Monitor CBC with diff daily  - Hematology consult to be placed in AM  - Leukopenia: Pt with possible UTI. Will empirically treat for UTI with ceftriaxone 1 g q24hrs.  - Anemia: FOBT negative. No BMs or rectal bleeding while in ED, no other S/S of active bleed. Will transfuse at least 1u pRBCs overnight and f/u post-transfusion CBC. Check iron studies, hemolysis labs, Stevo test, folate, and vitamin B12. Check TSH given macrocytosis.   - Thrombocytopenia: Last plt count in 10/2019 >100. Unclear etiology. CTH negative for ICH. Will check DIC labs. Currently, low suspicion for TTP, as pt without fevers and renal failure. Will check blood smear. Transfuse for plt count < 10 if afebrile, <20 if febrile. Hold ASA and Plavix for now.

## 2020-08-23 NOTE — H&P ADULT - NSHPSOCIALHISTORY_GEN_ALL_CORE
Tobacco: denies  EtOH: denies  Illicit drugs: denies  Lives with Tobacco: denies  EtOH: denies  Illicit drugs: denies  Lives alone.

## 2020-08-23 NOTE — PHYSICAL THERAPY INITIAL EVALUATION ADULT - IMPAIRMENTS CONTRIBUTING TO GAIT DEVIATIONS, PT EVAL
+BLE knee buckling (left > right )/impaired postural control/narrow base of support/decreased strength

## 2020-08-23 NOTE — H&P ADULT - ASSESSMENT
80 yo man, former longtime smoker (~60 pack-years) with history of CAD/NSTEMI s/p stent (possibly on ASA and Plavix), sick sinus syndrome s/p PPM, HTN, dementia, depression (requiring admission at [facility] in 2008), latent syphilis (s/p tx in 1960s and PCN IM x3 weekly), disseminated herpes zoster, L1 compression fracture, and restless leg syndrome presents with AMS, found to be pancytopenic.

## 2020-08-24 LAB
ALBUMIN SERPL ELPH-MCNC: 2.8 G/DL — LOW (ref 3.3–5)
ALP SERPL-CCNC: 66 U/L — SIGNIFICANT CHANGE UP (ref 40–120)
ALT FLD-CCNC: 11 U/L — SIGNIFICANT CHANGE UP (ref 4–41)
ANION GAP SERPL CALC-SCNC: 9 MMO/L — SIGNIFICANT CHANGE UP (ref 7–14)
APTT BLD: 26 SEC — LOW (ref 27–36.3)
AST SERPL-CCNC: 18 U/L — SIGNIFICANT CHANGE UP (ref 4–40)
BASOPHILS # BLD AUTO: 0.02 K/UL — SIGNIFICANT CHANGE UP (ref 0–0.2)
BASOPHILS NFR BLD AUTO: 0.5 % — SIGNIFICANT CHANGE UP (ref 0–2)
BILIRUB DIRECT SERPL-MCNC: 0.3 MG/DL — HIGH (ref 0.1–0.2)
BILIRUB SERPL-MCNC: 1.1 MG/DL — SIGNIFICANT CHANGE UP (ref 0.2–1.2)
BUN SERPL-MCNC: 20 MG/DL — SIGNIFICANT CHANGE UP (ref 7–23)
CALCIUM SERPL-MCNC: 9.2 MG/DL — SIGNIFICANT CHANGE UP (ref 8.4–10.5)
CHLORIDE SERPL-SCNC: 107 MMOL/L — SIGNIFICANT CHANGE UP (ref 98–107)
CO2 SERPL-SCNC: 25 MMOL/L — SIGNIFICANT CHANGE UP (ref 22–31)
CREAT SERPL-MCNC: 0.91 MG/DL — SIGNIFICANT CHANGE UP (ref 0.5–1.3)
CULTURE RESULTS: NO GROWTH — SIGNIFICANT CHANGE UP
EOSINOPHIL # BLD AUTO: 0.09 K/UL — SIGNIFICANT CHANGE UP (ref 0–0.5)
EOSINOPHIL NFR BLD AUTO: 2.4 % — SIGNIFICANT CHANGE UP (ref 0–6)
GLUCOSE SERPL-MCNC: 82 MG/DL — SIGNIFICANT CHANGE UP (ref 70–99)
HAPTOGLOB SERPL-MCNC: 158 MG/DL — SIGNIFICANT CHANGE UP (ref 34–200)
HCT VFR BLD CALC: 27.9 % — LOW (ref 39–50)
HGB BLD-MCNC: 8.4 G/DL — LOW (ref 13–17)
HIV 1+2 AB+HIV1 P24 AG SERPL QL IA: SIGNIFICANT CHANGE UP
IMM GRANULOCYTES NFR BLD AUTO: 0.3 % — SIGNIFICANT CHANGE UP (ref 0–1.5)
INR BLD: 1.3 — HIGH (ref 0.88–1.16)
LDH SERPL L TO P-CCNC: 227 U/L — HIGH (ref 135–225)
LYMPHOCYTES # BLD AUTO: 0.89 K/UL — LOW (ref 1–3.3)
LYMPHOCYTES # BLD AUTO: 24.1 % — SIGNIFICANT CHANGE UP (ref 13–44)
MAGNESIUM SERPL-MCNC: 1.9 MG/DL — SIGNIFICANT CHANGE UP (ref 1.6–2.6)
MCHC RBC-ENTMCNC: 30.1 % — LOW (ref 32–36)
MCHC RBC-ENTMCNC: 31.5 PG — SIGNIFICANT CHANGE UP (ref 27–34)
MCV RBC AUTO: 104.5 FL — HIGH (ref 80–100)
MONOCYTES # BLD AUTO: 0.8 K/UL — SIGNIFICANT CHANGE UP (ref 0–0.9)
MONOCYTES NFR BLD AUTO: 21.7 % — HIGH (ref 2–14)
NEUTROPHILS # BLD AUTO: 1.88 K/UL — SIGNIFICANT CHANGE UP (ref 1.8–7.4)
NEUTROPHILS NFR BLD AUTO: 51 % — SIGNIFICANT CHANGE UP (ref 43–77)
NRBC # FLD: 0 K/UL — SIGNIFICANT CHANGE UP (ref 0–0)
PHOSPHATE SERPL-MCNC: 3.9 MG/DL — SIGNIFICANT CHANGE UP (ref 2.5–4.5)
PLATELET # BLD AUTO: 22 K/UL — LOW (ref 150–400)
PLATELET COUNT - ESTIMATE: SIGNIFICANT CHANGE UP
PMV BLD: SIGNIFICANT CHANGE UP FL (ref 7–13)
POTASSIUM SERPL-MCNC: 4.3 MMOL/L — SIGNIFICANT CHANGE UP (ref 3.5–5.3)
POTASSIUM SERPL-SCNC: 4.3 MMOL/L — SIGNIFICANT CHANGE UP (ref 3.5–5.3)
PROT SERPL-MCNC: 5.3 G/DL — LOW (ref 6–8.3)
PROTHROM AB SERPL-ACNC: 14.6 SEC — HIGH (ref 10.6–13.6)
RBC # BLD: 2.67 M/UL — LOW (ref 4.2–5.8)
RBC # FLD: 21.4 % — HIGH (ref 10.3–14.5)
RETICS #: 146 K/UL — HIGH (ref 25–125)
RETICS/RBC NFR: 5.5 % — HIGH (ref 0.5–2.5)
RPR SER-TITR: SIGNIFICANT CHANGE UP
RPR SERPL-ACNC: REACTIVE — HIGH
SODIUM SERPL-SCNC: 141 MMOL/L — SIGNIFICANT CHANGE UP (ref 135–145)
SPECIMEN SOURCE: SIGNIFICANT CHANGE UP
T PALLIDUM AB TITR SER: POSITIVE — HIGH
WBC # BLD: 3.69 K/UL — LOW (ref 3.8–10.5)
WBC # FLD AUTO: 3.69 K/UL — LOW (ref 3.8–10.5)

## 2020-08-24 PROCEDURE — 12345: CPT | Mod: NC,GC

## 2020-08-24 PROCEDURE — 99223 1ST HOSP IP/OBS HIGH 75: CPT

## 2020-08-24 PROCEDURE — 99222 1ST HOSP IP/OBS MODERATE 55: CPT | Mod: GC

## 2020-08-24 RX ORDER — ATORVASTATIN CALCIUM 80 MG/1
10 TABLET, FILM COATED ORAL AT BEDTIME
Refills: 0 | Status: DISCONTINUED | OUTPATIENT
Start: 2020-08-24 | End: 2020-08-25

## 2020-08-24 RX ORDER — ACETAMINOPHEN 500 MG
975 TABLET ORAL ONCE
Refills: 0 | Status: COMPLETED | OUTPATIENT
Start: 2020-08-24 | End: 2020-08-24

## 2020-08-24 RX ORDER — BUDESONIDE, MICRONIZED 100 %
0.5 POWDER (GRAM) MISCELLANEOUS
Refills: 0 | Status: DISCONTINUED | OUTPATIENT
Start: 2020-08-24 | End: 2020-08-25

## 2020-08-24 RX ORDER — CARVEDILOL PHOSPHATE 80 MG/1
3.12 CAPSULE, EXTENDED RELEASE ORAL EVERY 12 HOURS
Refills: 0 | Status: DISCONTINUED | OUTPATIENT
Start: 2020-08-24 | End: 2020-08-25

## 2020-08-24 RX ORDER — CARVEDILOL PHOSPHATE 80 MG/1
3.12 CAPSULE, EXTENDED RELEASE ORAL EVERY 12 HOURS
Refills: 0 | Status: DISCONTINUED | OUTPATIENT
Start: 2020-08-24 | End: 2020-08-24

## 2020-08-24 RX ORDER — IPRATROPIUM BROMIDE 0.2 MG/ML
500 SOLUTION, NON-ORAL INHALATION ONCE
Refills: 0 | Status: COMPLETED | OUTPATIENT
Start: 2020-08-24 | End: 2020-08-24

## 2020-08-24 RX ADMIN — Medication 500 MICROGRAM(S): at 16:04

## 2020-08-24 RX ADMIN — ATORVASTATIN CALCIUM 10 MILLIGRAM(S): 80 TABLET, FILM COATED ORAL at 22:20

## 2020-08-24 RX ADMIN — Medication 0.5 MILLIGRAM(S): at 23:07

## 2020-08-24 RX ADMIN — Medication 975 MILLIGRAM(S): at 22:20

## 2020-08-24 RX ADMIN — CEFTRIAXONE 100 MILLIGRAM(S): 500 INJECTION, POWDER, FOR SOLUTION INTRAMUSCULAR; INTRAVENOUS at 05:03

## 2020-08-24 RX ADMIN — Medication 975 MILLIGRAM(S): at 22:50

## 2020-08-24 RX ADMIN — CARVEDILOL PHOSPHATE 3.12 MILLIGRAM(S): 80 CAPSULE, EXTENDED RELEASE ORAL at 22:47

## 2020-08-24 NOTE — SWALLOW BEDSIDE ASSESSMENT ADULT - ASR SWALLOW LINGUAL MOBILITY
71 y.o.    Patient Care Team:  Moe Matute MD as PCP - General  Garrison Alcaraz MD as PCP - Claims Attributed  Miguel Gautam MD as Consulting Physician (Cardiology)    Chief Complaint:      F/U HYPERTHYROID.  GRAVE'S DISEASE.  HERE TO DISCUSS LAB RESULTS     Subjective     HPI   Patient is a 71-year-old white female with a past history of Graves' disease and substernal goiter status post left hemithyroidectomy came for follow-up    Postoperative hyperthyroidism  Patient still has right-sided thyroid intact but is currently not taking any medication  Graves' disease  Patient was positive for antibodies  Denies any diplopia or blurred vision or pretibial myxedema  Abnormal weight gain  Patient currently weighs 257 pounds  She is steadily gaining weight  Chronic fatigue  Patient patient continues to report fatigue  Obstructive sleep apnea  She is currently using CPAP      Interval History      The following portions of the patient's history were reviewed and updated as appropriate: allergies, current medications, past family history, past medical history, past social history, past surgical history and problem list.    Past Medical History:   Diagnosis Date   • Asthma    • Back pain    • Diverticulitis    • Diverticulosis    • Goiter    • Hyperthyroidism     followed by Dr. Blackmon   • Joint pain, knee    • Kidney stones 2015    with cystoscopy by Dr. Miguel Monte done on 10/7/15, 9/9/15, 5/8/15, 9/21/07   • Mediastinal mass    • Obstructive pyelonephritis 09/28/2015    left obstructing pyelonephritis    • PONV (postoperative nausea and vomiting)    • Renal disorder    • SOB (shortness of breath) on exertion      Family History   Problem Relation Age of Onset   • Heart disease Father    • Heart attack Paternal Uncle    • Cancer Maternal Grandmother         ovarian   • Heart attack Paternal Grandfather    • Heart attack Paternal Uncle    • Heart attack Paternal Uncle    • Heart attack  "Paternal Uncle    • Heart attack Paternal Uncle    • Heart attack Paternal Uncle    • Malig Hyperthermia Neg Hx      Social History     Socioeconomic History   • Marital status:      Spouse name: BERENICE COFFEY    • Number of children: 2   • Years of education: HIGH SCHOOL    • Highest education level: Not on file   Occupational History     Employer: RETIRED   Tobacco Use   • Smoking status: Never Smoker   • Smokeless tobacco: Never Used   Substance and Sexual Activity   • Alcohol use: No   • Drug use: No   • Sexual activity: Defer     Allergies   Allergen Reactions   • Cephalexin Hives   • Cephalosporins Hives     Took in 2015 w/o problems   • Penicillins Hives       Current Outpatient Medications:   •  allopurinol (ZYLOPRIM) 300 MG tablet, Take  by mouth., Disp: , Rfl:         Review of Systems   Constitutional: Negative for chills, fatigue and fever.   Cardiovascular: Negative for chest pain and palpitations.   Gastrointestinal: Negative for abdominal pain, constipation, diarrhea, nausea and vomiting.   Endocrine: Negative for cold intolerance and heat intolerance.   All other systems reviewed and are negative.      Objective       Vitals:    12/10/19 0920   BP: 138/78   Pulse: 70   Weight: 117 kg (257 lb)   Height: 157.5 cm (62\")     Body mass index is 47.01 kg/m².      Physical Exam   Constitutional: She is oriented to person, place, and time. She appears well-developed and well-nourished.   Obese   HENT:   Head: Normocephalic and atraumatic.   Eyes: Pupils are equal, round, and reactive to light. EOM are normal.   No lid lag   exophthalmos   chemosis of the conjunctiva noted   Neck: Normal range of motion. Neck supple. No tracheal deviation present. No thyromegaly present.   Cardiovascular: Normal rate, regular rhythm, normal heart sounds and intact distal pulses.   Tachycardia   Pulmonary/Chest: Effort normal and breath sounds normal.   Abdominal: Soft. Bowel sounds are normal. She exhibits distension. " There is no tenderness.   Musculoskeletal: Normal range of motion. She exhibits no edema.   Negative for thyroid acropachy   Neurological: She is alert and oriented to person, place, and time. She has normal reflexes.   Skin: Skin is warm and dry. No erythema.   Psychiatric: She has a normal mood and affect. Her behavior is normal.   Nursing note and vitals reviewed.    Results Review:     I reviewed the patient's new clinical results.    Medical records reviewed  Summary:  Pulmonary notes reviewed  Severe obstructive sleep apnea on CPAP  Sleep-related hypoxemia noted  This could be the reason for her severe fatigue as well      Lab on 12/03/2019   Component Date Value Ref Range Status   • WBC 12/03/2019 4.50  3.40 - 10.80 10*3/mm3 Final   • RBC 12/03/2019 4.16  3.77 - 5.28 10*6/mm3 Final   • Hemoglobin 12/03/2019 13.3  12.0 - 15.9 g/dL Final   • Hematocrit 12/03/2019 38.4  34.0 - 46.6 % Final   • MCV 12/03/2019 92.3  79.0 - 97.0 fL Final   • MCH 12/03/2019 32.0  26.6 - 33.0 pg Final   • MCHC 12/03/2019 34.6  31.5 - 35.7 g/dL Final   • RDW 12/03/2019 13.8  12.3 - 15.4 % Final   • Platelets 12/03/2019 144  140 - 450 10*3/mm3 Final   • Neutrophil Rel % 12/03/2019 60.1  42.7 - 76.0 % Final   • Lymphocyte Rel % 12/03/2019 27.3  19.6 - 45.3 % Final   • Monocyte Rel % 12/03/2019 8.2  5.0 - 12.0 % Final   • Eosinophil Rel % 12/03/2019 3.8  0.3 - 6.2 % Final   • Basophil Rel % 12/03/2019 0.4  0.0 - 1.5 % Final   • Neutrophils Absolute 12/03/2019 2.70  1.70 - 7.00 10*3/mm3 Final   • Lymphocytes Absolute 12/03/2019 1.23  0.70 - 3.10 10*3/mm3 Final   • Monocytes Absolute 12/03/2019 0.37  0.10 - 0.90 10*3/mm3 Final   • Eosinophils Absolute 12/03/2019 0.17  0.00 - 0.40 10*3/mm3 Final   • Basophils Absolute 12/03/2019 0.02  0.00 - 0.20 10*3/mm3 Final   • Immature Granulocyte Rel % 12/03/2019 0.2  0.0 - 0.5 % Final   • Immature Grans Absolute 12/03/2019 0.01  0.00 - 0.05 10*3/mm3 Final   • nRBC 12/03/2019 0.0  0.0 - 0.2 /100 WBC  within functional limits "Final   • TSH 12/03/2019 0.033* 0.270 - 4.200 uIU/mL Final   • Free T4 12/03/2019 1.40  0.93 - 1.70 ng/dL Final     Lab Results   Component Value Date    HGBA1C 4.70 (L) 09/10/2016     Lab Results   Component Value Date    CREATININE 1.01 (H) 02/25/2019     Imaging Results (Most Recent)     None          Assessment and Plan:    Patt was seen today for hyperthyroidism.    Diagnoses and all orders for this visit:    Hyperthyroidism  -     US Thyroid; Future    Graves disease    Multinodular goiter    Abnormal weight gain    Chronic fatigue    Patient's TSH is 0.03 but the free T4 is 1.4  Patient denies any symptoms of hyper or hypothyroidism  Patient is currently not on any medication    She is status post left hemithyroidectomy  There was no recent ultrasound  Advised the patient to get ultrasound completed before next visit  She will return to follow-up in 6 months      Garrison Alcaraz MD. FACE    12/10/19      EMR Dragon / transcription disclaimer:     \"Dictated utilizing Dragon dictation\".         "

## 2020-08-24 NOTE — PROGRESS NOTE ADULT - PROBLEM SELECTOR PLAN 2
Pt with leukopenia (WBC 3.37), anemia (Hgb 7.5 vs. 11-12 baseline), and thrombocytopenia (plt count 26).  - Monitor CBC with diff daily  - Hematology consult to be placed in AM  - Leukopenia: Pt with possible UTI. Will empirically treat for UTI with ceftriaxone 1 g q24hrs.  - Anemia: FOBT negative. No BMs or rectal bleeding while in ED, no other S/S of active bleed. S/p 1u pRBCs 7.5.   - Iron studies grossly negative; Hemolysis labs equivocal- mildly hemolyzed    Stevo test Negative; f/u folate, and vitamin B12. Check TSH given macrocytosis.   - Thrombocytopenia: Last plt count in 10/2019 >100. Unclear etiology. CTH negative for ICH. Will check DIC labs. Currently, low suspicion for TTP, as pt without fevers and renal failure. Will check blood smear. Transfuse for plt count < 10 if afebrile, <20 if febrile. Hold ASA and Plavix for now.  - Bone Biopsy tomorrow Pt with leukopenia (WBC 3.37), anemia (Hgb 7.5 vs. 11-12 baseline), and thrombocytopenia (plt count 26).  - Monitor CBC with diff daily  - Hematology consult to be placed in AM  - Leukopenia: Pt with possible UTI. Will empirically treat for UTI with ceftriaxone 1 g q24hrs.  - Anemia: FOBT negative. No BMs or rectal bleeding while in ED, no other S/S of active bleed. S/p 1u pRBCs 7.5. Ct abd/pelvis negative for RP bleed  - Iron studies grossly negative; Hemolysis labs equivocal- mildly hemolyzed    Stevo test Negative; f/u folate, and vitamin B12. Check TSH given macrocytosis.   - Thrombocytopenia: Last plt count in 10/2019 >100. Unclear etiology. CTH negative for ICH. Will check DIC labs. Currently, low suspicion for TTP, as pt without fevers and renal failure. Will check blood smear. Transfuse for plt count < 10 if afebrile, <20 if febrile. Hold ASA and Plavix for now.  - Bone Biopsy tomorrow

## 2020-08-24 NOTE — SWALLOW BEDSIDE ASSESSMENT ADULT - PHARYNGEAL PHASE
no immediate cough/delayed cough post intake Delayed cough post oral intake unable to assess delayed cough Within functional limits Cough post oral intake delayed cough vs baseline cough

## 2020-08-24 NOTE — SWALLOW BEDSIDE ASSESSMENT ADULT - ASR SWALLOW RECOMMEND DIAG
VFSS/MBS/Consider Cinesophagram to objectively assess the swallow mechanism given patient with baseline wet congested cough and to rule out if cough is dysphagia related.

## 2020-08-24 NOTE — SWALLOW BEDSIDE ASSESSMENT ADULT - SWALLOW EVAL: DIAGNOSIS
Patient presents with OroPharyngeal Stage Dysphagia characterized by adequate oral containment, inability to chew solid consistency as patient expectorated solid consistency, slow/prlonged mashing/gumming for soft solids due to edentulous state, adequate bolus manipulation and transfer for [facility].  There is laryngeal elevation upon palpation, initiation of the pharyngeal swallow. Of Note: Patient has a baseline wet congested cough; and intermittently cough post intake for PO trials of Soft Solids, Nectar Thick Liquids and Thin Liquids. However it is difficult to determine if cough is dysphagia related. Patient presents with OroPharyngeal Stage Dysphagia characterized by adequate oral containment, inability to chew solid consistency as patient expectorated solid consistency, slow/prlonged mashing/gumming for soft solids due to edentulous state, adequate bolus manipulation and transfer for [facility].  There is laryngeal elevation upon palpation, initiation of the pharyngeal swallow. Of Note: Patient with a baseline wet congested cough; and exhibited additional cough post intake for PO trials of Soft Solids, Nectar Thick Liquids and Thin Liquids. Therefore, it is difficult to determine if cough is dysphagia related.

## 2020-08-24 NOTE — PROGRESS NOTE ADULT - ATTENDING COMMENTS
79 y.o. male smoker w/ hx Dementia, HTN, CAD s/p stent, s/p MI, SSS s/p PPM, CVA, depression, latent syphilis, restless leg syndrome, recent Left facial and shoulder zoster found slumped over with AMS found to have metabolic encephalopathy due to UTI now with mental status approaching baseline on Ceftriaxone. F/U Urine Cx. Patient also with pancytopenia ( PLT 26) but no active bleeding. Guiac negative. CT A/P negative for RP bleed. PT rec rehab.

## 2020-08-24 NOTE — PROGRESS NOTE ADULT - ASSESSMENT
78 yo man, former longtime smoker (~60 pack-years) with history of CAD/NSTEMI s/p stent (possibly on ASA and Plavix), sick sinus syndrome s/p PPM, HTN, dementia, depression (requiring admission at [facility] in 2008), latent syphilis (s/p tx in 1960s and PCN IM x3 weekly), disseminated herpes zoster, L1 compression fracture, and restless leg syndrome presents with AMS, found to be pancytopenic; hematology consulted for evaluation of severe thrombocytopenia and macrocytic anemia.    #Macrocytic anemia  #Thrombocytopenia  -labs notable for polychromasia, B12 and folate are normal, iron studies show normal iron sat but elevated ferritin. Haptoglobin and bilirubin are normal, making hemolytic process unlikely despite elevated LDH, which is nonspecific.   -no signs of SIRS and fibrinogen normal, making DIC unlikely  -Peripheral smear reviewed today showing polychromasia, some target cells, some large platelets, no blasts, very rare schistocytes, some dysplastic appearing neutrophils ; ddx still includes ITP vs MDS vs lymphoproliferative disorder  -transfuse for Hgb <7.0 or if he has symptomatic anemia or over bleeding  -transfuse for plts <10k or if bleeding  -Follow up CT C/A/P with contrast to r/o occult bleeding or lymphoproliferative disorder, which can also be associated with ITP and hematologic findings seen (lymphopenia, macrocytic anemia, thrombocytopenia)  -CTH negative for acute pathology, MRI with contrast contraindicated given PPM  -monitor for clinical signs of bleeding; given patient reporting black stool, would check occult blood and monitor stool count; if confirmed to be having GI bleeding source, would consult GI  -check HIV, Hep C, Hep B  - Discussed with patient's brother Johnson who is also his HCP regarding possible bone marrow biopsy to investigate etiology of pancytopenia. Also discussed with him that if a malignant etiology is found, pt may need disease modifying therapy which may include chemotherapy, and given pt's dementia, he may not be a good candidate. Johnson still wanted to continue with bone marrow bx to at least establish a diagnosis. Will plan for bone marrow biopsy likely tomorrow.     Magui Mcgregor MD  Hematology Oncology Fellow, PGY-5  [facility] Pager: [phone]/ [facility] Pager: [phone] 78 yo man, former longtime smoker (~60 pack-years) with history of CAD/NSTEMI s/p stent (possibly on ASA and Plavix), sick sinus syndrome s/p PPM, HTN, dementia, depression (requiring admission at [facility] in 2008), latent syphilis (s/p tx in 1960s and PCN IM x3 weekly), disseminated herpes zoster, L1 compression fracture, and restless leg syndrome presents with AMS, found to be pancytopenic; hematology consulted for evaluation of severe thrombocytopenia and macrocytic anemia.    #Macrocytic anemia  #Thrombocytopenia  -labs notable for polychromasia, B12 and folate are normal, iron studies show normal iron sat but elevated ferritin. Haptoglobin and bilirubin are normal, making hemolytic process unlikely despite elevated LDH, which is nonspecific.   -no signs of SIRS and fibrinogen normal, making DIC unlikely  -Peripheral smear reviewed today showing polychromasia, some target cells, some large platelets, no blasts, very rare schistocytes, some dysplastic appearing neutrophils ; ddx still includes ITP vs MDS vs lymphoproliferative disorder  -transfuse for Hgb <7.0 or if he has symptomatic anemia or over bleeding  -transfuse for plts <10k or if bleeding  -Follow up CT C/A/P with contrast to r/o occult bleeding or lymphoproliferative disorder, which can also be associated with ITP and hematologic findings seen (lymphopenia, macrocytic anemia, thrombocytopenia)  -CTH negative for acute pathology, MRI with contrast contraindicated given PPM  -monitor for clinical signs of bleeding; given patient reporting black stool, would check occult blood and monitor stool count; if confirmed to be having GI bleeding source, would consult GI  -check HIV, Hep C, Hep B  - Discussed with patient's brother Johnson who is also his HCP regarding possible bone marrow biopsy to investigate etiology of pancytopenia. Also discussed with him that if a malignant etiology is found, pt may need disease modifying therapy which may include chemotherapy, and given pt's dementia, he may not be a good candidate. We spoke with Johnson and advised against a bone marrow biopsy given his advanced dementia, and Johnson agreed against a biopsy but rather supportive transfusions if necessary.      Magui Mcgregor MD  Hematology Oncology Fellow, PGY-5  [facility] Pager: [phone]/ [facility] Pager: [phone]

## 2020-08-24 NOTE — SWALLOW BEDSIDE ASSESSMENT ADULT - COMMENTS
Medicine Note 8/24/2020 - 78 yo man, former longtime smoker (~60 pack-years) with history of CAD/NSTEMI s/p stent (possibly on ASA and Plavix), sick sinus syndrome s/p PPM, HTN, dementia, depression (requiring admission at [facility] in 2008), latent syphilis (s/p tx in 1960s and PCN IM x3 weekly), disseminated herpes zoster, L1 compression fracture, and restless leg syndrome presents with AMS, found to be pancytopenic and +UTI pending bone biopsy tomorrow.  	  Head CT - No acute intracranial hemorrhage or mass effect. Moderate to severe chronic small vessel ischemic changes, indeterminate age bilateral basal ganglionic lacunar infarcts, and chronic appearing bilateral cerebellar infarcts. In view of the extensive chronic ischemic changes, if there is concern for acute infarction, MRI should be considered.    Patient seen at bedside, alert and oriented. Patient is able to follow simple commands and express simple needs.  Patient was calm and cooperative state.  Patient is with baseline wet congested cough. Patient expectorated solid trial

## 2020-08-24 NOTE — PROGRESS NOTE ADULT - SUBJECTIVE AND OBJECTIVE BOX
INTERVAL History:    Allergies    No Known Allergies    Intolerances        MEDICATIONS  (STANDING):  cefTRIAXone   IVPB      cefTRIAXone   IVPB 1000 milliGRAM(s) IV Intermittent every 24 hours    MEDICATIONS  (PRN):      Vital Signs Last 24 Hrs  T(C): 37.2 (24 Aug 2020 05:04), Max: 37.2 (23 Aug 2020 22:30)  T(F): 98.9 (24 Aug 2020 05:04), Max: 99 (23 Aug 2020 22:30)  HR: 81 (24 Aug 2020 05:04) (74 - 81)  BP: 151/73 (24 Aug 2020 05:04) (143/62 - 161/81)  BP(mean): --  RR: 19 (24 Aug 2020 05:04) (19 - 20)  SpO2: 98% (24 Aug 2020 05:04) (96% - 98%)    PHYSICAL EXAM:      LABS:                        8.4    3.69  )-----------( 22       ( 24 Aug 2020 06:45 )             27.9     08-24    141  |  107  |  20  ----------------------------<  82  4.3   |  25  |  0.91    Ca    9.2      24 Aug 2020 06:45  Phos  3.9     08-24  Mg     1.9     08-24    TPro  5.3<L>  /  Alb  2.8<L>  /  TBili  1.1  /  DBili  0.3<H>  /  AST  18  /  ALT  11  /  AlkPhos  66  08-24    PT/INR - ( 24 Aug 2020 06:45 )   PT: 14.6 SEC;   INR: 1.30          PTT - ( 24 Aug 2020 06:45 )  PTT:26.0 SEC  Urinalysis Basic - ( 23 Aug 2020 01:12 )    Color: YELLOW / Appearance: CLEAR / S.028 / pH: 6.0  Gluc: NEGATIVE / Ketone: NEGATIVE  / Bili: NEGATIVE / Urobili: TRACE   Blood: NEGATIVE / Protein: 20 / Nitrite: NEGATIVE   Leuk Esterase: SMALL / RBC: 3-5 / WBC 11-25   Sq Epi: OCC / Non Sq Epi: x / Bacteria: MANY          RADIOLOGY & ADDITIONAL STUDIES:    PATHOLOGY: INTERVAL History: No acute events overnight. Spoke with pt's HCP and brother Johnson Young over the phone for collateral information. He states pt has had dementia for about 5 years, at baseline, can be forgetful of people's names, however on Saturday, pt fell and started hallucinating. Pt also noted to be in and out of rehab recently with home care at home. Brother states that pt usually gets bloodwork done every 3 months, however he was never told that pt's blood counts were low or needed transfusions.     Allergies    No Known Allergies    Intolerances        MEDICATIONS  (STANDING):  cefTRIAXone   IVPB      cefTRIAXone   IVPB 1000 milliGRAM(s) IV Intermittent every 24 hours    MEDICATIONS  (PRN):      Vital Signs Last 24 Hrs  T(C): 37.2 (24 Aug 2020 05:04), Max: 37.2 (23 Aug 2020 22:30)  T(F): 98.9 (24 Aug 2020 05:04), Max: 99 (23 Aug 2020 22:30)  HR: 81 (24 Aug 2020 05:04) (74 - 81)  BP: 151/73 (24 Aug 2020 05:04) (143/62 - 161/81)  BP(mean): --  RR: 19 (24 Aug 2020 05:04) (19 - 20)  SpO2: 98% (24 Aug 2020 05:04) (96% - 98%)    PHYSICAL EXAM:  GENERAL: Alert and oriented to name and birthday, not location. Calm, obese    HEAD:  Atraumatic, Normocephalic  EYES: EOMI, PERRLA, conjunctiva and sclera clear  Mouth: dry mucous membranes   NECK: Supple, No JVD, Normal thyroid  CHEST/LUNG: Clear to auscultation bilaterally; No rales, rhonchi, wheezing, or rubs  HEART: Regular rate and rhythm; No murmurs, rubs, or gallops  ABDOMEN: Soft, Nontender, Nondistended; Bowel sounds present  EXTREMITIES:  2+ Peripheral Pulses, No clubbing, cyanosis, or edema  LYMPH: No lymphadenopathy noted  SKIN: No rashes or lesions      LABS:                        8.4    3.69  )-----------(        ( 24 Aug 2020 06:45 )             27.9     08-24    141  |  107  |  20  ----------------------------<  82  4.3   |  25  |  0.91    Ca    9.2      24 Aug 2020 06:45  Phos  3.9     08-24  Mg     1.9     08-24    TPro  5.3<L>  /  Alb  2.8<L>  /  TBili  1.1  /  DBili  0.3<H>  /  AST  18  /  ALT  11  /  AlkPhos  66  08-24    PT/INR - ( 24 Aug 2020 06:45 )   PT: 14.6 SEC;   INR: 1.30          PTT - ( 24 Aug 2020 06:45 )  PTT:26.0 SEC  Urinalysis Basic - ( 23 Aug 2020 01:12 )    Color: YELLOW / Appearance: CLEAR / S.028 / pH: 6.0  Gluc: NEGATIVE / Ketone: NEGATIVE  / Bili: NEGATIVE / Urobili: TRACE   Blood: NEGATIVE / Protein: 20 / Nitrite: NEGATIVE   Leuk Esterase: SMALL / RBC: 3-5 / WBC 11-25   Sq Epi: OCC / Non Sq Epi: x / Bacteria: MANY          RADIOLOGY & ADDITIONAL STUDIES:  < from: CT Head No Cont (20 @ 22:18) >    EXAM:  CT BRAIN        PROCEDURE DATE:  Aug 22 2020         INTERPRETATION:  CLINICAL INFORMATION: Facial droop and slurred speech.    TECHNIQUE: Serial axial images were obtained from the skull base to the vertex without intravenous contrast.  Coronal and sagittal reformations were obtained.    COMPARISON: No similar prior studies available for comparison.    FINDINGS:    There is no acute intra-axial or extra-axial hemorrhage. There is no mass effect or shift of the midline. The basal cisterns are not effaced. There is cerebral and cerebellar volume loss with prominence of the ventricles, sulci, and cerebellar folia. There are moderate to severe chronic small vessel ischemic changes in the frontoparietal white matter. There are indeterminate age bilateral basal ganglionic lacunar infarcts and chronic appearing bilateral cerebellar infarcts. There are atherosclerotic calcifications of the intracranial carotid arteries and intradural vertebral arteries.    The regional soft tissues and osseous structures are unremarkable apart from evidence of bilateral lens surgery. The visualized paranasal sinuses and tympanic/mastoid cavities are clear apart from focal opacification of the left anterior ethmoid air cell and mild mucosal thickening of the remaining ethmoid air cells.    IMPRESSION:    No acute intracranial hemorrhage or mass effect. Moderate to severe chronic small vessel ischemic changes, indeterminate age bilateral basal ganglionic lacunar infarcts, and chronic appearing bilateral cerebellar infarcts. In view of the extensive chronic ischemic changes, if there is concern for acute infarction, MRI should be considered.              ASHWIN CHAVEZ M.D., RESIDENT RADIOLOGIST  This document has been electronically signed.  CONNER RICHARDS ATTENDING RADIOLOGIST  This document has been electronically signed. Aug 22 2020 11:26PM        < end of copied text >      PATHOLOGY:

## 2020-08-24 NOTE — PROGRESS NOTE ADULT - ATTENDING COMMENTS
79 year old male with advanced stage demetia: non verbal brought to hospital for management. Note evaluation by speech and swallow to evaluate aspiration risk. He has anemia and thrombocytopenia. Given his poor medical condition I would not advise him to have bone marrow aspiration and biopsy. He is not a candidate to cytotoxic therapy if a bone marrow dysfunction is identified. Recommendation discussed with the patient's brother by telephone 79 year old male with advanced stage dementia: non verbal brought to hospital for management. Note evaluation by speech and swallow to evaluate aspiration risk. He has anemia and thrombocytopenia. Given his poor medical condition I would not advise him to have bone marrow aspiration and biopsy. He is not a candidate to cytotoxic therapy if a bone marrow dysfunction is identified. Recommendation discussed with the patient's brother by telephone

## 2020-08-24 NOTE — SWALLOW BEDSIDE ASSESSMENT ADULT - ORAL PREPARATORY PHASE
Within functional limits slow mashing/gumming due to edentulous state unable to mash/gum due to edentulous state

## 2020-08-24 NOTE — PROGRESS NOTE ADULT - SUBJECTIVE AND OBJECTIVE BOX
Dr. Joao LITTLEJOHN:[id number]/NS: [phone]  After 7pm please page [phone] or [phone]    AISHWARYA CACERES  79y  MRN: [id number]    Subjective:    Patient is a 79y old  Male who presents with a chief complaint of AMS, pancytopenia (23 Aug 2020 12:46)      MEDICATIONS  (STANDING):  cefTRIAXone   IVPB      cefTRIAXone   IVPB 1000 milliGRAM(s) IV Intermittent every 24 hours    MEDICATIONS  (PRN):        Objective:    Vitals: Vital Signs Last 24 Hrs  T(C): 37.2 (20 @ 05:04), Max: 37.2 (20 @ 22:30)  T(F): 98.9 (20 @ 05:04), Max: 99 (20 @ 22:30)  HR: 81 (20 @ 05:04) (74 - 81)  BP: 151/73 (20 @ 05:04) (143/62 - 161/81)  BP(mean): --  RR: 19 (20 @ 05:04) (19 - 20)  SpO2: 98% (20 @ 05:04) (96% - 98%)            I&O's Summary    23 Aug 2020 07:01  -  24 Aug 2020 07:00  --------------------------------------------------------  IN: 1040 mL / OUT: 0 mL / NET: 1040 mL        PHYSICAL EXAM:  GENERAL: NAD, well-groomed, well-developed  HEAD:  Atraumatic, Normocephalic  EYES: EOMI, PERRLA, conjunctiva and sclera clear  ENMT: No tonsillar erythema, exudates, or enlargement; Moist mucous membranes, Good dentition, No lesions  NECK: Supple, No JVD, Normal thyroid  CHEST/LUNG: Clear to auscultation bilaterally; No rales, rhonchi, wheezing, or rubs  HEART: Regular rate and rhythm; No murmurs, rubs, or gallops  ABDOMEN: Soft, Nontender, Nondistended; Bowel sounds present  EXTREMITIES:  2+ Peripheral Pulses, No clubbing, cyanosis, or edema  LYMPH: No lymphadenopathy noted  SKIN: No rashes or lesions  NERVOUS SYSTEM:  Alert & Oriented X4, Good concentration    LABS:      142  |  106  |  21  ----------------------------<  98  4.2   |  24  |  1.01  08    138  |  105  |  24<H>  ----------------------------<  101<H>  4.4   |  22  |  1.05    Ca    8.8      23 Aug 2020 10:20  Ca    9.0      22 Aug 2020 21:50  Phos  4.0       Mg     1.8         TPro  5.2<L>  /  Alb  3.0<L>  /  TBili  1.1  /  DBili  x   /  AST  23  /  ALT  16  /  AlkPhos  71  0823  TPro  5.1<L>  /  Alb  2.8<L>  /  TBili  1.0  /  DBili  x   /  AST  35  /  ALT  18  /  AlkPhos  64  08-22      PT/INR - ( 23 Aug 2020 10:20 )   PT: 14.9 SEC;   INR: 1.32          PTT - ( 22 Aug 2020 21:50 )  PTT:27.0 SEC              Urinalysis Basic - ( 23 Aug 2020 01:12 )    Color: YELLOW / Appearance: CLEAR / S.028 / pH: 6.0  Gluc: NEGATIVE / Ketone: NEGATIVE  / Bili: NEGATIVE / Urobili: TRACE   Blood: NEGATIVE / Protein: 20 / Nitrite: NEGATIVE   Leuk Esterase: SMALL / RBC: 3-5 / WBC 11-25   Sq Epi: OCC / Non Sq Epi: x / Bacteria: MANY                              9.0    4.16  )-----------(        ( 23 Aug 2020 18:00 )             28.4                         8.6    3.93  )-----------(        ( 23 Aug 2020 10:20 )             28.8                         7.5    3.37  )-----------(        ( 22 Aug 2020 21:50 )             23.6     CAPILLARY BLOOD GLUCOSE              Dr. Joao Ayala PGY1 Dr. Joao LITTLEJOHN:[id number]/NS: [phone]  After 7pm please page [phone] or [phone]    AISHWARYA CACERES  79y  MRN: [id number]    Subjective:    Patient is a 79y old  Male who presents with a chief complaint of AMS, pancytopenia. patient was sleeping and did not want to be disturbed this am.     MEDICATIONS  (STANDING):  cefTRIAXone   IVPB      cefTRIAXone   IVPB 1000 milliGRAM(s) IV Intermittent every 24 hours    MEDICATIONS  (PRN):        Objective:    Vitals: Vital Signs Last 24 Hrs  T(C): 37.2 (20 @ 05:04), Max: 37.2 (20 @ 22:30)  T(F): 98.9 (20 @ 05:04), Max: 99 (20 @ 22:30)  HR: 81 (20 @ 05:04) (74 - 81)  BP: 151/73 (20 @ 05:04) (143/62 - 161/81)  BP(mean): --  RR: 19 (20 @ 05:04) (19 - 20)  SpO2: 98% (20 @ 05:04) (96% - 98%)            I&O's Summary    23 Aug 2020 07:01  -  24 Aug 2020 07:00  --------------------------------------------------------  IN: 1040 mL / OUT: 0 mL / NET: 1040 mL        PHYSICAL EXAM:  GENERAL: NAD, sleeping, mildly restless    LABS:      142  |  106  |  21  ----------------------------<  98  4.2   |  24  |  1.01      138  |  105  |  24<H>  ----------------------------<  101<H>  4.4   |  22  |  1.05    Ca    8.8      23 Aug 2020 10:20  Ca    9.0      22 Aug 2020 21:50  Phos  4.0     08-23  Mg     1.8     08-23    TPro  5.2<L>  /  Alb  3.0<L>  /  TBili  1.1  /  DBili  x   /  AST  23  /  ALT  16  /  AlkPhos  71  08-23  TPro  5.1<L>  /  Alb  2.8<L>  /  TBili  1.0  /  DBili  x   /  AST  35  /  ALT  18  /  AlkPhos  64  08-22      PT/INR - ( 23 Aug 2020 10:20 )   PT: 14.9 SEC;   INR: 1.32          PTT - ( 22 Aug 2020 21:50 )  PTT:27.0 SEC              Urinalysis Basic - ( 23 Aug 2020 01:12 )    Color: YELLOW / Appearance: CLEAR / S.028 / pH: 6.0  Gluc: NEGATIVE / Ketone: NEGATIVE  / Bili: NEGATIVE / Urobili: TRACE   Blood: NEGATIVE / Protein: 20 / Nitrite: NEGATIVE   Leuk Esterase: SMALL / RBC: 3-5 / WBC 11-25   Sq Epi: OCC / Non Sq Epi: x / Bacteria: MANY                              9.0    4.16  )-----------( 24       ( 23 Aug 2020 18:00 )             28.4                         8.6    3.93  )-----------( 21       ( 23 Aug 2020 10:20 )             28.8                         7.5    3.37  )-----------( 26       ( 22 Aug 2020 21:50 )             23.6     CAPILLARY BLOOD GLUCOSE              Dr. Joao Ayala PGY1 Dr. Joao LITTLEOJHN:[id number]/NS: [phone]  After 7pm please page [phone] or [phone]    AISHWARYA CACERES  79y  MRN: [id number]    Subjective:    Patient is a 79y old  Male who presents with a chief complaint of AMS, pancytopenia. patient was sleeping and did not want to be disturbed this am.     MEDICATIONS  (STANDING):  cefTRIAXone   IVPB      cefTRIAXone   IVPB 1000 milliGRAM(s) IV Intermittent every 24 hours    MEDICATIONS  (PRN):        Objective:    Vitals: Vital Signs Last 24 Hrs  T(C): 37.2 (20 @ 05:04), Max: 37.2 (20 @ 22:30)  T(F): 98.9 (20 @ 05:04), Max: 99 (20 @ 22:30)  HR: 81 (20 @ 05:04) (74 - 81)  BP: 151/73 (20 @ 05:04) (143/62 - 161/81)  BP(mean): --  RR: 19 (20 @ 05:04) (19 - 20)  SpO2: 98% (20 @ 05:04) (96% - 98%)            I&O's Summary    23 Aug 2020 07:01  -  24 Aug 2020 07:00  --------------------------------------------------------  IN: 1040 mL / OUT: 0 mL / NET: 1040 mL        PHYSICAL EXAM:  GENERAL: NAD, sleeping, mildly restless    LABS:      142  |  106  |  21  ----------------------------<  98  4.2   |  24  |  1.01      138  |  105  |  24<H>  ----------------------------<  101<H>  4.4   |  22  |  1.05    Ca    8.8      23 Aug 2020 10:20  Ca    9.0      22 Aug 2020 21:50  Phos  4.0     08-23  Mg     1.8     08-23    TPro  5.2<L>  /  Alb  3.0<L>  /  TBili  1.1  /  DBili  x   /  AST  23  /  ALT  16  /  AlkPhos  71  08-23  TPro  5.1<L>  /  Alb  2.8<L>  /  TBili  1.0  /  DBili  x   /  AST  35  /  ALT  18  /  AlkPhos  64  08-22      PT/INR - ( 23 Aug 2020 10:20 )   PT: 14.9 SEC;   INR: 1.32          PTT - ( 22 Aug 2020 21:50 )  PTT:27.0 SEC              Urinalysis Basic - ( 23 Aug 2020 01:12 )    Color: YELLOW / Appearance: CLEAR / S.028 / pH: 6.0  Gluc: NEGATIVE / Ketone: NEGATIVE  / Bili: NEGATIVE / Urobili: TRACE   Blood: NEGATIVE / Protein: 20 / Nitrite: NEGATIVE   Leuk Esterase: SMALL / RBC: 3-5 / WBC 11-25   Sq Epi: OCC / Non Sq Epi: x / Bacteria: MANY                              9.0    4.16  )-----------( 24       ( 23 Aug 2020 18:00 )             28.4                         8.6    3.93  )-----------( 21       ( 23 Aug 2020 10:20 )             28.8                         7.5    3.37  )-----------( 26       ( 22 Aug 2020 21:50 )             23.6     CAPILLARY BLOOD GLUCOSE    < from: CT Head No Cont (20 @ 22:18) >  No acute intracranial hemorrhage or mass effect. Moderate to severe chronic small vessel ischemic changes, indeterminate age bilateral basal ganglionic lacunar infarcts, and chronic appearing bilateral cerebellar infarcts. In view of the extensive chronic ischemic changes, if there is concern for acute infarction, MRI should be considered.    < end of copied text >            Dr. Joao Ayala PGY1

## 2020-08-24 NOTE — PROGRESS NOTE ADULT - PROBLEM SELECTOR PLAN 9
- DVT ppx: Hold pharm ppx for now given thrombocytopenia to 26  - Diet: Soft/DASH/TLC - DVT ppx: Hold pharm ppx for now given thrombocytopenia to 26  - Diet: Soft/DASH/TLC  - Dispo: as per PT: Inpt Rehab

## 2020-08-24 NOTE — PROGRESS NOTE ADULT - ASSESSMENT
Dr. Joao LITTLEJOHN:[id number]/NS: [phone]  After 7pm please page [phone] or [phone]    AISHWARYA CACERES  79y  MRN: [id number]    Subjective:    Patient is a 79y old  Male who presents with a chief complaint of AMS, pancytopenia (23 Aug 2020 12:46)      MEDICATIONS  (STANDING):  cefTRIAXone   IVPB      cefTRIAXone   IVPB 1000 milliGRAM(s) IV Intermittent every 24 hours    MEDICATIONS  (PRN):        Objective:    Vitals: Vital Signs Last 24 Hrs  T(C): 37.2 (20 @ 05:04), Max: 37.2 (20 @ 22:30)  T(F): 98.9 (20 @ 05:04), Max: 99 (20 @ 22:30)  HR: 81 (20 @ 05:04) (74 - 81)  BP: 151/73 (20 @ 05:04) (143/62 - 161/81)  BP(mean): --  RR: 19 (20 @ 05:04) (19 - 20)  SpO2: 98% (20 @ 05:04) (96% - 98%)            I&O's Summary    23 Aug 2020 07:01  -  24 Aug 2020 07:00  --------------------------------------------------------  IN: 1040 mL / OUT: 0 mL / NET: 1040 mL        PHYSICAL EXAM:  GENERAL: NAD, well-groomed, well-developed  HEAD:  Atraumatic, Normocephalic  EYES: EOMI, PERRLA, conjunctiva and sclera clear  ENMT: No tonsillar erythema, exudates, or enlargement; Moist mucous membranes, Good dentition, No lesions  NECK: Supple, No JVD, Normal thyroid  CHEST/LUNG: Clear to auscultation bilaterally; No rales, rhonchi, wheezing, or rubs  HEART: Regular rate and rhythm; No murmurs, rubs, or gallops  ABDOMEN: Soft, Nontender, Nondistended; Bowel sounds present  EXTREMITIES:  2+ Peripheral Pulses, No clubbing, cyanosis, or edema  LYMPH: No lymphadenopathy noted  SKIN: No rashes or lesions  NERVOUS SYSTEM:  Alert & Oriented X4, Good concentration    LABS:      142  |  106  |  21  ----------------------------<  98  4.2   |  24  |  1.01  08    138  |  105  |  24<H>  ----------------------------<  101<H>  4.4   |  22  |  1.05    Ca    8.8      23 Aug 2020 10:20  Ca    9.0      22 Aug 2020 21:50  Phos  4.0       Mg     1.8         TPro  5.2<L>  /  Alb  3.0<L>  /  TBili  1.1  /  DBili  x   /  AST  23  /  ALT  16  /  AlkPhos  71  0823  TPro  5.1<L>  /  Alb  2.8<L>  /  TBili  1.0  /  DBili  x   /  AST  35  /  ALT  18  /  AlkPhos  64  08-22      PT/INR - ( 23 Aug 2020 10:20 )   PT: 14.9 SEC;   INR: 1.32          PTT - ( 22 Aug 2020 21:50 )  PTT:27.0 SEC              Urinalysis Basic - ( 23 Aug 2020 01:12 )    Color: YELLOW / Appearance: CLEAR / S.028 / pH: 6.0  Gluc: NEGATIVE / Ketone: NEGATIVE  / Bili: NEGATIVE / Urobili: TRACE   Blood: NEGATIVE / Protein: 20 / Nitrite: NEGATIVE   Leuk Esterase: SMALL / RBC: 3-5 / WBC 11-25   Sq Epi: OCC / Non Sq Epi: x / Bacteria: MANY                              9.0    4.16  )-----------(        ( 23 Aug 2020 18:00 )             28.4                         8.6    3.93  )-----------(        ( 23 Aug 2020 10:20 )             28.8                         7.5    3.37  )-----------(        ( 22 Aug 2020 21:50 )             23.6     CAPILLARY BLOOD GLUCOSE              Dr. 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

## 2020-08-25 ENCOUNTER — TRANSCRIPTION ENCOUNTER (OUTPATIENT)
Age: 80
End: 2020-08-25

## 2020-08-25 VITALS
OXYGEN SATURATION: 97 % | TEMPERATURE: 98 F | SYSTOLIC BLOOD PRESSURE: 143 MMHG | HEART RATE: 75 BPM | RESPIRATION RATE: 18 BRPM | DIASTOLIC BLOOD PRESSURE: 62 MMHG

## 2020-08-25 LAB
ALBUMIN SERPL ELPH-MCNC: 3.1 G/DL — LOW (ref 3.3–5)
ALP SERPL-CCNC: 66 U/L — SIGNIFICANT CHANGE UP (ref 40–120)
ALT FLD-CCNC: 11 U/L — SIGNIFICANT CHANGE UP (ref 4–41)
ANION GAP SERPL CALC-SCNC: 11 MMO/L — SIGNIFICANT CHANGE UP (ref 7–14)
ANISOCYTOSIS BLD QL: SIGNIFICANT CHANGE UP
APTT BLD: 27 SEC — SIGNIFICANT CHANGE UP (ref 27–36.3)
AST SERPL-CCNC: 18 U/L — SIGNIFICANT CHANGE UP (ref 4–40)
BASOPHILS # BLD AUTO: 0.01 K/UL — SIGNIFICANT CHANGE UP (ref 0–0.2)
BASOPHILS NFR BLD AUTO: 0.3 % — SIGNIFICANT CHANGE UP (ref 0–2)
BASOPHILS NFR SPEC: 1.8 % — SIGNIFICANT CHANGE UP (ref 0–2)
BILIRUB SERPL-MCNC: 1 MG/DL — SIGNIFICANT CHANGE UP (ref 0.2–1.2)
BLASTS # FLD: 0 % — SIGNIFICANT CHANGE UP (ref 0–0)
BLD GP AB SCN SERPL QL: NEGATIVE — SIGNIFICANT CHANGE UP
BUN SERPL-MCNC: 24 MG/DL — HIGH (ref 7–23)
CALCIUM SERPL-MCNC: 9.5 MG/DL — SIGNIFICANT CHANGE UP (ref 8.4–10.5)
CHLORIDE SERPL-SCNC: 107 MMOL/L — SIGNIFICANT CHANGE UP (ref 98–107)
CO2 SERPL-SCNC: 25 MMOL/L — SIGNIFICANT CHANGE UP (ref 22–31)
CREAT SERPL-MCNC: 0.96 MG/DL — SIGNIFICANT CHANGE UP (ref 0.5–1.3)
EOSINOPHIL # BLD AUTO: 0.07 K/UL — SIGNIFICANT CHANGE UP (ref 0–0.5)
EOSINOPHIL NFR BLD AUTO: 2.1 % — SIGNIFICANT CHANGE UP (ref 0–6)
EOSINOPHIL NFR FLD: 3.7 % — SIGNIFICANT CHANGE UP (ref 0–6)
GIANT PLATELETS BLD QL SMEAR: PRESENT — SIGNIFICANT CHANGE UP
GLUCOSE SERPL-MCNC: 105 MG/DL — HIGH (ref 70–99)
HAPTOGLOB SERPL-MCNC: 166 MG/DL — SIGNIFICANT CHANGE UP (ref 34–200)
HAV IGM SER-ACNC: NONREACTIVE — SIGNIFICANT CHANGE UP
HBV CORE IGM SER-ACNC: NONREACTIVE — SIGNIFICANT CHANGE UP
HBV SURFACE AG SER-ACNC: NONREACTIVE — SIGNIFICANT CHANGE UP
HCT VFR BLD CALC: 29.1 % — LOW (ref 39–50)
HCV AB S/CO SERPL IA: 0.72 S/CO — SIGNIFICANT CHANGE UP (ref 0–0.99)
HCV AB SERPL-IMP: SIGNIFICANT CHANGE UP
HGB BLD-MCNC: 9.3 G/DL — LOW (ref 13–17)
HYPOCHROMIA BLD QL: SLIGHT — SIGNIFICANT CHANGE UP
IMM GRANULOCYTES NFR BLD AUTO: 0.6 % — SIGNIFICANT CHANGE UP (ref 0–1.5)
INR BLD: 1.28 — HIGH (ref 0.88–1.16)
LDH SERPL L TO P-CCNC: 239 U/L — HIGH (ref 135–225)
LYMPHOCYTES # BLD AUTO: 0.75 K/UL — LOW (ref 1–3.3)
LYMPHOCYTES # BLD AUTO: 22.1 % — SIGNIFICANT CHANGE UP (ref 13–44)
LYMPHOCYTES NFR SPEC AUTO: 2.8 % — LOW (ref 13–44)
MACROCYTES BLD QL: SIGNIFICANT CHANGE UP
MAGNESIUM SERPL-MCNC: 1.9 MG/DL — SIGNIFICANT CHANGE UP (ref 1.6–2.6)
MCHC RBC-ENTMCNC: 32 % — SIGNIFICANT CHANGE UP (ref 32–36)
MCHC RBC-ENTMCNC: 33.3 PG — SIGNIFICANT CHANGE UP (ref 27–34)
MCV RBC AUTO: 104.3 FL — HIGH (ref 80–100)
METAMYELOCYTES # FLD: 0 % — SIGNIFICANT CHANGE UP (ref 0–1)
MONOCYTES # BLD AUTO: 0.69 K/UL — SIGNIFICANT CHANGE UP (ref 0–0.9)
MONOCYTES NFR BLD AUTO: 20.4 % — HIGH (ref 2–14)
MONOCYTES NFR BLD: 11 % — HIGH (ref 2–9)
MYELOCYTES NFR BLD: 0 % — SIGNIFICANT CHANGE UP (ref 0–0)
NEUTROPHIL AB SER-ACNC: 73.4 % — SIGNIFICANT CHANGE UP (ref 43–77)
NEUTROPHILS # BLD AUTO: 1.85 K/UL — SIGNIFICANT CHANGE UP (ref 1.8–7.4)
NEUTROPHILS NFR BLD AUTO: 54.5 % — SIGNIFICANT CHANGE UP (ref 43–77)
NEUTS BAND # BLD: 0 % — SIGNIFICANT CHANGE UP (ref 0–6)
NRBC # FLD: 0 K/UL — SIGNIFICANT CHANGE UP (ref 0–0)
OTHER - HEMATOLOGY %: 0 — SIGNIFICANT CHANGE UP
PHOSPHATE SERPL-MCNC: 3.8 MG/DL — SIGNIFICANT CHANGE UP (ref 2.5–4.5)
PLATELET # BLD AUTO: 23 K/UL — LOW (ref 150–400)
PLATELET COUNT - ESTIMATE: SIGNIFICANT CHANGE UP
PMV BLD: 13.5 FL — HIGH (ref 7–13)
POIKILOCYTOSIS BLD QL AUTO: SLIGHT — SIGNIFICANT CHANGE UP
POLYCHROMASIA BLD QL SMEAR: SLIGHT — SIGNIFICANT CHANGE UP
POTASSIUM SERPL-MCNC: 4 MMOL/L — SIGNIFICANT CHANGE UP (ref 3.5–5.3)
POTASSIUM SERPL-SCNC: 4 MMOL/L — SIGNIFICANT CHANGE UP (ref 3.5–5.3)
PROMYELOCYTES # FLD: 0 % — SIGNIFICANT CHANGE UP (ref 0–0)
PROT SERPL-MCNC: 5.7 G/DL — LOW (ref 6–8.3)
PROTHROM AB SERPL-ACNC: 14.5 SEC — HIGH (ref 10.6–13.6)
RBC # BLD: 2.79 M/UL — LOW (ref 4.2–5.8)
RBC # FLD: 21.1 % — HIGH (ref 10.3–14.5)
RETICS #: 147 K/UL — HIGH (ref 25–125)
RETICS/RBC NFR: 5.3 % — HIGH (ref 0.5–2.5)
RH IG SCN BLD-IMP: POSITIVE — SIGNIFICANT CHANGE UP
SARS-COV-2 RNA SPEC QL NAA+PROBE: SIGNIFICANT CHANGE UP
SMUDGE CELLS # BLD: PRESENT — SIGNIFICANT CHANGE UP
SODIUM SERPL-SCNC: 143 MMOL/L — SIGNIFICANT CHANGE UP (ref 135–145)
STOMATOCYTES BLD QL SMEAR: SLIGHT — SIGNIFICANT CHANGE UP
TARGETS BLD QL SMEAR: SLIGHT — SIGNIFICANT CHANGE UP
VARIANT LYMPHS # BLD: 6.4 % — SIGNIFICANT CHANGE UP
WBC # BLD: 3.39 K/UL — LOW (ref 3.8–10.5)
WBC # FLD AUTO: 3.39 K/UL — LOW (ref 3.8–10.5)

## 2020-08-25 PROCEDURE — 74230 X-RAY XM SWLNG FUNCJ C+: CPT | Mod: 26

## 2020-08-25 PROCEDURE — 99239 HOSP IP/OBS DSCHRG MGMT >30: CPT

## 2020-08-25 RX ORDER — CEFPODOXIME PROXETIL 100 MG
2 TABLET ORAL
Qty: 20 | Refills: 0
Start: 2020-08-25 | End: 2020-08-29

## 2020-08-25 RX ORDER — CEFDITOREN PIVOXIL 400 MG/1
1 TABLET, FILM COATED ORAL
Qty: 14 | Refills: 0
Start: 2020-08-25 | End: 2020-08-31

## 2020-08-25 RX ADMIN — CEFTRIAXONE 100 MILLIGRAM(S): 500 INJECTION, POWDER, FOR SOLUTION INTRAMUSCULAR; INTRAVENOUS at 04:04

## 2020-08-25 RX ADMIN — CARVEDILOL PHOSPHATE 3.12 MILLIGRAM(S): 80 CAPSULE, EXTENDED RELEASE ORAL at 11:32

## 2020-08-25 NOTE — DISCHARGE NOTE PROVIDER - NSDCCPCAREPLAN_GEN_ALL_CORE_FT
PRINCIPAL DISCHARGE DIAGNOSIS  Diagnosis: Symptomatic anemia  Assessment and Plan of Treatment: You were found to have low blood counts overall. You were given transfusions and responded somewhat appropriately. You were offered a bone biopsy to find out the cause of your overall low blood counts but it was decided that if something were to be found, the treatment risks would outweigh the benefits. Should you experience further issues with bleeding or falls, please come back to the Emergency room. You were also found to have a possible urinary tract infection and should continue with antibiotics for 7 days

## 2020-08-25 NOTE — PROGRESS NOTE ADULT - PROBLEM SELECTOR PLAN 2
Pt with leukopenia (WBC 3.37), anemia (Hgb 7.5 vs. 11-12 baseline), and thrombocytopenia (plt count 26).  - Monitor CBC with diff daily  - Hematology consult to be placed in AM  - Leukopenia: Pt with possible UTI. Will empirically treat for UTI with ceftriaxone 1 g q24hrs.  - Anemia: FOBT negative. No BMs or rectal bleeding while in ED, no other S/S of active bleed. S/p 1u pRBCs 7.5. Ct abd/pelvis negative for RP bleed  - Iron studies grossly negative; Hemolysis labs equivocal- mildly hemolyzed    Stevo test Negative; f/u folate, and vitamin B12. Check TSH given macrocytosis.   - Thrombocytopenia: Last plt count in 10/2019 >100. Unclear etiology. CTH negative for ICH. Will check DIC labs. Currently, low suspicion for TTP, as pt without fevers and renal failure. Will check blood smear. Transfuse for plt count < 10 if afebrile, <20 if febrile. Hold ASA and Plavix for now.  - Bone Biopsy tomorrow Pt with leukopenia (WBC 3.37), anemia (Hgb 7.5 vs. 11-12 baseline), and thrombocytopenia (plt count 26).  - Monitor CBC with diff daily  - Hematology following  - Leukopenia: Pt with possible UTI. Will empirically treat for UTI with ceftriaxone 1 g q24hrs.  - Anemia: FOBT negative. No BMs or rectal bleeding while in ED, no other S/S of active bleed. S/p 1u pRBCs 7.5. CT abd/pelvis negative for RP bleed  - Iron studies grossly normal; Hemolysis labs equivocal- mildly hemolyzed    Stevo test Negative; f/u folate, and vitamin B12. Check TSH given macrocytosis.   - Thrombocytopenia: Last plt count in 10/2019 >100. Unclear etiology. CTH negative for ICH. Currently,  Hold ASA and Plavix for now.  - Bone Biopsy deferred by HCP if there is no planned therapy

## 2020-08-25 NOTE — DISCHARGE NOTE NURSING/CASE MANAGEMENT/SOCIAL WORK - NSDCPEEMAIL_GEN_ALL_CORE
[facility] email [email or web address]

## 2020-08-25 NOTE — PROGRESS NOTE ADULT - PROBLEM SELECTOR PLAN 7
- Will need to perform Med Rec to determine if pt is taking an antidepressant at home

## 2020-08-25 NOTE — PROGRESS NOTE ADULT - PROBLEM SELECTOR PLAN 9
- DVT ppx: Hold pharm ppx for now given thrombocytopenia to 26  - Diet: Soft/DASH/TLC  - Dispo: as per PT: Inpt Rehab

## 2020-08-25 NOTE — SWALLOW VFSS/MBS ASSESSMENT ADULT - ASPIRATION PRECAUTIONS
Immunization Declination         Amaris Gambino  1954  Medical Record Number: [id number]  PRIMARY CARE PROVIDER: Macie Jerez MD    My healthcare provider has advised that Amaris Gambino should be receiving the following vaccine(s).    _____Influenza (flu) vaccine (inactive) DECLINED   _____Pneumococcal Conjugate vaccine (PVC 13) DECLINED  _____Shingles vaccine DECLINED     I have read the Centers for Disease Control and Prevention's (CDC) Vaccine Information Sheet(s) explaining the vaccine(s) and the disease(s) they prevent. I have had the opportunity to discuss these with my health care provider, who has answered all my questions regarding the recommended vaccine(s). I understand the following:     The purpose of and the need for the recommended vaccine(s)  The risks and benefits of the recommended vaccine(s)   If I do not receive the vaccine(s), the consequences may include:   -renee the illness the vaccine should prevent  -transmitting the disease to others  -the need for me to stay out of work during disease outbreaks    Nevertheless I have decided to decline the vaccine(s) recommended for me, as indicated above, by my initials before the declined vaccines(s).    I know that I may re-address this issue with my health care provider at any time, and that I may change my mind and accept vaccination(s) in the future.    I acknowledge that I have read this document in its entirety and fully understand it.      ________________________________________     ____________________    Patient Signature                                               Date      ________________________________________     _____________________                         Witness          Date    
  [address]  [place] [address]  [phone]    Amaris Gambino  1954  [id number]    DECLINATION OF RECOMMENDED PREVENTIVE CARE    My provider, , has recommended that I receive the following preventive care. I acknowledge that I am aware of the following facts and that early detection of cancer is important to my overall health.     Check all that apply    [] Colorectal Cancer Screening: Colorectal cancer is the third most common cancer and the second leading cause of cancer-related deaths among men and women in the [place]. Symptoms rarely appear until later stages of the disease, at which point the patient's chances of survival decrease substantially. Identification and removal of polyps can prevent > 90% of colorectal cancers.     [] Osteoporosis Screening: Osteoporosis is a major public health threat for an estimated 44 million Americans. Osteoporosis is generally both preventable and treatable. All adults, especially women, should know their risk for osteoporosis and what they can do to prevent it. Screening is an important step! People cannot feel their bones getting weaker. A person with osteoporosis can break a bone from a minor fall, or in serious cases, from a simple action such as a sneeze. A woman over the age of 50 has a 50% chance of having an osteoporosis related fracture in her lifetime. An average of 24 % of hip fracture patients age 50 and over die in the year following their fracture.       I understand that I can change my mind at any time and accept to have this recommended preventive care.    I have read and fully understand the information on this declination form.    Signature: ____________________________________________ Date: _9/18/2018 _    Name (print): _Amaris Gambino _______    Witness #1______________________________________________    Witness #2 ______________________________________________  
yes

## 2020-08-25 NOTE — SWALLOW VFSS/MBS ASSESSMENT ADULT - DIAGNOSTIC IMPRESSIONS
Patient presents with Mild Oral Stage and Mild Pharyngeal Stage Dysphagia. The Oral Stage is characterized by adequate oral containment, slow/prolonged chewing for mechanical texture solid, slow bolus manipulation and slow tongue motion with slow anterior to posterior transfer for mechanical texture solid; adequate bolus manipulation and adequate transfer for puree; with adequate oral clearance.   The Pharyngeal Stage is characterized by delayed initiation of the pharyngeal swallow (Bolus head is at the vallecular for Thin Liquids), adequate laryngeal elevation, adequate tongue base retraction and adequate pharyngeal constriction. There is trace vallecular residue post primary swallow for puree/solids.  There was No Aspiration observed before, during or after the swallow across all PO Trials.  Of Note: Patient coughing during Cinesophagram in absence of contrast of the upper airway/trachea.

## 2020-08-25 NOTE — PROGRESS NOTE ADULT - ASSESSMENT
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

## 2020-08-25 NOTE — PROGRESS NOTE ADULT - PROBLEM SELECTOR PLAN 4
BPs currently in acceptable range.  - Will need to perform Med Rec to determine what BP meds pt is taking at home BPs currently in acceptable range.  - c/w home Carvedilol

## 2020-08-25 NOTE — DISCHARGE NOTE PROVIDER - NSDCMRMEDTOKEN_GEN_ALL_CORE_FT
atorvastatin 10 mg oral tablet: 1 tab(s) orally once a day  budesonide 0.5 mg/2 mL inhalation suspension: 2 milliliter(s) inhaled 2 times a day  carvedilol 3.125 mg oral tablet: 1 tab(s) orally 2 times a day  ipratropium 500 mcg/2.5 mL inhalation solution: 2.5 milliliter(s) inhaled 2 times a day

## 2020-08-25 NOTE — PROGRESS NOTE ADULT - PROBLEM SELECTOR PLAN 1
Per pt's family, pt appeared more confused today. Unclear baseline mental status, as pt's family unreachable overnight for collateral.   - CTH with moderate to severe chronic small vessel ischemic changes, indeterminate age bilateral basal ganglionic lacunar infarcts, and chronic appearing bilateral cerebellar infarcts. Similar to CTH in 1/2019. Unlikely with acute CVA, as pt without facial asymmetry, slurred speech, focal/unilateral weakness, or other focal neuro findings on exam.   - UA with small LE, 11-25 WBC, and many bacteria. Pt remarks that he has been having pain with urination for a year, but pt unreliable historian. Will empirically treat for UTI with ceftriaxone 1 g q24hrs in setting of possible acute encephalopathy; sent UCx  - Hgb 7.5 on admission vs. 11-12 at baseline. Will transfuse at least 1u pRBCs overnight, as symptomatic anemia might be reason for possible acute encephalopathy.   - Pt with h/o latent syphilis, last treated with PCN IM x3 weekly for positive RPR. Will check RPR titers, and if positive, possible workup to r/o neurosyphilis.   - Neuro checks per routine  - Aspiration precautions, fall risk protocol  - PT consult: Inpatient Rehab Per pt's family, pt appeared more confused today. Unclear baseline mental status, as pt's family unreachable overnight for collateral.   - CTH with moderate to severe chronic small vessel ischemic changes, indeterminate age bilateral basal ganglionic lacunar infarcts, and chronic appearing bilateral cerebellar infarcts. Similar to CTH in 1/2019. Unlikely with acute CVA, as pt without facial asymmetry, slurred speech, focal/unilateral weakness, or other focal neuro findings on exam.   - UA with small LE, 11-25 WBC, and many bacteria. Pt remarks that he has been having pain with urination for a year, but pt unreliable historian. Will empirically treat for UTI with ceftriaxone 1 g q24hrs in setting of possible acute encephalopathy; sent UCx  - Hgb 7.5 on admission vs. 11-12 at baseline. s/p1u pRBCs now 9  - Pt with h/o latent syphilis, last treated with PCN IM x3 weekly for positive RPR.   - Neuro checks per routine  - Aspiration precautions, fall risk protocol  - PT consult: Inpatient Rehab

## 2020-08-25 NOTE — PROGRESS NOTE ADULT - ATTENDING COMMENTS
79 y.o. male smoker w/ hx Dementia, HTN, CAD s/p stent, s/p MI, SSS s/p PPM, CVA, depression, latent syphilis, restless leg syndrome, recent Left facial and shoulder zoster found slumped over with AMS found to have metabolic encephalopathy due to UTI now resolved on Ceftriaxone. F/U Urine Cx. Patient also with pancytopenia ( PLT 26) but no active bleeding. Guaiac negative. No benefit to doing bone marrow biopsy as per hematology. CT A/P negative for RP bleed. PT rec rehab.

## 2020-08-25 NOTE — PROGRESS NOTE ADULT - SUBJECTIVE AND OBJECTIVE BOX
Dr. Joao LITTLEJOHN:[id number]/NS: [phone]  After 7pm please page [phone] or [phone]    AISHWARYA CACERES  79y  MRN: [id number]    Subjective:    Patient is a 79y old  Male who presents with a chief complaint of AMS, pancytopenia (24 Aug 2020 08:50)      MEDICATIONS  (STANDING):  atorvastatin 10 milliGRAM(s) Oral at bedtime  buDESOnide    Inhalation Suspension 0.5 milliGRAM(s) Inhalation two times a day  carvedilol 3.125 milliGRAM(s) Oral every 12 hours  cefTRIAXone   IVPB      cefTRIAXone   IVPB 1000 milliGRAM(s) IV Intermittent every 24 hours    MEDICATIONS  (PRN):        Objective:    Vitals: Vital Signs Last 24 Hrs  T(C): 36.4 (08-25-20 @ 04:42), Max: 36.9 (08-24-20 @ 22:12)  T(F): 97.6 (08-25-20 @ 04:42), Max: 98.5 (08-24-20 @ 22:12)  HR: 64 (08-25-20 @ 04:42) (64 - 81)  BP: 120/87 (08-25-20 @ 04:42) (120/87 - 145/73)  BP(mean): --  RR: 18 (08-25-20 @ 04:42) (16 - 18)  SpO2: 99% (08-25-20 @ 04:42) (95% - 99%)            I&O's Summary    23 Aug 2020 07:01  -  24 Aug 2020 07:00  --------------------------------------------------------  IN: 1040 mL / OUT: 0 mL / NET: 1040 mL    24 Aug 2020 07:01  -  25 Aug 2020 06:57  --------------------------------------------------------  IN: 300 mL / OUT: 0 mL / NET: 300 mL        PHYSICAL EXAM:  GENERAL: NAD, well-groomed, well-developed  HEAD:  Atraumatic, Normocephalic  EYES: EOMI, PERRLA, conjunctiva and sclera clear  ENMT: No tonsillar erythema, exudates, or enlargement; Moist mucous membranes, Good dentition, No lesions  NECK: Supple, No JVD, Normal thyroid  CHEST/LUNG: Clear to auscultation bilaterally; No rales, rhonchi, wheezing, or rubs  HEART: Regular rate and rhythm; No murmurs, rubs, or gallops  ABDOMEN: Soft, Nontender, Nondistended; Bowel sounds present  EXTREMITIES:  2+ Peripheral Pulses, No clubbing, cyanosis, or edema  LYMPH: No lymphadenopathy noted  SKIN: No rashes or lesions  NERVOUS SYSTEM:  Alert & Oriented X4, Good concentration    LABS:  08-24    141  |  107  |  20  ----------------------------<  82  4.3   |  25  |  0.91  08-23    142  |  106  |  21  ----------------------------<  98  4.2   |  24  |  1.01  08-22    138  |  105  |  24<H>  ----------------------------<  101<H>  4.4   |  22  |  1.05    Ca    9.2      24 Aug 2020 06:45  Ca    8.8      23 Aug 2020 10:20  Ca    9.0      22 Aug 2020 21:50  Phos  3.9     08-24  Mg     1.9     08-24    TPro  5.3<L>  /  Alb  2.8<L>  /  TBili  1.1  /  DBili  0.3<H>  /  AST  18  /  ALT  11  /  AlkPhos  66  08-24  TPro  5.2<L>  /  Alb  3.0<L>  /  TBili  1.1  /  DBili  x   /  AST  23  /  ALT  16  /  AlkPhos  71  08-23  TPro  5.1<L>  /  Alb  2.8<L>  /  TBili  1.0  /  DBili  x   /  AST  35  /  ALT  18  /  AlkPhos  64  08-22      PT/INR - ( 24 Aug 2020 06:45 )   PT: 14.6 SEC;   INR: 1.30          PTT - ( 24 Aug 2020 06:45 )  PTT:26.0 SEC                                        8.4    3.69  )-----------( 22       ( 24 Aug 2020 06:45 )             27.9                         9.0    4.16  )-----------( 24       ( 23 Aug 2020 18:00 )             28.4                         8.6    3.93  )-----------( 21       ( 23 Aug 2020 10:20 )             28.8     CAPILLARY BLOOD GLUCOSE              Dr. Joao Ayala PGY1 Dr. Joao LITTLEJOHN:[id number]/NS: [phone]  After 7pm please page [phone] or [phone]    AISHWARYA CACERES  79y  MRN: [id number]    Subjective:    Patient is a 79y old  Male who presents with a chief complaint of AMS, pancytopenia. Patient denies Ha/N/V/Cp/SOB/D      MEDICATIONS  (STANDING):  atorvastatin 10 milliGRAM(s) Oral at bedtime  buDESOnide    Inhalation Suspension 0.5 milliGRAM(s) Inhalation two times a day  carvedilol 3.125 milliGRAM(s) Oral every 12 hours  cefTRIAXone   IVPB      cefTRIAXone   IVPB 1000 milliGRAM(s) IV Intermittent every 24 hours    MEDICATIONS  (PRN):        Objective:    Vitals: Vital Signs Last 24 Hrs  T(C): 36.4 (08-25-20 @ 04:42), Max: 36.9 (08-24-20 @ 22:12)  T(F): 97.6 (08-25-20 @ 04:42), Max: 98.5 (08-24-20 @ 22:12)  HR: 64 (08-25-20 @ 04:42) (64 - 81)  BP: 120/87 (08-25-20 @ 04:42) (120/87 - 145/73)  BP(mean): --  RR: 18 (08-25-20 @ 04:42) (16 - 18)  SpO2: 99% (08-25-20 @ 04:42) (95% - 99%)            I&O's Summary    23 Aug 2020 07:01  -  24 Aug 2020 07:00  --------------------------------------------------------  IN: 1040 mL / OUT: 0 mL / NET: 1040 mL    24 Aug 2020 07:01  -  25 Aug 2020 06:57  --------------------------------------------------------  IN: 300 mL / OUT: 0 mL / NET: 300 mL        PHYSICAL EXAM:  GENERAL: NAD,  HEAD:  Atraumatic, Normocephalic, lesion above left eyebrow possbily consistent with previous zoster infection  EYES: EOMI, PERRLA, conjunctiva and sclera clear  CHEST/LUNG: Clear to auscultation bilaterally; No rales, rhonchi, wheezing, or rubs  HEART: Regular rate and rhythm; No murmurs, rubs, or gallops  ABDOMEN: Soft, Nontender, Nondistended; Bowel sounds present  SKIN: Left shoulder faded zoster lesions  NERVOUS SYSTEM:  Alert & Oriented X2, Good concentration    LABS:  08-24    141  |  107  |  20  ----------------------------<  82  4.3   |  25  |  0.91  08-23    142  |  106  |  21  ----------------------------<  98  4.2   |  24  |  1.01  08-22    138  |  105  |  24<H>  ----------------------------<  101<H>  4.4   |  22  |  1.05    Ca    9.2      24 Aug 2020 06:45  Ca    8.8      23 Aug 2020 10:20  Ca    9.0      22 Aug 2020 21:50  Phos  3.9     08-24  Mg     1.9     08-24    TPro  5.3<L>  /  Alb  2.8<L>  /  TBili  1.1  /  DBili  0.3<H>  /  AST  18  /  ALT  11  /  AlkPhos  66  08-24  TPro  5.2<L>  /  Alb  3.0<L>  /  TBili  1.1  /  DBili  x   /  AST  23  /  ALT  16  /  AlkPhos  71  08-23  TPro  5.1<L>  /  Alb  2.8<L>  /  TBili  1.0  /  DBili  x   /  AST  35  /  ALT  18  /  AlkPhos  64  08-22      PT/INR - ( 24 Aug 2020 06:45 )   PT: 14.6 SEC;   INR: 1.30          PTT - ( 24 Aug 2020 06:45 )  PTT:26.0 SEC                                        8.4    3.69  )-----------( 22       ( 24 Aug 2020 06:45 )             27.9                         9.0    4.16  )-----------( 24       ( 23 Aug 2020 18:00 )             28.4                         8.6    3.93  )-----------( 21       ( 23 Aug 2020 10:20 )             28.8     CAPILLARY BLOOD GLUCOSE              Dr. Joao Ayala PGY1

## 2020-08-25 NOTE — SWALLOW VFSS/MBS ASSESSMENT ADULT - ORAL PHASE
within functional limits Within functional limits Delayed oral transit time/Reduced anterior - posterior transport

## 2020-08-25 NOTE — DISCHARGE NOTE PROVIDER - HOSPITAL COURSE
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 Hematology was consulted and bone biopsy offered but decision with HCP decided against benefits and risks of therapy if diagnosis were to be made versus patient's current dementia. Patient was stable to be discharged to acute rehab.

## 2020-08-25 NOTE — SWALLOW VFSS/MBS ASSESSMENT ADULT - RECOMMENDED CONSISTENCY
1.) Mechanical Soft with Thin Liquids  2.) Feeding/Swallowing Guidelines: Sit upright, encourage small bites, chew mechanical soft solids well, encourage single cup sips at a time  3.) Aspiration Precautions  4.) Maintain Good Oral Hygiene Care

## 2020-08-25 NOTE — CHART NOTE - NSCHARTNOTEFT_GEN_A_CORE
Patient to continue Cefpedoxime 100mg BID for 5 days Patient to continue Cefpedoxime 100mg 2 Tablets BID for 5 days at Inpatient rehab

## 2020-08-25 NOTE — SWALLOW VFSS/MBS ASSESSMENT ADULT - PHARYNGEAL PHASE COMMENTS
adequate initiation of the pharyngeal swallow, adequate laryngeal elevation, adequate tongue base retraction, adequate pharyngeal constriction delayed initiation of the pharyngeal swallow, adequate laryngeal elevation, adequate tongue base retraction, adequate pharyngeal constriction

## 2020-08-25 NOTE — PROGRESS NOTE ADULT - PROBLEM SELECTOR PLAN 6
With behavioral disturbance while in ED.  - QTc prolonged at 505 ms. Will give ativan or Zyprexa PRN for agitation. Avoid Haldol for now. - DVT ppx: Hold pharm ppx for now given thrombocytopenia to 26  - Diet: Soft/DASH/TLC  - Dispo: as per PT: Inpt Rehab - DVT ppx: Hold pharm ppx for now given thrombocytopenia to 26  - Diet: Soft/DASH/TLC  - Dispo: Inpt Rehab - DVT ppx: Hold pharm ppx for now given thrombocytopenia to 26  - Diet: Soft/DASH/TLC  - Dispo: Inpt Rehab  - No Morning Labs

## 2020-08-25 NOTE — SWALLOW VFSS/MBS ASSESSMENT ADULT - COMMENTS
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    Of Note: Patient was seen for a Clinical Swallow Eval on 8/24/2020 (See Consult).

## 2020-08-25 NOTE — DISCHARGE NOTE PROVIDER - NSDCHOSPICE_GEN_A_CORE
History & Physical  Gynecology      SUBJECTIVE:     Chief Complaint: IUD Check       History of Present Illness:  Ms. Mckeon is a 34 y.o. female who returns 4 weeks after an IUD placement.  She has no complaints today.  Reports noticing the string with intercourse, but doesn't bother her.  No pain.  Still having some spotting.      Review of Systems:  Review of Systems   Genitourinary: Negative for menorrhagia, menstrual problem, pelvic pain, vaginal bleeding, vaginal discharge, vaginal pain and vaginal odor.        OBJECTIVE:     Physical Exam:  Physical Exam   Constitutional: She is oriented to person, place, and time. She appears well-developed and well-nourished.   Pulmonary/Chest: Effort normal.   Genitourinary: Vagina normal. No labial fusion. There is no rash, tenderness, lesion or injury on the right labia. There is no rash, tenderness, lesion or injury on the left labia. Cervix exhibits no motion tenderness, no discharge and no friability. No erythema, tenderness or bleeding in the vagina. No foreign body in the vagina. No signs of injury around the vagina. No vaginal discharge found.   Genitourinary Comments: Strings visualized   Neurological: She is alert and oriented to person, place, and time.   Psychiatric: She has a normal mood and affect. Her behavior is normal. Judgment and thought content normal.   Nursing note and vitals reviewed.        ASSESSMENT:       ICD-10-CM ICD-9-CM    1. IUD check up Z30.431 V25.42           Plan:      Rose was seen today for iud check.    Diagnoses and all orders for this visit:    IUD check up        No orders of the defined types were placed in this encounter.      IUD strings visualized on exam.  Strings did not require trimming.    Follow up for annual exam.     Kimberlee Salmeron      
No

## 2020-08-25 NOTE — PROGRESS NOTE ADULT - PROBLEM SELECTOR PLAN 5
S/p PPM.   - EP consult to be placed in AM to interrogate PPM, as pt borderline verna With behavioral disturbance while in ED.  - QTc prolonged at 505 ms. Will give ativan or Zyprexa PRN for agitation. Avoid Haldol for now.

## 2020-08-25 NOTE — DISCHARGE NOTE NURSING/CASE MANAGEMENT/SOCIAL WORK - NSDCPEWEB_GEN_ALL_CORE
[facility] for Tobacco Control website --- [email or web address] website --- [email or web address]

## 2020-08-26 RX ORDER — IPRATROPIUM BROMIDE 0.2 MG/ML
2.5 SOLUTION, NON-ORAL INHALATION
Qty: 0 | Refills: 0 | DISCHARGE

## 2020-08-26 RX ORDER — CARVEDILOL PHOSPHATE 80 MG/1
1 CAPSULE, EXTENDED RELEASE ORAL
Qty: 0 | Refills: 0 | DISCHARGE

## 2020-08-26 RX ORDER — ATORVASTATIN CALCIUM 80 MG/1
1 TABLET, FILM COATED ORAL
Qty: 0 | Refills: 0 | DISCHARGE

## 2020-08-26 RX ORDER — BUDESONIDE, MICRONIZED 100 %
2 POWDER (GRAM) MISCELLANEOUS
Qty: 0 | Refills: 0 | DISCHARGE

## 2020-08-28 LAB
CULTURE RESULTS: SIGNIFICANT CHANGE UP
SPECIMEN SOURCE: SIGNIFICANT CHANGE UP
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
## 2021-06-09 NOTE — PROGRESS NOTE ADULT - PROBLEM SELECTOR PROBLEM 3
Phone Number Called: [phone]    Call outcome: Spoke to patient regarding message below.    Message: Told her to go to pharmacy and ask for a refill.   
Atypical chest pain
Atypical chest pain
Acute bronchitis
Atypical chest pain

## 2021-08-20 NOTE — PROGRESS NOTE ADULT - PROBLEM SELECTOR PLAN 1
No Per pt's family, pt appeared more confused today. Unclear baseline mental status, as pt's family unreachable overnight for collateral.   - CTH with moderate to severe chronic small vessel ischemic changes, indeterminate age bilateral basal ganglionic lacunar infarcts, and chronic appearing bilateral cerebellar infarcts. Similar to CTH in 1/2019. Unlikely with acute CVA, as pt without facial asymmetry, slurred speech, focal/unilateral weakness, or other focal neuro findings on exam.   - UA with small LE, 11-25 WBC, and many bacteria. Pt remarks that he has been having pain with urination for a year, but pt unreliable historian. Will empirically treat for UTI with ceftriaxone 1 g q24hrs in setting of possible acute encephalopathy; sent UCx  - Hgb 7.5 on admission vs. 11-12 at baseline. Will transfuse at least 1u pRBCs overnight, as symptomatic anemia might be reason for possible acute encephalopathy.   - Pt with h/o latent syphilis, last treated with PCN IM x3 weekly for positive RPR. Will check RPR titers, and if positive, possible workup to r/o neurosyphilis.   - Neuro checks per routine  - Aspiration precautions, fall risk protocol  - PT consult: Inpatient Rehab

## 2021-10-22 NOTE — PATIENT PROFILE ADULT. - ABILITY TO HEAR (WITH HEARING AID OR HEARING APPLIANCE IF NORMALLY USED):
Adequate: hears normal conversation without difficulty Physical Therapy Treatment Note    Date: 10/22/2021  Patient Name: Srikanth Longo  : 1948   MRN: [id number]  DOInjury: Chronic   DOSx: None  Referring Provider:   Surekha Carranza MD  [address] [facility], [address] [facility]           Medical Diagnosis:    Diagnosis Orders   1. Lumbosacral spondylosis without myelopathy       Outcome Measure:  Modified Oswestry 42% disability   Midpoint score 10/20/2021 = 26%    S: Patient reports feeling sore today due to weather also that she has bad knee's and cant perform any stair ex's . O:  Time 2626-2222 am     Visit  regular billing  Repeat outcome measure at mid point and end.     Pain 5 /10     ROM      Modalities      MH + ES      Traction            Exercise      ALL EXERCISE DONE WITH DRAW-IN TECHNIQUE       Manual     Mobilization/Manipulation              THEREX     Supine Knee to Chest HEP     LTR  HEP     Supine Clams HEP     Pelvic Tilts HEP     Bridges HEP     Supine HS Stretch  HEP     Piriformis Stretch      Trunk Flexion      Trunk Extension      Quadratus Lumborum Stretch on Wall            Cat/Camel      Bird Dog      Prone Lumbar Extension      Prone Arm and Leg Raises      Prone Multifidus Strengthening       Crunches      Plank      Lateral Plank       Functional Activities To aid in reaching , pushing, pulling tasks at home     Nustep   L 5 x 5 min   TA         ROWS: H      ROWS: M      ROWS: L      Punches      Triceps Ext Standing      Trunk Ext TB      Trunk Flex TB      Obliques - low      Obliques - high            SLR      Supine Abduction      Supine Marches      SL Clamshells      SL Abduction            LAQ's 2# 2 x 15 reps R/L new            Calf raises  2 x 10 reps   Seated rest periods as needed    TA   Standing Marching 2 x 10 reps    TA   Standing Hip Abduction 2 x 10 reps     TA   Standing Hip Extension      Standing Hip Flexion       Squats 2 x 10 reps  TA   Stairs - FWD Pt declined      Stairs - LAT Pt declined            TG Squats      Leg Press       Gait     Marching Gait      Side Stepping             Weighted Activities            Lat Pull Down Green  2 x 10 reps        Vertical Row  Pt given green tubing for HEP 10/01/2021     ROWS: H Green 2 x 10 reps seated   TA   ROWS: M Green 2 x 10 reps seated   TA   ROWS: L Green 2 x 10 reps seated   TA   Skiing seated  Green 2 x 10 reps seated      Trunk flexion   Not performed today    Trunk ext  Green 2 x 10 reps seated  Made pt dizzy this session [id number]     Dead Lift      Merged with [facility] Dead Lift      [facility] Split Squat      Squats            A: Tolerated well. Pt able to tolerate newly added ex ( LAQ's) . Above added to written HEP.   P: Continue with rehab plan  Odalis Brennan PTA    Treatment Charges: Mins Units   Initial Evaluation     Re-Evaluation     Ther Exercise         TE     Manual Therapy     MT     Ther Activities        TA 40 3   Gait Training          GT     Neuro Re-education NR     Modalities     Non-Billable Service Time     Other     Total Time/Units 40 3

## 2022-04-05 NOTE — PATIENT PROFILE ADULT. - VISION (WITH CORRECTIVE LENSES IF THE PATIENT USUALLY WEARS THEM):
Partially impaired: cannot see medication labels or newsprint, but can see obstacles in path, and the surrounding layout; can count fingers at arm's length 4 = No assist / stand by assistance

## 2022-07-30 NOTE — DIETITIAN INITIAL EVALUATION ADULT. - PROBLEM SELECTOR PROBLEM 2
Occupational Therapy  ACUTE OT Evaluation     Therapy Evaluation: OT triaged in due to a recent decline in one or more of the following: ADL independence, safety, cognition, functional mobility and strength.             Pt seen on 11S nursing unit.                                                            Visit Type: initial evaluation  Precautions:  Medical precautions:  fall risk;.   Lines:     Basic: IV (ostomy)      Lines in chart and on patient reviewed, precautions maintained throughout session.  Vision:     Current vision: wears glasses only for reading (Pt reports that she needs new reading glasses- has noticed changes in vision recently)  Safety Measures: bed alarm and chair alarm (call light in reach)    SUBJECTIVE  Patient agreed to participate in therapy this date.  SATHYA Mccoy approved OT session.   Patient / Family Goal: maximize function and return to previous functional status    OBJECTIVE     Increased time spent discussing pt's prior level of functioning and recent changes in function. Level of consciousness: alert    Oriented to person, place, time and situation     Arousal alertness: appropriate responses to stimuli    Affect/Behavior: cooperative and pleasant  Patient activity tolerance: 1 to 1 activity to rest  Functional Communication/Cognition    Overall status:  Within functional limits    Form of communication:  Verbal  Additional Details: Pt alert and appropriate in conversation. Pt tearful during session due to weakness and uncertainty of what's going on.   Hand Dominance: right  Range of Motion (measured in degrees unless otherwise indicated)  ROM Details: Pt with limited shoulder ROM due to weakness.  PROM limited due to discomfort.  Distal UE AROM WFL with increased effort.   Strength (out of 5 unless otherwise indicated)  Finger/Thumb:  Gross :  strength grossly equal bilateral  Comments / Details:  Equal but very weak  Balance (trials in sec unless otherwise  indicated)  Sitting:   - Static:  supervision    - Dynamic:  supervision  Standing - Firm Surface - Eyes Open:    - Static: minimal assist double upper extremity support (zana stedy)    Coordination  Gross Motor:  LUE: tremors  RUE: tremors  Fine Motor:  LUE: tremors RUE: tremors  Neurological Comments / Details: Hot/cold sensation intact in bilateral hands. Pt denies numbness/tingling in hands and feet.   Tremors/shakiness noted in bilateral arms/legs throughout session.  Tremors appear to worsen with movement and upon standing.  Bed mobility:      Supine to sit: supervision  Training completed:      Education details: patient safety and body mechanics    Pt performed bed mobility with increased time with HOB Elevated and bed rail used.   Transfers:    Assistive devices: gait belt and non-mechanical sit to stand lift    Sit to stand: total assist - non-dependent (min assist of 2)    Stand to sit: minimal assist (second person present for safety)   Bed to chair: total assist - dependent  (zana stedy), type:    Training completed:      Education details: patient safety and body mechanics    Pt agreeable to sitting in recliner.  Pt declined use of walker due to fear of falling from shakiness/LE heaviness.  Educated pt on use of zana stedy for transfers.  Pt performed sit to stand transfer from EOB with elevated bed height and minimal assist of 2 due to weakness/shakiness. Pt tolerated standing ~30 seconds; however increased LE shakiness noted and pt returned to sitting on flaps of zana stedy.  Pt transferred to chair with zana stedy (total assist).  Pt reports feeling more safe using zana stedy.  Functional Ambulation:     Details/Comments: Deferred due to safety concerns  Activities of Daily Living (ADLs):  Upper Body Dressing:    Assist: maximal assist    Assist needed for: fasteners and pull around back  Lower Body Dressing:     Footwear assistance: total assist - non-dependent    Assist needed for: don/doff left  sock and don/doff right sock  Activities of daily living training:   Maximal to total assist for dressing tasks due to tremors and overall weakness.  Pt denied need to use toilet. Discussed use of zana stedy to transfer to toilet/commode.  Pt reported that she has been able to feed herself with increased time.  Pt with limited shoulder range of motion and upper extremity weakness affecting pt's abilit yto perform ADLs.       Interventions    Training provided: ADL training, activity tolerance, safety training, transfer training, body mechanics and compensatory techniques    Educated pt on importance of out of bed activity and safety with using zana stedy.     Increased time spent collaborating with RN and physical therapist regarding concerns with tremors and vision.   Skilled input: verbal instruction/cues  Verbal Consent: Writer verbally educated and received verbal consent for hand placement, positioning of patient, and techniques to be performed today from patient         ASSESSMENT  Impairments: activity tolerance, balance, pain, range of motion, strength, bed mobility and cardiovascular endurance  Functional Limitations: grooming, bathing, toileting, functional transfers, IADLs, dressing, showering, functional mobility and bed mobility    52 y/o female seen for occupational therapy evaluation.  Pt admitted for acute renal failure and UTI.  Pt lives in a house with her sister and nieces.  At baseline, pt is independent with ADLs/IADLs and mobility.  Pt reports that over the last few weeks she has become more weak with increased tremors, spending more time in bed.  Over the last four days wasn't able to eat and become more weak. Pt currently functioning at supervision for supine to sitting EOB transfer with increased time, minimal assist of 2 for sit to stand transfers with use of zana stedy, and total assist (zana stedy) for transfer from EOB to recliner.  Pt required maximal assist for upper body dressing and  total assist for lower body dressing.  Pt limited by upper/lower extremity tremors, weakness, and limited upper extremity range of motion.  See above for full details of evaluation, tasks performed, and education provided.  Pt will continue to benefit from skilled acute OT to maximize independence and activity tolerance.     Recommended Consults: OT: Neurology consult    Discharge Recommendations:  Recommendation for Discharge Location: OT WI: Intensive therapy/oversight of PMR physician, Post-acute facility with therapy needs, Pending functional progress  PT/OT Mobility Equipment for Discharge: continue to assess  PT/OT ADL Equipment for Discharge: Pt has shower chair. continue to assess  OT Identified Barriers to Discharge: weakness, tremors, balance/safety, fall risk, and medical status       • Skilled therapy is required to address these limitations in attempt to maximize the patient's independence.     • Personal Occupations Profile Affected: bathing/showering, functional mobility/transfers, personal hygiene/grooming, toileting/toilet hygiene, upper body dressing, lower body dressing, home establishment/managements, meal preparation/cleanup     • Clinical decision making: Moderate - Patient has several limitations (3-5), comorbidities and/or complexities, as noted in detailed assessment above, that impact their occupational profile.  Resulting in several treatment options and minimal to moderate task modification consistent with moderate clinical decision making complexity.    Education Provided:   Learning Assessment:  - Primary learner: patient  - Are they ready to learn: yes  - Preferred learning style: verbal  - Barriers to learning: no barriers apparent at this time  Education provided during session:  - Receiving Education: patient  -   Educated pt on role of OT, risk of inactivity, and POC.  - Results of above outlined education: Verbalizes understanding and Needs reinforcement    Patient at End of  Session:   Location: in chair  Safety measures: alarm system in place/re-engaged, lines intact and call light within reach  Handoff to: nurse, nurse assistant and therapist    PLAN  Suggestions for next session as indicated: Bring aide - progress with transfers with 2ww, toilet transfers, seated ADLs    Frequency Comments: SUN; M-F 7/30  Interventions: activity tolerance training, ADL retraining, balance, compensatory technique education, energy conservation, coordination, fine motor coordination activities, equipment eval/education, HEP training, positioning, patient education, functional transfer training, therapeutic exercise, therapeutic activity, upper extremity strengthening/ROM, use of adaptive equipment, body mechanics, neuromuscular reeducation, transfer training and IADL  Agreement to plan and goals: patient agrees with goals and treatment plan      GOALS  Review Date: 8/6/2022  Long Term Goals: (to be met by time of discharge from hospital)  Grooming: Patient will complete grooming tasks modified independent.  Upper body dressing: Patient will complete upper body dressing modified independent.  Lower body dressing: Patient will complete lower body dressing modified independent.  Toileting: Patient will complete toileting modified independent.  Bathing: Patient will complete bathingmodified independent Toilet transfer: Patient will complete toilet transfer with modified independent.   Tub/shower transfer: Patient will complete tub/shower transfer with modified independent.   Home setting transfer: Patient will complete home setting transfers with modified independent.   Item retrieval: Patient will complete item retrieval modified independent.         Documented in the chart in the following areas: Prior Level of Function. Pain. Assessment. Plan.      Therapy procedure time and total treatment time can be found documented on the Time Entry flowsheet   Rash

## 2022-08-15 NOTE — DISCHARGE NOTE NURSING/CASE MANAGEMENT/SOCIAL WORK - PATIENT PORTAL LINK FT
JUANA (non oliguric) on CKD.   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 SCr on arrival was 1.8 on 8/11; peaked to 2.02 on 8/13 & now back to baseline at 1.9 today  -BUN uptrending 93 could be in the setting of diuresis. Pt does not appear uremic at this time.   -FeUrea suggests pre- renal etiology of JUANA such as seen in CRS  -c/w Bumex 2 mg bid. Increase as tolerated to keep net negative 1-2L daily  -please avoid nephrotoxic agents (NSAIDs, ACC inhibitor, contrast administration)   -Please continue to monitor renal function, daily weights, strict I & Os  -No urgent need for dialysis at this time  -Can consider DDAVP 20 mcg if patient is to undergo any procedures to prevent uremic platelet bleed  -Pulm vasodilators per pulm & HF team  -Hyponatremia due to volume overload. Loops as above JUANA (non oliguric) on CKD.   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 SCr on arrival was 1.8 on 8/11; peaked to 2.02 on 8/13 & now back to baseline at 1.9 today  -BUN uptrending 93 could be in the setting of diuresis. Pt does not appear uremic at this time.   -FeUrea suggests pre- renal etiology of JUANA such as seen in CRS  -c/w Bumex 2 mg bid. Increase as tolerated to keep net negative 1-2L daily  -Please continue to monitor renal function, daily weights, strict I & Os  -No urgent need for dialysis at this time  -Can consider DDAVP 20 mcg if patient is to undergo any procedures to prevent uremic platelet bleed  -Pulm vasodilators per pulm & HF team  -Send serological work-up for secondary causes of renal failure including KALI, dsDNA, anti smith, RF, (CONRADO panel), ANCAs, Anti GBM, Anti PLA2R level, C3, c4, Hep panel, HIV, RPR, serum immunoglobulin free light chains, Serum immunofixation, serum immunoelectrophoresis.   -Check bladder scan & Renal US. Recommend Lunsford catheter placement if retaining.    -Monitor labs and urine output. Avoid NSAIDs, ACEI/ARBS, RCA and nephrotoxins. Dose medications as per eGFR.  -Hyponatremia due to volume overload. Loops as above JUANA (non oliguric) on CKD.   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 SCr on arrival was 1.8 on 8/11; peaked to 2.02 on 8/13 & now back to baseline at 1.9 today  -BUN uptrending 93 could be in the setting of diuresis. Pt does not appear uremic at this time.   -FeUrea suggests pre- renal etiology of JUANA such as seen in CRS  -c/w Bumex 2 mg bid. Increase as tolerated to keep net negative 1-2L daily  -Please continue to monitor renal function, daily weights, strict I & Os  -No urgent need for dialysis at this time  -Can consider DDAVP 20 mcg if patient is to undergo any procedures to prevent uremic platelet bleed  -Pulm vasodilators per pulm & HF team  -Check UA, spot urine TP/Cr, Hep panel, serum immunoglobulin free light chains, Serum immunofixation.  -Check bladder scan & Renal US. Recommend Lunsford catheter placement if retaining.    -Monitor labs and urine output. Avoid NSAIDs, ACEI/ARBS, RCA and nephrotoxins. Dose medications as per eGFR.  -Hyponatremia due to volume overload. Loops as above You can access the FollowMyHealth Patient Portal offered by [facility] by registering at the following website: [email or web address]. By joining [facility]’s FollowMyHealth portal, you will also be able to view your health information using other applications (apps) compatible with our system.
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
## 2022-11-07 NOTE — ED ADULT TRIAGE NOTE - MODE OF ARRIVAL
EMS Information: Selecting Yes will display possible errors in your note based on the variables you have selected. This validation is only offered as a suggestion for you. PLEASE NOTE THAT THE VALIDATION TEXT WILL BE REMOVED WHEN YOU FINALIZE YOUR NOTE. IF YOU WANT TO FAX A PRELIMINARY NOTE YOU WILL NEED TO TOGGLE THIS TO 'NO' IF YOU DO NOT WANT IT IN YOUR FAXED NOTE.

## 2022-11-22 NOTE — DISCHARGE NOTE ADULT - NSTOBACCOHOTLINE_GEN_A_NCS
[facility] Smokers Quitline ([id number]) Imiquimod Counseling:  I discussed with the patient the risks of imiquimod including but not limited to erythema, scaling, itching, weeping, crusting, and pain.  Patient understands that the inflammatory response to imiquimod is variable from person to person and was educated regarded proper titration schedule.  If flu-like symptoms develop, patient knows to discontinue the medication and contact us.

## 2022-12-09 NOTE — PATIENT PROFILE ADULT. - ALCOHOL USE HISTORY SINGLE SELECT
Problem: Pressure Injury, Risk for  Goal: # Verbalizes understanding of PI risk factors and prevention strategies  Description: Document education using the patient education activity.   Outcome: Outcome Met, Continue evaluating goal progress toward completion  Note: Patient has excoriation to buttocks and barrier cream applied.  Patient needing some help with positioning due to back pain.  Leg wounds healing nice.  RLE still with dressing change daily.     Problem: Pain  Goal: # Acceptable pain level achieved/maintained with activity using NRS/Faces  Description: This goal is used for patients who can self-report and are not achieving acceptable pain control during activity.  Outcome: Outcome Not Met, Continue to Monitor  Note: Pain is controlled at rest, however has significant increase in back pain with activities.  Patient has scheduled lidocaine patch to back- however he is unsure if it is helping.  Has taken tylenol as a premedication to therapies.  Patient refusing anything stronger for pain as there is a family history of drug abuse.  Discussed this with Dr. Hyman to see if there are other pain options.  A heating pad has been ordered up- however still awaiting for it to arrive to unit.  Positioning for comfort and stressing activity for patient.  Part of the back pain could be from immobility and limited activity this last week.      never

## 2023-01-10 NOTE — PATIENT PROFILE ADULT. - NS PRO CONTRA FLU 1
Pt was sent home with a prescription for norco. Zofran was also electronically sent to her pharmacy by Dr. Webster  
no

## 2023-07-14 NOTE — ED ADULT NURSE NOTE - NSHISCREENINGQ1_ED_A_ED
CM Update: Plan at discharge is Home with no needs. He ambulated with PT 200ft WW. NS following. Cardiology consulted for anticoagulation therapy. Eliquis remains on hold.  CM will continue to follow (TF)    Rehana Donovan [facility]  Case Management  [phone]
Transition of Care: Met with patient at bedside to discuss transition of care He from home with his wife. He has a walker, bsc and shower chair. Hx with MYLENE Claire. His PCP is Dr Garcia Silva @ [address] [facility] [address] [facility] or [facility] for mail order. Pt admitted for hematomyelia. Increased BLE weakness and abd/back pain. MRI Spine showed increase in size. NS consulted. CM will follow up after therapy completes evals to determine safest discharge plan. (TF)    Ashley Hartman  Case Management  [phone]      Case Management Assessment  Initial Evaluation    Date/Time of Evaluation: 7/12/2023 3:15 PM  Assessment Completed by: Hazel Mays RN    If patient is discharged prior to next notation, then this note serves as note for discharge by case management. Patient Name: Shaylee Adorno                   YOB: 1958  Diagnosis: Low back pain [M54.50]  Hematomyelia ([address]) [G95.19]  Atrial fibrillation, unspecified type ([address]) [I48.91]  Chronic kidney disease, unspecified CKD stage [N18.9]                   Date / Time: 7/11/2023  5:12 PM    Patient Admission Status: Inpatient   Readmission Risk (Low < 19, Mod (19-27), High > 27): Readmission Risk Score: 14.8    Current PCP: Janae Geronimo MD  PCP verified by CM? Yes    Chart Reviewed: Yes      History Provided by: Patient  Patient Orientation: Alert and Oriented    Patient Cognition: Alert    Hospitalization in the last 30 days (Readmission):  No    If yes, Readmission Assessment in  Navigator will be completed.     Advance Directives:      Code Status: Full Code   Patient's Primary Decision Maker is: Legal Next of Kin    Primary Decision Maker: Bruna Steven - Spouse - [phone]    Discharge Planning:    Patient lives with: Spouse/Significant Other Type of Home: House  Primary Care Giver: Spouse  Patient Support Systems include: Spouse/Significant Other, Family Members   Current Financial resources:    Current community
Transition of care update. Discharge order noted. Recommendations for [facility] for PT/OT. Patient Agreeable. He thinks he has [facility] in the past, so referral made to liaedie Black. Checking if able to accept. If not able, patient has no other preference. Patient arranging a ride home with family. CM will follow. Electronically signed by Isaura Wilkes RN on 7/14/2023 at 9:33 AM    Addendum 1045: Accepted per [facility] at [facility] for PT/OT. They will call patient with start of care. Updated patient and RN. Orders in epic. MB    The Plan for Transition of Care is related to the following treatment goals: increase strength    The Patient was provided with a choice of provider and agrees   with the discharge plan. [x] Yes [] No    Freedom of choice list was provided with basic dialogue that supports the patient's individualized plan of care/goals, treatment preferences and shares the quality data associated with the providers.  [x] Yes [] No
No

## 2023-08-01 NOTE — ED ADULT NURSE NOTE - PAIN RATING/NUMBER SCALE (0-10): ACTIVITY
What Type Of Note Output Would You Prefer (Optional)?: Bullet Format How Severe Is Your Skin Lesion?: mild Has Your Skin Lesion Been Treated?: not been treated Is This A New Presentation, Or A Follow-Up?: Skin Lesions 0
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
## 2024-04-23 NOTE — DIETITIAN INITIAL EVALUATION ADULT. - FACTORS AFF FOOD INTAKE
"FUTURE VISIT INFORMATION      FUTURE VISIT INFORMATION:  Date: 7/22/24  Time: 9AM  [place] [facility] [place]  REFERRAL INFORMATION:  Referring provider:  Hanane Nolasco APRN CNP  Referring providers clinic:  formerly [facility]   Reason for visit/diagnosis  Per Pt, dx change in voice, he is starting to lose his ability to speak, started in the summer, referral from   Hanane Nolasco APRN CNP recs in epic     RECORDS REQUESTED FROM:       Clinic name Comments Records Status Imaging Status   formerly [facility]  12/18/23, 12/8/23 - Mychart Hanane Nolasco APRN CNP  12/8/23- Ov Arianna Post PA-C   10/2/19- Ov Shannon Estrada MD  [facility]  12/25/23- ED  Epic     imaging 12/25/23- CT Neck   12/8/23- MRA Neck  [facility]  PACS            \"Please notify/message CSS if patient completed outside imaging prior to scheduled appointment and/or any outside records that might have been missed at pre visit -Thank you\"  " none

## 2024-08-07 NOTE — SWALLOW BEDSIDE ASSESSMENT ADULT - SLP GENERAL OBSERVATIONS
Spontaneous, unlabored and symmetrical
Of Note: Oral Mechanism exam-Patient is noted with pieces of beef on the tongue and buccal surface when patient opened his mouth for viewing.  Patient had PO lunch meal which is sitting at bedside tray table prior to Clinical Swallow Evaluation.

## 2025-02-11 NOTE — ED ADULT NURSE NOTE - CHIEF COMPLAINT QUOTE
SWAT was called to help with new IV and Blood draw. Pt was noted to have audible respiratory sounds. Primary RN aware.        Thank you very much for coming.  It is nice to meet you.    Thank you for taking care of yourself.  I would like to contribute to your overall care.  I am glad to hear that you have been seeing Dr. Calle, EP, as well as Dr. Dupree, CARDIOLOGY, and they have been confident enough to tell you to just come back once a year.    Yes, if you have any refill needs, please contact the office, either by telephone, or through the Internet.  It is best to get in touch with us, instead of going directly to your pharmacy, because we will know exactly what you will need.    Your blood pressure is mildly elevated today.  Please continue to check your blood pressure at home while at rest.  Make sure that the first number stays 139 or less, and make sure that the second number stays 88 or less.  If you have any persistent elevations, please let me know, and I can give you advice until you are seen again by cardiology/EP.    Please continue to stay physically active.  AEROBIC activities are best for blood pressure control, and are very good for mood, energy, and weight management!  You look like you are able to exercise regularly.  Please continue to take care of yourself.    Likewise, please drink lots of fluids, and avoid salt.  Good for blood pressure control, and good for keeping your kidneys healthy.    I am glad to see that you saw GI, Dr. Andersen, and that he proceeded to do a biopsy.  With history of colon polyps and tubular adenoma, it is ideal that you have a repeat colonoscopy within the next 3 years.  I am glad that this is already scheduled.    I do understand that you have an enlarged prostate, and that sometimes, you have to get up in the middle of the night to urinate.  It will be time to repeat your PSA and urine studies in August.  Until then, try to limit your fluid intake by taking only sips of water after supper.  This way, you will not have to get up as often to urinate.    There are medications that we can  Pt states that he has "problems" with his feet, that they are unsteady and he had multiple recent falls, last one yesterday. Denies injury from falls of head trauma. Pt on Plavix. Denies chest pain/palpitations/dizziness/SOB. Ambulatory in triage with cane. Denies foot pain. try if symptoms persist, especially if you have to get up to urinate 3 or more times each night.  As we have discussed, they are not without risks, but they can be very helpful if needed.  Let me know if you would like to talk about this sooner.  We will also discuss it when you return in about 6 weeks.    Thank you for having your FASTING laboratory examinations done care of Dr. Platt.  Your kidney and liver functions were preserved.  Your cholesterol panel is close to ideal.  Please continue your current regimens.    Medicare recommends that you get your PNEUMONIA vaccination.  This is only once for your lifetime, and does not need to be repeated.  Schedule this with your local friendly pharmacist.  Ask for PREVNAR 20.    After 2 weeks, go ahead and schedule for your SHINGLES vaccination, which is a SHINGRIX series of 2 injections at least 1 month apart.  Again, recommended by Medicare, and already paid for by your insurance.    Please do not take more than 1 vaccination per visit.  Space them out at least 2 weeks apart.    Again, thank you very much for coming.  It is a pleasure to meet you.  Thank you for taking care of yourself.  Please let me help.  Call or text as needed.  Please come back in 6 weeks, so that we can do your Medicare Wellness evaluation for the year.  Go ahead and do NONFASTING blood and urine examinations a few days prior, so that we can also review the results at that point.    Please continue to take care of yourself and your family, and please continue to pray for our recovery from this pandemic.  See you in 6 weeks.  Call sooner please as needed.            0  Return in 6 weeks.  40 minutes please.  Nonfasting laboratory examinations via [employer], then see me soon after for Medicare Wellness evaluation for the year.  Reassess mood, energy, function, preventive strategies, cardiovascular risk.  Reassess urinary symptoms.  Coordinate with cardiology, EP, GI.            0

## 2025-05-08 NOTE — PATIENT PROFILE ADULT. - PRO SERVICES AT DIS[facility]
Patient cooperative at this time. But has been refusing meds. No indication to force meds.     Get Hernandez RN  05/07/25 9835     none

## 2025-06-19 NOTE — ED PROVIDER NOTE - CRITERIA ADMIT PEDS PT
No - [facility] not available.    - Per RD note from previous admission (5/01/2025), patient's weight documented to be 228lbs. Per chart, patient's current dosing weight 231lbs (6/16/2025).     - Patient confirmed dosing weight, and reports 25lbs unintentional weight loss x one month. 9.7% weight loss x 1 month indicated.